# Patient Record
Sex: FEMALE | Race: WHITE | Employment: UNEMPLOYED | URBAN - METROPOLITAN AREA
[De-identification: names, ages, dates, MRNs, and addresses within clinical notes are randomized per-mention and may not be internally consistent; named-entity substitution may affect disease eponyms.]

---

## 2019-01-08 ENCOUNTER — HOSPITAL ENCOUNTER (OUTPATIENT)
Dept: MRI IMAGING | Age: 54
Discharge: HOME | End: 2019-01-10
Payer: COMMERCIAL

## 2019-01-08 ENCOUNTER — HOSPITAL ENCOUNTER (OUTPATIENT)
Dept: INTERVENTIONAL RADIOLOGY/VASCULAR | Age: 54
Discharge: HOME | End: 2019-01-08
Payer: COMMERCIAL

## 2019-01-08 DIAGNOSIS — G89.29 CHRONIC LEFT SHOULDER PAIN: ICD-10-CM

## 2019-01-08 DIAGNOSIS — M25.512 LEFT SHOULDER PAIN, UNSPECIFIED CHRONICITY: ICD-10-CM

## 2019-01-08 DIAGNOSIS — M25.512 CHRONIC LEFT SHOULDER PAIN: ICD-10-CM

## 2019-01-08 PROCEDURE — 77002 NEEDLE LOCALIZATION BY XRAY: CPT

## 2019-01-08 PROCEDURE — A9577 INJ MULTIHANCE: HCPCS | Performed by: RADIOLOGY

## 2019-01-08 PROCEDURE — 73222 MRI JOINT UPR EXTREM W/DYE: CPT

## 2019-01-08 PROCEDURE — 2500000003 HC RX 250 WO HCPCS: Performed by: RADIOLOGY

## 2019-01-08 PROCEDURE — 6360000004 HC RX CONTRAST MEDICATION: Performed by: RADIOLOGY

## 2019-01-08 PROCEDURE — 23350 INJECTION FOR SHOULDER X-RAY: CPT

## 2019-01-08 RX ORDER — LIDOCAINE HYDROCHLORIDE 10 MG/ML
10 INJECTION, SOLUTION INFILTRATION; PERINEURAL ONCE
Status: COMPLETED | OUTPATIENT
Start: 2019-01-08 | End: 2019-01-08

## 2019-01-08 RX ADMIN — GADOBENATE DIMEGLUMINE 1 ML: 529 INJECTION, SOLUTION INTRAVENOUS at 13:14

## 2019-01-08 RX ADMIN — IOPAMIDOL 3 ML: 408 INJECTION, SOLUTION INTRATHECAL at 13:14

## 2019-01-08 RX ADMIN — LIDOCAINE HYDROCHLORIDE 10 ML: 10 INJECTION, SOLUTION INFILTRATION; PERINEURAL at 13:13

## 2019-01-08 ASSESSMENT — PAIN SCALES - GENERAL: PAINLEVEL_OUTOF10: 10

## 2019-10-11 ENCOUNTER — TELEPHONE (OUTPATIENT)
Dept: FAMILY MEDICINE CLINIC | Age: 54
End: 2019-10-11

## 2019-10-16 ENCOUNTER — OFFICE VISIT (OUTPATIENT)
Dept: FAMILY MEDICINE CLINIC | Age: 54
End: 2019-10-16
Payer: MEDICAID

## 2019-10-16 ENCOUNTER — HOSPITAL ENCOUNTER (OUTPATIENT)
Age: 54
Discharge: HOME | End: 2019-10-18
Payer: MEDICAID

## 2019-10-16 VITALS
WEIGHT: 255 LBS | SYSTOLIC BLOOD PRESSURE: 136 MMHG | TEMPERATURE: 96.6 F | BODY MASS INDEX: 45.18 KG/M2 | DIASTOLIC BLOOD PRESSURE: 84 MMHG | OXYGEN SATURATION: 99 % | HEIGHT: 63 IN | HEART RATE: 88 BPM

## 2019-10-16 DIAGNOSIS — F32.A ANXIETY AND DEPRESSION: ICD-10-CM

## 2019-10-16 DIAGNOSIS — Z23 NEED FOR TDAP VACCINATION: ICD-10-CM

## 2019-10-16 DIAGNOSIS — E66.01 CLASS 3 SEVERE OBESITY DUE TO EXCESS CALORIES WITH BODY MASS INDEX (BMI) OF 45.0 TO 49.9 IN ADULT, UNSPECIFIED WHETHER SERIOUS COMORBIDITY PRESENT (HCC): ICD-10-CM

## 2019-10-16 DIAGNOSIS — Z12.31 ENCOUNTER FOR SCREENING MAMMOGRAM FOR BREAST CANCER: ICD-10-CM

## 2019-10-16 DIAGNOSIS — G89.29 CHRONIC RIGHT-SIDED LOW BACK PAIN WITH RIGHT-SIDED SCIATICA: ICD-10-CM

## 2019-10-16 DIAGNOSIS — M25.551 RIGHT HIP PAIN: ICD-10-CM

## 2019-10-16 DIAGNOSIS — Z76.89 ENCOUNTER TO ESTABLISH CARE: Primary | ICD-10-CM

## 2019-10-16 DIAGNOSIS — M54.41 CHRONIC RIGHT-SIDED LOW BACK PAIN WITH RIGHT-SIDED SCIATICA: ICD-10-CM

## 2019-10-16 DIAGNOSIS — G47.00 INSOMNIA, UNSPECIFIED TYPE: ICD-10-CM

## 2019-10-16 DIAGNOSIS — Z12.11 SCREEN FOR COLON CANCER: ICD-10-CM

## 2019-10-16 DIAGNOSIS — F41.9 ANXIETY AND DEPRESSION: ICD-10-CM

## 2019-10-16 PROBLEM — E66.813 CLASS 3 SEVERE OBESITY DUE TO EXCESS CALORIES WITH BODY MASS INDEX (BMI) OF 45.0 TO 49.9 IN ADULT: Status: ACTIVE | Noted: 2019-10-16

## 2019-10-16 LAB
ALBUMIN SERPL-MCNC: 4.7 G/DL (ref 3.5–5.2)
ALP BLD-CCNC: 80 U/L (ref 35–104)
ALT SERPL-CCNC: 20 U/L (ref 0–32)
ANION GAP SERPL CALCULATED.3IONS-SCNC: 17 MMOL/L (ref 7–16)
AST SERPL-CCNC: 22 U/L (ref 0–31)
BASOPHILS ABSOLUTE: 0.05 E9/L (ref 0–0.2)
BASOPHILS RELATIVE PERCENT: 0.9 % (ref 0–2)
BILIRUB SERPL-MCNC: 0.4 MG/DL (ref 0–1.2)
BUN BLDV-MCNC: 18 MG/DL (ref 6–20)
CALCIUM SERPL-MCNC: 10.2 MG/DL (ref 8.6–10.2)
CHLORIDE BLD-SCNC: 96 MMOL/L (ref 98–107)
CHOLESTEROL, TOTAL: 295 MG/DL (ref 0–199)
CO2: 29 MMOL/L (ref 22–29)
CREAT SERPL-MCNC: 1 MG/DL (ref 0.5–1)
EOSINOPHILS ABSOLUTE: 0.09 E9/L (ref 0.05–0.5)
EOSINOPHILS RELATIVE PERCENT: 1.6 % (ref 0–6)
GFR AFRICAN AMERICAN: >60
GFR NON-AFRICAN AMERICAN: 57 ML/MIN/1.73
GLUCOSE BLD-MCNC: 112 MG/DL (ref 74–99)
HBA1C MFR BLD: 5.7 % (ref 4–5.6)
HCT VFR BLD CALC: 48 % (ref 34–48)
HDLC SERPL-MCNC: 60 MG/DL
HEMOGLOBIN: 14.8 G/DL (ref 11.5–15.5)
IMMATURE GRANULOCYTES #: 0.01 E9/L
IMMATURE GRANULOCYTES %: 0.2 % (ref 0–5)
LDL CHOLESTEROL CALCULATED: 203 MG/DL (ref 0–99)
LYMPHOCYTES ABSOLUTE: 2.08 E9/L (ref 1.5–4)
LYMPHOCYTES RELATIVE PERCENT: 37.9 % (ref 20–42)
MCH RBC QN AUTO: 27.3 PG (ref 26–35)
MCHC RBC AUTO-ENTMCNC: 30.8 % (ref 32–34.5)
MCV RBC AUTO: 88.6 FL (ref 80–99.9)
MONOCYTES ABSOLUTE: 0.49 E9/L (ref 0.1–0.95)
MONOCYTES RELATIVE PERCENT: 8.9 % (ref 2–12)
NEUTROPHILS ABSOLUTE: 2.77 E9/L (ref 1.8–7.3)
NEUTROPHILS RELATIVE PERCENT: 50.5 % (ref 43–80)
PDW BLD-RTO: 13.9 FL (ref 11.5–15)
PLATELET # BLD: 302 E9/L (ref 130–450)
PMV BLD AUTO: 10.8 FL (ref 7–12)
POTASSIUM SERPL-SCNC: 4.2 MMOL/L (ref 3.5–5)
RBC # BLD: 5.42 E12/L (ref 3.5–5.5)
SODIUM BLD-SCNC: 142 MMOL/L (ref 132–146)
TOTAL PROTEIN: 8.1 G/DL (ref 6.4–8.3)
TRIGL SERPL-MCNC: 161 MG/DL (ref 0–149)
TSH SERPL DL<=0.05 MIU/L-ACNC: 4.09 UIU/ML (ref 0.27–4.2)
VLDLC SERPL CALC-MCNC: 32 MG/DL
WBC # BLD: 5.5 E9/L (ref 4.5–11.5)

## 2019-10-16 PROCEDURE — 80053 COMPREHEN METABOLIC PANEL: CPT

## 2019-10-16 PROCEDURE — 36415 COLL VENOUS BLD VENIPUNCTURE: CPT

## 2019-10-16 PROCEDURE — 90715 TDAP VACCINE 7 YRS/> IM: CPT | Performed by: FAMILY MEDICINE

## 2019-10-16 PROCEDURE — 90471 IMMUNIZATION ADMIN: CPT | Performed by: FAMILY MEDICINE

## 2019-10-16 PROCEDURE — G8484 FLU IMMUNIZE NO ADMIN: HCPCS | Performed by: FAMILY MEDICINE

## 2019-10-16 PROCEDURE — 80061 LIPID PANEL: CPT

## 2019-10-16 PROCEDURE — 1036F TOBACCO NON-USER: CPT | Performed by: FAMILY MEDICINE

## 2019-10-16 PROCEDURE — 99203 OFFICE O/P NEW LOW 30 MIN: CPT | Performed by: FAMILY MEDICINE

## 2019-10-16 PROCEDURE — 83036 HEMOGLOBIN GLYCOSYLATED A1C: CPT

## 2019-10-16 PROCEDURE — 86803 HEPATITIS C AB TEST: CPT

## 2019-10-16 PROCEDURE — G8427 DOCREV CUR MEDS BY ELIG CLIN: HCPCS | Performed by: FAMILY MEDICINE

## 2019-10-16 PROCEDURE — 86703 HIV-1/HIV-2 1 RESULT ANTBDY: CPT

## 2019-10-16 PROCEDURE — G8417 CALC BMI ABV UP PARAM F/U: HCPCS | Performed by: FAMILY MEDICINE

## 2019-10-16 PROCEDURE — 84443 ASSAY THYROID STIM HORMONE: CPT

## 2019-10-16 PROCEDURE — 3017F COLORECTAL CA SCREEN DOC REV: CPT | Performed by: FAMILY MEDICINE

## 2019-10-16 PROCEDURE — 85025 COMPLETE CBC W/AUTO DIFF WBC: CPT

## 2019-10-16 RX ORDER — CLONAZEPAM 1 MG/1
1 TABLET ORAL 2 TIMES DAILY PRN
Refills: 2 | COMMUNITY
Start: 2019-09-19

## 2019-10-16 RX ORDER — ZOLPIDEM TARTRATE 10 MG/1
TABLET ORAL
Refills: 2 | COMMUNITY
Start: 2019-09-28

## 2019-10-16 RX ORDER — BUPROPION HYDROCHLORIDE 300 MG/1
300 TABLET ORAL EVERY MORNING
Refills: 2 | COMMUNITY
Start: 2019-09-23 | End: 2021-01-13

## 2019-10-16 RX ORDER — DULOXETIN HYDROCHLORIDE 60 MG/1
60 CAPSULE, DELAYED RELEASE ORAL DAILY
Refills: 2 | COMMUNITY
Start: 2019-09-28

## 2019-10-16 RX ORDER — INDOMETHACIN 75 MG/1
CAPSULE, EXTENDED RELEASE ORAL
Refills: 3 | COMMUNITY
Start: 2019-09-30 | End: 2019-11-04

## 2019-10-16 SDOH — HEALTH STABILITY: MENTAL HEALTH: HOW MANY STANDARD DRINKS CONTAINING ALCOHOL DO YOU HAVE ON A TYPICAL DAY?: 1 OR 2

## 2019-10-16 SDOH — HEALTH STABILITY: MENTAL HEALTH: HOW OFTEN DO YOU HAVE A DRINK CONTAINING ALCOHOL?: MONTHLY OR LESS

## 2019-10-16 ASSESSMENT — ENCOUNTER SYMPTOMS
SORE THROAT: 0
ABDOMINAL PAIN: 0
NAUSEA: 0
RHINORRHEA: 0
VOMITING: 0
CONSTIPATION: 0
COUGH: 0
SINUS PAIN: 0
SHORTNESS OF BREATH: 0
DIARRHEA: 0
BACK PAIN: 1

## 2019-10-16 ASSESSMENT — PATIENT HEALTH QUESTIONNAIRE - PHQ9
SUM OF ALL RESPONSES TO PHQ QUESTIONS 1-9: 2
2. FEELING DOWN, DEPRESSED OR HOPELESS: 1
SUM OF ALL RESPONSES TO PHQ9 QUESTIONS 1 & 2: 2
SUM OF ALL RESPONSES TO PHQ QUESTIONS 1-9: 2
1. LITTLE INTEREST OR PLEASURE IN DOING THINGS: 1

## 2019-10-17 LAB
HEPATITIS C ANTIBODY INTERPRETATION: NORMAL
HIV-1 AND HIV-2 ANTIBODIES: NORMAL

## 2019-10-30 ENCOUNTER — OFFICE VISIT (OUTPATIENT)
Dept: FAMILY MEDICINE CLINIC | Age: 54
End: 2019-10-30
Payer: MEDICAID

## 2019-10-30 VITALS
HEART RATE: 76 BPM | OXYGEN SATURATION: 98 % | DIASTOLIC BLOOD PRESSURE: 84 MMHG | SYSTOLIC BLOOD PRESSURE: 136 MMHG | BODY MASS INDEX: 45.54 KG/M2 | HEIGHT: 63 IN | WEIGHT: 257 LBS | TEMPERATURE: 96.9 F

## 2019-10-30 DIAGNOSIS — M47.816 LUMBAR SPONDYLOSIS: ICD-10-CM

## 2019-10-30 DIAGNOSIS — R73.03 PREDIABETES: ICD-10-CM

## 2019-10-30 DIAGNOSIS — E66.01 CLASS 3 SEVERE OBESITY DUE TO EXCESS CALORIES WITH BODY MASS INDEX (BMI) OF 45.0 TO 49.9 IN ADULT, UNSPECIFIED WHETHER SERIOUS COMORBIDITY PRESENT (HCC): ICD-10-CM

## 2019-10-30 DIAGNOSIS — E78.2 MIXED HYPERLIPIDEMIA: Primary | ICD-10-CM

## 2019-10-30 DIAGNOSIS — M43.16 SPONDYLOLISTHESIS OF LUMBAR REGION: ICD-10-CM

## 2019-10-30 PROCEDURE — G8484 FLU IMMUNIZE NO ADMIN: HCPCS | Performed by: FAMILY MEDICINE

## 2019-10-30 PROCEDURE — 3017F COLORECTAL CA SCREEN DOC REV: CPT | Performed by: FAMILY MEDICINE

## 2019-10-30 PROCEDURE — 1036F TOBACCO NON-USER: CPT | Performed by: FAMILY MEDICINE

## 2019-10-30 PROCEDURE — G8427 DOCREV CUR MEDS BY ELIG CLIN: HCPCS | Performed by: FAMILY MEDICINE

## 2019-10-30 PROCEDURE — 99213 OFFICE O/P EST LOW 20 MIN: CPT | Performed by: FAMILY MEDICINE

## 2019-10-30 PROCEDURE — G8417 CALC BMI ABV UP PARAM F/U: HCPCS | Performed by: FAMILY MEDICINE

## 2019-10-30 RX ORDER — ATORVASTATIN CALCIUM 10 MG/1
10 TABLET, FILM COATED ORAL DAILY
Qty: 30 TABLET | Refills: 5 | Status: SHIPPED
Start: 2019-10-30 | End: 2020-04-29 | Stop reason: SDUPTHER

## 2019-11-01 PROBLEM — M47.816 LUMBAR SPONDYLOSIS: Status: ACTIVE | Noted: 2019-11-01

## 2019-11-01 PROBLEM — M43.16 SPONDYLOLISTHESIS OF LUMBAR REGION: Status: ACTIVE | Noted: 2019-11-01

## 2019-11-01 PROBLEM — R73.03 PREDIABETES: Status: ACTIVE | Noted: 2019-11-01

## 2019-11-01 ASSESSMENT — ENCOUNTER SYMPTOMS
SHORTNESS OF BREATH: 0
DIARRHEA: 0
NAUSEA: 0
CONSTIPATION: 0
BACK PAIN: 1
WHEEZING: 0
VOMITING: 0
COUGH: 0

## 2019-11-04 ENCOUNTER — OFFICE VISIT (OUTPATIENT)
Dept: PHYSICAL MEDICINE AND REHAB | Age: 54
End: 2019-11-04
Payer: MEDICAID

## 2019-11-04 VITALS
BODY MASS INDEX: 46.25 KG/M2 | SYSTOLIC BLOOD PRESSURE: 128 MMHG | WEIGHT: 261 LBS | DIASTOLIC BLOOD PRESSURE: 84 MMHG | HEART RATE: 82 BPM | HEIGHT: 63 IN

## 2019-11-04 DIAGNOSIS — M47.819 FACET ARTHROPATHY: ICD-10-CM

## 2019-11-04 DIAGNOSIS — M47.26 OTHER SPONDYLOSIS WITH RADICULOPATHY, LUMBAR REGION: ICD-10-CM

## 2019-11-04 DIAGNOSIS — M47.816 LUMBAR SPONDYLOSIS: Primary | ICD-10-CM

## 2019-11-04 DIAGNOSIS — M21.70 LEG LENGTH DISCREPANCY: ICD-10-CM

## 2019-11-04 PROCEDURE — G8427 DOCREV CUR MEDS BY ELIG CLIN: HCPCS | Performed by: PHYSICAL MEDICINE & REHABILITATION

## 2019-11-04 PROCEDURE — 3017F COLORECTAL CA SCREEN DOC REV: CPT | Performed by: PHYSICAL MEDICINE & REHABILITATION

## 2019-11-04 PROCEDURE — G8417 CALC BMI ABV UP PARAM F/U: HCPCS | Performed by: PHYSICAL MEDICINE & REHABILITATION

## 2019-11-04 PROCEDURE — G8484 FLU IMMUNIZE NO ADMIN: HCPCS | Performed by: PHYSICAL MEDICINE & REHABILITATION

## 2019-11-04 PROCEDURE — 99204 OFFICE O/P NEW MOD 45 MIN: CPT | Performed by: PHYSICAL MEDICINE & REHABILITATION

## 2019-11-04 PROCEDURE — 1036F TOBACCO NON-USER: CPT | Performed by: PHYSICAL MEDICINE & REHABILITATION

## 2019-11-11 ENCOUNTER — EVALUATION (OUTPATIENT)
Dept: PHYSICAL THERAPY | Age: 54
End: 2019-11-11
Payer: MEDICAID

## 2019-11-11 DIAGNOSIS — M47.819 FACET ARTHROPATHY: ICD-10-CM

## 2019-11-11 DIAGNOSIS — M47.26 OTHER SPONDYLOSIS WITH RADICULOPATHY, LUMBAR REGION: ICD-10-CM

## 2019-11-11 DIAGNOSIS — M47.816 LUMBAR SPONDYLOSIS: Primary | ICD-10-CM

## 2019-11-11 DIAGNOSIS — M21.70 LEG LENGTH DISCREPANCY: ICD-10-CM

## 2019-11-11 PROCEDURE — 97163 PT EVAL HIGH COMPLEX 45 MIN: CPT | Performed by: PHYSICAL THERAPIST

## 2019-11-13 ENCOUNTER — TREATMENT (OUTPATIENT)
Dept: PHYSICAL THERAPY | Age: 54
End: 2019-11-13
Payer: MEDICAID

## 2019-11-13 DIAGNOSIS — M47.816 LUMBAR SPONDYLOSIS: Primary | ICD-10-CM

## 2019-11-13 PROCEDURE — 97110 THERAPEUTIC EXERCISES: CPT | Performed by: PHYSICAL THERAPIST

## 2019-11-13 PROCEDURE — G0283 ELEC STIM OTHER THAN WOUND: HCPCS | Performed by: PHYSICAL THERAPIST

## 2019-11-18 ENCOUNTER — TREATMENT (OUTPATIENT)
Dept: PHYSICAL THERAPY | Age: 54
End: 2019-11-18
Payer: MEDICAID

## 2019-11-18 DIAGNOSIS — M47.816 LUMBAR SPONDYLOSIS: Primary | ICD-10-CM

## 2019-11-18 PROCEDURE — 97530 THERAPEUTIC ACTIVITIES: CPT | Performed by: PHYSICAL THERAPIST

## 2019-11-18 PROCEDURE — G0283 ELEC STIM OTHER THAN WOUND: HCPCS | Performed by: PHYSICAL THERAPIST

## 2019-11-18 PROCEDURE — 97110 THERAPEUTIC EXERCISES: CPT | Performed by: PHYSICAL THERAPIST

## 2019-11-22 ENCOUNTER — TREATMENT (OUTPATIENT)
Dept: PHYSICAL THERAPY | Age: 54
End: 2019-11-22
Payer: MEDICAID

## 2019-11-22 DIAGNOSIS — M47.816 LUMBAR SPONDYLOSIS: Primary | ICD-10-CM

## 2019-11-22 PROCEDURE — G0283 ELEC STIM OTHER THAN WOUND: HCPCS | Performed by: PHYSICAL THERAPIST

## 2019-11-25 ENCOUNTER — TELEPHONE (OUTPATIENT)
Dept: PHYSICAL MEDICINE AND REHAB | Age: 54
End: 2019-11-25

## 2019-11-27 ENCOUNTER — TREATMENT (OUTPATIENT)
Dept: PHYSICAL THERAPY | Age: 54
End: 2019-11-27
Payer: MEDICAID

## 2019-11-27 DIAGNOSIS — M47.816 LUMBAR SPONDYLOSIS: Primary | ICD-10-CM

## 2019-11-27 DIAGNOSIS — M47.26 OTHER SPONDYLOSIS WITH RADICULOPATHY, LUMBAR REGION: ICD-10-CM

## 2019-11-27 DIAGNOSIS — M47.819 FACET ARTHROPATHY: ICD-10-CM

## 2019-11-27 DIAGNOSIS — M21.70 LEG LENGTH DISCREPANCY: ICD-10-CM

## 2019-11-27 PROCEDURE — 97110 THERAPEUTIC EXERCISES: CPT

## 2019-11-27 PROCEDURE — G0283 ELEC STIM OTHER THAN WOUND: HCPCS

## 2019-11-27 PROCEDURE — 97164 PT RE-EVAL EST PLAN CARE: CPT | Performed by: PHYSICAL THERAPIST

## 2019-12-10 ENCOUNTER — OFFICE VISIT (OUTPATIENT)
Dept: PHYSICAL MEDICINE AND REHAB | Age: 54
End: 2019-12-10
Payer: MEDICAID

## 2019-12-10 VITALS
HEIGHT: 62 IN | SYSTOLIC BLOOD PRESSURE: 130 MMHG | HEART RATE: 86 BPM | WEIGHT: 257 LBS | DIASTOLIC BLOOD PRESSURE: 96 MMHG | BODY MASS INDEX: 47.29 KG/M2

## 2019-12-10 DIAGNOSIS — M21.70 LEG LENGTH DISCREPANCY: ICD-10-CM

## 2019-12-10 DIAGNOSIS — M54.17 RADICULOPATHY, LUMBOSACRAL REGION: Primary | ICD-10-CM

## 2019-12-10 DIAGNOSIS — M47.26 OTHER SPONDYLOSIS WITH RADICULOPATHY, LUMBAR REGION: ICD-10-CM

## 2019-12-10 PROCEDURE — G8427 DOCREV CUR MEDS BY ELIG CLIN: HCPCS | Performed by: PHYSICAL MEDICINE & REHABILITATION

## 2019-12-10 PROCEDURE — 1036F TOBACCO NON-USER: CPT | Performed by: PHYSICAL MEDICINE & REHABILITATION

## 2019-12-10 PROCEDURE — G8417 CALC BMI ABV UP PARAM F/U: HCPCS | Performed by: PHYSICAL MEDICINE & REHABILITATION

## 2019-12-10 PROCEDURE — G8484 FLU IMMUNIZE NO ADMIN: HCPCS | Performed by: PHYSICAL MEDICINE & REHABILITATION

## 2019-12-10 PROCEDURE — 99214 OFFICE O/P EST MOD 30 MIN: CPT | Performed by: PHYSICAL MEDICINE & REHABILITATION

## 2019-12-10 PROCEDURE — 3017F COLORECTAL CA SCREEN DOC REV: CPT | Performed by: PHYSICAL MEDICINE & REHABILITATION

## 2019-12-10 RX ORDER — GABAPENTIN 300 MG/1
CAPSULE ORAL
Qty: 90 CAPSULE | Refills: 2 | Status: SHIPPED
Start: 2019-12-10 | End: 2020-02-24

## 2019-12-10 RX ORDER — CYCLOBENZAPRINE HCL 5 MG
5 TABLET ORAL NIGHTLY PRN
Qty: 30 TABLET | Refills: 2 | Status: SHIPPED | OUTPATIENT
Start: 2019-12-10 | End: 2020-01-09

## 2019-12-18 ENCOUNTER — TREATMENT (OUTPATIENT)
Dept: PHYSICAL THERAPY | Age: 54
End: 2019-12-18
Payer: MEDICAID

## 2019-12-18 DIAGNOSIS — M47.816 LUMBAR SPONDYLOSIS: Primary | ICD-10-CM

## 2019-12-18 PROCEDURE — G0283 ELEC STIM OTHER THAN WOUND: HCPCS

## 2019-12-18 PROCEDURE — 97110 THERAPEUTIC EXERCISES: CPT

## 2019-12-19 ENCOUNTER — HOSPITAL ENCOUNTER (OUTPATIENT)
Age: 54
Setting detail: OUTPATIENT SURGERY
Discharge: HOME | End: 2019-12-19
Attending: PHYSICAL MEDICINE & REHABILITATION | Admitting: PHYSICAL MEDICINE & REHABILITATION
Payer: MEDICAID

## 2019-12-19 VITALS
DIASTOLIC BLOOD PRESSURE: 76 MMHG | HEART RATE: 70 BPM | RESPIRATION RATE: 16 BRPM | SYSTOLIC BLOOD PRESSURE: 144 MMHG | OXYGEN SATURATION: 95 %

## 2019-12-19 DIAGNOSIS — M54.50 LUMBAR BACK PAIN: ICD-10-CM

## 2019-12-19 PROBLEM — M54.16 LUMBAR RADICULITIS: Status: ACTIVE | Noted: 2019-12-19

## 2019-12-19 PROCEDURE — 64483 NJX AA&/STRD TFRM EPI L/S 1: CPT | Performed by: PHYSICAL MEDICINE & REHABILITATION

## 2019-12-19 PROCEDURE — 7100000011 HC PHASE II RECOVERY - ADDTL 15 MIN: Performed by: PHYSICAL MEDICINE & REHABILITATION

## 2019-12-19 PROCEDURE — 2580000003 HC RX 258: Performed by: PHYSICAL MEDICINE & REHABILITATION

## 2019-12-19 PROCEDURE — 6360000004 HC RX CONTRAST MEDICATION: Performed by: PHYSICAL MEDICINE & REHABILITATION

## 2019-12-19 PROCEDURE — 2500000003 HC RX 250 WO HCPCS: Performed by: PHYSICAL MEDICINE & REHABILITATION

## 2019-12-19 PROCEDURE — 3600000005 HC SURGERY LEVEL 5 BASE: Performed by: PHYSICAL MEDICINE & REHABILITATION

## 2019-12-19 PROCEDURE — 3600000015 HC SURGERY LEVEL 5 ADDTL 15MIN: Performed by: PHYSICAL MEDICINE & REHABILITATION

## 2019-12-19 PROCEDURE — 7100000010 HC PHASE II RECOVERY - FIRST 15 MIN: Performed by: PHYSICAL MEDICINE & REHABILITATION

## 2019-12-19 PROCEDURE — 2709999900 HC NON-CHARGEABLE SUPPLY: Performed by: PHYSICAL MEDICINE & REHABILITATION

## 2019-12-19 PROCEDURE — 6360000002 HC RX W HCPCS: Performed by: PHYSICAL MEDICINE & REHABILITATION

## 2019-12-19 RX ORDER — LIDOCAINE HYDROCHLORIDE 10 MG/ML
INJECTION, SOLUTION EPIDURAL; INFILTRATION; INTRACAUDAL; PERINEURAL PRN
Status: DISCONTINUED | OUTPATIENT
Start: 2019-12-19 | End: 2019-12-19 | Stop reason: ALTCHOICE

## 2019-12-19 ASSESSMENT — PAIN - FUNCTIONAL ASSESSMENT: PAIN_FUNCTIONAL_ASSESSMENT: 0-10

## 2019-12-19 ASSESSMENT — PAIN DESCRIPTION - DESCRIPTORS: DESCRIPTORS: ACHING

## 2019-12-19 ASSESSMENT — PAIN SCALES - GENERAL
PAINLEVEL_OUTOF10: 2
PAINLEVEL_OUTOF10: 4

## 2019-12-19 ASSESSMENT — PAIN DESCRIPTION - LOCATION: LOCATION: BACK

## 2019-12-19 ASSESSMENT — PAIN DESCRIPTION - PAIN TYPE: TYPE: CHRONIC PAIN

## 2019-12-23 ENCOUNTER — TREATMENT (OUTPATIENT)
Dept: PHYSICAL THERAPY | Age: 54
End: 2019-12-23
Payer: MEDICAID

## 2019-12-23 DIAGNOSIS — M47.816 LUMBAR SPONDYLOSIS: Primary | ICD-10-CM

## 2019-12-23 PROCEDURE — G0283 ELEC STIM OTHER THAN WOUND: HCPCS

## 2019-12-23 PROCEDURE — 97110 THERAPEUTIC EXERCISES: CPT

## 2019-12-27 ENCOUNTER — TREATMENT (OUTPATIENT)
Dept: PHYSICAL THERAPY | Age: 54
End: 2019-12-27
Payer: MEDICAID

## 2019-12-27 DIAGNOSIS — M47.816 LUMBAR SPONDYLOSIS: Primary | ICD-10-CM

## 2019-12-27 PROCEDURE — 97110 THERAPEUTIC EXERCISES: CPT | Performed by: PHYSICAL THERAPIST

## 2019-12-27 PROCEDURE — G0283 ELEC STIM OTHER THAN WOUND: HCPCS | Performed by: PHYSICAL THERAPIST

## 2020-01-07 ENCOUNTER — TREATMENT (OUTPATIENT)
Dept: PHYSICAL THERAPY | Age: 55
End: 2020-01-07
Payer: MEDICAID

## 2020-01-07 PROCEDURE — 97110 THERAPEUTIC EXERCISES: CPT

## 2020-01-07 PROCEDURE — G0283 ELEC STIM OTHER THAN WOUND: HCPCS

## 2020-01-13 ENCOUNTER — OFFICE VISIT (OUTPATIENT)
Dept: PHYSICAL MEDICINE AND REHAB | Age: 55
End: 2020-01-13
Payer: MEDICAID

## 2020-01-13 VITALS
HEART RATE: 95 BPM | SYSTOLIC BLOOD PRESSURE: 130 MMHG | WEIGHT: 269 LBS | HEIGHT: 63 IN | BODY MASS INDEX: 47.66 KG/M2 | DIASTOLIC BLOOD PRESSURE: 83 MMHG

## 2020-01-13 PROCEDURE — G8484 FLU IMMUNIZE NO ADMIN: HCPCS | Performed by: PHYSICAL MEDICINE & REHABILITATION

## 2020-01-13 PROCEDURE — 1036F TOBACCO NON-USER: CPT | Performed by: PHYSICAL MEDICINE & REHABILITATION

## 2020-01-13 PROCEDURE — 3017F COLORECTAL CA SCREEN DOC REV: CPT | Performed by: PHYSICAL MEDICINE & REHABILITATION

## 2020-01-13 PROCEDURE — G8427 DOCREV CUR MEDS BY ELIG CLIN: HCPCS | Performed by: PHYSICAL MEDICINE & REHABILITATION

## 2020-01-13 PROCEDURE — 99214 OFFICE O/P EST MOD 30 MIN: CPT | Performed by: PHYSICAL MEDICINE & REHABILITATION

## 2020-01-13 PROCEDURE — G8417 CALC BMI ABV UP PARAM F/U: HCPCS | Performed by: PHYSICAL MEDICINE & REHABILITATION

## 2020-01-13 NOTE — H&P
Eriberto Sanchez, [address] [facility] Physical Medicine and Rehabilitation  [address] [facility] [facility] [place]  Phone: [phone]  Fax: [phone]    PCP: Ben Goyal DO  Date of visit: 1/13/20    Chief Complaint   Patient presents with    Back Pain     2 week follow up after santosh     Interval:   Patient presents today for injection follow up. She underwent right L3-4 TFESI and reports 100% relief in her right hip and leg pain. Today she complains of right low back pain over the SI joint. The pain is rated Pain Score:   2, is described as sharp. The pain is better with sitting in recliner with heating pad. The pain is worse with staying in one position too long. There is associated tingling in right leg- improved. There is no weakness. There is no bowel/bladder changes. The prior workup has included: Xray, MRI L Spine     The prior treatment has included:  PT: currently   Chiropractic: none    Modalities: heat with relief   OTC Tylenol: yes with no relief   NSAIDS: ibuprofen, indocin with no relief, voltaren with relief  Opioids: none    Membrane stabilizers: neurontin 300mg TID with relief   Muscle relaxers: flexeril with relief   Previous injections: right L3-4 TFESI    Previous surgery at this site: none    No Known Allergies    Current Outpatient Medications   Medication Sig Dispense Refill    gabapentin (NEURONTIN) 300 MG capsule Take 1 capsule by mouth nightly for 3 days, THEN 1 capsule 2 times daily for 3 days, THEN 1 capsule 3 times daily for 30 days.  90 capsule 2    diclofenac (VOLTAREN) 50 MG EC tablet Take 1 tablet by mouth 2 times daily as needed for Pain 60 tablet 3    atorvastatin (LIPITOR) 10 MG tablet Take 1 tablet by mouth daily 30 tablet 5    buPROPion (WELLBUTRIN XL) 300 MG extended release tablet TAKE 1 TABLET BY MOUTH ONCE DAILY  2    clonazePAM (KLONOPIN) 1 MG tablet TAKE 1 TABLET BY MOUTH THREE TIMES DAILY AS NEEDED  2    DULoxetine (CYMBALTA) 60 MG disturbance, anxiety, depression    Hematologic/Lymphatic/Immunologic: Denies bruising       Physical Exam:   Blood pressure 130/83, pulse 95, height 5' 3\" (1.6 m), weight 269 lb (122 kg). General: well developed and well nourished in no acute distress. Body habitus is morbidly obese  HEENT: No rhinorrhea, sneezing, yawning, or lacrimation. No scleral icterus or conjunctival injection. Resp: symmetrical chest expansion, unlabored breathing, respirations unlabored. CV: Heart rate is regular. Peripheral pulses are palpable  Lymph: No visible regional lymphadenopathy. Skin: No rashes or ecchymosis. Normal turgor. Psych: Mood is calm. Affect is normal.   Ext: No edema noted     MSK:   Back/Hip Exam:   Inspection: Pelvis was asymmetric. Lumbar lordotic curvature was decreased. There was no scoliosis. No ecchymoses or erythema. Palpation: Palpatory exam revealed no tenderness along right lumbosacral paraspinals, no ttp midline spine  ttp right SI joint sulcus, no ttp greater trochanters and TFL on both side. There was paraspinal spasms. There were no trigger points. ROM: ROM decreased  Special/provocative testing:   Supine SLR negative   negative FABERS. Positive Gaenslen's   There was leg length discrepancy     Neurological Exam:  Strength:   R  L  Hip Flex  5  5  Knee Ext  5  5  Ankle dorsi  4+  5  EHL   5 5  Ankle Plantar  5  5    Sensory:  Intact for light touch in all lower extremity dermatomes. Reflexes:   R  L  Patellar  (0) (2+)  Ankle Jerk  (2+) (2+)    Gait is Antalgic. Right foot externally rotated      Imaging: (personally reviewed by me 01/13/20)  Lumbar x-ray     MRI L Spine      BONE MARROW SIGNAL: There is normal marrow signal.       T12-L1: Normal T12-L1.       L1-L2: Normal L1-L2.       L2-L3: Facet hypertrophy noted without canal or foraminal stenosis.       L3-L4: Grade 1 listhesis of L3 noted with a disc bulge/pseudobulge.    Mild canal stenosis noted with moderate left and severe

## 2020-01-13 NOTE — PROGRESS NOTES
Darryl Hull, [address] [facility] Physical Medicine and Rehabilitation  [address] [facility] [place]  Phone: [phone]  Fax: [phone]    PCP: Lev Olivia DO  Date of visit: 1/13/20    Chief Complaint   Patient presents with    Back Pain     2 week follow up after santosh     Interval:   Patient presents today for injection follow up. She underwent right L3-4 TFESI and reports 100% relief in her right hip and leg pain. Today she complains of right low back pain over the SI joint. The pain is rated Pain Score:   2, is described as sharp. The pain is better with sitting in recliner with heating pad. The pain is worse with staying in one position too long. There is associated tingling in right leg- improved. There is no weakness. There is no bowel/bladder changes. The prior workup has included: Xray, MRI L Spine     The prior treatment has included:  PT: currently   Chiropractic: none    Modalities: heat with relief   OTC Tylenol: yes with no relief   NSAIDS: ibuprofen, indocin with no relief, voltaren with relief  Opioids: none    Membrane stabilizers: neurontin 300mg TID with relief   Muscle relaxers: flexeril with relief   Previous injections: right L3-4 TFESI    Previous surgery at this site: none    No Known Allergies    Current Outpatient Medications   Medication Sig Dispense Refill    gabapentin (NEURONTIN) 300 MG capsule Take 1 capsule by mouth nightly for 3 days, THEN 1 capsule 2 times daily for 3 days, THEN 1 capsule 3 times daily for 30 days.  90 capsule 2    diclofenac (VOLTAREN) 50 MG EC tablet Take 1 tablet by mouth 2 times daily as needed for Pain 60 tablet 3    atorvastatin (LIPITOR) 10 MG tablet Take 1 tablet by mouth daily 30 tablet 5    buPROPion (WELLBUTRIN XL) 300 MG extended release tablet TAKE 1 TABLET BY MOUTH ONCE DAILY  2    clonazePAM (KLONOPIN) 1 MG tablet TAKE 1 TABLET BY MOUTH THREE TIMES DAILY AS NEEDED  2    DULoxetine (CYMBALTA) 60 MG extended release capsule TAKE 2 CAPSULES BY MOUTH ONCE DAILY AS DIRECTED  2    zolpidem (AMBIEN) 10 MG tablet TAKE 1 2 (ONE HALF) TO 1 WHOLE TABLET BY MOUTH AT BEDTIME AS DIRECETED ON EMPTY STOMACH  2    Probiotic Product (PROBIOTIC-10 PO) Take by mouth       No current facility-administered medications for this visit. Past Medical History:   Diagnosis Date    Anxiety     Depression     Obesity     Osteoarthritis        Past Surgical History:   Procedure Laterality Date    ANESTHESIA NERVE BLOCK Right 12/19/2019    RIGHT L3-4 TRANSFORAMINAL EPIDURAL STEROID INJECTION (CPT 60297) performed by Sharmaine Mayes DO at [address] [place] [address] Right 2015    NERVE BLOCK Right 12/19/2019    lumbar trasnforaminal    ROTATOR CUFF REPAIR Right 2000    SHOULDER SURGERY Left 2019       Family History   Problem Relation Age of Onset    Diabetes Mother     Atrial Fibrillation Mother     High Blood Pressure Mother     High Cholesterol Mother     Diabetes Father     Breast Cancer Maternal Grandmother        Social History     Tobacco Use    Smoking status: Never Smoker    Smokeless tobacco: Never Used   Substance Use Topics    Alcohol use: Yes     Frequency: Monthly or less     Drinks per session: 1 or 2     Binge frequency: Never    Drug use: Never        Functional Status: The patient is able to ambulate and perform activities of daily living with the use of an assistive device- cane. ROS:    Constitutional: Denies fevers, chills, night sweats, unintentional weight loss     Skin: Denies rash or skin changes     Eyes: Denies vision changes    Ears/Nose/Throat: Denies nasal congestion or sore throat     Respiratory: Denies SOB or cough     Cardiovascular: Denies CP, palpitations, edema      Gastrointestinal: Denies abdominal pain,  N/V, constipation, or diarrhea    Genitourinary: Denies urinary symptoms    Neurologic: See HPI.     MSK: See HPI.      Psychiatric: + sleep right   foraminal stenosis. Synovial facet joint effusions are seen   bilaterally.       L4-L5: Minimal retrolisthesis of L4 with facet hypertrophy. Severe   left and mild right foraminal stenosis without canal stenosis.       L5-S1: Facet hypertrophy without canal or foraminal stenosis.       SOFT TISSUES: The visualized paravertebral soft tissues are within   normal limits.           Impression   1. Grade 1 spondylolisthesis of L3 with severe facet synovitis. 2. Multilevel canal and foraminal stenosis most severe on the left   from L3-4 through L5-S1 as discussed. Impression:   Sukhdev Manuel is a 62 y.o. female     1. Sacroiliitis (HCC)    2. Radiculopathy, lumbosacral region    3. Other spondylosis with radiculopathy, lumbar region        Plan:   · Continue HEP   · Patient would benefit from right SI joint injection under x-ray guidance. Procedure risks, benefits and alternatives were discussed. Patient would like to proceed. · Continue neurontin 300mg TID  · Continue flexeril 5mg qhs PRN   · Continue voltaren.  The patient was educated about the diagnosis, prognosis, indications, risks and benefits of treatment. An opportunity to ask questions was given to the patient and questions were answered. The patient agreed to proceed with the recommended treatment as described above.       Follow up after injection       Janes Steen DO, [facility]   Board Certified Physical Medicine and Rehabilitation

## 2020-01-14 ENCOUNTER — TREATMENT (OUTPATIENT)
Dept: PHYSICAL THERAPY | Age: 55
End: 2020-01-14
Payer: MEDICAID

## 2020-01-14 PROCEDURE — G0283 ELEC STIM OTHER THAN WOUND: HCPCS | Performed by: PHYSICAL THERAPIST

## 2020-01-14 NOTE — PROGRESS NOTES
Physical Therapy Treatment Note    Date: 2020  Patient Name: Lizzy JOHNSON [facility])  : 1965   MRN: [id number]  DOInjury: 5 years ago   Referring Provider:  Sharmaine Mayes DO    Medical Diagnosis:   Lumbar spondylosis       2. Other spondylosis with radiculopathy, lumbar region      3. Leg length discrepancy      4. Facet arthropathy       Modified Oswestry 54% at mid point. S: Pt reports 5/10 pain today. States Dr aggravated LB yesterday during examination and today she is tight and really sore with movement   O:  Time 8414-9265     Visit 10/12 Repeat outcome measure at mid point and end. Pain 5/10     ROM      Modalities      MH + ES X 20 min prone  MO   Stretch      PPT   NR   SKTC Do not do due to sharp anterior R hip pain. TE   DKTC Do not do due to sharp anterior R hip pain. TE   Seated flexion 5 x 10 sec  TE      TE   Exercise      Nustep  or Do not do if is reproduces anterior or lateral hip pain  TE   Bike  or X 10 min TE        ROWS: H  TA   ROWS: M  TA   ROWS: L  TA   Obliques - high  TA   Obliques - low     Lawnmower Pulls  TA   Standing rectus pull down  TA   Squats  TA     TA   S-lying hip ABD isometrics  Pillow between knees    Clamshell - isometric      Bridging                     TA      TA               A:  Tolerated well. Felt some relief with HP and IFC. Discussed anatomy, physiology, body mechanics, principles of loading, and progressive loading/activity.   P: Continue with rehab plan  Madelaine Metzger PTA, ATC    Treatment Charges: Mins Units   Initial Evaluation     Re-Evaluation     Ther Exercise         TE     Manual Therapy     MT     Ther Activities        TA     Gait Training          GT     Neuro Re-education NR     Modalities 20 1   Non-Billable Service Time     Other     Total Time/Units 20 1

## 2020-01-16 ENCOUNTER — HOSPITAL ENCOUNTER (OUTPATIENT)
Dept: OPERATING ROOM | Age: 55
Setting detail: OUTPATIENT SURGERY
Discharge: HOME | End: 2020-01-16
Attending: PHYSICAL MEDICINE & REHABILITATION
Payer: MEDICAID

## 2020-01-16 ENCOUNTER — HOSPITAL ENCOUNTER (OUTPATIENT)
Age: 55
Setting detail: OUTPATIENT SURGERY
Discharge: HOME | End: 2020-01-16
Attending: PHYSICAL MEDICINE & REHABILITATION | Admitting: PHYSICAL MEDICINE & REHABILITATION
Payer: MEDICAID

## 2020-01-16 VITALS
RESPIRATION RATE: 16 BRPM | HEART RATE: 79 BPM | OXYGEN SATURATION: 97 % | SYSTOLIC BLOOD PRESSURE: 141 MMHG | DIASTOLIC BLOOD PRESSURE: 94 MMHG

## 2020-01-16 PROBLEM — M46.1 SACROILIITIS (HCC): Status: ACTIVE | Noted: 2020-01-16

## 2020-01-16 PROCEDURE — 6360000002 HC RX W HCPCS: Performed by: PHYSICAL MEDICINE & REHABILITATION

## 2020-01-16 PROCEDURE — 7100000011 HC PHASE II RECOVERY - ADDTL 15 MIN: Performed by: PHYSICAL MEDICINE & REHABILITATION

## 2020-01-16 PROCEDURE — 2709999900 HC NON-CHARGEABLE SUPPLY: Performed by: PHYSICAL MEDICINE & REHABILITATION

## 2020-01-16 PROCEDURE — 3600000005 HC SURGERY LEVEL 5 BASE: Performed by: PHYSICAL MEDICINE & REHABILITATION

## 2020-01-16 PROCEDURE — 7100000010 HC PHASE II RECOVERY - FIRST 15 MIN: Performed by: PHYSICAL MEDICINE & REHABILITATION

## 2020-01-16 PROCEDURE — 2500000003 HC RX 250 WO HCPCS: Performed by: PHYSICAL MEDICINE & REHABILITATION

## 2020-01-16 PROCEDURE — 6360000004 HC RX CONTRAST MEDICATION: Performed by: PHYSICAL MEDICINE & REHABILITATION

## 2020-01-16 PROCEDURE — 3209999900 FLUORO FOR SURGICAL PROCEDURES

## 2020-01-16 PROCEDURE — 27096 INJECT SACROILIAC JOINT: CPT | Performed by: PHYSICAL MEDICINE & REHABILITATION

## 2020-01-16 RX ORDER — LIDOCAINE HYDROCHLORIDE 10 MG/ML
INJECTION, SOLUTION EPIDURAL; INFILTRATION; INTRACAUDAL; PERINEURAL PRN
Status: DISCONTINUED | OUTPATIENT
Start: 2020-01-16 | End: 2020-01-16 | Stop reason: ALTCHOICE

## 2020-01-16 ASSESSMENT — PAIN DESCRIPTION - DESCRIPTORS: DESCRIPTORS: ACHING

## 2020-01-16 ASSESSMENT — PAIN SCALES - GENERAL: PAINLEVEL_OUTOF10: 0

## 2020-01-16 ASSESSMENT — PAIN - FUNCTIONAL ASSESSMENT: PAIN_FUNCTIONAL_ASSESSMENT: 0-10

## 2020-01-16 NOTE — OP NOTE
SACROILIAC JOINT ARTHROGRAM AND STEROID INJECTION     WITH FLUOROSCOPIC GUIDANCE    Patient: Yari Stallworth                                                    MRN: [id number]  : 1965                                             Date of procedure: 2020    Physician Performing Procedure: Ольга Pedraza DO    Clinical Scenario: As per our office notes. Diagnosis: sacroiliitis     Injectate: A total of 2mL, consisting of 1mL of Kenalog (40mg/mL), the remainder comprised of 2% Lidocaine. Levels Treated: right Sacroiliac Joint    Approach:  Posterior     Improvement after today's procedure: As per nursing record. Comments: none    Pre-procedural evaluation: The patient was examined today just prior to performing the procedure listed above. The patient's heart rate was normal and rhythm was regular, lungs were clear to auscultation bilaterally, peripheral pulses were intact in the lower limbs, strength and sensation were preserved in the lower limbs, and reflexes were symmetric in the lower limbs. Procedure: The patient was prepped and draped in a sterile fashion in the prone position after informed consent was signed and all patient questions were answered including the risks, benefits, alternative treatment options, and prognosis. The risks include but are not limited to infection, allergic reaction, nerve damage, paralysis, epidural hematoma, syncope, headache, respiratory or cardiac arrest, and scar formation. The fluoroscopic C-arm was positioned for optimal visualization of the sacroiliac (SI) joint. The inferior portion of the above SI joint was localized under fluoroscopic visualization and 3cc of 1% Lidocaine without Epinephrine was utilized for soft tissue local anesthesia. A 22 gauge spinal needle was inserted into the fluoroscopically hyperlucent region within the SI joint.   A 0.5 cc. volume of Isovue was injected into the joint and a partial arthrogram flow pattern was obtained. Spot films were obtained. The steroid/anesthetic solution noted above was then injected into the SI joint. The patient tolerated the procedure well and was discharged after an appropriate period of observation. If there are any complications, the patient was instructed to call us. The patient is to follow-up with the requesting physician after the injection, preferably within one month.

## 2020-01-16 NOTE — H&P
Yan Guthrie, [address] [facility] Physical Medicine and Rehabilitation  [address] [facility] [place]  Phone: [phone]  Fax: [phone]     PCP: Ata Quinones DO  Date of visit: 1/13/20          Chief Complaint   Patient presents with    Back Pain       2 week follow up after santosh      Interval:   Patient presents today for injection follow up. She underwent right L3-4 TFESI and reports 100% relief in her right hip and leg pain. Today she complains of right low back pain over the SI joint. The pain is rated Pain Score:   2, is described as sharp. The pain is better with sitting in recliner with heating pad. The pain is worse with staying in one position too long. There is associated tingling in right leg- improved. There is no weakness. There is no bowel/bladder changes.      The prior workup has included: Xray, MRI L Spine      The prior treatment has included:  PT: currently   Chiropractic: none    Modalities: heat with relief   OTC Tylenol: yes with no relief   NSAIDS: ibuprofen, indocin with no relief, voltaren with relief  Opioids: none    Membrane stabilizers: neurontin 300mg TID with relief   Muscle relaxers: flexeril with relief   Previous injections: right L3-4 TFESI    Previous surgery at this site: none     No Known Allergies            Current Outpatient Medications   Medication Sig Dispense Refill    gabapentin (NEURONTIN) 300 MG capsule Take 1 capsule by mouth nightly for 3 days, THEN 1 capsule 2 times daily for 3 days, THEN 1 capsule 3 times daily for 30 days.  90 capsule 2    diclofenac (VOLTAREN) 50 MG EC tablet Take 1 tablet by mouth 2 times daily as needed for Pain 60 tablet 3    atorvastatin (LIPITOR) 10 MG tablet Take 1 tablet by mouth daily 30 tablet 5    buPROPion (WELLBUTRIN XL) 300 MG extended release tablet TAKE 1 TABLET BY MOUTH ONCE DAILY   2    clonazePAM (KLONOPIN) 1 MG tablet TAKE 1 TABLET BY MOUTH THREE TIMES DAILY AS NEEDED   2    DULoxetine (CYMBALTA) 60 MG extended release capsule TAKE 2 CAPSULES BY MOUTH ONCE DAILY AS DIRECTED   2    zolpidem (AMBIEN) 10 MG tablet TAKE 1 2 (ONE HALF) TO 1 WHOLE TABLET BY MOUTH AT BEDTIME AS DIRECETED ON EMPTY STOMACH   2    Probiotic Product (PROBIOTIC-10 PO) Take by mouth          No current facility-administered medications for this visit.               Past Medical History:   Diagnosis Date    Anxiety      Depression      Obesity      Osteoarthritis                 Past Surgical History:   Procedure Laterality Date    ANESTHESIA NERVE BLOCK Right 12/19/2019     RIGHT L3-4 TRANSFORAMINAL EPIDURAL STEROID INJECTION (CPT 91787) performed by Jose Calle DO at [address] [place] [address] Right 2015    NERVE BLOCK Right 12/19/2019     lumbar trasnforaminal    ROTATOR CUFF REPAIR Right 2000    SHOULDER SURGERY Left 2019               Family History   Problem Relation Age of Onset    Diabetes Mother      Atrial Fibrillation Mother      High Blood Pressure Mother      High Cholesterol Mother      Diabetes Father      Breast Cancer Maternal Grandmother           Social History            Tobacco Use    Smoking status: Never Smoker    Smokeless tobacco: Never Used   Substance Use Topics    Alcohol use: Yes       Frequency: Monthly or less       Drinks per session: 1 or 2       Binge frequency: Never    Drug use: Never         Functional Status: The patient is able to ambulate and perform activities of daily living with the use of an assistive device- cane.          ROS:    Constitutional: Denies fevers, chills, night sweats, unintentional weight loss     Skin: Denies rash or skin changes     Eyes: Denies vision changes    Ears/Nose/Throat: Denies nasal congestion or sore throat     Respiratory: Denies SOB or cough     Cardiovascular: Denies CP, palpitations, edema      Gastrointestinal: Denies abdominal pain,  N/V, constipation, or diarrhea    Genitourinary: Denies urinary symptoms    Neurologic: See HPI.     MSK: See HPI. Psychiatric: + sleep disturbance, anxiety, depression    Hematologic/Lymphatic/Immunologic: Denies bruising         Physical Exam:   Blood pressure 130/83, pulse 95, height 5' 3\" (1.6 m), weight 269 lb (122 kg). General: well developed and well nourished in no acute distress. Body habitus is morbidly obese  HEENT: No rhinorrhea, sneezing, yawning, or lacrimation. No scleral icterus or conjunctival injection. Resp: symmetrical chest expansion, unlabored breathing, respirations unlabored. CV: Heart rate is regular. Peripheral pulses are palpable  Lymph: No visible regional lymphadenopathy. Skin: No rashes or ecchymosis. Normal turgor. Psych: Mood is calm. Affect is normal.   Ext: No edema noted      MSK:   Back/Hip Exam:   Inspection: Pelvis was asymmetric. Lumbar lordotic curvature was decreased. There was no scoliosis. No ecchymoses or erythema. Palpation: Palpatory exam revealed no tenderness along right lumbosacral paraspinals, no ttp midline spine  ttp right SI joint sulcus, no ttp greater trochanters and TFL on both side. There was paraspinal spasms. There were no trigger points. ROM: ROM decreased  Special/provocative testing:   Supine SLR negative   negative FABERS. Positive Gaenslen's   There was leg length discrepancy      Neurological Exam:  Strength:                     R          L  Hip Flex                       5          5  Knee Ext                     5          5  Ankle dorsi                  4+        5  EHL                             5          5  Ankle Plantar               5          5     Sensory:  Intact for light touch in all lower extremity dermatomes.      Reflexes:                     R          L  Patellar                        (0)        (2+)  Ankle Jerk                   (2+)      (2+)     Gait is Antalgic.  Right foot externally rotated        Imaging: (personally reviewed by me 01/13/20)  Lumbar x-ray      MRI L

## 2020-01-20 ENCOUNTER — TREATMENT (OUTPATIENT)
Dept: PHYSICAL THERAPY | Age: 55
End: 2020-01-20
Payer: MEDICAID

## 2020-01-20 PROCEDURE — 97110 THERAPEUTIC EXERCISES: CPT

## 2020-01-20 PROCEDURE — G0283 ELEC STIM OTHER THAN WOUND: HCPCS

## 2020-01-20 NOTE — PROGRESS NOTES
Physical Therapy Treatment Note    Date: 2020  Patient Name: Hola JOHNSON [facility])  : 1965   MRN: [id number]  DOInjury: 5 years ago   Referring Provider:  Malinda Ro DO    Medical Diagnosis:   Lumbar spondylosis       2. Other spondylosis with radiculopathy, lumbar region      3. Leg length discrepancy      4. Facet arthropathy       Modified Oswestry 54% at mid point. S: Pt reports /10 pain today. PT states she had an epidural Thursday that went fine. However, she fell down the stairs Saturday and she is very sore. O:  Time 5080-1884     Visit  Repeat outcome measure at mid point and end. Pain 5/10     ROM      Modalities      MH + ES X 20 min prone  MO   Stretch      PPT   NR   SKTC Do not do due to sharp anterior R hip pain. TE   DKTC Do not do due to sharp anterior R hip pain. TE   Seated flexion 5 x 10 sec  TE      TE   Exercise      Nustep  or Do not do if is reproduces anterior or lateral hip pain  TE   Bike  or X 15 min TE        ROWS: H Blue  x 20  TA   ROWS: M Blue  x 20  TA   ROWS: L Blue  x 20  TA   Obliques - high  TA   Obliques - low     Lawnmower Pulls  TA   Standing rectus pull down  TA   Squats  TA     TA   S-lying hip ABD isometrics  Pillow between knees    Clamshell - isometric      Bridging                     TA      TA               A:  Tolerated well. Felt some relief with HP and IFC. Discussed anatomy, physiology, body mechanics, principles of loading, and progressive loading/activity.   P: Continue with rehab plan  [place] Primer, PTA, ATC    Treatment Charges: Mins Units   Initial Evaluation     Re-Evaluation     Ther Exercise         TE 35 2   Manual Therapy     MT     Ther Activities        TA     Gait Training          GT     Neuro Re-education NR     Modalities 20 1   Non-Billable Service Time     Other     Total Time/Units 55 3

## 2020-01-30 ENCOUNTER — TREATMENT (OUTPATIENT)
Dept: PHYSICAL THERAPY | Age: 55
End: 2020-01-30
Payer: MEDICAID

## 2020-01-30 PROCEDURE — G0283 ELEC STIM OTHER THAN WOUND: HCPCS | Performed by: PHYSICAL THERAPIST

## 2020-01-30 PROCEDURE — 97110 THERAPEUTIC EXERCISES: CPT | Performed by: PHYSICAL THERAPIST

## 2020-02-04 ENCOUNTER — TREATMENT (OUTPATIENT)
Dept: PHYSICAL THERAPY | Age: 55
End: 2020-02-04
Payer: MEDICAID

## 2020-02-04 PROCEDURE — 97110 THERAPEUTIC EXERCISES: CPT | Performed by: PHYSICAL THERAPIST

## 2020-02-04 PROCEDURE — G0283 ELEC STIM OTHER THAN WOUND: HCPCS | Performed by: PHYSICAL THERAPIST

## 2020-02-24 ENCOUNTER — OFFICE VISIT (OUTPATIENT)
Dept: ORTHOPEDIC SURGERY | Age: 55
End: 2020-02-24
Payer: MEDICAID

## 2020-02-24 VITALS — BODY MASS INDEX: 46.42 KG/M2 | HEIGHT: 63 IN | WEIGHT: 262 LBS

## 2020-02-24 PROCEDURE — 3017F COLORECTAL CA SCREEN DOC REV: CPT | Performed by: ORTHOPAEDIC SURGERY

## 2020-02-24 PROCEDURE — 1036F TOBACCO NON-USER: CPT | Performed by: ORTHOPAEDIC SURGERY

## 2020-02-24 PROCEDURE — G8484 FLU IMMUNIZE NO ADMIN: HCPCS | Performed by: ORTHOPAEDIC SURGERY

## 2020-02-24 PROCEDURE — 99203 OFFICE O/P NEW LOW 30 MIN: CPT | Performed by: ORTHOPAEDIC SURGERY

## 2020-02-24 PROCEDURE — G8427 DOCREV CUR MEDS BY ELIG CLIN: HCPCS | Performed by: ORTHOPAEDIC SURGERY

## 2020-02-24 PROCEDURE — G8417 CALC BMI ABV UP PARAM F/U: HCPCS | Performed by: ORTHOPAEDIC SURGERY

## 2020-02-24 RX ORDER — CYCLOBENZAPRINE HCL 5 MG
TABLET ORAL
COMMUNITY
Start: 2020-01-13 | End: 2020-03-11

## 2020-02-24 RX ORDER — GABAPENTIN 300 MG/1
CAPSULE ORAL
Qty: 90 CAPSULE | Refills: 3 | Status: SHIPPED
Start: 2020-02-24 | End: 2020-06-16 | Stop reason: SDUPTHER

## 2020-02-24 NOTE — TELEPHONE ENCOUNTER
Patient last visit 1- no return visit scheduled. Pharmacy requesting refill on Gabapentin 300 mg 1 tab tid sent 12-10-19 #90 2 refills, and Diclofenac 50 mg 1 tab bid sent 11-4-19 #60 3 refills. Please advise.

## 2020-02-24 NOTE — TELEPHONE ENCOUNTER
Called and left message on patient voicemail that 2 scripts were sent to her pharmacy for Gabapentin and diclofenac.

## 2020-03-03 ENCOUNTER — OFFICE VISIT (OUTPATIENT)
Dept: PHYSICAL MEDICINE AND REHAB | Age: 55
End: 2020-03-03
Payer: MEDICAID

## 2020-03-03 VITALS
HEIGHT: 63 IN | SYSTOLIC BLOOD PRESSURE: 130 MMHG | BODY MASS INDEX: 46.95 KG/M2 | WEIGHT: 265 LBS | HEART RATE: 94 BPM | DIASTOLIC BLOOD PRESSURE: 84 MMHG

## 2020-03-03 PROCEDURE — 3017F COLORECTAL CA SCREEN DOC REV: CPT | Performed by: PHYSICAL MEDICINE & REHABILITATION

## 2020-03-03 PROCEDURE — 99214 OFFICE O/P EST MOD 30 MIN: CPT | Performed by: PHYSICAL MEDICINE & REHABILITATION

## 2020-03-03 PROCEDURE — G8417 CALC BMI ABV UP PARAM F/U: HCPCS | Performed by: PHYSICAL MEDICINE & REHABILITATION

## 2020-03-03 PROCEDURE — 1036F TOBACCO NON-USER: CPT | Performed by: PHYSICAL MEDICINE & REHABILITATION

## 2020-03-03 PROCEDURE — G8427 DOCREV CUR MEDS BY ELIG CLIN: HCPCS | Performed by: PHYSICAL MEDICINE & REHABILITATION

## 2020-03-03 PROCEDURE — G8484 FLU IMMUNIZE NO ADMIN: HCPCS | Performed by: PHYSICAL MEDICINE & REHABILITATION
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
## 2020-03-11 ENCOUNTER — OFFICE VISIT (OUTPATIENT)
Dept: ORTHOPEDIC SURGERY | Age: 55
End: 2020-03-11
Payer: MEDICAID

## 2020-03-11 VITALS — WEIGHT: 264.99 LBS | BODY MASS INDEX: 46.95 KG/M2 | HEIGHT: 63 IN

## 2020-03-11 PROCEDURE — G8417 CALC BMI ABV UP PARAM F/U: HCPCS | Performed by: ORTHOPAEDIC SURGERY

## 2020-03-11 PROCEDURE — 3017F COLORECTAL CA SCREEN DOC REV: CPT | Performed by: ORTHOPAEDIC SURGERY

## 2020-03-11 PROCEDURE — 99213 OFFICE O/P EST LOW 20 MIN: CPT | Performed by: ORTHOPAEDIC SURGERY

## 2020-03-11 PROCEDURE — G8427 DOCREV CUR MEDS BY ELIG CLIN: HCPCS | Performed by: ORTHOPAEDIC SURGERY

## 2020-03-11 PROCEDURE — G8484 FLU IMMUNIZE NO ADMIN: HCPCS | Performed by: ORTHOPAEDIC SURGERY

## 2020-03-11 PROCEDURE — 1036F TOBACCO NON-USER: CPT | Performed by: ORTHOPAEDIC SURGERY

## 2020-03-11 RX ORDER — CYCLOBENZAPRINE HCL 5 MG
TABLET ORAL
Qty: 30 TABLET | Refills: 0 | Status: SHIPPED
Start: 2020-03-11 | End: 2020-04-13

## 2020-03-11 NOTE — TELEPHONE ENCOUNTER
Patient last visit 3-3-2020 next visit 4-. Pharmacy requesting refill on Flexeril 5 mg 1 tab qhs prn sent 12-10-19 #30 2 refills. Please advise.

## 2020-03-19 ENCOUNTER — EVALUATION (OUTPATIENT)
Dept: PHYSICAL THERAPY | Age: 55
End: 2020-03-19
Payer: MEDICAID

## 2020-03-19 PROBLEM — S83.92XA SPRAIN OF LEFT KNEE: Status: ACTIVE | Noted: 2020-03-19

## 2020-03-19 PROCEDURE — 97161 PT EVAL LOW COMPLEX 20 MIN: CPT | Performed by: PHYSICAL THERAPIST

## 2020-03-19 PROCEDURE — 97110 THERAPEUTIC EXERCISES: CPT | Performed by: PHYSICAL THERAPIST

## 2020-03-19 PROCEDURE — 97530 THERAPEUTIC ACTIVITIES: CPT | Performed by: PHYSICAL THERAPIST

## 2020-03-19 NOTE — PROGRESS NOTES
Physical Therapy Daily Treatment Note    Date: 3/19/2020  Patient Name: Marianne Arias  : 1965   MRN: [id number]  [facility] [place]: 2020  DOSx: None  Referring Provider: Sherice Guallpa DO  [address]     Medical Diagnosis:      Diagnosis Orders   1. Sprain of left knee, unspecified ligament, initial encounter         Outcome Measure:  Lower Extremity Functional Scale (LEFS) 72.5% impairment    S: See eval  O: Mod edema  Time  0823-900       Visit  1 Repeat outcome measure at mid point and end. Pain    8/10 worst pain     ROM  96 active flex, -3 active ext     Modalities       Ice, compression, elevation  mmHg x 15 min           Stretching       Patella mobs      Prone hangs      Heel props      Knee flex stretch-seated      Prone self flexion stretch            Exercise       Nustep  NEXT     Quad sets      SLR 10 reps x 1 set     Heel slides 12 reps x 1 set      HS stretch 5 reps x 1 set with 10 second holds     SAQ 10 reps x 1 set with 3 second hold     Standing HS curls 15 reps x 1 set     Calf Stretch long sitting 5 reps x 1 set with 10 second hold     Toe Raises 15 reps x 1 set     LAQ  15 reps x 1 set     HS in sitting with pillow case      Marching       Sidekicks      Squat       Step-ups - FWD       Step-ups - LAT      Step-ups - BWD       Step up and over reciprocally       TG squats      TG Calf raises       NMR For safe ambulation in community and home    Marching gait      Side stepping      Retrowalk      Heel to toe      A: Tolerated well. Above added to written HEP along with instructions for ice and elevation.   P: Continue with rehab plan  Gavin Monroy PT    Treatment Charges: Mins Units   Initial Evaluation 30 1   Re-Evaluation     Ther Exercise         TE 25 2   Manual Therapy     MT     Ther Activities        TA     Gait Training          GT     Neuro Re-education NR     Modalities     Non-Billable Service Time     Other     Total Time/Units 55
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
## 2020-03-23 NOTE — TELEPHONE ENCOUNTER
Pharmacy is requesting refills for Diclofenac 50mg 1 BID prn #60. Last filled on 2/24/20. Patient was last seen on 3/3/2020, pending follow up on 4/14/2020. Please advise.

## 2020-03-26 ENCOUNTER — TELEPHONE (OUTPATIENT)
Dept: PHYSICAL MEDICINE AND REHAB | Age: 55
End: 2020-03-26

## 2020-03-26 ENCOUNTER — TELEPHONE (OUTPATIENT)
Dept: PHYSICAL THERAPY | Age: 55
End: 2020-03-26

## 2020-03-26 NOTE — TELEPHONE ENCOUNTER
Date: 3/26/2020       Patient Name: Will Bolton  : 1965      MRN: [id number]    Left message informing patient we are scheduling follow up appts for the end of May.      Anson Zhao PTA

## 2020-03-27 NOTE — TELEPHONE ENCOUNTER
2nd attempt to contact the patient.   Called and left message on the patient voicemail that we either need to rescheduled her appointment for after 5- or schedule an evist.

## 2020-04-03 ENCOUNTER — TELEPHONE (OUTPATIENT)
Dept: ORTHOPEDIC SURGERY | Age: 55
End: 2020-04-03

## 2020-04-03 ENCOUNTER — TELEPHONE (OUTPATIENT)
Dept: PHYSICAL THERAPY | Age: 55
End: 2020-04-03

## 2020-04-03 NOTE — TELEPHONE ENCOUNTER
Called and left message for patient to call back and r/s her appointment on 4/8/2020 with Dr Elvin Rodriguez. We are canceling her about due to Covid-19. Electronically signed by Roxanna Vaughan MA on 4/3/20 at 9:11 AM EDT

## 2020-04-08 ENCOUNTER — TELEPHONE (OUTPATIENT)
Dept: PHYSICAL THERAPY | Age: 55
End: 2020-04-08

## 2020-04-13 RX ORDER — CYCLOBENZAPRINE HCL 5 MG
TABLET ORAL
Qty: 30 TABLET | Refills: 2 | Status: SHIPPED
Start: 2020-04-13 | End: 2020-06-16 | Stop reason: SDUPTHER

## 2020-04-22 ENCOUNTER — HOSPITAL ENCOUNTER (OUTPATIENT)
Age: 55
Discharge: HOME | End: 2020-04-24
Payer: MEDICAID

## 2020-04-22 LAB
ANION GAP SERPL CALCULATED.3IONS-SCNC: 15 MMOL/L (ref 7–16)
BUN BLDV-MCNC: 8 MG/DL (ref 6–20)
CALCIUM SERPL-MCNC: 9.9 MG/DL (ref 8.6–10.2)
CHLORIDE BLD-SCNC: 107 MMOL/L (ref 98–107)
CHOLESTEROL, TOTAL: 183 MG/DL (ref 0–199)
CO2: 27 MMOL/L (ref 22–29)
CREAT SERPL-MCNC: 0.9 MG/DL (ref 0.5–1)
GFR AFRICAN AMERICAN: >60
GFR NON-AFRICAN AMERICAN: >60 ML/MIN/1.73
GLUCOSE BLD-MCNC: 122 MG/DL (ref 74–99)
HBA1C MFR BLD: 5.7 % (ref 4–5.6)
HDLC SERPL-MCNC: 59 MG/DL
LDL CHOLESTEROL CALCULATED: 99 MG/DL (ref 0–99)
POTASSIUM SERPL-SCNC: 5.8 MMOL/L (ref 3.5–5)
SODIUM BLD-SCNC: 149 MMOL/L (ref 132–146)
TRIGL SERPL-MCNC: 125 MG/DL (ref 0–149)
VLDLC SERPL CALC-MCNC: 25 MG/DL

## 2020-04-22 PROCEDURE — 36415 COLL VENOUS BLD VENIPUNCTURE: CPT

## 2020-04-22 PROCEDURE — 83036 HEMOGLOBIN GLYCOSYLATED A1C: CPT

## 2020-04-22 PROCEDURE — 80061 LIPID PANEL: CPT

## 2020-04-22 PROCEDURE — 80048 BASIC METABOLIC PNL TOTAL CA: CPT

## 2020-04-27 RX ORDER — ATORVASTATIN CALCIUM 10 MG/1
TABLET, FILM COATED ORAL
Qty: 30 TABLET | Refills: 0 | OUTPATIENT
Start: 2020-04-27

## 2020-04-29 ENCOUNTER — TELEMEDICINE (OUTPATIENT)
Dept: FAMILY MEDICINE CLINIC | Age: 55
End: 2020-04-29
Payer: MEDICAID

## 2020-04-29 VITALS — WEIGHT: 265 LBS | BODY MASS INDEX: 46.95 KG/M2 | HEIGHT: 63 IN

## 2020-04-29 PROCEDURE — 3017F COLORECTAL CA SCREEN DOC REV: CPT | Performed by: FAMILY MEDICINE

## 2020-04-29 PROCEDURE — 99213 OFFICE O/P EST LOW 20 MIN: CPT | Performed by: FAMILY MEDICINE

## 2020-04-29 PROCEDURE — G8427 DOCREV CUR MEDS BY ELIG CLIN: HCPCS | Performed by: FAMILY MEDICINE

## 2020-04-29 RX ORDER — ATORVASTATIN CALCIUM 10 MG/1
10 TABLET, FILM COATED ORAL DAILY
Qty: 30 TABLET | Refills: 5 | Status: SHIPPED
Start: 2020-04-29 | End: 2020-10-07 | Stop reason: SDUPTHER

## 2020-04-29 ASSESSMENT — ENCOUNTER SYMPTOMS
WHEEZING: 0
COUGH: 0
CONSTIPATION: 0
DIARRHEA: 0
NAUSEA: 0
VOMITING: 0
SHORTNESS OF BREATH: 0

## 2020-04-29 NOTE — PROGRESS NOTES
TeleMedicine Patient Consent    This visit was performed as a virtual video visit using a synchronous, two-way, audio-video telehealth technology platform. Patient identification was verified at the start of the visit, including the patient's telephone number and physical location. I discussed with the patient the nature of our telehealth visits, that:     1. Due to the nature of an audio- video modality, the only components of a physical exam that could be done are the elements supported by direct observation. 2. I would evaluate the patient and recommend diagnostics and treatments based on my assessment. 3. If it was felt that the patient should be evaluated in clinic or an emergency room setting, then they would be directed there. 4. Our sessions are not being recorded and that personal health information is protected. 5. Our team would provide follow up care in person if/when the patient needs it. Patient does agree to proceed with telemedicine consultation. Patient's location: home address in [facility]  Physician  location other address in [facility]) other people involved in call: none        2020     Shane Mazariegos (:  1965) is a 60 y.o. female, with a:  Past Medical History:   Diagnosis Date    Anxiety     Depression     Obesity     Osteoarthritis        Here for evaluation of the following medical concerns:  Chief Complaint   Patient presents with    Follow-up    Medication Refill     Verified and pended    Discuss Labs     Completed on 2020     Hyperlipidemia:  No new myalgias or GI upset on atorvastatin (Lipitor). Medication compliance: compliant all of the time. Patient is  following a low fat, low cholesterol diet. She is not exercising regularly.      Lab Results   Component Value Date    CHOL 183 2020    TRIG 125 2020    HDL 59 2020    LDLCALC 99 2020     Lab Results   Component Value Date    ALT 20 10/16/2019    AST 22 (ONE HALF) TO 1 WHOLE TABLET BY MOUTH AT BEDTIME AS DIRECETED ON EMPTY STOMACH Yes Historical Provider, MD   Probiotic Product (PROBIOTIC-10 PO) Take by mouth Yes Historical Provider, MD        Social History     Tobacco Use    Smoking status: Never Smoker    Smokeless tobacco: Never Used   Substance Use Topics    Alcohol use: Yes     Frequency: Monthly or less     Drinks per session: 1 or 2     Binge frequency: Never        Past Surgical History:   Procedure Laterality Date    ANESTHESIA NERVE BLOCK Right 12/19/2019    RIGHT L3-4 TRANSFORAMINAL EPIDURAL STEROID INJECTION (CPT 75237) performed by Hunter Pineda DO at [address] [facility] Right 1/16/2020    RIGHT SACROILIAC JOINT STEROID INJECTION UNDER X-RAY GUIDANCE performed by Hunter Pineda DO at [address] Right 2015    NERVE BLOCK Right 12/19/2019    lumbar trasnforaminal    NERVE BLOCK Right 01/16/2020    right sacroiliac joint steroid injection     ROTATOR CUFF REPAIR Right 2000    SHOULDER SURGERY Left 2019       Vitals:    04/29/20 1439   Weight: 265 lb (120.2 kg)   Height: 5' 2.99\" (1.6 m)     Estimated body mass index is 46.96 kg/m² as calculated from the following:    Height as of this encounter: 5' 2.99\" (1.6 m). Weight as of this encounter: 265 lb (120.2 kg). Physical Exam  Constitutional:       General: She is not in acute distress. Appearance: Normal appearance. She is not ill-appearing. HENT:      Head: Normocephalic and atraumatic. Eyes:      Extraocular Movements: Extraocular movements intact. Pupils: Pupils are equal, round, and reactive to light. Pulmonary:      Effort: Pulmonary effort is normal. No respiratory distress. Neurological:      Mental Status: She is alert. Mental status is at baseline.    Psychiatric:         Mood and Affect: Mood normal.         Behavior: Behavior normal.         ASSESSMENT/PLAN:  Eloina Chan was seen

## 2020-05-12 ENCOUNTER — HOSPITAL ENCOUNTER (OUTPATIENT)
Age: 55
Discharge: HOME | End: 2020-05-14
Payer: MEDICAID

## 2020-05-12 LAB
ANION GAP SERPL CALCULATED.3IONS-SCNC: 13 MMOL/L (ref 7–16)
BUN BLDV-MCNC: 9 MG/DL (ref 6–20)
CALCIUM SERPL-MCNC: 9.9 MG/DL (ref 8.6–10.2)
CHLORIDE BLD-SCNC: 102 MMOL/L (ref 98–107)
CO2: 29 MMOL/L (ref 22–29)
CREAT SERPL-MCNC: 0.9 MG/DL (ref 0.5–1)
GFR AFRICAN AMERICAN: >60
GFR NON-AFRICAN AMERICAN: >60 ML/MIN/1.73
GLUCOSE BLD-MCNC: 132 MG/DL (ref 74–99)
POTASSIUM SERPL-SCNC: 5.1 MMOL/L (ref 3.5–5)
SODIUM BLD-SCNC: 144 MMOL/L (ref 132–146)

## 2020-05-12 PROCEDURE — 36415 COLL VENOUS BLD VENIPUNCTURE: CPT

## 2020-05-12 PROCEDURE — 80048 BASIC METABOLIC PNL TOTAL CA: CPT

## 2020-05-15 ENCOUNTER — EVALUATION (OUTPATIENT)
Dept: PHYSICAL THERAPY | Age: 55
End: 2020-05-15
Payer: MEDICAID

## 2020-05-15 PROBLEM — S80.02XA CONTUSION OF LEFT KNEE: Status: ACTIVE | Noted: 2020-05-15

## 2020-05-15 PROBLEM — M25.562 LEFT KNEE PAIN: Status: ACTIVE | Noted: 2020-05-15

## 2020-05-15 PROCEDURE — 97110 THERAPEUTIC EXERCISES: CPT | Performed by: PHYSICAL THERAPIST

## 2020-05-15 NOTE — PROGRESS NOTES
Physical Therapy Daily Treatment Note    Date: 5/15/2020  Patient Name: Samantha Mratinez  : 1965   MRN: [id number]  [place]: 2020   DOSx: --  Referring Provider: Sherin Price DO  [address]     Medical Diagnosis:      Diagnosis Orders   1. Left knee pain, unspecified chronicity     2. Contusion of left knee, initial encounter     3. Sprain of left knee, unspecified ligament, initial encounter         Outcome Measure:   Lower Extremity Functional Scale (LEFS) 72.5% impairment    S: Patient returns due to hiatus caused by COVID-19. She reports she has not improved since injuring her knee 3 months ago. She reports pain at rest 2/10. Pain is made worse with stairs, bending knee, knee compression. She reports her knee has given out 5 times with falls since 20. She denies locking. She also reports feeling swollen and says knee pops a lot. Patient has made marks on her knee with an ink pen to note areas where she hurts the most which are medial joint line and patella. O: Patient entered clinic with limping gait and knee brace in place. No edema noted. Ballotable patella is present. Patella crepitus noted with AROM. ROM: 0 ext, 90 flex. Strength 3/5. TTP medial joint line and entire patella rim. Lateral joint line is non-tender. Time 3865-9804       Visit  1 time every 7-10 days until ready for Heavy, Slow, Resistance then 2-3 days per week. Pain 2/10  Repeat outcome measure at mid point and end. ROM 0 ext, 90 flex (vs 120 on R)       Modalities         Ice     MO   Exercise         Phase 1        QS 5 sec hold 3 x 10   TE   SLR    TE   Bridging 2-leg 3 x 10   TE   S-lying hip ABD 3 x 10   TE   Clamshells  3 x 10     LAQ to 45 deg  3 x 10       Phase 2         Bridging 1-leg      TE   Clamshell      TE   S-lying hip ABD      TE   SLR      TE   Hip Hiking      NR   Reach and Touch      NR   Airplane ?     NR             Phase 3 Knee Extension Machine 90 flex to 45 flex   TE   Hamstring Curl Machine     TE   Leg Press 2-leg To 45 deg flex   TE   Calf Raises     TE   Hip Hiking      Reach and Touch               Leg Press 1-leg ? TE                               A:  Tolerated well. Above added to written HEP. Discussed anatomy, physiology, body mechanics, principles of loading, and progressive loading/activity at length.   P: Continue with rehab plan  Darlin Martinez PT    Treatment Charges: Mins Units   Initial Evaluation     Re-Evaluation     Ther Exercise         TE 60 4   Manual Therapy     MT     Ther Activities        TA     Gait Training          GT     Neuro Re-education NR     Modalities     Non-Billable Service Time     Other     Total Time/Units 60 4

## 2020-05-26 ENCOUNTER — TREATMENT (OUTPATIENT)
Dept: PHYSICAL THERAPY | Age: 55
End: 2020-05-26
Payer: MEDICAID

## 2020-05-26 PROCEDURE — 97110 THERAPEUTIC EXERCISES: CPT | Performed by: PHYSICAL THERAPIST

## 2020-05-26 NOTE — PROGRESS NOTES
Physical Therapy Daily Treatment Note    Date: 2020  Patient Name: Fab Coleman  : 1965   MRN: [id number]  [place]: 2020   DOSx: --  Referring Provider: Claude Bright, DO  [address], [facility]     Medical Diagnosis:      Diagnosis Orders   1. Left knee pain, unspecified chronicity     2. Contusion of left knee, initial encounter     3. Sprain of left knee, unspecified ligament, initial encounter         Outcome Measure:   Lower Extremity Functional Scale (LEFS) 72.5% impairment    S: Performing HEP. Most exercises are comfortable; knee extensions are not. O:    Time 7078-1811       Visit 3/12 1 time every 7-14 days until ready for Heavy, Slow, Resistance then 2 days per week. Pain 2/10  Repeat outcome measure at mid point and end. ROM 0 ext, 90 flex (vs 120 on R)       Modalities         Ice     MO   Exercise         Phase 1        QS    TE   SLR    TE   Bridging 2-leg   TE   S-lying hip ABD   TE   Clamshells      LAQ to 45 deg        Phase 2         LAQ to 45 deg  3 x 10 (PT's foot blocking motion for proper limit)     QS 5 sec hold 3 x 10     Bridging 1-leg Unable. Opted for 2 leg below     TE   Bridging 2-leg 20 lbs on abdomen 3 x 10     Clamshell Blue 3 x 10     TE   S-lying hip ABD 3 lbs 3 x 10   Cues for hip placement   TE   SLR      TE   Hip Hiking      NR             Phase 3         Knee Extension Machine 90 flex to 45 flex  Next    TE   Hamstring Curl Machine Next? TE   Leg Press 2-leg To 45 deg flex  Next    TE   Calf Raises     TE   Hip Hiking      Reach and Touch               Leg Press 1-leg ? TE                               A:  Tolerated well. Above added to written HEP. Discussed anatomy, physiology, body mechanics, principles of loading, and progressive loading/activity at length.   P: Continue with rehab plan  Jethro Damon PT    Treatment Charges: Mins Units   Initial Evaluation     Re-Evaluation     Ther Exercise         TE

## 2020-05-27 ENCOUNTER — OFFICE VISIT (OUTPATIENT)
Dept: ORTHOPEDIC SURGERY | Age: 55
End: 2020-05-27
Payer: MEDICAID

## 2020-05-27 VITALS — BODY MASS INDEX: 46.78 KG/M2 | WEIGHT: 264 LBS | HEIGHT: 63 IN

## 2020-05-27 PROCEDURE — 20610 DRAIN/INJ JOINT/BURSA W/O US: CPT | Performed by: ORTHOPAEDIC SURGERY

## 2020-05-27 PROCEDURE — 1036F TOBACCO NON-USER: CPT | Performed by: ORTHOPAEDIC SURGERY

## 2020-05-27 PROCEDURE — G8427 DOCREV CUR MEDS BY ELIG CLIN: HCPCS | Performed by: ORTHOPAEDIC SURGERY

## 2020-05-27 PROCEDURE — 99213 OFFICE O/P EST LOW 20 MIN: CPT | Performed by: ORTHOPAEDIC SURGERY

## 2020-05-27 PROCEDURE — 3017F COLORECTAL CA SCREEN DOC REV: CPT | Performed by: ORTHOPAEDIC SURGERY

## 2020-05-27 PROCEDURE — G8417 CALC BMI ABV UP PARAM F/U: HCPCS | Performed by: ORTHOPAEDIC SURGERY

## 2020-05-27 RX ORDER — TRIAMCINOLONE ACETONIDE 40 MG/ML
40 INJECTION, SUSPENSION INTRA-ARTICULAR; INTRAMUSCULAR ONCE
Status: COMPLETED | OUTPATIENT
Start: 2020-05-27 | End: 2020-05-27

## 2020-05-27 RX ADMIN — TRIAMCINOLONE ACETONIDE 40 MG: 40 INJECTION, SUSPENSION INTRA-ARTICULAR; INTRAMUSCULAR at 16:16
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
## 2020-06-05 ENCOUNTER — OFFICE VISIT (OUTPATIENT)
Dept: ORTHOPEDIC SURGERY | Age: 55
End: 2020-06-05
Payer: MEDICAID

## 2020-06-05 VITALS — TEMPERATURE: 98 F | BODY MASS INDEX: 47.84 KG/M2 | HEIGHT: 63 IN | WEIGHT: 270 LBS

## 2020-06-05 PROCEDURE — 3017F COLORECTAL CA SCREEN DOC REV: CPT | Performed by: ORTHOPAEDIC SURGERY

## 2020-06-05 PROCEDURE — G8427 DOCREV CUR MEDS BY ELIG CLIN: HCPCS | Performed by: ORTHOPAEDIC SURGERY

## 2020-06-05 PROCEDURE — G8417 CALC BMI ABV UP PARAM F/U: HCPCS | Performed by: ORTHOPAEDIC SURGERY

## 2020-06-05 PROCEDURE — 1036F TOBACCO NON-USER: CPT | Performed by: ORTHOPAEDIC SURGERY

## 2020-06-05 PROCEDURE — 20610 DRAIN/INJ JOINT/BURSA W/O US: CPT | Performed by: ORTHOPAEDIC SURGERY

## 2020-06-05 PROCEDURE — 99213 OFFICE O/P EST LOW 20 MIN: CPT | Performed by: ORTHOPAEDIC SURGERY

## 2020-06-05 NOTE — PROGRESS NOTES
Chief Complaint:   Chief Complaint   Patient presents with    Knee Pain     Left Knee, x 2 days, states of swelling and inability to bear any weight. States of pain all over the knee       Lolis Young complains of a great deal of left knee pain over the last 2 days. There is no history of injury or incident to cause this. She has had feeling of swelling in the knee and has difficulty weightbearing or moving it. She is walking on crutches with great difficulty. Allergies; medications; past medical, surgical, family, and social history; and problem list have been reviewed today and updated as indicated in this encounter seen below. Exam: The patient is struggling with crutches to walk but she is able to get around. She is making a lot of sounds indicating her distress. Color and temperature of the left knee are grossly normal.  She resists motion of the knee. There is crepitus with range of motion. There is the impression that there is effusion or hemarthrosis. Her calf is supple with no tenderness. Radiographs: None    ASSESSMENT:    Lori Garcia was seen today for knee pain. Diagnoses and all orders for this visit:    Primary osteoarthritis of left knee    Sprain of left knee, unspecified ligament, initial encounter    Contusion of left knee, initial encounter    Hemarthrosis, left knee  -     [place] ARTHROCENTESIS ASPIR&/INJ MAJOR JT/BURSA W/O US        PLAN: With suggestion of hemarthrosis arthrocentesis was recommended. After discussing risks and potential benefits the skin was prepped in an aseptic fashion. The knee was punctured with 8 21-gauge needle. Approximately 5 cc of dark blood was aspirated from the knee before the needle tip occluded. There is no suggestion of purulence in this at all. She will ice and elevate and limit activities to allow the knee to settle down.   We will follow-up in couple weeks and see how she is doing with possibility of resuming physical therapy. Return in about 3 weeks (around 6/26/2020). Current Outpatient Medications   Medication Sig Dispense Refill    atorvastatin (LIPITOR) 10 MG tablet Take 1 tablet by mouth daily 30 tablet 5    diclofenac (VOLTAREN) 50 MG EC tablet Take 1 tablet by mouth twice daily as needed for pain 60 tablet 2    cyclobenzaprine (FLEXERIL) 5 MG tablet TAKE 1 TABLET BY MOUTH NIGHTLY AS NEEDED FOR MUSCLE SPASM 30 tablet 2    buPROPion (WELLBUTRIN XL) 300 MG extended release tablet TAKE 1 TABLET BY MOUTH ONCE DAILY  2    clonazePAM (KLONOPIN) 1 MG tablet TAKE 1 TABLET BY MOUTH THREE TIMES DAILY AS NEEDED  2    DULoxetine (CYMBALTA) 60 MG extended release capsule TAKE 2 CAPSULES BY MOUTH ONCE DAILY AS DIRECTED  2    zolpidem (AMBIEN) 10 MG tablet TAKE 1 2 (ONE HALF) TO 1 WHOLE TABLET BY MOUTH AT BEDTIME AS DIRECETED ON EMPTY STOMACH  2    Probiotic Product (PROBIOTIC-10 PO) Take by mouth      gabapentin (NEURONTIN) 300 MG capsule TAKE 1 CAPSULE BY MOUTH NIGHTLY FOR 3 DAYS,THEN 1CAPSULE TWICE A DAY FOR 3 DAYS,THEN 1 CAPSULE 3 TIMES A DAY 90 capsule 3     No current facility-administered medications for this visit.         Patient Active Problem List   Diagnosis    Class 3 severe obesity due to excess calories with body mass index (BMI) of 45.0 to 49.9 in adult [facility])    Chronic right-sided low back pain with right-sided sciatica    Right hip pain    Anxiety and depression    Insomnia    Prediabetes    Spondylolisthesis of lumbar region    Lumbar spondylosis    Other spondylosis with radiculopathy, lumbar region    Leg length discrepancy    Facet arthropathy    Lumbar radiculitis    Sacroiliitis ([facility] [address]    Sprain of left knee    Left knee pain    Contusion of left knee       Past Medical History:   Diagnosis Date    Anxiety     Depression     Obesity     Osteoarthritis        Past Surgical History:   Procedure Laterality Date    ANESTHESIA NERVE BLOCK Right 12/19/2019    RIGHT L3-4 TRANSFORAMINAL EPIDURAL STEROID INJECTION (CPT 27743) performed by Lennice Castleman, DO at [address] [facility] Right 1/16/2020    RIGHT SACROILIAC JOINT STEROID INJECTION UNDER X-RAY GUIDANCE performed by Lennice Castleman, DO at [address] [facility] Right 2015    NERVE BLOCK Right 12/19/2019    lumbar trasnforaminal    NERVE BLOCK Right 01/16/2020    right sacroiliac joint steroid injection     ROTATOR CUFF REPAIR Right 2000    SHOULDER SURGERY Left 2019       No Known Allergies    Social History     Socioeconomic History    Marital status:      Spouse name: None    Number of children: None    Years of education: None    Highest education level: None   Occupational History    None   Social Needs    Financial resource strain: None    Food insecurity     Worry: None     Inability: None    Transportation needs     Medical: None     Non-medical: None   Tobacco Use    Smoking status: Never Smoker    Smokeless tobacco: Never Used   Substance and Sexual Activity    Alcohol use: Yes     Frequency: Monthly or less     Drinks per session: 1 or 2     Binge frequency: Never    Drug use: Never    Sexual activity: Yes     Partners: Female   Lifestyle    Physical activity     Days per week: None     Minutes per session: None    Stress: None   Relationships    Social connections     Talks on phone: None     Gets together: None     Attends Ca[employer] service: None     Active member of club or organization: None     Attends meetings of clubs or organizations: None     Relationship status: None    Intimate partner violence     Fear of current or ex partner: None     Emotionally abused: None     Physically abused: None     Forced sexual activity: None   Other Topics Concern    None   Social History Narrative    None       Review of Systems  As follows except as previously noted in HPI:  Constitutional: Negative for chills, diaphoresis,

## 2020-06-16 ENCOUNTER — OFFICE VISIT (OUTPATIENT)
Dept: PHYSICAL MEDICINE AND REHAB | Age: 55
End: 2020-06-16
Payer: MEDICAID

## 2020-06-16 VITALS
SYSTOLIC BLOOD PRESSURE: 132 MMHG | HEART RATE: 98 BPM | DIASTOLIC BLOOD PRESSURE: 80 MMHG | BODY MASS INDEX: 49.61 KG/M2 | HEIGHT: 63 IN | WEIGHT: 280 LBS

## 2020-06-16 PROCEDURE — 3017F COLORECTAL CA SCREEN DOC REV: CPT | Performed by: PHYSICAL MEDICINE & REHABILITATION

## 2020-06-16 PROCEDURE — 99213 OFFICE O/P EST LOW 20 MIN: CPT | Performed by: PHYSICAL MEDICINE & REHABILITATION

## 2020-06-16 PROCEDURE — G8417 CALC BMI ABV UP PARAM F/U: HCPCS | Performed by: PHYSICAL MEDICINE & REHABILITATION

## 2020-06-16 PROCEDURE — G8427 DOCREV CUR MEDS BY ELIG CLIN: HCPCS | Performed by: PHYSICAL MEDICINE & REHABILITATION

## 2020-06-16 PROCEDURE — 1036F TOBACCO NON-USER: CPT | Performed by: PHYSICAL MEDICINE & REHABILITATION

## 2020-06-16 RX ORDER — GABAPENTIN 300 MG/1
300 CAPSULE ORAL 3 TIMES DAILY
Qty: 90 CAPSULE | Refills: 3 | Status: SHIPPED
Start: 2020-06-16 | End: 2020-08-18 | Stop reason: SDUPTHER

## 2020-06-16 RX ORDER — CYCLOBENZAPRINE HCL 5 MG
10 TABLET ORAL NIGHTLY PRN
Qty: 30 TABLET | Refills: 2 | Status: SHIPPED
Start: 2020-06-16 | End: 2020-08-18 | Stop reason: SDUPTHER

## 2020-06-17 ENCOUNTER — OFFICE VISIT (OUTPATIENT)
Dept: ORTHOPEDIC SURGERY | Age: 55
End: 2020-06-17
Payer: MEDICAID

## 2020-06-17 VITALS — TEMPERATURE: 98 F | BODY MASS INDEX: 49.08 KG/M2 | WEIGHT: 277 LBS | HEIGHT: 63 IN

## 2020-06-17 PROCEDURE — G8417 CALC BMI ABV UP PARAM F/U: HCPCS | Performed by: ORTHOPAEDIC SURGERY

## 2020-06-17 PROCEDURE — 1036F TOBACCO NON-USER: CPT | Performed by: ORTHOPAEDIC SURGERY

## 2020-06-17 PROCEDURE — 99213 OFFICE O/P EST LOW 20 MIN: CPT | Performed by: ORTHOPAEDIC SURGERY

## 2020-06-17 PROCEDURE — 3017F COLORECTAL CA SCREEN DOC REV: CPT | Performed by: ORTHOPAEDIC SURGERY

## 2020-06-17 PROCEDURE — G8427 DOCREV CUR MEDS BY ELIG CLIN: HCPCS | Performed by: ORTHOPAEDIC SURGERY

## 2020-06-17 NOTE — PROGRESS NOTES
the knee is doing and if she can resume her physical therapy will refresher prescription if necessary to follow-up. Return in about 4 weeks (around 7/15/2020). Current Outpatient Medications   Medication Sig Dispense Refill    gabapentin (NEURONTIN) 300 MG capsule Take 1 capsule by mouth 3 times daily for 30 days. 90 capsule 3    cyclobenzaprine (FLEXERIL) 5 MG tablet Take 2 tablets by mouth nightly as needed for Muscle spasms 30 tablet 2    diclofenac (VOLTAREN) 50 MG EC tablet Take 1 tablet by mouth 2 times daily 60 tablet 2    atorvastatin (LIPITOR) 10 MG tablet Take 1 tablet by mouth daily 30 tablet 5    buPROPion (WELLBUTRIN XL) 300 MG extended release tablet TAKE 1 TABLET BY MOUTH ONCE DAILY  2    clonazePAM (KLONOPIN) 1 MG tablet TAKE 1 TABLET BY MOUTH THREE TIMES DAILY AS NEEDED  2    DULoxetine (CYMBALTA) 60 MG extended release capsule TAKE 2 CAPSULES BY MOUTH ONCE DAILY AS DIRECTED  2    zolpidem (AMBIEN) 10 MG tablet TAKE 1 2 (ONE HALF) TO 1 WHOLE TABLET BY MOUTH AT BEDTIME AS DIRECETED ON EMPTY STOMACH  2    Probiotic Product (PROBIOTIC-10 PO) Take by mouth       No current facility-administered medications for this visit.         Patient Active Problem List   Diagnosis    Class 3 severe obesity due to excess calories with body mass index (BMI) of 45.0 to 49.9 in adult [facility])    Chronic right-sided low back pain with right-sided sciatica    Right hip pain    Anxiety and depression    Insomnia    Prediabetes    Spondylolisthesis of lumbar region    Lumbar spondylosis    Other spondylosis with radiculopathy, lumbar region    Leg length discrepancy    Facet arthropathy    Lumbar radiculitis    [facility]roiliitis ([facility] [address]    Sprain of left knee    Left knee pain    Contusion of left knee       Past Medical History:   Diagnosis Date    Anxiety     Depression     Obesity     Osteoarthritis        Past Surgical History:   Procedure Laterality Date    ANESTHESIA NERVE BLOCK Right for chills, diaphoresis, fatigue, fever and unexpected weight change. Respiratory: Negative for cough, shortness of breath and wheezing. Cardiovascular: Negative for chest pain and palpitations. Neurological: Negative for dizziness, syncope, cephalgia. GI / : negative  Musculoskeletal: see HPI       Objective:   Physical Exam   Constitutional: Oriented to person, place, and time. and appears well-developed and well-nourished. :   Head: Normocephalic and atraumatic. Eyes: EOM are normal.   Neck: Neck supple. Cardiovascular: Normal rate and regular rhythm. Pulmonary/Chest: Effort normal. No stridor. No respiratory distress, no wheezes. Abdominal:  No abnormal distension. Neurological: Alert and oriented to person, place, and time. Skin: Skin is warm and dry. Psychiatric: Normal mood and affect.  Behavior is normal. Thought content normal.    ROHAN Guzmán DO    6/17/20  10:56 AM

## 2020-07-08 ENCOUNTER — TREATMENT (OUTPATIENT)
Dept: PHYSICAL THERAPY | Age: 55
End: 2020-07-08
Payer: MEDICAID

## 2020-07-08 PROCEDURE — 97110 THERAPEUTIC EXERCISES: CPT | Performed by: PHYSICAL THERAPIST

## 2020-07-08 NOTE — PROGRESS NOTES
Physical Therapy Daily Treatment Note    Date: 2020  Patient Name: Andrew Forman  : 1965   MRN: [id number]  [facility] [place]: 2020   DOSx: --  Referring Provider: Bg Wilder DO  1934 [address], [facility]     Medical Diagnosis:      Diagnosis Orders   1. Left knee pain, unspecified chronicity         Outcome Measure:   Lower Extremity Functional Scale (LEFS) 72.5% impairment    S: Patient returns after hiatus. She was last in PT 20. Patient very frustrated with L knee dysfunction and resurgence of old LB and R leg pain problem. She reports limited ability to tolerate axial loading. Notices she has trouble shopping at [employer] (with cane and cart). She has trouble lifting R leg into car when she is done. Overall, she reports her L knee has improved slightly since having her knee injected 20 and blood aspirated off of it 20. She states she has to wear a brace to help manage her symptoms. She is able to tolerate light activity with brace. Increasing activity like running errands in the community makes things worse for her including her LBP which affects her ability to raise the right leg into the car. She reports she is performing her HEP, but that bridges aggravate her R LBP. O:    Time 8403-3819       Visit  1 time every 7-14 days until ready for Heavy, Slow, Resistance then 2 days per week. Pain 2/10  Repeat outcome measure at mid point and end.      ROM 0 ext, 110 flex (vs 120 on R)       Modalities         Ice     MO   Exercise         Phase 1        QS L & R 5 sec hold 3 x 10   TE   SLR L 3 x 15   TE   Bridging 2-leg   TE   S-lying hip ABD L & R 3 x 10   TE   Clamshells L & R 3 x 10     LAQ to 45 deg        Phase 2         LAQ to 45 deg      QS     Bridging 1-leg    TE   Bridging 2-leg     Clamshell    TE   S-lying hip ABD  Cues for hip placement   TE   SLR      TE   Hip Hiking      NR             Phase 3         Knee Extension Machine   TE   Hamstring Curl Machine   TE   Leg Press 2-leg   TE   Calf Raises     TE   Hip Hiking      Reach and Touch               Leg Press 1-leg ? TE            ROWS H - M - L Next                  A:  Tolerated well. Motion improved. No edema noted L knee. Above added to written HEP. Discussed anatomy, physiology, body mechanics, principles of loading, and progressive loading/activity at length. Temporarily discontinued LAQ, resisted clamshells, and bridging. Added unresisted clamshell and hip ABD to R LE. Patient to do HEP 1-2 times a day. P: Continue with rehab plan. Will add back exercise into the mix.    Alvarez Reinoso, PT    Treatment Charges: Mins Units   Initial Evaluation     Re-Evaluation     Ther Exercise         TE 45 3   Manual Therapy     MT     Ther Activities        TA     Gait Training          GT     Neuro Re-education NR     Modalities     Non-Billable Service Time     Other     Total Time/Units 45 3

## 2020-07-10 ENCOUNTER — TREATMENT (OUTPATIENT)
Dept: PHYSICAL THERAPY | Age: 55
End: 2020-07-10
Payer: MEDICAID

## 2020-07-10 PROCEDURE — 97110 THERAPEUTIC EXERCISES: CPT | Performed by: PHYSICAL THERAPIST

## 2020-07-10 NOTE — PROGRESS NOTES
Physical Therapy Daily Treatment Note    Date: 7/10/2020  Patient Name: Vani Jackson  : 1965   MRN: [id number]  [place]: 2020   DOSx: --  Referring Provider: Vivian Velazquez DO  1934 [address], [facility]     Medical Diagnosis:      Diagnosis Orders   1. Left knee pain, unspecified chronicity     2. Contusion of left knee, initial encounter     3. Sprain of left knee, unspecified ligament, initial encounter         Outcome Measure:   Lower Extremity Functional Scale (LEFS) 72.5% impairment    S: No new report      O:    Time 2875-5572       Visit  1 time every 7-14 days until ready for Heavy, Slow, Resistance then 2 days per week. Pain 2/10  Repeat outcome measure at mid point and end. ROM 0 ext, 110 flex (vs 120 on R)       Modalities         Ice     MO   Exercise         Phase 1        QS L & R 5 sec hold 3 x 10   TE   SLR L & R 3 x 15   TE   Bridging 2-leg   TE   S-lying hip ABD L & R 3 x 10   TE   Clamshells L & R 3 x 10     LAQ to 30 deg  3 x 10       Phase 2         LAQ to 45 deg      QS     Bridging 1-leg    TE   Bridging 2-leg     Clamshell    TE   S-lying hip ABD  Cues for hip placement   TE   SLR      TE   Hip Hiking      NR             Phase 3         Knee Extension Machine   TE   Hamstring Curl Machine   TE   Leg Press 2-leg   TE   Calf Raises     TE   Hip Hiking      Reach and Touch               Leg Press 1-leg ? TE            ROWS H - M - L Green 3 x 10 ea            Green tubing  Given 7/10/20               A:  Tolerated well. Patient adding daily tubing exercise. P: Continue with rehab plan.     Luther Islas, PT    Treatment Charges: Mins Units   Initial Evaluation     Re-Evaluation     Ther Exercise         TE 45 3   Manual Therapy     MT     Ther Activities        TA     Gait Training          GT     Neuro Re-education NR     Modalities     Non-Billable Service Time     Other     Total Time/Units 45 3
yes

## 2020-07-15 ENCOUNTER — TREATMENT (OUTPATIENT)
Dept: PHYSICAL THERAPY | Age: 55
End: 2020-07-15
Payer: MEDICAID

## 2020-07-15 PROCEDURE — 97110 THERAPEUTIC EXERCISES: CPT | Performed by: PHYSICAL THERAPIST

## 2020-07-15 NOTE — PROGRESS NOTES
Physical Therapy Daily Treatment Note    Date: 7/15/2020  Patient Name: Kim Boyce  : 1965   MRN: [id number]  [place]: 2020   DOSx: --  Referring Provider: Marty De Luna DO  [address], [facility] [place]     Medical Diagnosis:      Diagnosis Orders   1. Left knee pain, unspecified chronicity     2. Contusion of left knee, initial encounter         Outcome Measure:   Lower Extremity Functional Scale (LEFS) 72.5% impairment    S: No new report      O:    Time 0448-1530       Visit  1 time every 7-14 days until ready for Heavy, Slow, Resistance then 2 days per week. Pain 2/10  Repeat outcome measure at mid point and end. ROM 0 ext, 110 flex (vs 120 on R)       Modalities         Ice     MO   Exercise         Phase 1        QS L & R 5 sec hold 3 x 10   TE   SLR L & R 3 x 15   TE   Bridging 2-leg   TE   S-lying hip ABD L & R 3 x 10   TE   Clamshells L & R 3 x 10     LAQ to 30 deg  3 x 10       Phase 2         LAQ to 45 deg      QS     Bridging 1-leg    TE   Bridging 2-leg     Clamshell    TE   S-lying hip ABD  Cues for hip placement   TE   SLR      TE   Hip Hiking      NR             Phase 3         Knee Extension Machine   TE   Hamstring Curl Machine   TE   Leg Press 2-leg   TE   Calf Raises     TE   Hip Hiking      Reach and Touch               Leg Press 1-leg ? TE            ROWS H - M - L Green 3 x 10 ea            Green tubing  Given 7/10/20               A:  Tolerated well. P: Continue with rehab plan.     Erna Door, PT    Treatment Charges: Mins Units   Initial Evaluation     Re-Evaluation     Ther Exercise         TE 45 3   Manual Therapy     MT     Ther Activities        TA     Gait Training          GT     Neuro Re-education NR     Modalities     Non-Billable Service Time     Other     Total Time/Units 45 3

## 2020-07-17 ENCOUNTER — TREATMENT (OUTPATIENT)
Dept: PHYSICAL THERAPY | Age: 55
End: 2020-07-17
Payer: MEDICAID

## 2020-07-17 PROCEDURE — G0283 ELEC STIM OTHER THAN WOUND: HCPCS | Performed by: PHYSICAL THERAPIST

## 2020-07-17 PROCEDURE — 97110 THERAPEUTIC EXERCISES: CPT | Performed by: PHYSICAL THERAPIST

## 2020-07-17 NOTE — PROGRESS NOTES
Physical Therapy Daily Treatment Note    Date: 2020  Patient Name: Andrea Marie  : 1965   MRN: [id number]  [place]: 2020   DOSx: --  Referring Provider: Bonnie Baker DO  [address], [facility]     Medical Diagnosis:      Diagnosis Orders   1. Left knee pain, unspecified chronicity     2. Contusion of left knee, initial encounter     3. Sprain of left knee, unspecified ligament, initial encounter         Outcome Measure:   Lower Extremity Functional Scale (LEFS) 72.5% impairment    S: Reports L knee is feeling better. She states her back and R leg pain persists. She says she is trying to be better about pacing, however her trips to [employer] remain difficult due to distance and duration. O:    Time 5409-2502       Visit    1 time every 7-14 days until ready for Heavy, Slow, Resistance then 2 days per week. Pain 2/10  Repeat outcome measure at mid point and end. ROM 0 ext, 110 flex (vs 120 on R)       Modalities         Ice + IFC R lateral hip L  S-lying; Ice & ES applied to R hip x 15 min   MO   Exercise         Phase 1        QS L & R 5 sec hold 3 x 10   TE   SLR L & R 3 x 15   TE   Bridging 2-leg   TE   S-lying hip ABD L & R 3 x 10   TE   Clamshells L & R 3 x 10     LAQ to 30 deg  3 x 10       Phase 2         LAQ to 45 deg      QS     Bridging 1-leg    TE   Bridging 2-leg     Clamshell    TE   S-lying hip ABD  Cues for hip placement   TE   SLR      TE   Hip Hiking      NR             Phase 3         Knee Extension Machine   TE   Hamstring Curl Machine   TE   Leg Press 2-leg   TE   Calf Raises     TE   Hip Hiking      Reach and Touch               Leg Press 1-leg ? TE            [address] tubing  Given 7/10/20               A:  Tolerated well. P: Continue with rehab plan.     Katy Beal PT    Treatment Charges: Mins Units   Initial Evaluation     Re-Evaluation     Ther Exercise         TE 30 2   Manual Therapy MT     Ther Activities        TA     Gait Training          GT     Neuro Re-education NR     Modalities 15 1   Non-Billable Service Time     Other     Total Time/Units 45 3

## 2020-07-20 ENCOUNTER — TREATMENT (OUTPATIENT)
Dept: PHYSICAL THERAPY | Age: 55
End: 2020-07-20
Payer: MEDICAID

## 2020-07-20 PROCEDURE — 97014 ELECTRIC STIMULATION THERAPY: CPT | Performed by: PHYSICAL THERAPIST

## 2020-07-20 PROCEDURE — 97110 THERAPEUTIC EXERCISES: CPT | Performed by: PHYSICAL THERAPIST

## 2020-07-20 NOTE — PROGRESS NOTES
Physical Therapy Daily Treatment Note    Date: 2020  Patient Name: Suhas Fu  : 1965   MRN: [id number]  [facility] [place]: 2020   DOSx: --  Referring Provider: Roosevelt Cui DO   [address], [facility]     Medical Diagnosis:      Diagnosis Orders   1. Left knee pain, unspecified chronicity     2. Contusion of left knee, initial encounter     3. Sprain of left knee, unspecified ligament, initial encounter         Outcome Measure:   Lower Extremity Functional Scale (LEFS) 72.5% impairment    S: \"Everything hurts. \"  Does not want ice and ES today; open to heat and ES. Reports R lateral hip pain has been worse. She reports it limits her ability to walk and is painful to lift it to enter car. O: TTP R greater trochanter and R glute med bulk. Trunk flexion causes same pain; trunk extension does not. Hip motions cause familiar pain. Time 7769-8832       Visit    1 time every 7-14 days until ready for Heavy, Slow, Resistance then 2 days per week. Pain Pre Ex: 4/10  Post Ex: 7/10  Post modalities: 7/10  Repeat outcome measure at mid point and end. ROM 0 ext, 110 flex (vs 120 on R)       Modalities         Heat + IFC R lateral hip   (L  S-lying) x 15 min   MO   Exercise         Phase 1        QS L & R 5 sec hold 3 x 10   TE   SLR L & R 3 x 10   TE   Bridging 2-leg   TE   S-lying hip ABD L & R 3 x 10   TE   Clamshells L & R 3 x 10     LAQ to 30 deg  3 x 10       Phase 2         LAQ to 45 deg      QS     Bridging 1-leg    TE   Bridging 2-leg     Clamshell    TE   S-lying hip ABD  Cues for hip placement   TE   SLR      TE   Hip Hiking      NR             Phase 3         Knee Extension Machine   TE   Hamstring Curl Machine   TE   Leg Press 2-leg   TE   Calf Raises     TE   Hip Hiking      Reach and Touch               Leg Press 1-leg ? TE            [address] tubing  Given 7/10/20               A:  Tolerated well.   Impression: R gluteal

## 2020-07-22 ENCOUNTER — TREATMENT (OUTPATIENT)
Dept: PHYSICAL THERAPY | Age: 55
End: 2020-07-22

## 2020-07-22 ENCOUNTER — TELEPHONE (OUTPATIENT)
Dept: PHYSICAL THERAPY | Age: 55
End: 2020-07-22

## 2020-07-22 PROCEDURE — 99999 PR OFFICE/OUTPT VISIT,PROCEDURE ONLY: CPT | Performed by: PHYSICAL THERAPIST

## 2020-07-22 NOTE — TELEPHONE ENCOUNTER
Patient arrived for her regularly scheduled appointment stating she could not participate in therapy. She said her back is getting worse and would like to put PT on hold until she can see Dr. Ag Gomez for her back (she is attending PT for her L knee via Dr. Karo Savage). No treatment provided today.

## 2020-07-22 NOTE — PROGRESS NOTES
Patient arrived for her regularly scheduled appointment stating she could not participate in therapy. She said her back is getting worse and would like to put PT on hold until she can see Dr. Althea Choudhury for her back (she is attending PT for her L knee via Dr. Sukh Palacios). No treatment provided today.

## 2020-08-18 ENCOUNTER — OFFICE VISIT (OUTPATIENT)
Dept: PHYSICAL MEDICINE AND REHAB | Age: 55
End: 2020-08-18
Payer: MEDICAID

## 2020-08-18 VITALS
WEIGHT: 275 LBS | SYSTOLIC BLOOD PRESSURE: 161 MMHG | HEIGHT: 63 IN | BODY MASS INDEX: 48.73 KG/M2 | DIASTOLIC BLOOD PRESSURE: 99 MMHG | HEART RATE: 99 BPM

## 2020-08-18 PROCEDURE — G8417 CALC BMI ABV UP PARAM F/U: HCPCS | Performed by: PHYSICAL MEDICINE & REHABILITATION

## 2020-08-18 PROCEDURE — 3017F COLORECTAL CA SCREEN DOC REV: CPT | Performed by: PHYSICAL MEDICINE & REHABILITATION

## 2020-08-18 PROCEDURE — 1036F TOBACCO NON-USER: CPT | Performed by: PHYSICAL MEDICINE & REHABILITATION

## 2020-08-18 PROCEDURE — G8427 DOCREV CUR MEDS BY ELIG CLIN: HCPCS | Performed by: PHYSICAL MEDICINE & REHABILITATION

## 2020-08-18 PROCEDURE — 99214 OFFICE O/P EST MOD 30 MIN: CPT | Performed by: PHYSICAL MEDICINE & REHABILITATION

## 2020-08-18 RX ORDER — GABAPENTIN 300 MG/1
300 CAPSULE ORAL 3 TIMES DAILY
Qty: 90 CAPSULE | Refills: 3 | Status: SHIPPED
Start: 2020-08-18 | End: 2020-10-06 | Stop reason: SDUPTHER

## 2020-08-18 RX ORDER — CYCLOBENZAPRINE HCL 5 MG
10 TABLET ORAL NIGHTLY PRN
Qty: 30 TABLET | Refills: 2 | Status: SHIPPED
Start: 2020-08-18 | End: 2020-10-06 | Stop reason: SDUPTHER
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
## 2020-08-28 ENCOUNTER — HOSPITAL ENCOUNTER (OUTPATIENT)
Age: 55
Discharge: HOME | End: 2020-08-30
Payer: MEDICAID

## 2020-08-28 PROCEDURE — U0003 INFECTIOUS AGENT DETECTION BY NUCLEIC ACID (DNA OR RNA); SEVERE ACUTE RESPIRATORY SYNDROME CORONAVIRUS 2 (SARS-COV-2) (CORONAVIRUS DISEASE [COVID-19]), AMPLIFIED PROBE TECHNIQUE, MAKING USE OF HIGH THROUGHPUT TECHNOLOGIES AS DESCRIBED BY CMS-2020-01-R: HCPCS

## 2020-08-29 LAB
SARS-COV-2: NOT DETECTED
SOURCE: NORMAL

## 2020-09-03 ENCOUNTER — HOSPITAL ENCOUNTER (OUTPATIENT)
Dept: OPERATING ROOM | Age: 55
Setting detail: OUTPATIENT SURGERY
Discharge: HOME | End: 2020-09-03
Attending: PHYSICAL MEDICINE & REHABILITATION
Payer: MEDICAID

## 2020-09-03 ENCOUNTER — HOSPITAL ENCOUNTER (OUTPATIENT)
Age: 55
Setting detail: OUTPATIENT SURGERY
Discharge: HOME | End: 2020-09-03
Attending: PHYSICAL MEDICINE & REHABILITATION | Admitting: PHYSICAL MEDICINE & REHABILITATION
Payer: MEDICAID

## 2020-09-03 VITALS
OXYGEN SATURATION: 9 % | BODY MASS INDEX: 47.84 KG/M2 | RESPIRATION RATE: 14 BRPM | DIASTOLIC BLOOD PRESSURE: 79 MMHG | SYSTOLIC BLOOD PRESSURE: 130 MMHG | WEIGHT: 270 LBS | TEMPERATURE: 97.9 F | HEART RATE: 96 BPM | HEIGHT: 63 IN

## 2020-09-03 PROCEDURE — 2709999900 HC NON-CHARGEABLE SUPPLY: Performed by: PHYSICAL MEDICINE & REHABILITATION

## 2020-09-03 PROCEDURE — 6360000004 HC RX CONTRAST MEDICATION: Performed by: PHYSICAL MEDICINE & REHABILITATION

## 2020-09-03 PROCEDURE — 6360000002 HC RX W HCPCS: Performed by: PHYSICAL MEDICINE & REHABILITATION

## 2020-09-03 PROCEDURE — 3209999900 FLUORO FOR SURGICAL PROCEDURES

## 2020-09-03 PROCEDURE — 2500000003 HC RX 250 WO HCPCS: Performed by: PHYSICAL MEDICINE & REHABILITATION

## 2020-09-03 PROCEDURE — 7100000010 HC PHASE II RECOVERY - FIRST 15 MIN: Performed by: PHYSICAL MEDICINE & REHABILITATION

## 2020-09-03 PROCEDURE — 64483 NJX AA&/STRD TFRM EPI L/S 1: CPT | Performed by: PHYSICAL MEDICINE & REHABILITATION

## 2020-09-03 PROCEDURE — 7100000011 HC PHASE II RECOVERY - ADDTL 15 MIN: Performed by: PHYSICAL MEDICINE & REHABILITATION

## 2020-09-03 PROCEDURE — 2580000003 HC RX 258: Performed by: PHYSICAL MEDICINE & REHABILITATION

## 2020-09-03 PROCEDURE — 3600000005 HC SURGERY LEVEL 5 BASE: Performed by: PHYSICAL MEDICINE & REHABILITATION

## 2020-09-03 RX ORDER — LIDOCAINE HYDROCHLORIDE 10 MG/ML
INJECTION, SOLUTION EPIDURAL; INFILTRATION; INTRACAUDAL; PERINEURAL PRN
Status: DISCONTINUED | OUTPATIENT
Start: 2020-09-03 | End: 2020-09-03 | Stop reason: ALTCHOICE

## 2020-09-03 ASSESSMENT — PAIN SCALES - GENERAL
PAINLEVEL_OUTOF10: 0
PAINLEVEL_OUTOF10: 0

## 2020-09-03 ASSESSMENT — PAIN - FUNCTIONAL ASSESSMENT: PAIN_FUNCTIONAL_ASSESSMENT: 0-10

## 2020-09-03 NOTE — OP NOTE
LUMBOSACRAL EPIDURAL STEROID INJECTION, TRANSFORAMINAL APPROACH       WITH FLUOROSCOPIC GUIDANCE    Patient: Judith Pena                         MRN#: [id number]  : 1965   Date of procedure: 9/3/2020      Physician Performing Procedure:  Kallie Sanchez DO    Clinical Scenario: As per electronic documentation. Diagnosis: lumbar radiculitis    Injectate: A total of 3cc, consisting of 1 cc of Dexamethasone 10mg/ml,  with the remainder of normal saline    Levels Treated:  Right L3-4 neural foramen    Approach:   Transforaminal    Improvement after today's procedure: As per nursing record. Comments:  None     Pre-procedural evaluation: The patient was examined today just prior to performing the procedure listed above. The patient's heart rate was normal, lungs were clear to auscultation bilaterally. Procedure: The patient was prepped and draped in a sterile fashion in the prone position after informed consent was signed and all the patient's questions were answered including the risks, benefits, alternative treatment options, and prognosis. The risks include - but are not limited to - infection, allergic reaction, increased pain, lack of therapeutic benefit, steroid reaction, nerve damage, paralysis, stroke, epidural hematoma, syncope, headache, respiratory or cardiac arrest, and scar formation. The C-arm was positioned so that an oblique view of the neural foramen as noted above was visualized. The soft tissues overlying this structure were infiltrated with 2-3 cc. of 1% Lidocaine without Epinephrine. A 22 gauge 5 inch spinal needle was inserted toward the target using a trajectory view along the fluoroscope beam.  Under AP and lateral visualization, the needle was advanced so it did not puncture dura. Biplanar projections were used to confirm position. Aspiration was confirmed to be negative for CSF and/or blood.   A 1-2 cc. volume of Isovue contrast was injected at this level.  The contrast was observed to flow under the pedicle. Radiographs were obtained for documentation purposes. After attaining flow of contrast documented above, the above injectate was administered into the transforaminal epidural space. The patient tolerated the procedure well and was discharged after an appropriate period of observation. If there are any complications, the patient was instructed to call us. The patient is to follow-up with the requesting physician after the injection, preferably within three weeks.
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
## 2020-10-06 ENCOUNTER — TELEPHONE (OUTPATIENT)
Dept: FAMILY MEDICINE CLINIC | Age: 55
End: 2020-10-06

## 2020-10-06 ENCOUNTER — OFFICE VISIT (OUTPATIENT)
Dept: PHYSICAL MEDICINE AND REHAB | Age: 55
End: 2020-10-06
Payer: MEDICAID

## 2020-10-06 VITALS
HEIGHT: 63 IN | HEART RATE: 82 BPM | WEIGHT: 276.8 LBS | BODY MASS INDEX: 49.04 KG/M2 | DIASTOLIC BLOOD PRESSURE: 85 MMHG | SYSTOLIC BLOOD PRESSURE: 141 MMHG

## 2020-10-06 PROCEDURE — G8484 FLU IMMUNIZE NO ADMIN: HCPCS | Performed by: PHYSICAL MEDICINE & REHABILITATION

## 2020-10-06 PROCEDURE — 99214 OFFICE O/P EST MOD 30 MIN: CPT | Performed by: PHYSICAL MEDICINE & REHABILITATION

## 2020-10-06 PROCEDURE — 1036F TOBACCO NON-USER: CPT | Performed by: PHYSICAL MEDICINE & REHABILITATION

## 2020-10-06 PROCEDURE — 3017F COLORECTAL CA SCREEN DOC REV: CPT | Performed by: PHYSICAL MEDICINE & REHABILITATION

## 2020-10-06 PROCEDURE — G8417 CALC BMI ABV UP PARAM F/U: HCPCS | Performed by: PHYSICAL MEDICINE & REHABILITATION

## 2020-10-06 PROCEDURE — G8427 DOCREV CUR MEDS BY ELIG CLIN: HCPCS | Performed by: PHYSICAL MEDICINE & REHABILITATION

## 2020-10-06 RX ORDER — CYCLOBENZAPRINE HCL 5 MG
10 TABLET ORAL NIGHTLY PRN
Qty: 30 TABLET | Refills: 2 | Status: SHIPPED
Start: 2020-10-06 | End: 2020-12-15

## 2020-10-06 RX ORDER — GABAPENTIN 300 MG/1
300 CAPSULE ORAL 3 TIMES DAILY
Qty: 90 CAPSULE | Refills: 3 | Status: SHIPPED
Start: 2020-10-06 | End: 2021-07-26

## 2020-10-06 NOTE — H&P
Lori Rater, [address] [facility]  [address] [facility] [place]  Phone: [phone]  Fax: [phone]    PCP: Remedios Singer DO  Date of visit: 10/6/20    Chief Complaint   Patient presents with    Back Pain     post epidural 9/3/20    Tingling     right leg    Medication Refill     Interval:   Patient presents today for follow up. She underwent right L3-4 TFESI and reports 100% relief in her radicular pain. Now she complains of low back pain. She is getting left knee scope on Monday. The pain is rated Pain Score:   5. Described as achy. Located in low back without radiation. The pain is better with sitting in recliner with heating pad. The pain is worse with staying in one position too long. There is no weakness. There is no bowel/bladder changes. The prior workup has included: Xray, MRI L Spine     The prior treatment has included:  PT: completed for lumbar 2/4/2020, yes   Chiropractic: none    Modalities: heat with relief   OTC Tylenol: yes with no relief   NSAIDS: ibuprofen, indocin with no relief, voltaren with relief  Opioids: none    Membrane stabilizers: neurontin 300mg TID with relief   Muscle relaxers: flexeril with relief   Previous injections: right L3-4 TFESI x 2, right SI joint    Previous surgery at this site: none    No Known Allergies    Current Outpatient Medications   Medication Sig Dispense Refill    cyclobenzaprine (FLEXERIL) 5 MG tablet Take 2 tablets by mouth nightly as needed for Muscle spasms 30 tablet 2    gabapentin (NEURONTIN) 300 MG capsule Take 1 capsule by mouth 3 times daily for 30 days.  90 capsule 3    diclofenac (VOLTAREN) 50 MG EC tablet Take 1 tablet by mouth 2 times daily 60 tablet 2    atorvastatin (LIPITOR) 10 MG tablet Take 1 tablet by mouth daily 30 tablet 5    buPROPion (WELLBUTRIN XL) 300 MG extended release tablet TAKE 1 TABLET BY MOUTH ONCE DAILY  2    clonazePAM (KLONOPIN) 1 MG tablet TAKE 1 TABLET BY MOUTH THREE TIMES DAILY AS NEEDED  2    DULoxetine (CYMBALTA) 60 MG extended release capsule TAKE 2 CAPSULES BY MOUTH ONCE DAILY AS DIRECTED  2    zolpidem (AMBIEN) 10 MG tablet TAKE 1 2 (ONE HALF) TO 1 WHOLE TABLET BY MOUTH AT BEDTIME AS DIRECETED ON EMPTY STOMACH  2     No current facility-administered medications for this visit. Past Medical History:   Diagnosis Date    Anxiety     Depression     Obesity     Osteoarthritis        Past Surgical History:   Procedure Laterality Date    ANESTHESIA NERVE BLOCK Right 12/19/2019    RIGHT L3-4 TRANSFORAMINAL EPIDURAL STEROID INJECTION (CPT 57400) performed by Tiffany Bennett DO at [address] [facility] [address] JOINT Right 1/16/2020    RIGHT SACROILIAC JOINT STEROID INJECTION UNDER X-RAY GUIDANCE performed by Tiffany Bennett DO at [address] [facility] Right 2015    NERVE BLOCK Right 12/19/2019    lumbar trasnforaminal    NERVE BLOCK Right 01/16/2020    right sacroiliac joint steroid injection     NERVE BLOCK Right 09/03/2020    right l3-4transforaminal epidural steroid injection    NERVE BLOCK Right 9/3/2020    RIGHT L3-4 TRANSFORAMINAL EPIDURAL STEROID INJECTION performed by Tiffany Bennett DO at [address] [facility] Right 2000    SHOULDER SURGERY Left 2019       Family History   Problem Relation Age of Onset    Diabetes Mother     Atrial Fibrillation Mother     High Blood Pressure Mother     High Cholesterol Mother     Diabetes Father     Breast Cancer Maternal Grandmother        Social History     Tobacco Use    Smoking status: Never Smoker    Smokeless tobacco: Never Used   Substance Use Topics    Alcohol use: Yes     Frequency: Monthly or less     Drinks per session: 1 or 2     Binge frequency: Never    Drug use: Never        Functional Status:    The patient is able to ambulate and perform activities of daily living with the use of an 5     Sensory:  Intact for light touch in all upper and lower extremity dermatomes.      Reflexes:                     R          L  Patellar                        (1+)       (2+)  Ankle Jerk                   (2+)      (2+)     Gait is Antalgic. Right foot externally rotated      Imaging: (personally reviewed by me 10/06/20)  Lumbar x-ray     MRI L Spine      BONE MARROW SIGNAL: There is normal marrow signal.       T12-L1: Normal T12-L1.       L1-L2: Normal L1-L2.       L2-L3: Facet hypertrophy noted without canal or foraminal stenosis.       L3-L4: Grade 1 listhesis of L3 noted with a disc bulge/pseudobulge. Mild canal stenosis noted with moderate left and severe right   foraminal stenosis. Synovial facet joint effusions are seen   bilaterally.       L4-L5: Minimal retrolisthesis of L4 with facet hypertrophy. Severe   left and mild right foraminal stenosis without canal stenosis.       L5-S1: Facet hypertrophy without canal or foraminal stenosis.       SOFT TISSUES: The visualized paravertebral soft tissues are within   normal limits.           Impression   1. Grade 1 spondylolisthesis of L3 with severe facet synovitis. 2. Multilevel canal and foraminal stenosis most severe on the left   from L3-4 through L5-S1 as discussed. CT C spine       Impression:   Carlos Fournier is a 60 y.o. female     1. Lumbar facet arthropathy    2. Radiculopathy, lumbosacral region        Plan:   · Epidural helped with radicular pain. Now complaining of axial low back pain. · She would benefit from medial branch block bilateral L4-5 and L5-S1 x 2 with intent for RFA. Procedure risks, benefits and alternatives were discussed. Patient would like to proceed. · Refilled neurontin, flexeril and voltaren.  The patient was educated about the diagnosis, prognosis, indications, risks and benefits of treatment. An opportunity to ask questions was given to the patient and questions were answered.   The patient agreed to proceed with the recommended treatment as described above.  Follow up after injection       Tiara Flores DO, FAAPMR   Board Certified Physical Medicine and Rehabilitation    The patient was counseled at length about the risks of mariposa Covid-19 during their perioperative period and any recovery window from their procedure. The patient was made aware that mariposa Covid-19  may worsen their prognosis for recovering from their procedure  and lend to a higher morbidity and/or mortality risk. All material risks, benefits, and reasonable alternatives including postponing the procedure were discussed. The patient does wish to proceed with the procedure at this time.

## 2020-10-06 NOTE — TELEPHONE ENCOUNTER
Called patient and scheduled her for surgical clearance 10/7/2020 @ 1200.  Patient is having left knee torn meniscus scope repair with [facility] on 10/12/2020

## 2020-10-06 NOTE — PROGRESS NOTES
assistive device- cane. ROS:    Constitutional: Denies fevers, chills, night sweats, unintentional weight loss     Skin: Denies rash or skin changes     Eyes: Denies vision changes    Ears/Nose/Throat: Denies nasal congestion or sore throat     Respiratory: Denies SOB or cough     Cardiovascular: Denies CP, palpitations, edema      Gastrointestinal: Denies abdominal pain,  N/V, constipation, or diarrhea    Genitourinary: Denies urinary symptoms    Neurologic: See HPI.     MSK: See HPI. Psychiatric: + sleep disturbance, anxiety, depression    Hematologic/Lymphatic/Immunologic: Denies bruising       Physical Exam:   Blood pressure (!) 141/85, pulse 82, height 5' 3\" (1.6 m), weight 276 lb 12.8 oz (125.6 kg). General: well developed and well nourished in no acute distress. Body habitus is morbidly obese  HEENT: No rhinorrhea, sneezing, yawning, or lacrimation. No scleral icterus or conjunctival injection. Resp: symmetrical chest expansion, unlabored breathing, respirations unlabored. CV: Heart rate is regular. Peripheral pulses are palpable  Lymph: No visible regional lymphadenopathy. Skin: No rashes or ecchymosis. Normal turgor. Psych: Mood is calm. Affect is normal.   Ext: No edema noted     MSK:   Back/Hip Exam:   Inspection: Pelvis was asymmetric. Lumbar lordotic curvature was decreased. There was no scoliosis. No ecchymoses or erythema. Palpation: Palpatory exam revealed tenderness along bilateral lumbosacral paraspinals, no ttp midline spine  ttp right SI joint sulcus, no ttp greater trochanters and TFL on both side. There was paraspinal spasms. There were no trigger points. ROM decreased. +facet loading.        Neurological Exam:  Strength:                     R          L  Hip Flex                       5         5    Knee Ext                     5          5  Ankle dorsi                  5          5  EHL                             5          5  Ankle Plantar               5 5     Sensory:  Intact for light touch in all upper and lower extremity dermatomes.      Reflexes:                     R          L  Patellar                        (1+)       (2+)  Ankle Jerk                   (2+)      (2+)     Gait is Antalgic. Right foot externally rotated      Imaging: (personally reviewed by me 10/06/20)  Lumbar x-ray     MRI L Spine      BONE MARROW SIGNAL: There is normal marrow signal.       T12-L1: Normal T12-L1.       L1-L2: Normal L1-L2.       L2-L3: Facet hypertrophy noted without canal or foraminal stenosis.       L3-L4: Grade 1 listhesis of L3 noted with a disc bulge/pseudobulge. Mild canal stenosis noted with moderate left and severe right   foraminal stenosis. Synovial facet joint effusions are seen   bilaterally.       L4-L5: Minimal retrolisthesis of L4 with facet hypertrophy. Severe   left and mild right foraminal stenosis without canal stenosis.       L5-S1: Facet hypertrophy without canal or foraminal stenosis.       SOFT TISSUES: The visualized paravertebral soft tissues are within   normal limits.           Impression   1. Grade 1 spondylolisthesis of L3 with severe facet synovitis. 2. Multilevel canal and foraminal stenosis most severe on the left   from L3-4 through L5-S1 as discussed. CT C spine       Impression:   Salinas Hooker is a 60 y.o. female     1. Lumbar facet arthropathy    2. Radiculopathy, lumbosacral region        Plan:   · Epidural helped with radicular pain. Now complaining of axial low back pain. · She would benefit from medial branch block bilateral L4-5 and L5-S1 x 2 with intent for RFA. Procedure risks, benefits and alternatives were discussed. Patient would like to proceed. · Refilled neurontin, flexeril and voltaren.  The patient was educated about the diagnosis, prognosis, indications, risks and benefits of treatment. An opportunity to ask questions was given to the patient and questions were answered.   The patient agreed to proceed with the recommended treatment as described above.  Follow up after injection       Tiara Flores DO, FAAPMR   Board Certified Physical Medicine and Rehabilitation    The patient was counseled at length about the risks of mariposa Covid-19 during their perioperative period and any recovery window from their procedure. The patient was made aware that mariposa Covid-19  may worsen their prognosis for recovering from their procedure  and lend to a higher morbidity and/or mortality risk. All material risks, benefits, and reasonable alternatives including postponing the procedure were discussed. The patient does wish to proceed with the procedure at this time.

## 2020-10-07 ENCOUNTER — HOSPITAL ENCOUNTER (OUTPATIENT)
Age: 55
Discharge: HOME | End: 2020-10-09
Payer: MEDICAID

## 2020-10-07 ENCOUNTER — OFFICE VISIT (OUTPATIENT)
Dept: FAMILY MEDICINE CLINIC | Age: 55
End: 2020-10-07
Payer: MEDICAID

## 2020-10-07 VITALS
BODY MASS INDEX: 48.73 KG/M2 | HEIGHT: 63 IN | HEART RATE: 83 BPM | OXYGEN SATURATION: 96 % | DIASTOLIC BLOOD PRESSURE: 88 MMHG | TEMPERATURE: 97.3 F | SYSTOLIC BLOOD PRESSURE: 138 MMHG | WEIGHT: 275 LBS

## 2020-10-07 LAB
ALBUMIN SERPL-MCNC: 4.2 G/DL (ref 3.5–5.2)
ALP BLD-CCNC: 69 U/L (ref 35–104)
ALT SERPL-CCNC: 53 U/L (ref 0–32)
ANION GAP SERPL CALCULATED.3IONS-SCNC: 12 MMOL/L (ref 7–16)
AST SERPL-CCNC: 40 U/L (ref 0–31)
BASOPHILS ABSOLUTE: 0.06 E9/L (ref 0–0.2)
BASOPHILS RELATIVE PERCENT: 1.3 % (ref 0–2)
BILIRUB SERPL-MCNC: 0.3 MG/DL (ref 0–1.2)
BUN BLDV-MCNC: 9 MG/DL (ref 6–20)
CALCIUM SERPL-MCNC: 10.1 MG/DL (ref 8.6–10.2)
CHLORIDE BLD-SCNC: 101 MMOL/L (ref 98–107)
CO2: 27 MMOL/L (ref 22–29)
CREAT SERPL-MCNC: 0.9 MG/DL (ref 0.5–1)
EOSINOPHILS ABSOLUTE: 0.13 E9/L (ref 0.05–0.5)
EOSINOPHILS RELATIVE PERCENT: 2.8 % (ref 0–6)
GFR AFRICAN AMERICAN: >60
GFR NON-AFRICAN AMERICAN: >60 ML/MIN/1.73
GLUCOSE BLD-MCNC: 105 MG/DL (ref 74–99)
HBA1C MFR BLD: 6.3 % (ref 4–5.6)
HCT VFR BLD CALC: 44.5 % (ref 34–48)
HEMOGLOBIN: 13.7 G/DL (ref 11.5–15.5)
IMMATURE GRANULOCYTES #: 0.01 E9/L
IMMATURE GRANULOCYTES %: 0.2 % (ref 0–5)
LYMPHOCYTES ABSOLUTE: 2.08 E9/L (ref 1.5–4)
LYMPHOCYTES RELATIVE PERCENT: 44.8 % (ref 20–42)
MCH RBC QN AUTO: 27.5 PG (ref 26–35)
MCHC RBC AUTO-ENTMCNC: 30.8 % (ref 32–34.5)
MCV RBC AUTO: 89.2 FL (ref 80–99.9)
MONOCYTES ABSOLUTE: 0.35 E9/L (ref 0.1–0.95)
MONOCYTES RELATIVE PERCENT: 7.5 % (ref 2–12)
NEUTROPHILS ABSOLUTE: 2.01 E9/L (ref 1.8–7.3)
NEUTROPHILS RELATIVE PERCENT: 43.4 % (ref 43–80)
PDW BLD-RTO: 14.9 FL (ref 11.5–15)
PLATELET # BLD: 254 E9/L (ref 130–450)
PMV BLD AUTO: 10.6 FL (ref 7–12)
POTASSIUM SERPL-SCNC: 5.7 MMOL/L (ref 3.5–5)
RBC # BLD: 4.99 E12/L (ref 3.5–5.5)
SODIUM BLD-SCNC: 140 MMOL/L (ref 132–146)
TOTAL PROTEIN: 7.2 G/DL (ref 6.4–8.3)
WBC # BLD: 4.6 E9/L (ref 4.5–11.5)

## 2020-10-07 PROCEDURE — 3017F COLORECTAL CA SCREEN DOC REV: CPT | Performed by: FAMILY MEDICINE

## 2020-10-07 PROCEDURE — 1036F TOBACCO NON-USER: CPT | Performed by: FAMILY MEDICINE

## 2020-10-07 PROCEDURE — G8417 CALC BMI ABV UP PARAM F/U: HCPCS | Performed by: FAMILY MEDICINE

## 2020-10-07 PROCEDURE — 99213 OFFICE O/P EST LOW 20 MIN: CPT | Performed by: FAMILY MEDICINE

## 2020-10-07 PROCEDURE — 80053 COMPREHEN METABOLIC PANEL: CPT

## 2020-10-07 PROCEDURE — G8484 FLU IMMUNIZE NO ADMIN: HCPCS | Performed by: FAMILY MEDICINE

## 2020-10-07 PROCEDURE — 36415 COLL VENOUS BLD VENIPUNCTURE: CPT

## 2020-10-07 PROCEDURE — G8427 DOCREV CUR MEDS BY ELIG CLIN: HCPCS | Performed by: FAMILY MEDICINE

## 2020-10-07 PROCEDURE — 93000 ELECTROCARDIOGRAM COMPLETE: CPT | Performed by: FAMILY MEDICINE

## 2020-10-07 PROCEDURE — 85025 COMPLETE CBC W/AUTO DIFF WBC: CPT

## 2020-10-07 PROCEDURE — 83036 HEMOGLOBIN GLYCOSYLATED A1C: CPT

## 2020-10-07 RX ORDER — ATORVASTATIN CALCIUM 10 MG/1
10 TABLET, FILM COATED ORAL DAILY
Qty: 30 TABLET | Refills: 5 | Status: SHIPPED
Start: 2020-10-07 | End: 2021-04-15

## 2020-10-07 ASSESSMENT — ENCOUNTER SYMPTOMS
DIARRHEA: 0
CONSTIPATION: 0
VOMITING: 0
NAUSEA: 0
SHORTNESS OF BREATH: 0
WHEEZING: 0
COUGH: 0

## 2020-10-12 ENCOUNTER — TELEPHONE (OUTPATIENT)
Dept: PHYSICAL MEDICINE AND REHAB | Age: 55
End: 2020-10-12

## 2020-10-12 NOTE — TELEPHONE ENCOUNTER
Called patient to schedule mbb with dr. Scooby Leblanc. Left message with callback number and instructed patient to call the office to schedule.

## 2020-10-26 ENCOUNTER — TELEPHONE (OUTPATIENT)
Dept: PHYSICAL MEDICINE AND REHAB | Age: 55
End: 2020-10-26

## 2020-10-29 ENCOUNTER — HOSPITAL ENCOUNTER (OUTPATIENT)
Age: 55
Discharge: HOME | End: 2020-10-31
Payer: MEDICAID

## 2020-10-29 PROCEDURE — U0003 INFECTIOUS AGENT DETECTION BY NUCLEIC ACID (DNA OR RNA); SEVERE ACUTE RESPIRATORY SYNDROME CORONAVIRUS 2 (SARS-COV-2) (CORONAVIRUS DISEASE [COVID-19]), AMPLIFIED PROBE TECHNIQUE, MAKING USE OF HIGH THROUGHPUT TECHNOLOGIES AS DESCRIBED BY CMS-2020-01-R: HCPCS

## 2020-10-31 LAB
SARS-COV-2: NOT DETECTED
SOURCE: NORMAL

## 2020-11-05 ENCOUNTER — HOSPITAL ENCOUNTER (OUTPATIENT)
Age: 55
Setting detail: OUTPATIENT SURGERY
Discharge: HOME | End: 2020-11-05
Attending: PHYSICAL MEDICINE & REHABILITATION | Admitting: PHYSICAL MEDICINE & REHABILITATION
Payer: MEDICAID

## 2020-11-05 ENCOUNTER — HOSPITAL ENCOUNTER (OUTPATIENT)
Dept: OPERATING ROOM | Age: 55
Setting detail: OUTPATIENT SURGERY
Discharge: HOME | End: 2020-11-05
Attending: PHYSICAL MEDICINE & REHABILITATION
Payer: MEDICAID

## 2020-11-05 VITALS
RESPIRATION RATE: 16 BRPM | SYSTOLIC BLOOD PRESSURE: 104 MMHG | DIASTOLIC BLOOD PRESSURE: 62 MMHG | TEMPERATURE: 96.3 F | HEIGHT: 63 IN | WEIGHT: 265 LBS | BODY MASS INDEX: 46.95 KG/M2 | HEART RATE: 90 BPM | OXYGEN SATURATION: 97 %

## 2020-11-05 PROCEDURE — 2709999900 HC NON-CHARGEABLE SUPPLY: Performed by: PHYSICAL MEDICINE & REHABILITATION

## 2020-11-05 PROCEDURE — 2500000003 HC RX 250 WO HCPCS: Performed by: PHYSICAL MEDICINE & REHABILITATION

## 2020-11-05 PROCEDURE — 3600000005 HC SURGERY LEVEL 5 BASE: Performed by: PHYSICAL MEDICINE & REHABILITATION

## 2020-11-05 PROCEDURE — 3600000015 HC SURGERY LEVEL 5 ADDTL 15MIN: Performed by: PHYSICAL MEDICINE & REHABILITATION

## 2020-11-05 PROCEDURE — 7100000010 HC PHASE II RECOVERY - FIRST 15 MIN: Performed by: PHYSICAL MEDICINE & REHABILITATION

## 2020-11-05 PROCEDURE — 3209999900 FLUORO FOR SURGICAL PROCEDURES

## 2020-11-05 PROCEDURE — 64493 INJ PARAVERT F JNT L/S 1 LEV: CPT | Performed by: PHYSICAL MEDICINE & REHABILITATION

## 2020-11-05 PROCEDURE — 64494 INJ PARAVERT F JNT L/S 2 LEV: CPT | Performed by: PHYSICAL MEDICINE & REHABILITATION

## 2020-11-05 RX ORDER — LIDOCAINE HYDROCHLORIDE 10 MG/ML
INJECTION, SOLUTION EPIDURAL; INFILTRATION; INTRACAUDAL; PERINEURAL PRN
Status: DISCONTINUED | OUTPATIENT
Start: 2020-11-05 | End: 2020-11-05 | Stop reason: ALTCHOICE

## 2020-11-05 ASSESSMENT — PAIN - FUNCTIONAL ASSESSMENT: PAIN_FUNCTIONAL_ASSESSMENT: 0-10

## 2020-11-05 ASSESSMENT — PAIN DESCRIPTION - LOCATION
LOCATION: BACK
LOCATION: BACK

## 2020-11-05 ASSESSMENT — PAIN DESCRIPTION - PAIN TYPE
TYPE: CHRONIC PAIN
TYPE: SURGICAL PAIN

## 2020-11-05 ASSESSMENT — PAIN SCALES - GENERAL
PAINLEVEL_OUTOF10: 3
PAINLEVEL_OUTOF10: 6

## 2020-11-05 ASSESSMENT — PAIN DESCRIPTION - DESCRIPTORS
DESCRIPTORS: SORE
DESCRIPTORS: ACHING

## 2020-11-05 NOTE — OP NOTE
Cristal Carrera    11/5/2020     CHIEF COMPLAINT:  Low back pain. PRE-OPERATIVE DIAGNOSIS:  Lumbar spondylosis, lumbar facet syndrome, lumbosacral osteoarthritis     POST-OPERATIVE DIAGNOSIS:  Lumbar spondylosis, lumbar facet syndrome, lumbosacral osteoarthritis     PROCEDURE:  # 1 Fluoroscopic guided lumbar medial branch blocks at  levels: medial branch level: L3, L4, L5 Joint: Bilateral L4-5, L5-S1. SURGEON:    Nakita Aparicio DO    ANESTHESIA:   Local.    ESTIMATED BLOOD LOSS:  None.  ______________________________________________________________________  HISTORY & PHYSICAL: See separate H&P. PROCEDURE:  After confirming written and informed consent, Cristal Carrera was brought to the procedure room and placed in the prone position. Blood pressure, heart rate, O2 saturation, and visual and verbal monitoring were established. A time-out was performed and Heenas name, date of birth, the procedure to be performed, as well as the plan for the location of the needle insertion were confirmed. Fluoroscopy was utilized to delineate the anatomy of the lumbosacral spine. Surface landmarks were identified as well. After prep and drape, an oblique fluoroscopic view was created to optimize visualization of the junction of the transverse process and the pedicle. After infiltration of local, a #22-gauge, 3.5-inch regional block needle was passed to position the tip at the junction of the superior articular process and the transverse process, along the course of the medial branch. Satisfactory needle placement was confirmed by AP and oblique projections. At the sacral alar level, the sacral alar region was visualized and the needle tip was positioned on the sacral alar at the base of the superior articulating process where the medial branch traverses. At each level the patient received 0.75 cc of 0.25% Marcaine.     The above procedure was performed at each of the following levels: Bilateral L4-5, L5-S1. Negative aspiration was noted prior to each injection. The needles were removed intact and Band-Aids were applied. Whit Cooper was transferred to the recovery area. She was monitored, reassessed and eventually discharged after an appropriate observatory period. No complications were noted. Following the procedure Whit Cooper noted improvement of previous pain symptoms. Plan: Whit Cooper will return to the office as scheduled. She was encouraged to call with questions, concerns or if worsening of symptoms occurs.       Nakita Aparicio DO, [facility]   Board Certified Physical Medicine and Rehabilitation

## 2020-11-05 NOTE — H&P
Joe Cassidy, [id number] [facility] Physical Medicine [facility] Rehabilitation  [address] [facility] [place]  Phone: [phone]  Fax: [phone]     PCP: Claudine Toure DO  Date of visit: 10/6/20          Chief Complaint   Patient presents with    Back Pain       post epidural 9/3/20    Tingling       right leg    Medication Refill      Interval:   Patient presents today for follow up. She underwent right L3-4 TFESI and reports 100% relief in her radicular pain. Now she complains of low back pain. She is getting left knee scope on Monday. The pain is rated Pain Score:   5. Described as achy. Located in low back without radiation. The pain is better with sitting in recliner with heating pad. The pain is worse with staying in one position too long. There is no weakness. There is no bowel/bladder changes.      The prior workup has included: Xray, MRI L Spine      The prior treatment has included:  PT: completed for lumbar 2/4/2020, yes   Chiropractic: none    Modalities: heat with relief   OTC Tylenol: yes with no relief   NSAIDS: ibuprofen, indocin with no relief, voltaren with relief  Opioids: none    Membrane stabilizers: neurontin 300mg TID with relief   Muscle relaxers: flexeril with relief   Previous injections: right L3-4 TFESI x 2, right SI joint    Previous surgery at this site: none     No Known Allergies            Current Outpatient Medications   Medication Sig Dispense Refill    cyclobenzaprine (FLEXERIL) 5 MG tablet Take 2 tablets by mouth nightly as needed for Muscle spasms 30 tablet 2    gabapentin (NEURONTIN) 300 MG capsule Take 1 capsule by mouth 3 times daily for 30 days.  90 capsule 3    diclofenac (VOLTAREN) 50 MG EC tablet Take 1 tablet by mouth 2 times daily 60 tablet 2    atorvastatin (LIPITOR) 10 MG tablet Take 1 tablet by mouth daily 30 tablet 5    buPROPion (WELLBUTRIN XL) 300 MG extended release tablet TAKE 1 TABLET BY MOUTH ONCE DAILY   2    clonazePAM (KLONOPIN) 1 MG tablet TAKE 1 TABLET BY MOUTH THREE TIMES DAILY AS NEEDED   2    DULoxetine (CYMBALTA) 60 MG extended release capsule TAKE 2 CAPSULES BY MOUTH ONCE DAILY AS DIRECTED   2    zolpidem (AMBIEN) 10 MG tablet TAKE 1 2 (ONE HALF) TO 1 WHOLE TABLET BY MOUTH AT BEDTIME AS DIRECETED ON EMPTY STOMACH   2      No current facility-administered medications for this visit.               Past Medical History:   Diagnosis Date    Anxiety      Depression      Obesity      Osteoarthritis                 Past Surgical History:   Procedure Laterality Date    ANESTHESIA NERVE BLOCK Right 12/19/2019     RIGHT L3-4 TRANSFORAMINAL EPIDURAL STEROID INJECTION (CPT 61479) performed by Volodymyr Craven DO at [facility] Right 1/16/2020     RIGHT SACROILIAC JOINT STEROID INJECTION UNDER X-RAY GUIDANCE performed by Volodymyr Craven DO at [address] [place] [address] Right 2015    NERVE BLOCK Right 12/19/2019     lumbar trasnforaminal    NERVE BLOCK Right 01/16/2020     right sacroiliac joint steroid injection     NERVE BLOCK Right 09/03/2020     right l3-4transforaminal epidural steroid injection    NERVE BLOCK Right 9/3/2020     RIGHT L3-4 TRANSFORAMINAL EPIDURAL STEROID INJECTION performed by Volodymyr Craven DO at [address] [facility] Right 2000    SHOULDER SURGERY Left 2019               Family History   Problem Relation Age of Onset    Diabetes Mother      Atrial Fibrillation Mother      High Blood Pressure Mother      High Cholesterol Mother      Diabetes Father      Breast Cancer Maternal Grandmother           Social History            Tobacco Use    Smoking status: Never Smoker    Smokeless tobacco: Never Used   Substance Use Topics    Alcohol use: Yes       Frequency: Monthly or less       Drinks per session: 1 or 2       Binge frequency: Never    Drug use: Never         Functional Status:    The patient is able to ambulate and perform activities of daily living with the use of an assistive device- cane. ROS:    Constitutional: Denies fevers, chills, night sweats, unintentional weight loss     Skin: Denies rash or skin changes     Eyes: Denies vision changes    Ears/Nose/Throat: Denies nasal congestion or sore throat     Respiratory: Denies SOB or cough     Cardiovascular: Denies CP, palpitations, edema      Gastrointestinal: Denies abdominal pain,  N/V, constipation, or diarrhea    Genitourinary: Denies urinary symptoms    Neurologic: See HPI.     MSK: See HPI. Psychiatric: + sleep disturbance, anxiety, depression    Hematologic/Lymphatic/Immunologic: Denies bruising         Physical Exam:   Blood pressure (!) 141/85, pulse 82, height 5' 3\" (1.6 m), weight 276 lb 12.8 oz (125.6 kg). General: well developed and well nourished in no acute distress. Body habitus is morbidly obese  HEENT: No rhinorrhea, sneezing, yawning, or lacrimation. No scleral icterus or conjunctival injection. Resp: symmetrical chest expansion, unlabored breathing, respirations unlabored. CV: Heart rate is regular. Peripheral pulses are palpable  Lymph: No visible regional lymphadenopathy. Skin: No rashes or ecchymosis. Normal turgor. Psych: Mood is calm. Affect is normal.   Ext: No edema noted      MSK:   Back/Hip Exam:   Inspection: Pelvis was asymmetric. Lumbar lordotic curvature was decreased. There was no scoliosis. No ecchymoses or erythema. Palpation: Palpatory exam revealed tenderness along bilateral lumbosacral paraspinals, no ttp midline spine  ttp right SI joint sulcus, no ttp greater trochanters and TFL on both side. There was paraspinal spasms. There were no trigger points.     ROM decreased.         Neurological Exam:  Strength:                     R          L  Hip Flex                       5         5    Knee Ext                     5          6  Ankle dorsi                  5          5  EHL                             5          5  Ankle Plantar               5          4     Sensory:  Intact for light touch in all upper and lower extremity dermatomes.      Reflexes:                     R          L  Patellar                        (1+)       (2+)  Ankle Jerk                   (2+)      (2+)     Gait is Antalgic. Right foot externally rotated        Imaging: (personally reviewed by me 10/06/20)  Lumbar x-ray      MRI L Spine       BONE MARROW SIGNAL: There is normal marrow signal.       T12-L1: Normal T12-L1.       L1-L2: Normal L1-L2.       L2-L3: Facet hypertrophy noted without canal or foraminal stenosis.       L3-L4: Grade 1 listhesis of L3 noted with a disc bulge/pseudobulge. Mild canal stenosis noted with moderate left and severe right   foraminal stenosis. Synovial facet joint effusions are seen   bilaterally.       L4-L5: Minimal retrolisthesis of L4 with facet hypertrophy. Severe   left and mild right foraminal stenosis without canal stenosis.       L5-S1: Facet hypertrophy without canal or foraminal stenosis.       SOFT TISSUES: The visualized paravertebral soft tissues are within   normal limits.           Impression   1. Grade 1 spondylolisthesis of L3 with severe facet synovitis. 2. Multilevel canal and foraminal stenosis most severe on the left   from L3-4 through L5-S1 as discussed.      CT C spine         Impression:   Aishwarya Bull is a 60 y.o. female      1. Lumbar facet arthropathy    2. Radiculopathy, lumbosacral region          Plan:   · Epidural helped with radicular pain. Now complaining of axial low back pain. · She would benefit from medial branch block bilateral L4-5 and L5-S1 x 2 with intent for RFA. Procedure risks, benefits and alternatives were discussed. Patient would like to proceed. · Refilled neurontin, flexeril and voltaren.         · The patient was educated about the diagnosis, prognosis, indications, risks and benefits of treatment.  An opportunity to ask questions was given to the patient and questions were answered. The patient agreed to proceed with the recommended treatment as described above.      · Follow up after injection         Geatano Soares DO, FAAPMR   Board Certified Physical Medicine and Rehabilitation     The patient was counseled at length about the risks of mariposa Covid-19 during their perioperative period and any recovery window from their procedure.  The patient was made aware that mraiposa Covid-19  may worsen their prognosis for recovering from their procedure  and lend to a higher morbidity and/or mortality risk.  All material risks, benefits, and reasonable alternatives including postponing the procedure were discussed.  The patient does wish to proceed with the procedure at this time.

## 2020-11-10 DIAGNOSIS — E87.5 SERUM POTASSIUM ELEVATED: ICD-10-CM

## 2020-11-10 DIAGNOSIS — R79.89 ELEVATED LFTS: ICD-10-CM

## 2020-11-10 LAB
ALBUMIN SERPL-MCNC: 4.7 G/DL (ref 3.5–5.2)
ALP BLD-CCNC: 69 U/L (ref 35–104)
ALT SERPL-CCNC: 40 U/L (ref 0–32)
ANION GAP SERPL CALCULATED.3IONS-SCNC: 16 MMOL/L (ref 7–16)
AST SERPL-CCNC: 29 U/L (ref 0–31)
BILIRUB SERPL-MCNC: 0.4 MG/DL (ref 0–1.2)
BUN BLDV-MCNC: 12 MG/DL (ref 6–20)
CALCIUM SERPL-MCNC: 10.5 MG/DL (ref 8.6–10.2)
CHLORIDE BLD-SCNC: 101 MMOL/L (ref 98–107)
CO2: 24 MMOL/L (ref 22–29)
CREAT SERPL-MCNC: 1.1 MG/DL (ref 0.5–1)
GFR AFRICAN AMERICAN: >60
GFR NON-AFRICAN AMERICAN: 51 ML/MIN/1.73
GLUCOSE BLD-MCNC: 153 MG/DL (ref 74–99)
POTASSIUM SERPL-SCNC: 5.8 MMOL/L (ref 3.5–5)
PRO-BNP: <5 PG/ML (ref 0–125)
SODIUM BLD-SCNC: 141 MMOL/L (ref 132–146)
TOTAL PROTEIN: 7.9 G/DL (ref 6.4–8.3)

## 2020-11-11 LAB
HAV IGM SER IA-ACNC: NORMAL
HEPATITIS B CORE IGM ANTIBODY: NORMAL
HEPATITIS B SURFACE ANTIGEN INTERPRETATION: NORMAL
HEPATITIS C ANTIBODY INTERPRETATION: NORMAL

## 2020-11-13 LAB
ALDOSTERONE: 36.9 NG/DL
RENIN ACTIVITY: 4.1 NG/ML/HR

## 2020-11-16 ENCOUNTER — OFFICE VISIT (OUTPATIENT)
Dept: FAMILY MEDICINE CLINIC | Age: 55
End: 2020-11-16
Payer: MEDICAID

## 2020-11-16 VITALS
OXYGEN SATURATION: 95 % | HEART RATE: 95 BPM | BODY MASS INDEX: 47.84 KG/M2 | TEMPERATURE: 97 F | RESPIRATION RATE: 18 BRPM | DIASTOLIC BLOOD PRESSURE: 82 MMHG | SYSTOLIC BLOOD PRESSURE: 120 MMHG | WEIGHT: 270 LBS | HEIGHT: 63 IN

## 2020-11-16 PROCEDURE — 90686 IIV4 VACC NO PRSV 0.5 ML IM: CPT | Performed by: FAMILY MEDICINE

## 2020-11-16 PROCEDURE — G8417 CALC BMI ABV UP PARAM F/U: HCPCS | Performed by: FAMILY MEDICINE

## 2020-11-16 PROCEDURE — 3017F COLORECTAL CA SCREEN DOC REV: CPT | Performed by: FAMILY MEDICINE

## 2020-11-16 PROCEDURE — 99214 OFFICE O/P EST MOD 30 MIN: CPT | Performed by: FAMILY MEDICINE

## 2020-11-16 PROCEDURE — 90471 IMMUNIZATION ADMIN: CPT | Performed by: FAMILY MEDICINE

## 2020-11-16 PROCEDURE — G8482 FLU IMMUNIZE ORDER/ADMIN: HCPCS | Performed by: FAMILY MEDICINE

## 2020-11-16 PROCEDURE — G8427 DOCREV CUR MEDS BY ELIG CLIN: HCPCS | Performed by: FAMILY MEDICINE

## 2020-11-16 PROCEDURE — 1036F TOBACCO NON-USER: CPT | Performed by: FAMILY MEDICINE

## 2020-11-16 ASSESSMENT — ENCOUNTER SYMPTOMS
VOMITING: 0
WHEEZING: 0
SHORTNESS OF BREATH: 0
NAUSEA: 0
COUGH: 0
DIARRHEA: 0
CONSTIPATION: 0

## 2020-11-16 NOTE — PROGRESS NOTES
2020     Socorro Alexander (:  1965) is a 60 y.o. female, with a:  Past Medical History:   Diagnosis Date    Anxiety     Depression     Dyslipidemia     Obesity     Osteoarthritis        Here for evaluation of the following medical concerns:  Chief Complaint   Patient presents with    6 Month Follow-Up    Hyperlipidemia     F/u atorvastatin     Discuss Labs     completed 11/10/2020    Results     chest xray results concerned about enlarged heart and what to do about it     Health Maintenance     wants mammogram on [place] Every /unsure about the flu vaccine     F/U of chronic problem(s) and new or recent complaint of lab review and abnormal CXR   Chronic problems reviewed today include:  Hyperlipidemia and prediabetes  Current status of this/these condition(s):  stable  Tolerating meds: Yes    Hyperlipidemia  Current treatment: Atorvastatin 10 mg daily  Recent medication changes: None  No new myalgias or GI upset    Lab Results   Component Value Date    CHOL 183 2020    TRIG 125 2020    HDL 59 2020    LDLCALC 99 2020     Lab Results   Component Value Date    ALT 40 (H) 11/10/2020    AST 29 11/10/2020        The 10-year ASCVD risk score (Cornel Cervantes et al., 2013) is: 1.9%    Values used to calculate the score:      Age: 60 years      Sex: Female      Is Non- : No      Diabetic: No      Tobacco smoker: No      Systolic Blood Pressure: 539 mmHg      Is BP treated: No      HDL Cholesterol: 59 mg/dL      Total Cholesterol: 183 mg/dL    Prediabetes  Recent hemoglobin A1c 6.3    Lab Results   Component Value Date    LABA1C 6.3 (H) 10/07/2020    LABA1C 5.7 (H) 2020    LABA1C 5.7 (H) 10/16/2019     Lab Results   Component Value Date    CREATININE 1.1 (H) 11/10/2020     Lab Results   Component Value Date    ALT 40 (H) 11/10/2020    AST 29 11/10/2020     Lab Results   Component Value Date    CHOL 183 2020    TRIG 125 2020    HDL 59 Never        Past Surgical History:   Procedure Laterality Date    ANESTHESIA NERVE BLOCK Right 12/19/2019    RIGHT L3-4 TRANSFORAMINAL EPIDURAL STEROID INJECTION (CPT 40458) performed by Joanie Yung DO at [facility] Right 1/16/2020    RIGHT SACROILIAC JOINT STEROID INJECTION UNDER X-RAY GUIDANCE performed by Joanie Yung DO at [facility] Right 2015    NERVE BLOCK Right 12/19/2019    lumbar trasnforaminal    NERVE BLOCK Right 01/16/2020    right sacroiliac joint steroid injection     NERVE BLOCK Right 09/03/2020    right l3-4transforaminal epidural steroid injection    NERVE BLOCK Right 9/3/2020    RIGHT L3-4 TRANSFORAMINAL EPIDURAL STEROID INJECTION performed by Joanie Yung DO at [facility] Bilateral 11/05/2020    medial branch block    NERVE BLOCK Bilateral 11/5/2020    MEDIAL BRANCH BLOCK BILATERAL L4-5 AND L5-S1 (CPT 95615) performed by Joanie Yung DO at [facility] Right 2000    SHOULDER SURGERY Left 2019       Vitals:    11/16/20 1304   BP: 120/82   Pulse: 95   Resp: 18   Temp: 97 °F (36.1 °C)   TempSrc: Temporal   SpO2: 95%   Weight: 270 lb (122.5 kg)   Height: 5' 3\" (1.6 m)     Estimated body mass index is 47.83 kg/m² as calculated from the following:    Height as of this encounter: 5' 3\" (1.6 m). Weight as of this encounter: 270 lb (122.5 kg). Physical Exam  Constitutional:       General: She is not in acute distress. Appearance: She is well-developed. She is obese. HENT:      Head: Normocephalic and atraumatic. Right Ear: External ear normal.      Left Ear: External ear normal.   Eyes:      Extraocular Movements: Extraocular movements intact. Pupils: Pupils are equal, round, and reactive to light. Neck:      Thyroid: No thyromegaly. Cardiovascular:      Rate and Rhythm: Normal rate and regular rhythm. --Barrett Cordon, DO on 11/16/2020 at 1:15 PM

## 2020-11-16 NOTE — PATIENT INSTRUCTIONS
Patient Education        Potassium-Restricted Diet: Care Instructions  Your Care Instructions     Potassium is a mineral. It helps keep the right mix of fluids in your body. It also helps your nerves and muscles work as they should. Most people get the potassium they need from the foods they eat. But if you have certain health problems, such as kidney disease, you may need to be careful about how much potassium you get. This is because too much potassium can be harmful. You can control how much potassium you get. You can do this if you eat foods that don't have much of it and you don't eat foods that have lots of it. Follow-up care is a key part of your treatment and safety. Be sure to make and go to all appointments, and call your doctor if you are having problems. It's also a good idea to know your test results and keep a list of the medicines you take. How can you care for yourself at home? · Limit foods that are high in potassium. Potassium is in many foods, such as vegetables, fruits, and milk products. High-potassium foods include:  ? Fruits such as bananas, oranges, and cantaloupe. ? Tomatoes. ? Broccoli. ? Milk. ? Spinach. ? Potatoes and sweet potatoes. · Eat foods that don't have as much potassium. These low-potassium foods include:  ? Fruits such as applesauce, pineapple, grapes, blueberries, and raspberries. ? Cucumbers. ? Hummus. ? White or brown rice. ? Spaghetti. ? Tortillas. ? Macaroni. · Do not use a salt substitute or \"lite\" salt unless you talk to your doctor first. These often are very high in potassium. · Be sure to tell your doctor about any prescription or over-the-counter medicines you are taking. Some medicines can increase the potassium in your body. Where can you learn more? Go to [email or web address]://[email or web address].[email or web address]. org and sign in to your vidCoin account. Enter M262 in the formerly [facility] box to learn more about \"Potassium-Restricted Diet: Care Instructions. \" If you do not have an account, please click on the \"Sign Up Now\" link. Current as of: August 22, 2019               Content Version: 12.6  © 1552-0637 [employer]. Care instructions adapted under license by [facility] ([facility]). If you have questions about a medical condition or this instruction, always ask your healthcare professional. [address] any warranty or liability for your use of this information.

## 2020-11-16 NOTE — PROGRESS NOTES
Vaccine Information Sheet, \"Influenza - Inactivated\"  given to Yesi Rushing, or parent/legal guardian of  Yesi Rushing and verbalized understanding. Patient responses:    Have you ever had a reaction to a flu vaccine? No  Do you have any current illness? No  Have you ever had Guillian Woodrow Syndrome? No  Do you have a serious allergy to any of the follow: Neomycin, Polymyxin, Thimerosal, eggs or egg products? No    Flu vaccine given per order. Please see immunization tab. Risks and benefits explained. Current VIS given.       Immunizations Administered     Name Date Dose Route    Influenza, Quadv, IM, PF (6 mo and older Fluzone, Flulaval, Fluarix, and 3 yrs and older Afluria) 11/16/2020 0.5 mL Intramuscular    Site: Deltoid- Left    Lot: [id number]    NDC: 09978-526-28

## 2020-11-23 ENCOUNTER — OFFICE VISIT (OUTPATIENT)
Dept: PHYSICAL MEDICINE AND REHAB | Age: 55
End: 2020-11-23
Payer: MEDICAID

## 2020-11-23 VITALS
DIASTOLIC BLOOD PRESSURE: 93 MMHG | HEIGHT: 63 IN | SYSTOLIC BLOOD PRESSURE: 132 MMHG | HEART RATE: 93 BPM | WEIGHT: 272 LBS | BODY MASS INDEX: 48.2 KG/M2

## 2020-11-23 PROCEDURE — 1036F TOBACCO NON-USER: CPT | Performed by: PHYSICAL MEDICINE & REHABILITATION

## 2020-11-23 PROCEDURE — G8427 DOCREV CUR MEDS BY ELIG CLIN: HCPCS | Performed by: PHYSICAL MEDICINE & REHABILITATION

## 2020-11-23 PROCEDURE — G8417 CALC BMI ABV UP PARAM F/U: HCPCS | Performed by: PHYSICAL MEDICINE & REHABILITATION

## 2020-11-23 PROCEDURE — 99213 OFFICE O/P EST LOW 20 MIN: CPT | Performed by: PHYSICAL MEDICINE & REHABILITATION

## 2020-11-23 PROCEDURE — 3017F COLORECTAL CA SCREEN DOC REV: CPT | Performed by: PHYSICAL MEDICINE & REHABILITATION

## 2020-11-23 PROCEDURE — G8482 FLU IMMUNIZE ORDER/ADMIN: HCPCS | Performed by: PHYSICAL MEDICINE & REHABILITATION

## 2020-11-23 NOTE — H&P
Matthew Tashiisaac, [id number] [facility] Physical Medicine and Rehabilitation  [address] [facility] [place]  Phone: [phone]  Fax: [phone]    PCP: Felix Gibbs DO  Date of visit: 11/23/20    Chief Complaint   Patient presents with    Back Pain     low back pain     Interval:   Patient presents today for follow up after MBB. She underwent bilateral L4-5 and L5-S1 MBB #1 and reports 50% relief. She was able to mow her mother's lawn and mulch the leaves without issues. She complains of continued low back pain. The pain is rated Pain Score:   4. Described as achy. Located in low back without radiation. The pain is better with sitting in recliner with heating pad. The pain is worse with staying in one position too long. There is no weakness. There is no bowel/bladder changes.      The prior workup has included: Xray, MRI L Spine     The prior treatment has included:  PT: completed for lumbar 2/4/2020, yes   Chiropractic: none    Modalities: heat with relief   OTC Tylenol: yes with no relief   NSAIDS: ibuprofen, indocin with no relief, voltaren with relief  Opioids: none    Membrane stabilizers: neurontin 300mg TID with relief   Muscle relaxers: flexeril with relief   Previous injections: right L3-4 TFESI x 2, right SI joint    Bilateral L4-5 and L5-S1 MBB   Previous surgery at this site: none    No Known Allergies    Current Outpatient Medications   Medication Sig Dispense Refill    atorvastatin (LIPITOR) 10 MG tablet Take 1 tablet by mouth daily 30 tablet 5    cyclobenzaprine (FLEXERIL) 5 MG tablet Take 2 tablets by mouth nightly as needed for Muscle spasms 30 tablet 2    buPROPion (WELLBUTRIN XL) 300 MG extended release tablet TAKE 1 TABLET BY MOUTH ONCE DAILY  2    clonazePAM (KLONOPIN) 1 MG tablet TAKE 1 TABLET BY MOUTH THREE TIMES DAILY AS NEEDED  2    DULoxetine (CYMBALTA) 60 MG extended release capsule TAKE 2 CAPSULES BY MOUTH ONCE DAILY AS DIRECTED  2    zolpidem (AMBIEN) 10 MG tablet TAKE 1 2 (ONE HALF) TO 1 WHOLE TABLET BY MOUTH AT BEDTIME AS DIRECETED ON EMPTY STOMACH  2    gabapentin (NEURONTIN) 300 MG capsule Take 1 capsule by mouth 3 times daily for 30 days. 90 capsule 3    diclofenac (VOLTAREN) 50 MG EC tablet Take 1 tablet by mouth 2 times daily 60 tablet 2     No current facility-administered medications for this visit.         Past Medical History:   Diagnosis Date    Anxiety     Depression     Dyslipidemia     Obesity     Osteoarthritis        Past Surgical History:   Procedure Laterality Date    ANESTHESIA NERVE BLOCK Right 12/19/2019    RIGHT L3-4 TRANSFORAMINAL EPIDURAL STEROID INJECTION (CPT 96400) performed by Yovanny De Luna DO at [address] [facility] [address] [place] [address] Right 1/16/2020    RIGHT SACROILIAC JOINT STEROID INJECTION UNDER X-RAY GUIDANCE performed by Yovanny De Luna DO at [address] [place] [address] Right 2015    NERVE BLOCK Right 12/19/2019    lumbar trasnforaminal    NERVE BLOCK Right 01/16/2020    right sacroiliac joint steroid injection     NERVE BLOCK Right 09/03/2020    right l3-4transforaminal epidural steroid injection    NERVE BLOCK Right 9/3/2020    RIGHT L3-4 TRANSFORAMINAL EPIDURAL STEROID INJECTION performed by Yovanny De Luna DO at [facility] Bilateral 11/05/2020    medial branch block    NERVE BLOCK Bilateral 11/5/2020    MEDIAL BRANCH BLOCK BILATERAL L4-5 AND L5-S1 (CPT 35557) performed by Yovanny De Luna DO at [address] [facility] Right 2000    SHOULDER SURGERY Left 2019       Family History   Problem Relation Age of Onset    Diabetes Mother     Atrial Fibrillation Mother     High Blood Pressure Mother     High Cholesterol Mother     Diabetes Father     Breast Cancer Maternal Grandmother        Social History     Tobacco Use    Smoking status: Never Smoker    Smokeless tobacco: Never Used   Substance Use Topics    Alcohol use: Yes     Frequency: Monthly or less     Drinks per session: 1 or 2     Binge frequency: Never    Drug use: Never        Functional Status: The patient is able to ambulate and perform activities of daily living with the use of an assistive device- cane. ROS:    Constitutional: Denies fevers, chills, night sweats, unintentional weight loss     Skin: Denies rash or skin changes     Eyes: Denies vision changes    Ears/Nose/Throat: Denies nasal congestion or sore throat     Respiratory: Denies SOB or cough     Cardiovascular: Denies CP, palpitations, edema      Gastrointestinal: Denies abdominal pain,  N/V, constipation, or diarrhea    Genitourinary: Denies urinary symptoms    Neurologic: See HPI.     MSK: See HPI. Psychiatric: + sleep disturbance, anxiety, depression    Hematologic/Lymphatic/Immunologic: Denies bruising       Physical Exam:   Blood pressure (!) 132/93, pulse 93, height 5' 3\" (1.6 m), weight 272 lb (123.4 kg). General: well developed and well nourished in no acute distress. Body habitus is morbidly obese  HEENT: No rhinorrhea, sneezing, yawning, or lacrimation. No scleral icterus or conjunctival injection. Resp: symmetrical chest expansion, unlabored breathing, respirations unlabored. CV: Heart rate is regular. Peripheral pulses are palpable  Lymph: No visible regional lymphadenopathy. Skin: No rashes or ecchymosis. Normal turgor. Psych: Mood is calm. Affect is normal.   Ext: No edema noted     MSK:   Back/Hip Exam:   Inspection: Pelvis was asymmetric. Lumbar lordotic curvature was decreased. There was no scoliosis. No ecchymoses or erythema. Palpation: Palpatory exam revealed tenderness along bilateral lumbosacral paraspinals, no ttp midline spine  ttp right SI joint sulcus, no ttp greater trochanters and TFL on both side. There was paraspinal spasms. There were no trigger points. ROM decreased. +facet loading.        Neurological Exam:  Strength: R          L  Hip Flex                       5         5    Knee Ext                     5          5  Ankle dorsi                  5          5  EHL                             5          5  Ankle Plantar               5          5     Sensory:  Intact for light touch in all upper and lower extremity dermatomes.      Reflexes:                     R          L  Patellar                        (1+)       (2+)  Ankle Jerk                   (2+)      (2+)     Gait is Antalgic. Right foot externally rotated      Imaging: (personally reviewed by me 11/23/20)  Lumbar x-ray     MRI L Spine      BONE MARROW SIGNAL: There is normal marrow signal.       T12-L1: Normal T12-L1.       L1-L2: Normal L1-L2.       L2-L3: Facet hypertrophy noted without canal or foraminal stenosis.       L3-L4: Grade 1 listhesis of L3 noted with a disc bulge/pseudobulge. Mild canal stenosis noted with moderate left and severe right   foraminal stenosis. Synovial facet joint effusions are seen   bilaterally.       L4-L5: Minimal retrolisthesis of L4 with facet hypertrophy. Severe   left and mild right foraminal stenosis without canal stenosis.       L5-S1: Facet hypertrophy without canal or foraminal stenosis.       SOFT TISSUES: The visualized paravertebral soft tissues are within   normal limits.           Impression   1. Grade 1 spondylolisthesis of L3 with severe facet synovitis. 2. Multilevel canal and foraminal stenosis most severe on the left   from L3-4 through L5-S1 as discussed. CT C spine       Impression:   Pita Best is a 60 y.o. female     1. Lumbar facet arthropathy        Plan:    · She would benefit from medial branch block bilateral L4-5 and L5-S1 #2 with intent for RFA. Procedure risks, benefits and alternatives were discussed. Patient would like to proceed.  The patient was educated about the diagnosis, prognosis, indications, risks and benefits of treatment.  An opportunity to ask questions was given to the patient and questions were answered. The patient agreed to proceed with the recommended treatment as described above.  Follow up after injection       Volodymyr Craven DO, FAAPMR   Board Certified Physical Medicine and Rehabilitation    The patient was counseled at length about the risks of mariposa Covid-19 during their perioperative period and any recovery window from their procedure. The patient was made aware that mariposa Covid-19  may worsen their prognosis for recovering from their procedure  and lend to a higher morbidity and/or mortality risk. All material risks, benefits, and reasonable alternatives including postponing the procedure were discussed. The patient does wish to proceed with the procedure at this time.

## 2020-11-23 NOTE — PROGRESS NOTES
Michelle Anne, [address] [facility] Physical Medicine and Rehabilitation  [address] [facility] [place]  Phone: [phone]  Fax: [phone]    PCP: Derek Fenton DO  Date of visit: 11/23/20    Chief Complaint   Patient presents with    Back Pain     low back pain     Interval:   Patient presents today for follow up after MBB. She underwent bilateral L4-5 and L5-S1 MBB #1 and reports 50% relief. She was able to mow her mother's lawn and mulch the leaves without issues. She complains of continued low back pain. The pain is rated Pain Score:   4. Described as achy. Located in low back without radiation. The pain is better with sitting in recliner with heating pad. The pain is worse with staying in one position too long. There is no weakness. There is no bowel/bladder changes.      The prior workup has included: Xray, MRI L Spine     The prior treatment has included:  PT: completed for lumbar 2/4/2020, yes   Chiropractic: none    Modalities: heat with relief   OTC Tylenol: yes with no relief   NSAIDS: ibuprofen, indocin with no relief, voltaren with relief  Opioids: none    Membrane stabilizers: neurontin 300mg TID with relief   Muscle relaxers: flexeril with relief   Previous injections: right L3-4 TFESI x 2, right SI joint    Bilateral L4-5 and L5-S1 MBB   Previous surgery at this site: none    No Known Allergies    Current Outpatient Medications   Medication Sig Dispense Refill    atorvastatin (LIPITOR) 10 MG tablet Take 1 tablet by mouth daily 30 tablet 5    cyclobenzaprine (FLEXERIL) 5 MG tablet Take 2 tablets by mouth nightly as needed for Muscle spasms 30 tablet 2    buPROPion (WELLBUTRIN XL) 300 MG extended release tablet TAKE 1 TABLET BY MOUTH ONCE DAILY  2    clonazePAM (KLONOPIN) 1 MG tablet TAKE 1 TABLET BY MOUTH THREE TIMES DAILY AS NEEDED  2    DULoxetine (CYMBALTA) 60 MG extended release capsule TAKE 2 CAPSULES BY MOUTH ONCE DAILY AS DIRECTED  2    zolpidem (AMBIEN) 10 MG tablet TAKE 1 2 (ONE HALF) TO 1 WHOLE TABLET BY MOUTH AT BEDTIME AS DIRECETED ON EMPTY STOMACH  2    gabapentin (NEURONTIN) 300 MG capsule Take 1 capsule by mouth 3 times daily for 30 days. 90 capsule 3    diclofenac (VOLTAREN) 50 MG EC tablet Take 1 tablet by mouth 2 times daily 60 tablet 2     No current facility-administered medications for this visit.         Past Medical History:   Diagnosis Date    Anxiety     Depression     Dyslipidemia     Obesity     Osteoarthritis        Past Surgical History:   Procedure Laterality Date    ANESTHESIA NERVE BLOCK Right 12/19/2019    RIGHT L3-4 TRANSFORAMINAL EPIDURAL STEROID INJECTION (CPT 17304) performed by Ariel Kaiser DO at [address] [facility] [address] [place] [address] Right 1/16/2020    RIGHT SACROILIAC JOINT STEROID INJECTION UNDER X-RAY GUIDANCE performed by Ariel Kaiser DO at [address] [place] [address] Right 2015    NERVE BLOCK Right 12/19/2019    lumbar trasnforaminal    NERVE BLOCK Right 01/16/2020    right sacroiliac joint steroid injection     NERVE BLOCK Right 09/03/2020    right l3-4transforaminal epidural steroid injection    NERVE BLOCK Right 9/3/2020    RIGHT L3-4 TRANSFORAMINAL EPIDURAL STEROID INJECTION performed by Ariel Kaiser DO at [facility] Bilateral 11/05/2020    medial branch block    NERVE BLOCK Bilateral 11/5/2020    MEDIAL BRANCH BLOCK BILATERAL L4-5 AND L5-S1 (CPT 09254) performed by Ariel Kaiser DO at [address] [facility] Right 2000    SHOULDER SURGERY Left 2019       Family History   Problem Relation Age of Onset    Diabetes Mother     Atrial Fibrillation Mother     High Blood Pressure Mother     High Cholesterol Mother     Diabetes Father     Breast Cancer Maternal Grandmother        Social History     Tobacco Use    Smoking status: Never Smoker    Smokeless tobacco: Never Used   Substance Use Topics    Alcohol use: Yes     Frequency: Monthly or less     Drinks per session: 1 or 2     Binge frequency: Never    Drug use: Never        Functional Status: The patient is able to ambulate and perform activities of daily living with the use of an assistive device- cane. ROS:    Constitutional: Denies fevers, chills, night sweats, unintentional weight loss     Skin: Denies rash or skin changes     Eyes: Denies vision changes    Ears/Nose/Throat: Denies nasal congestion or sore throat     Respiratory: Denies SOB or cough     Cardiovascular: Denies CP, palpitations, edema      Gastrointestinal: Denies abdominal pain,  N/V, constipation, or diarrhea    Genitourinary: Denies urinary symptoms    Neurologic: See HPI.     MSK: See HPI. Psychiatric: + sleep disturbance, anxiety, depression    Hematologic/Lymphatic/Immunologic: Denies bruising       Physical Exam:   Blood pressure (!) 132/93, pulse 93, height 5' 3\" (1.6 m), weight 272 lb (123.4 kg). General: well developed and well nourished in no acute distress. Body habitus is morbidly obese  HEENT: No rhinorrhea, sneezing, yawning, or lacrimation. No scleral icterus or conjunctival injection. Resp: symmetrical chest expansion, unlabored breathing, respirations unlabored. CV: Heart rate is regular. Peripheral pulses are palpable  Lymph: No visible regional lymphadenopathy. Skin: No rashes or ecchymosis. Normal turgor. Psych: Mood is calm. Affect is normal.   Ext: No edema noted     MSK:   Back/Hip Exam:   Inspection: Pelvis was asymmetric. Lumbar lordotic curvature was decreased. There was no scoliosis. No ecchymoses or erythema. Palpation: Palpatory exam revealed tenderness along bilateral lumbosacral paraspinals, no ttp midline spine  ttp right SI joint sulcus, no ttp greater trochanters and TFL on both side. There was paraspinal spasms. There were no trigger points. ROM decreased. +facet loading.        Neurological Exam:  Strength: R          L  Hip Flex                       5         5    Knee Ext                     5          5  Ankle dorsi                  5          5  EHL                             5          5  Ankle Plantar               5          5     Sensory:  Intact for light touch in all upper and lower extremity dermatomes.      Reflexes:                     R          L  Patellar                        (1+)       (2+)  Ankle Jerk                   (2+)      (2+)     Gait is Antalgic. Right foot externally rotated      Imaging: (personally reviewed by me 11/23/20)  Lumbar x-ray     MRI L Spine      BONE MARROW SIGNAL: There is normal marrow signal.       T12-L1: Normal T12-L1.       L1-L2: Normal L1-L2.       L2-L3: Facet hypertrophy noted without canal or foraminal stenosis.       L3-L4: Grade 1 listhesis of L3 noted with a disc bulge/pseudobulge. Mild canal stenosis noted with moderate left and severe right   foraminal stenosis. Synovial facet joint effusions are seen   bilaterally.       L4-L5: Minimal retrolisthesis of L4 with facet hypertrophy. Severe   left and mild right foraminal stenosis without canal stenosis.       L5-S1: Facet hypertrophy without canal or foraminal stenosis.       SOFT TISSUES: The visualized paravertebral soft tissues are within   normal limits.           Impression   1. Grade 1 spondylolisthesis of L3 with severe facet synovitis. 2. Multilevel canal and foraminal stenosis most severe on the left   from L3-4 through L5-S1 as discussed. CT C spine       Impression:   Katy Rowan is a 60 y.o. female     1. Lumbar facet arthropathy        Plan:    · She would benefit from medial branch block bilateral L4-5 and L5-S1 #2 with intent for RFA. Procedure risks, benefits and alternatives were discussed. Patient would like to proceed.  The patient was educated about the diagnosis, prognosis, indications, risks and benefits of treatment.  An opportunity to ask questions was given to the patient and questions were answered. The patient agreed to proceed with the recommended treatment as described above.  Follow up after injection       Reynold Benitez DO, FAAPMR   Board Certified Physical Medicine and Rehabilitation    The patient was counseled at length about the risks of mariposa Covid-19 during their perioperative period and any recovery window from their procedure. The patient was made aware that mariposa Covid-19  may worsen their prognosis for recovering from their procedure  and lend to a higher morbidity and/or mortality risk. All material risks, benefits, and reasonable alternatives including postponing the procedure were discussed. The patient does wish to proceed with the procedure at this time.

## 2020-11-30 ENCOUNTER — TELEPHONE (OUTPATIENT)
Dept: PHYSICAL MEDICINE AND REHAB | Age: 55
End: 2020-11-30

## 2020-11-30 NOTE — TELEPHONE ENCOUNTER
Got the authorization for MONE from [facility]. Called patient to schedule. Left message with callback number and instructed patient to call the office to be put on the schedule.

## 2020-12-04 ENCOUNTER — HOSPITAL ENCOUNTER (OUTPATIENT)
Age: 55
Discharge: HOME | End: 2020-12-06
Payer: MEDICAID

## 2020-12-04 PROCEDURE — U0003 INFECTIOUS AGENT DETECTION BY NUCLEIC ACID (DNA OR RNA); SEVERE ACUTE RESPIRATORY SYNDROME CORONAVIRUS 2 (SARS-COV-2) (CORONAVIRUS DISEASE [COVID-19]), AMPLIFIED PROBE TECHNIQUE, MAKING USE OF HIGH THROUGHPUT TECHNOLOGIES AS DESCRIBED BY CMS-2020-01-R: HCPCS

## 2020-12-05 LAB
SARS-COV-2: NOT DETECTED
SOURCE: NORMAL

## 2020-12-09 ENCOUNTER — HOSPITAL ENCOUNTER (OUTPATIENT)
Dept: NON INVASIVE DIAGNOSTICS | Age: 55
Discharge: HOME | End: 2020-12-09
Payer: MEDICAID

## 2020-12-09 LAB
LV EF: 63 %
LVEF MODALITY: NORMAL

## 2020-12-09 PROCEDURE — 93306 TTE W/DOPPLER COMPLETE: CPT

## 2020-12-10 ENCOUNTER — HOSPITAL ENCOUNTER (OUTPATIENT)
Dept: OPERATING ROOM | Age: 55
Setting detail: OUTPATIENT SURGERY
Discharge: HOME | End: 2020-12-10
Attending: PHYSICAL MEDICINE & REHABILITATION
Payer: MEDICAID

## 2020-12-10 ENCOUNTER — HOSPITAL ENCOUNTER (OUTPATIENT)
Age: 55
Setting detail: OUTPATIENT SURGERY
Discharge: HOME | End: 2020-12-10
Attending: PHYSICAL MEDICINE & REHABILITATION | Admitting: PHYSICAL MEDICINE & REHABILITATION
Payer: MEDICAID

## 2020-12-10 VITALS
HEIGHT: 63 IN | SYSTOLIC BLOOD PRESSURE: 134 MMHG | WEIGHT: 270 LBS | DIASTOLIC BLOOD PRESSURE: 73 MMHG | OXYGEN SATURATION: 98 % | BODY MASS INDEX: 47.84 KG/M2 | RESPIRATION RATE: 16 BRPM | TEMPERATURE: 97.6 F | HEART RATE: 82 BPM

## 2020-12-10 PROCEDURE — 3600000005 HC SURGERY LEVEL 5 BASE: Performed by: PHYSICAL MEDICINE & REHABILITATION

## 2020-12-10 PROCEDURE — 64494 INJ PARAVERT F JNT L/S 2 LEV: CPT | Performed by: PHYSICAL MEDICINE & REHABILITATION

## 2020-12-10 PROCEDURE — 3209999900 FLUORO FOR SURGICAL PROCEDURES

## 2020-12-10 PROCEDURE — 3600000015 HC SURGERY LEVEL 5 ADDTL 15MIN: Performed by: PHYSICAL MEDICINE & REHABILITATION

## 2020-12-10 PROCEDURE — 2709999900 HC NON-CHARGEABLE SUPPLY: Performed by: PHYSICAL MEDICINE & REHABILITATION

## 2020-12-10 PROCEDURE — 7100000011 HC PHASE II RECOVERY - ADDTL 15 MIN: Performed by: PHYSICAL MEDICINE & REHABILITATION

## 2020-12-10 PROCEDURE — 2500000003 HC RX 250 WO HCPCS: Performed by: PHYSICAL MEDICINE & REHABILITATION

## 2020-12-10 PROCEDURE — 7100000010 HC PHASE II RECOVERY - FIRST 15 MIN: Performed by: PHYSICAL MEDICINE & REHABILITATION

## 2020-12-10 PROCEDURE — 64493 INJ PARAVERT F JNT L/S 1 LEV: CPT | Performed by: PHYSICAL MEDICINE & REHABILITATION

## 2020-12-10 RX ORDER — BUPIVACAINE HYDROCHLORIDE 2.5 MG/ML
INJECTION, SOLUTION EPIDURAL; INFILTRATION; INTRACAUDAL PRN
Status: DISCONTINUED | OUTPATIENT
Start: 2020-12-10 | End: 2020-12-10 | Stop reason: ALTCHOICE

## 2020-12-10 RX ORDER — LIDOCAINE HYDROCHLORIDE 10 MG/ML
INJECTION, SOLUTION EPIDURAL; INFILTRATION; INTRACAUDAL; PERINEURAL PRN
Status: DISCONTINUED | OUTPATIENT
Start: 2020-12-10 | End: 2020-12-10 | Stop reason: ALTCHOICE

## 2020-12-10 ASSESSMENT — PAIN SCALES - GENERAL
PAINLEVEL_OUTOF10: 0
PAINLEVEL_OUTOF10: 8

## 2020-12-10 ASSESSMENT — PAIN DESCRIPTION - ORIENTATION: ORIENTATION: RIGHT

## 2020-12-10 ASSESSMENT — PAIN DESCRIPTION - PAIN TYPE: TYPE: SURGICAL PAIN

## 2020-12-10 ASSESSMENT — PAIN DESCRIPTION - LOCATION: LOCATION: BACK

## 2020-12-10 ASSESSMENT — PAIN DESCRIPTION - DESCRIPTORS
DESCRIPTORS: ACHING
DESCRIPTORS: ACHING

## 2020-12-10 ASSESSMENT — PAIN - FUNCTIONAL ASSESSMENT: PAIN_FUNCTIONAL_ASSESSMENT: 0-10

## 2020-12-10 NOTE — H&P
Meliza Avendano, [id number] [facility] Physical Medicine [facility] Rehabilitation  [address] [facility] [address]  Phone: [phone]  Fax: [phone]     PCP: Davey Forte DO  Date of visit: 11/23/20          Chief Complaint   Patient presents with    Back Pain       low back pain      Interval:   Patient presents today for follow up after MBB. She underwent bilateral L4-5 and L5-S1 MBB #1 and reports 50% relief. She was able to mow her mother's lawn and mulch the leaves without issues. She complains of continued low back pain. The pain is rated Pain Score:   4. Described as achy. Located in low back without radiation. The pain is better with sitting in recliner with heating pad. The pain is worse with staying in one position too long. There is no weakness.  There is no bowel/bladder changes.      The prior workup has included: Xray, MRI L Spine      The prior treatment has included:  PT: completed for lumbar 2/4/2020, yes   Chiropractic: none    Modalities: heat with relief   OTC Tylenol: yes with no relief   NSAIDS: ibuprofen, indocin with no relief, voltaren with relief  Opioids: none    Membrane stabilizers: neurontin 300mg TID with relief   Muscle relaxers: flexeril with relief   Previous injections: right L3-4 TFESI x 2, right SI joint    Bilateral L4-5 and L5-S1 MBB   Previous surgery at this site: none     No Known Allergies            Current Outpatient Medications   Medication Sig Dispense Refill    atorvastatin (LIPITOR) 10 MG tablet Take 1 tablet by mouth daily 30 tablet 5    cyclobenzaprine (FLEXERIL) 5 MG tablet Take 2 tablets by mouth nightly as needed for Muscle spasms 30 tablet 2    buPROPion (WELLBUTRIN XL) 300 MG extended release tablet TAKE 1 TABLET BY MOUTH ONCE DAILY   2    clonazePAM (KLONOPIN) 1 MG tablet TAKE 1 TABLET BY MOUTH THREE TIMES DAILY AS NEEDED   2    DULoxetine (CYMBALTA) 60 MG extended release capsule TAKE 2 CAPSULES BY MOUTH ONCE DAILY AS DIRECTED   2    zolpidem (AMBIEN) 10 MG tablet TAKE 1 2 (ONE HALF) TO 1 WHOLE TABLET BY MOUTH AT BEDTIME AS DIRECETED ON EMPTY STOMACH   2    gabapentin (NEURONTIN) 300 MG capsule Take 1 capsule by mouth 3 times daily for 30 days.  90 capsule 3    diclofenac (VOLTAREN) 50 MG EC tablet Take 1 tablet by mouth 2 times daily 60 tablet 2      No current facility-administered medications for this visit.               Past Medical History:   Diagnosis Date    Anxiety      Depression      Dyslipidemia      Obesity      Osteoarthritis                 Past Surgical History:   Procedure Laterality Date    ANESTHESIA NERVE BLOCK Right 12/19/2019     RIGHT L3-4 TRANSFORAMINAL EPIDURAL STEROID INJECTION (CPT 38467) performed by Volodymyr Craven DO at [address] Right 1/16/2020     RIGHT SACROILIAC JOINT STEROID INJECTION UNDER X-RAY GUIDANCE performed by Volodymyr Craven DO at [address] [place] [address] Right 2015    NERVE BLOCK Right 12/19/2019     lumbar trasnforaminal    NERVE BLOCK Right 01/16/2020     right sacroiliac joint steroid injection     NERVE BLOCK Right 09/03/2020     right l3-4transforaminal epidural steroid injection    NERVE BLOCK Right 9/3/2020     RIGHT L3-4 TRANSFORAMINAL EPIDURAL STEROID INJECTION performed by Volodymyr Craven DO at [facility] Bilateral 11/05/2020     medial branch block    NERVE BLOCK Bilateral 11/5/2020     MEDIAL BRANCH BLOCK BILATERAL L4-5 AND L5-S1 (CPT 66391) performed by Volodymyr Craven DO at [address] [facility] Right 2000    SHOULDER SURGERY Left 2019               Family History   Problem Relation Age of Onset    Diabetes Mother      Atrial Fibrillation Mother      High Blood Pressure Mother      High Cholesterol Mother      Diabetes Father      Breast Cancer Maternal Grandmother           Social History            Tobacco Use    Smoking status: Never Smoker    Smokeless tobacco: Never Used   Substance Use Topics    Alcohol use: Yes       Frequency: Monthly or less       Drinks per session: 1 or 2       Binge frequency: Never    Drug use: Never         Functional Status: The patient is able to ambulate and perform activities of daily living with the use of an assistive device- cane. ROS:    Constitutional: Denies fevers, chills, night sweats, unintentional weight loss     Skin: Denies rash or skin changes     Eyes: Denies vision changes    Ears/Nose/Throat: Denies nasal congestion or sore throat     Respiratory: Denies SOB or cough     Cardiovascular: Denies CP, palpitations, edema      Gastrointestinal: Denies abdominal pain,  N/V, constipation, or diarrhea    Genitourinary: Denies urinary symptoms    Neurologic: See HPI.     MSK: See HPI. Psychiatric: + sleep disturbance, anxiety, depression    Hematologic/Lymphatic/Immunologic: Denies bruising         Physical Exam:   Blood pressure (!) 132/93, pulse 93, height 5' 3\" (1.6 m), weight 272 lb (123.4 kg). General: well developed and well nourished in no acute distress. Body habitus is morbidly obese  HEENT: No rhinorrhea, sneezing, yawning, or lacrimation. No scleral icterus or conjunctival injection. Resp: symmetrical chest expansion, unlabored breathing, respirations unlabored. CV: Heart rate is regular. Peripheral pulses are palpable  Lymph: No visible regional lymphadenopathy. Skin: No rashes or ecchymosis. Normal turgor. Psych: Mood is calm. Affect is normal.   Ext: No edema noted      MSK:   Back/Hip Exam:   Inspection: Pelvis was asymmetric. Lumbar lordotic curvature was decreased. There was no scoliosis. No ecchymoses or erythema. Palpation: Palpatory exam revealed tenderness along bilateral lumbosacral paraspinals, no ttp midline spine  ttp right SI joint sulcus, no ttp greater trochanters and TFL on both side. There was paraspinal spasms.  There were no trigger points. ROM decreased. +facet loading.         Neurological Exam:  Strength:                     R          L  Hip Flex                       5         5    Knee Ext                     5          6  Ankle dorsi                  5          5  EHL                             5          5  Ankle Plantar               5          5     Sensory:  Intact for light touch in all upper and lower extremity dermatomes.      Reflexes:                     R          L  Patellar                        (1+)       (2+)  Ankle Jerk                   (2+)      (2+)     Gait is Antalgic. Right foot externally rotated        Imaging: (personally reviewed by me 11/23/20)  Lumbar x-ray      MRI L Spine       BONE MARROW SIGNAL: There is normal marrow signal.       T12-L1: Normal T12-L1.       L1-L2: Normal L1-L2.       L2-L3: Facet hypertrophy noted without canal or foraminal stenosis.       L3-L4: Grade 1 listhesis of L3 noted with a disc bulge/pseudobulge. Mild canal stenosis noted with moderate left and severe right   foraminal stenosis. Synovial facet joint effusions are seen   bilaterally.       L4-L5: Minimal retrolisthesis of L4 with facet hypertrophy. Severe   left and mild right foraminal stenosis without canal stenosis.       L5-S1: Facet hypertrophy without canal or foraminal stenosis.       SOFT TISSUES: The visualized paravertebral soft tissues are within   normal limits.           Impression   1. Grade 1 spondylolisthesis of L3 with severe facet synovitis. 2. Multilevel canal and foraminal stenosis most severe on the left   from L3-4 through L5-S1 as discussed.      CT C spine         Impression:   Mabel Lay is a 60 y.o. female      1. Lumbar facet arthropathy          Plan:    · She would benefit from medial branch block bilateral L4-5 and L5-S1 #2 with intent for RFA. Procedure risks, benefits and alternatives were discussed.  Patient would like to proceed.        · The patient was educated about the diagnosis, prognosis, indications, risks and benefits of treatment. An opportunity to ask questions was given to the patient and questions were answered. The patient agreed to proceed with the recommended treatment as described above.      · Follow up after injection         Myra Boo DO, FAAPMR   Board Certified Physical Medicine and Rehabilitation     The patient was counseled at length about the risks of mariposa Covid-19 during their perioperative period and any recovery window from their procedure.  The patient was made aware that mariposa Covid-19  may worsen their prognosis for recovering from their procedure  and lend to a higher morbidity and/or mortality risk.  All material risks, benefits, and reasonable alternatives including postponing the procedure were discussed. The patient does wish to proceed with the procedure at this time.

## 2020-12-10 NOTE — OP NOTE
Vivi Eaton    12/10/2020     CHIEF COMPLAINT:  Low back pain. PRE-OPERATIVE DIAGNOSIS:  Lumbar spondylosis, lumbar facet syndrome, lumbosacral osteoarthritis     POST-OPERATIVE DIAGNOSIS:  Lumbar spondylosis, lumbar facet syndrome, lumbosacral osteoarthritis     PROCEDURE:  # 2 Fluoroscopic guided lumbar medial branch blocks at  levels: medial branch level: L3, L4, L5 Joint: Bilateral L4-5, L5-S1. SURGEON:    Yovanny De Luna DO    ANESTHESIA:   Local.    ESTIMATED BLOOD LOSS:  None.  ______________________________________________________________________  HISTORY & PHYSICAL: See separate H&P. PROCEDURE:  After confirming written and informed consent, Vivi Eaton was brought to the procedure room and placed in the prone position. Blood pressure, heart rate, O2 saturation, and visual and verbal monitoring were established. A time-out was performed and Heenas name, date of birth, the procedure to be performed, as well as the plan for the location of the needle insertion were confirmed. Fluoroscopy was utilized to delineate the anatomy of the lumbosacral spine. Surface landmarks were identified as well. After prep and drape, an oblique fluoroscopic view was created to optimize visualization of the junction of the transverse process and the pedicle. After infiltration of local, a #22-gauge, 3.5-inch regional block needle was passed to position the tip at the junction of the superior articular process and the transverse process, along the course of the medial branch. Satisfactory needle placement was confirmed by AP and oblique projections. At the sacral alar level, the sacral alar region was visualized and the needle tip was positioned on the sacral alar at the base of the superior articulating process where the medial branch traverses. At each level the patient received 0.75 cc of 0.25% Marcaine.     The above procedure was performed at each of the following levels: Bilateral L4-5, L5-S1. Negative aspiration was noted prior to each injection. The needles were removed intact and Band-Aids were applied. Jossy Sarmiento was transferred to the recovery area. She was monitored, reassessed and eventually discharged after an appropriate observatory period. No complications were noted. Following the procedure Jossy Sarmiento noted improvement of previous pain symptoms. Plan: Jossy Sarmiento will return to the office as scheduled. She was encouraged to call with questions, concerns or if worsening of symptoms occurs.       Reynold Benitez DO, [facility]   Board Certified Physical Medicine and Rehabilitation

## 2020-12-15 RX ORDER — CYCLOBENZAPRINE HCL 5 MG
TABLET ORAL
Qty: 30 TABLET | Refills: 0 | Status: SHIPPED
Start: 2020-12-15 | End: 2021-01-05 | Stop reason: SDUPTHER

## 2020-12-15 NOTE — TELEPHONE ENCOUNTER
Patient last visit 11-23-20 next visit 1-5-2021. Pharmacy requesting refill on Flexeril 5 mg 1 tab qhs prn  Sent 10-6-20 #30 2 refills. Please advise.

## 2020-12-18 ENCOUNTER — OFFICE VISIT (OUTPATIENT)
Dept: CARDIOLOGY CLINIC | Age: 55
End: 2020-12-18
Payer: MEDICAID

## 2020-12-18 VITALS
OXYGEN SATURATION: 98 % | BODY MASS INDEX: 47.84 KG/M2 | SYSTOLIC BLOOD PRESSURE: 130 MMHG | WEIGHT: 270 LBS | HEART RATE: 79 BPM | DIASTOLIC BLOOD PRESSURE: 78 MMHG | HEIGHT: 63 IN | RESPIRATION RATE: 16 BRPM

## 2020-12-18 PROCEDURE — 93000 ELECTROCARDIOGRAM COMPLETE: CPT | Performed by: INTERNAL MEDICINE

## 2020-12-18 PROCEDURE — 1036F TOBACCO NON-USER: CPT | Performed by: INTERNAL MEDICINE

## 2020-12-18 PROCEDURE — G8417 CALC BMI ABV UP PARAM F/U: HCPCS | Performed by: INTERNAL MEDICINE

## 2020-12-18 PROCEDURE — G8482 FLU IMMUNIZE ORDER/ADMIN: HCPCS | Performed by: INTERNAL MEDICINE

## 2020-12-18 PROCEDURE — 3017F COLORECTAL CA SCREEN DOC REV: CPT | Performed by: INTERNAL MEDICINE

## 2020-12-18 PROCEDURE — G8427 DOCREV CUR MEDS BY ELIG CLIN: HCPCS | Performed by: INTERNAL MEDICINE

## 2020-12-18 PROCEDURE — 99204 OFFICE O/P NEW MOD 45 MIN: CPT | Performed by: INTERNAL MEDICINE

## 2020-12-18 RX ORDER — BUPROPION HYDROCHLORIDE 150 MG/1
450 TABLET ORAL EVERY MORNING
COMMUNITY
Start: 2020-12-07

## 2020-12-18 NOTE — PROGRESS NOTES
Chief Complaint   Patient presents with    Abnormal Test Results       Patient Active Problem List    Diagnosis Date Noted    Left knee pain 05/15/2020    Contusion of left knee 05/15/2020    Sprain of left knee 03/19/2020    Sacroiliitis ([facility] [address] 01/16/2020    Lumbar radiculitis 12/19/2019    Other spondylosis with radiculopathy, lumbar region 11/11/2019    Leg length discrepancy 11/11/2019    Facet arthropathy 11/11/2019    Prediabetes 11/01/2019    Spondylolisthesis of lumbar region 11/01/2019    Lumbar spondylosis 11/01/2019    Class 3 severe obesity due to excess calories with body mass index (BMI) of 45.0 to 49.9 in adult [facility]) 10/16/2019    Chronic right-sided low back pain with right-sided sciatica 10/16/2019    Right hip pain 10/16/2019    Anxiety and depression 10/16/2019    Insomnia 10/16/2019       Current Outpatient Medications   Medication Sig Dispense Refill    buPROPion (WELLBUTRIN XL) 150 MG extended release tablet TAKE 1 TABLET BY MOUTH ONCE DAILY ALONG WITH 300MG DOSE TO EQUAL 450MG      cyclobenzaprine (FLEXERIL) 5 MG tablet TAKE 2 TABLETS BY MOUTH NIGHTLY AS NEEDED FOR MUSCLE SPASM 30 tablet 0    atorvastatin (LIPITOR) 10 MG tablet Take 1 tablet by mouth daily 30 tablet 5    gabapentin (NEURONTIN) 300 MG capsule Take 1 capsule by mouth 3 times daily for 30 days. 90 capsule 3    diclofenac (VOLTAREN) 50 MG EC tablet Take 1 tablet by mouth 2 times daily 60 tablet 2    buPROPion (WELLBUTRIN XL) 300 MG extended release tablet 300 mg every morning   2    clonazePAM (KLONOPIN) 1 MG tablet TAKE 1 TABLET BY MOUTH THREE TIMES DAILY AS NEEDED  2    DULoxetine (CYMBALTA) 60 MG extended release capsule TAKE 2 CAPSULES BY MOUTH ONCE DAILY AS DIRECTED  2    zolpidem (AMBIEN) 10 MG tablet TAKE 1 2 (ONE HALF) TO 1 WHOLE TABLET BY MOUTH AT BEDTIME AS DIRECETED ON EMPTY STOMACH  2     No current facility-administered medications for this visit.          No Known Allergies    Vitals: 12/18/20 1015   BP: 130/78   Site: Right Upper Arm   Position: Sitting   Cuff Size: Large Adult   Pulse: 79   Resp: 16   SpO2: 98%   Weight: 270 lb (122.5 kg)   Height: 5' 3\" (1.6 m)       Social History     Socioeconomic History    Marital status:      Spouse name: Not on file    Number of children: Not on file    Years of education: Not on file    Highest education level: Not on file   Occupational History    Not on file   Social Needs    Financial resource strain: Not on file    Food insecurity     Worry: Not on file     Inability: Not on file    Transportation needs     Medical: Not on file     Non-medical: Not on file   Tobacco Use    Smoking status: Never Smoker    Smokeless tobacco: Never Used   Substance and Sexual Activity    Alcohol use: Yes     Frequency: Monthly or less     Drinks per session: 1 or 2     Binge frequency: Never     Comment: 2 cups a day     Drug use: Never    Sexual activity: Yes     Partners: Female   Lifestyle    Physical activity     Days per week: Not on file     Minutes per session: Not on file    Stress: Not on file   Relationships    Social connections     Talks on phone: Not on file     Gets together: Not on file     Attends Yarsanism service: Not on file     Active member of club or organization: Not on file     Attends meetings of clubs or organizations: Not on file     Relationship status: Not on file    Intimate partner violence     Fear of current or ex partner: Not on file     Emotionally abused: Not on file     Physically abused: Not on file     Forced sexual activity: Not on file   Other Topics Concern    Not on file   Social History Narrative    Not on file       Family History   Problem Relation Age of Onset    Diabetes Mother     Atrial Fibrillation Mother     High Blood Pressure Mother     High Cholesterol Mother     Diabetes Father     Breast Cancer Maternal Grandmother SUBJECTIVE: Yesi Rushing presents to the office today for consult - dr. Blanca New - for cardiac evaluation. She complains of no cardiac symptoms  and denies   chest pain, chest pressure/discomfort, claudication, dyspnea, exertional chest pressure/discomfort, fatigue, irregular heart beat, near-syncope, orthopnea, palpitations, paroxysmal nocturnal dyspnea, syncope and tachypnea. Had a cxr prior to orthopedic surgery - claimed cardiomegaly - they went along with surgery with no issues. No conventional risk factors for CAD. 10 year AHA CV risk profile 1.9%  Had an echo 12/9 read by dr. Zia Henley ( I reviewed images and agree): NORMAL LV size and systolic function, mild LVH and mild AI        Review of Systems:   Heart: as above   Lungs: as above   Eyes: denies changes in vision or discharge. Ears: denies changes in hearing or pain. Nose: denies epistaxis or masses   Throat: denies sore throat or trouble swallowing. Neuro: denies numbness, tingling, tremors. Skin: denies rashes or itching. : denies hematuria, dysuria   GI: denies vomiting, diarrhea   Psych: denies mood changed, anxiety, depression. all others negative. Physical Exam   /78 (Site: Right Upper Arm, Position: Sitting, Cuff Size: Large Adult)   Pulse 79   Resp 16   Ht 5' 3\" (1.6 m)   Wt 270 lb (122.5 kg)   SpO2 98%   BMI 47.83 kg/m²   Constitutional: Oriented to person, place, and time. Obese  No distress. Head: Normocephalic and atraumatic. Eyes: EOM are normal. Pupils are equal, round, and reactive to light. Neck: Normal range of motion. Neck supple. No hepatojugular reflux and no JVD present. Carotid bruit is not present. No tracheal deviation present. No thyromegaly present. Cardiovascular: Normal rate, regular rhythm, normal heart sounds and intact distal pulses. Exam reveals no gallop and no friction rub. No murmur heard. Pulmonary/Chest: Effort normal and breath sounds normal. No respiratory distress. No wheezes. No rales. No tenderness. Abdominal: Soft. Bowel sounds are normal. No distension and no mass. No tenderness. No rebound and no guarding. Musculoskeletal: Normal range of motion. No edema and no tenderness. Neurological: Alert and oriented to person, place, and time. Skin: Skin is warm and dry. No rash noted. Not diaphoretic. No erythema. Psychiatric: Normal mood and affect. Behavior is normal.     EKG:  normal EKG, normal sinus rhythm.     ASSESSMENT AND PLAN:  Patient Active Problem List   Diagnosis    Class 3 severe obesity due to excess calories with body mass index (BMI) of 45.0 to 49.9 in adult [facility])    Chronic right-sided low back pain with right-sided sciatica    Right hip pain    Anxiety and depression    Insomnia    Prediabetes    Spondylolisthesis of lumbar region    Lumbar spondylosis    Other spondylosis with radiculopathy, lumbar region    Leg length discrepancy    Facet arthropathy    Lumbar radiculitis    Sacroiliitis ([address]    Sprain of left knee    Left knee pain    Contusion of left knee     No cardiac symptoms  False positive claim by cxr of cardiomegaly - echo refutes this claim  Normal ekg and cardiac exam  Discussed weight loss for long term health  OV vimal Do M.D  [facility]

## 2021-01-05 ENCOUNTER — OFFICE VISIT (OUTPATIENT)
Dept: PHYSICAL MEDICINE AND REHAB | Age: 56
End: 2021-01-05
Payer: MEDICAID

## 2021-01-05 VITALS
WEIGHT: 273 LBS | HEIGHT: 63 IN | BODY MASS INDEX: 48.37 KG/M2 | DIASTOLIC BLOOD PRESSURE: 96 MMHG | SYSTOLIC BLOOD PRESSURE: 164 MMHG | HEART RATE: 93 BPM

## 2021-01-05 DIAGNOSIS — M47.816 LUMBAR FACET ARTHROPATHY: Primary | ICD-10-CM

## 2021-01-05 PROCEDURE — G8482 FLU IMMUNIZE ORDER/ADMIN: HCPCS | Performed by: PHYSICAL MEDICINE & REHABILITATION

## 2021-01-05 PROCEDURE — 1036F TOBACCO NON-USER: CPT | Performed by: PHYSICAL MEDICINE & REHABILITATION

## 2021-01-05 PROCEDURE — G8427 DOCREV CUR MEDS BY ELIG CLIN: HCPCS | Performed by: PHYSICAL MEDICINE & REHABILITATION

## 2021-01-05 PROCEDURE — 99213 OFFICE O/P EST LOW 20 MIN: CPT | Performed by: PHYSICAL MEDICINE & REHABILITATION

## 2021-01-05 PROCEDURE — G8417 CALC BMI ABV UP PARAM F/U: HCPCS | Performed by: PHYSICAL MEDICINE & REHABILITATION

## 2021-01-05 PROCEDURE — 3017F COLORECTAL CA SCREEN DOC REV: CPT | Performed by: PHYSICAL MEDICINE & REHABILITATION

## 2021-01-05 RX ORDER — CYCLOBENZAPRINE HCL 5 MG
TABLET ORAL
Qty: 30 TABLET | Refills: 2 | Status: SHIPPED
Start: 2021-01-05 | End: 2021-03-24

## 2021-01-05 NOTE — PROGRESS NOTES
William Beal, [address] [facility]  [address] [facility] [place]  Phone: [phone]  Fax: [phone]    PCP: Brittni Allen DO  Date of visit: 1/5/21    Chief Complaint   Patient presents with    Back Pain     follow up after medical branch blocks     Interval:   Patient presents today for follow up after MBB. She underwent bilateral L4-5 and L5-S1 MBB #1 and reports 70% relief. She complains of continued low back pain. The pain is rated Pain Score:   3. Described as achy. Located in low back without radiation. The pain is better with sitting in recliner with heating pad. The pain is worse with staying in one position too long. There is no weakness. There is no bowel/bladder changes.      The prior workup has included: Xray, MRI L Spine     The prior treatment has included:  PT: completed for lumbar 2/4/2020, yes   Chiropractic: none    Modalities: heat with relief   OTC Tylenol: yes with no relief   NSAIDS: ibuprofen, indocin with no relief, voltaren with relief  Opioids: none    Membrane stabilizers: neurontin 300mg TID with relief   Muscle relaxers: flexeril with relief   Previous injections: right L3-4 TFESI x 2, right SI joint    Bilateral L4-5 and L5-S1 MBB x2  Previous surgery at this site: none    No Known Allergies    Current Outpatient Medications   Medication Sig Dispense Refill    cyclobenzaprine (FLEXERIL) 5 MG tablet TAKE 2 TABLETS BY MOUTH NIGHTLY AS NEEDED FOR MUSCLE SPASM 30 tablet 2    buPROPion (WELLBUTRIN XL) 150 MG extended release tablet Take 400 mg by mouth daily       atorvastatin (LIPITOR) 10 MG tablet Take 1 tablet by mouth daily 30 tablet 5    buPROPion (WELLBUTRIN XL) 300 MG extended release tablet 300 mg every morning   2    clonazePAM (KLONOPIN) 1 MG tablet TAKE 1 TABLET BY MOUTH THREE TIMES DAILY AS NEEDED  2    DULoxetine (CYMBALTA) 60 MG extended release capsule TAKE 2 CAPSULES BY MOUTH ONCE DAILY AS DIRECTED  2    zolpidem (AMBIEN) 10 MG tablet TAKE 1 2 (ONE HALF) TO 1 WHOLE TABLET BY MOUTH AT BEDTIME AS DIRECETED ON EMPTY STOMACH  2    gabapentin (NEURONTIN) 300 MG capsule Take 1 capsule by mouth 3 times daily for 30 days. 90 capsule 3    diclofenac (VOLTAREN) 50 MG EC tablet Take 1 tablet by mouth 2 times daily 60 tablet 2     No current facility-administered medications for this visit.         Past Medical History:   Diagnosis Date    Anxiety     Depression     Dyslipidemia     Hyperlipidemia     Obesity     Osteoarthritis        Past Surgical History:   Procedure Laterality Date    ANESTHESIA NERVE BLOCK Right 12/19/2019    RIGHT L3-4 TRANSFORAMINAL EPIDURAL STEROID INJECTION (CPT 12099) performed by Savannah Rubio DO at [address] [facility] [address] [place] [address] Right 1/16/2020    RIGHT SACROILIAC JOINT STEROID INJECTION UNDER X-RAY GUIDANCE performed by Savannah Rubio DO at [address] [facility] Right 2015    NERVE BLOCK Right 12/19/2019    lumbar trasnforaminal    NERVE BLOCK Right 01/16/2020    right sacroiliac joint steroid injection     NERVE BLOCK Right 09/03/2020    right l3-4transforaminal epidural steroid injection    NERVE BLOCK Right 9/3/2020    RIGHT L3-4 TRANSFORAMINAL EPIDURAL STEROID INJECTION performed by Savannah Rubio DO at [facility] Bilateral 11/05/2020    medial branch block    NERVE BLOCK Bilateral 11/5/2020    MEDIAL BRANCH BLOCK BILATERAL L4-5 AND L5-S1 (CPT 31353) performed by Savannah Rubio DO at [facility] Bilateral 12/10/2020    medial branch block    NERVE BLOCK Bilateral 12/10/2020    BILATERAL MEDIAL BRANCH BLOCK L4-5 AND L5-S1 (CPT 99465) performed by Savannah Rubio DO at [address] [facility] Right 2000    SHOULDER SURGERY Left 2019       Family History   Problem Relation Age of Onset    Diabetes Mother     Atrial revealed tenderness along bilateral lumbosacral paraspinals, no ttp midline spine  ttp right SI joint sulcus, no ttp greater trochanters and TFL on both side. There was paraspinal spasms. There were no trigger points. ROM decreased. +facet loading. Neurological Exam:  Strength:                     R          L  Hip Flex                       5         5    Knee Ext                     5          5  Ankle dorsi                  5          5  EHL                             5          5  Ankle Plantar               5          5     Sensory:  Intact for light touch in all upper and lower extremity dermatomes.      Reflexes:                     R          L  Patellar                        (1+)       (2+)  Ankle Jerk                   (2+)      (2+)     Gait is Antalgic. Right foot externally rotated      Imaging: (personally reviewed by me 01/05/21)  Lumbar x-ray     MRI L Spine      BONE MARROW SIGNAL: There is normal marrow signal.       T12-L1: Normal T12-L1.       L1-L2: Normal L1-L2.       L2-L3: Facet hypertrophy noted without canal or foraminal stenosis.       L3-L4: Grade 1 listhesis of L3 noted with a disc bulge/pseudobulge. Mild canal stenosis noted with moderate left and severe right   foraminal stenosis. Synovial facet joint effusions are seen   bilaterally.       L4-L5: Minimal retrolisthesis of L4 with facet hypertrophy. Severe   left and mild right foraminal stenosis without canal stenosis.       L5-S1: Facet hypertrophy without canal or foraminal stenosis.       SOFT TISSUES: The visualized paravertebral soft tissues are within   normal limits.           Impression   1. Grade 1 spondylolisthesis of L3 with severe facet synovitis. 2. Multilevel canal and foraminal stenosis most severe on the left   from L3-4 through L5-S1 as discussed. CT C spine       Impression:   Shannan Harrington is a 60 y.o. female     1. Lumbar facet arthropathy        Plan:    · Positive MBB.  Will proceed with Fluoroscopic-guided Right lumbar medial branch neurolysis (RFA) at levels L4-5, L5-S1  · Refill flexeril        The patient was educated about the diagnosis, prognosis, indications, risks and benefits of treatment. An opportunity to ask questions was given to the patient and questions were answered. The patient agreed to proceed with the recommended treatment as described above.  Follow up after RFA      Tish Goldstein, , [address] [place] [address]   Board Certified Physical Medicine and Rehabilitation    The patient was counseled at length about the risks of mariposa Covid-19 during their perioperative period and any recovery window from their procedure. The patient was made aware that mariposa Covid-19  may worsen their prognosis for recovering from their procedure  and lend to a higher morbidity and/or mortality risk. All material risks, benefits, and reasonable alternatives including postponing the procedure were discussed. The patient does wish to proceed with the procedure at this time.

## 2021-01-05 NOTE — H&P
 DULoxetine (CYMBALTA) 60 MG extended release capsule TAKE 2 CAPSULES BY MOUTH ONCE DAILY AS DIRECTED  2    zolpidem (AMBIEN) 10 MG tablet TAKE 1 2 (ONE HALF) TO 1 WHOLE TABLET BY MOUTH AT BEDTIME AS DIRECETED ON EMPTY STOMACH  2    gabapentin (NEURONTIN) 300 MG capsule Take 1 capsule by mouth 3 times daily for 30 days. 90 capsule 3    diclofenac (VOLTAREN) 50 MG EC tablet Take 1 tablet by mouth 2 times daily 60 tablet 2     No current facility-administered medications for this visit.         Past Medical History:   Diagnosis Date    Anxiety     Depression     Dyslipidemia     Hyperlipidemia     Obesity     Osteoarthritis        Past Surgical History:   Procedure Laterality Date    ANESTHESIA NERVE BLOCK Right 12/19/2019    RIGHT L3-4 TRANSFORAMINAL EPIDURAL STEROID INJECTION (CPT 37392) performed by Holly Dhillon DO at [address] Right 1/16/2020    RIGHT SACROILIAC JOINT STEROID INJECTION UNDER X-RAY GUIDANCE [facility]    NERVE BLOCK Right 12/19/2019    lumbar trasnforaminal    NERVE BLOCK Right 01/16/2020    right sacroiliac joint steroid injection     NERVE BLOCK Right 09/03/2020    right l3-4transforaminal epidural steroid injection    NERVE BLOCK Right 9/3/2020    RIGHT L3-4 TRANSFORAMINAL EPIDURAL STEROID INJECTION performed by Holly Dhillon DO at [facility] Bilateral 11/05/2020    medial branch block    NERVE BLOCK Bilateral 11/5/2020    MEDIAL BRANCH BLOCK BILATERAL L4-5 AND L5-S1 (CPT 92865) performed by Holly Dhillon DO at [facility] Bilateral 12/10/2020    medial branch block    NERVE BLOCK Bilateral 12/10/2020    BILATERAL MEDIAL BRANCH BLOCK L4-5 AND L5-S1 (CPT 32169) performed by Holly Dhillon DO at [address] Right [address]  SHOULDER SURGERY Left 2019       Family History   Problem Relation Age of Onset    Diabetes Mother     Atrial Fibrillation Mother     High Blood Pressure Mother     High Cholesterol Mother     Diabetes Father     Breast Cancer Maternal Grandmother        Social History     Tobacco Use    Smoking status: Never Smoker    Smokeless tobacco: Never Used   Substance Use Topics    Alcohol use: Yes     Frequency: Monthly or less     Drinks per session: 1 or 2     Binge frequency: Never     Comment: 2 cups a day     Drug use: Never        Functional Status: The patient is able to ambulate and perform activities of daily living with the use of an assistive device- cane. ROS:    Constitutional: Denies fevers, chills, night sweats, unintentional weight loss     Skin: Denies rash or skin changes     Eyes: Denies vision changes    Ears/Nose/Throat: Denies nasal congestion or sore throat     Respiratory: Denies SOB or cough     Cardiovascular: Denies CP, palpitations, edema      Gastrointestinal: Denies abdominal pain,  N/V, constipation, or diarrhea    Genitourinary: Denies urinary symptoms    Neurologic: See HPI.     MSK: See HPI. Psychiatric: + sleep disturbance, anxiety, depression    Hematologic/Lymphatic/Immunologic: Denies bruising       Physical Exam:   Blood pressure (!) 164/96, pulse 93, height 5' 3\" (1.6 m), weight 273 lb (123.8 kg). General: well developed and well nourished in no acute distress. Body habitus is morbidly obese  HEENT: No rhinorrhea, sneezing, yawning, or lacrimation. No scleral icterus or conjunctival injection. Resp: symmetrical chest expansion, unlabored breathing, respirations unlabored. CV: Heart rate is regular. Peripheral pulses are palpable  Lymph: No visible regional lymphadenopathy. Skin: No rashes or ecchymosis. Normal turgor. Psych: Mood is calm.  Affect is normal.   Ext: No edema noted     MSK:   Back/Hip Exam: Inspection: Pelvis was asymmetric. Lumbar lordotic curvature was decreased. There was no scoliosis. No ecchymoses or erythema. Palpation: Palpatory exam revealed tenderness along bilateral lumbosacral paraspinals, no ttp midline spine  ttp right SI joint sulcus, no ttp greater trochanters and TFL on both side. There was paraspinal spasms. There were no trigger points. ROM decreased. +facet loading. Neurological Exam:  Strength:                     R          L  Hip Flex                       5         5    Knee Ext                     5          5  Ankle dorsi                  5          5  EHL                             5          5  Ankle Plantar               5          5     Sensory:  Intact for light touch in all upper and lower extremity dermatomes.      Reflexes:                     R          L  Patellar                        (1+)       (2+)  Ankle Jerk                   (2+)      (2+)     Gait is Antalgic. Right foot externally rotated      Imaging: (personally reviewed by me 01/05/21)  Lumbar x-ray     MRI L Spine      BONE MARROW SIGNAL: There is normal marrow signal.       T12-L1: Normal T12-L1.       L1-L2: Normal L1-L2.       L2-L3: Facet hypertrophy noted without canal or foraminal stenosis.       L3-L4: Grade 1 listhesis of L3 noted with a disc bulge/pseudobulge. Mild canal stenosis noted with moderate left and severe right   foraminal stenosis. Synovial facet joint effusions are seen   bilaterally.       L4-L5: Minimal retrolisthesis of L4 with facet hypertrophy. Severe   left and mild right foraminal stenosis without canal stenosis.       L5-S1: Facet hypertrophy without canal or foraminal stenosis.       SOFT TISSUES: The visualized paravertebral soft tissues are within   normal limits.           Impression   1. Grade 1 spondylolisthesis of L3 with severe facet synovitis.    2. Multilevel canal and foraminal stenosis most severe on the left from L3-4 through L5-S1 as discussed. CT C spine       Impression:   Gayle Ochoa is a 60 y.o. female     1. Lumbar facet arthropathy        Plan:    · Positive MBB. Will proceed with Fluoroscopic-guided Right lumbar medial branch neurolysis (RFA) at levels L4-5, L5-S1  · Refill flexeril       ? The patient was educated about the diagnosis, prognosis, indications, risks and benefits of treatment. An opportunity to ask questions was given to the patient and questions were answered. The patient agreed to proceed with the recommended treatment as described above. ? Follow up after RFA      William Beal DO, [address] [place] [address]   Board Certified Physical Medicine and Rehabilitation    The patient was counseled at length about the risks of mariposa Covid-19 during their perioperative period and any recovery window from their procedure. The patient was made aware that mariposa Covid-19  may worsen their prognosis for recovering from their procedure  and lend to a higher morbidity and/or mortality risk. All material risks, benefits, and reasonable alternatives including postponing the procedure were discussed. The patient does wish to proceed with the procedure at this time.

## 2021-01-08 ENCOUNTER — TELEPHONE (OUTPATIENT)
Dept: PHYSICAL MEDICINE AND REHAB | Age: 56
End: 2021-01-08

## 2021-01-08 NOTE — TELEPHONE ENCOUNTER
Called patient to schedule RFA. Left message with callback number and instructed patient to call the office to be put on the schedule. Will wait for patient to call the office.

## 2021-01-14 ENCOUNTER — HOSPITAL ENCOUNTER (OUTPATIENT)
Age: 56
Discharge: HOME | End: 2021-01-16
Payer: MEDICAID

## 2021-01-14 DIAGNOSIS — M47.816 LUMBAR FACET ARTHROPATHY: ICD-10-CM

## 2021-01-14 PROCEDURE — U0003 INFECTIOUS AGENT DETECTION BY NUCLEIC ACID (DNA OR RNA); SEVERE ACUTE RESPIRATORY SYNDROME CORONAVIRUS 2 (SARS-COV-2) (CORONAVIRUS DISEASE [COVID-19]), AMPLIFIED PROBE TECHNIQUE, MAKING USE OF HIGH THROUGHPUT TECHNOLOGIES AS DESCRIBED BY CMS-2020-01-R: HCPCS

## 2021-01-15 LAB
SARS-COV-2: NOT DETECTED
SOURCE: NORMAL

## 2021-01-21 ENCOUNTER — HOSPITAL ENCOUNTER (OUTPATIENT)
Age: 56
Setting detail: OUTPATIENT SURGERY
Discharge: HOME | End: 2021-01-21
Attending: PHYSICAL MEDICINE & REHABILITATION | Admitting: PHYSICAL MEDICINE & REHABILITATION
Payer: MEDICAID

## 2021-01-21 ENCOUNTER — HOSPITAL ENCOUNTER (OUTPATIENT)
Dept: OPERATING ROOM | Age: 56
Setting detail: OUTPATIENT SURGERY
Discharge: HOME | End: 2021-01-21
Attending: PHYSICAL MEDICINE & REHABILITATION
Payer: MEDICAID

## 2021-01-21 VITALS
HEART RATE: 84 BPM | HEIGHT: 63 IN | SYSTOLIC BLOOD PRESSURE: 158 MMHG | TEMPERATURE: 95.6 F | OXYGEN SATURATION: 98 % | DIASTOLIC BLOOD PRESSURE: 85 MMHG | WEIGHT: 255 LBS | BODY MASS INDEX: 45.18 KG/M2 | RESPIRATION RATE: 20 BRPM

## 2021-01-21 DIAGNOSIS — M47.816 LUMBAR FACET ARTHROPATHY: Primary | ICD-10-CM

## 2021-01-21 DIAGNOSIS — M47.896 OTHER OSTEOARTHRITIS OF SPINE, LUMBAR REGION: ICD-10-CM

## 2021-01-21 PROCEDURE — 3600000005 HC SURGERY LEVEL 5 BASE: Performed by: PHYSICAL MEDICINE & REHABILITATION

## 2021-01-21 PROCEDURE — 64635 DESTROY LUMB/SAC FACET JNT: CPT | Performed by: PHYSICAL MEDICINE & REHABILITATION

## 2021-01-21 PROCEDURE — 3209999900 FLUORO FOR SURGICAL PROCEDURES

## 2021-01-21 PROCEDURE — 7100000010 HC PHASE II RECOVERY - FIRST 15 MIN: Performed by: PHYSICAL MEDICINE & REHABILITATION

## 2021-01-21 PROCEDURE — 2709999900 HC NON-CHARGEABLE SUPPLY: Performed by: PHYSICAL MEDICINE & REHABILITATION

## 2021-01-21 PROCEDURE — 64636 DESTROY L/S FACET JNT ADDL: CPT | Performed by: PHYSICAL MEDICINE & REHABILITATION

## 2021-01-21 PROCEDURE — 3600000015 HC SURGERY LEVEL 5 ADDTL 15MIN: Performed by: PHYSICAL MEDICINE & REHABILITATION

## 2021-01-21 PROCEDURE — 2500000003 HC RX 250 WO HCPCS: Performed by: PHYSICAL MEDICINE & REHABILITATION

## 2021-01-21 PROCEDURE — 7100000011 HC PHASE II RECOVERY - ADDTL 15 MIN: Performed by: PHYSICAL MEDICINE & REHABILITATION

## 2021-01-21 PROCEDURE — C1713 ANCHOR/SCREW BN/BN,TIS/BN: HCPCS | Performed by: PHYSICAL MEDICINE & REHABILITATION

## 2021-01-21 RX ORDER — LIDOCAINE HYDROCHLORIDE 10 MG/ML
INJECTION, SOLUTION EPIDURAL; INFILTRATION; INTRACAUDAL; PERINEURAL PRN
Status: DISCONTINUED | OUTPATIENT
Start: 2021-01-21 | End: 2021-01-21 | Stop reason: ALTCHOICE

## 2021-01-21 RX ORDER — LIDOCAINE HYDROCHLORIDE 20 MG/ML
INJECTION, SOLUTION EPIDURAL; INFILTRATION; INTRACAUDAL; PERINEURAL PRN
Status: DISCONTINUED | OUTPATIENT
Start: 2021-01-21 | End: 2021-01-21 | Stop reason: ALTCHOICE

## 2021-01-21 ASSESSMENT — PAIN DESCRIPTION - PAIN TYPE
TYPE: CHRONIC PAIN
TYPE: CHRONIC PAIN

## 2021-01-21 ASSESSMENT — PAIN DESCRIPTION - ONSET
ONSET: ON-GOING
ONSET: ON-GOING

## 2021-01-21 ASSESSMENT — PAIN SCALES - GENERAL
PAINLEVEL_OUTOF10: 2
PAINLEVEL_OUTOF10: 2

## 2021-01-21 ASSESSMENT — PAIN - FUNCTIONAL ASSESSMENT: PAIN_FUNCTIONAL_ASSESSMENT: 0-10

## 2021-01-21 ASSESSMENT — PAIN DESCRIPTION - DESCRIPTORS: DESCRIPTORS: SORE

## 2021-01-21 ASSESSMENT — PAIN DESCRIPTION - FREQUENCY: FREQUENCY: CONTINUOUS

## 2021-01-21 NOTE — H&P
Shalonda Pacheco, [address] [facility]  [address] [facility] [place]  Phone: [phone]  Fax: [phone]     PCP: Sandrine Naik DO  Date of visit: 1/5/21          Chief Complaint   Patient presents with    Back Pain       follow up after medical branch blocks      Interval:   Patient presents today for follow up after MBB. She underwent bilateral L4-5 and L5-S1 MBB #1 and reports 70% relief. She complains of continued low back pain. The pain is rated Pain Score:   3. Described as achy. Located in low back without radiation. The pain is better with sitting in recliner with heating pad. The pain is worse with staying in one position too long. There is no weakness.  There is no bowel/bladder changes.      The prior workup has included: Xray, MRI L Spine      The prior treatment has included:  PT: completed for lumbar 2/4/2020, yes   Chiropractic: none    Modalities: heat with relief   OTC Tylenol: yes with no relief   NSAIDS: ibuprofen, indocin with no relief, voltaren with relief  Opioids: none    Membrane stabilizers: neurontin 300mg TID with relief   Muscle relaxers: flexeril with relief   Previous injections: right L3-4 TFESI x 2, right SI joint    Bilateral L4-5 and L5-S1 MBB x2  Previous surgery at this site: none     No Known Allergies            Current Outpatient Medications   Medication Sig Dispense Refill    cyclobenzaprine (FLEXERIL) 5 MG tablet TAKE 2 TABLETS BY MOUTH NIGHTLY AS NEEDED FOR MUSCLE SPASM 30 tablet 2    buPROPion (WELLBUTRIN XL) 150 MG extended release tablet Take 400 mg by mouth daily         atorvastatin (LIPITOR) 10 MG tablet Take 1 tablet by mouth daily 30 tablet 5    buPROPion (WELLBUTRIN XL) 300 MG extended release tablet 300 mg every morning    2    clonazePAM (KLONOPIN) 1 MG tablet TAKE 1 TABLET BY MOUTH THREE TIMES DAILY AS NEEDED   2 MSK:   Back/Hip Exam:   Inspection: Pelvis was asymmetric. Lumbar lordotic curvature was decreased. There was no scoliosis. No ecchymoses or erythema. Palpation: Palpatory exam revealed tenderness along bilateral lumbosacral paraspinals, no ttp midline spine  ttp right SI joint sulcus, no ttp greater trochanters and TFL on both side. There was paraspinal spasms. There were no trigger points. ROM decreased. +facet loading.         Neurological Exam:  Strength:                     R          L  Hip Flex                       5         5    Knee Ext                     5          7  Ankle dorsi                  5          5  EHL                             5          5  Ankle Plantar               5          5     Sensory:  Intact for light touch in all upper and lower extremity dermatomes.      Reflexes:                     R          L  Patellar                        (1+)       (2+)  Ankle Jerk                   (2+)      (2+)     Gait is Antalgic. Right foot externally rotated        Imaging: (personally reviewed by me 01/05/21)  Lumbar x-ray      MRI L Spine       BONE MARROW SIGNAL: There is normal marrow signal.       T12-L1: Normal T12-L1.       L1-L2: Normal L1-L2.       L2-L3: Facet hypertrophy noted without canal or foraminal stenosis.       L3-L4: Grade 1 listhesis of L3 noted with a disc bulge/pseudobulge. Mild canal stenosis noted with moderate left and severe right   foraminal stenosis. Synovial facet joint effusions are seen   bilaterally.       L4-L5: Minimal retrolisthesis of L4 with facet hypertrophy. Severe   left and mild right foraminal stenosis without canal stenosis.       L5-S1: Facet hypertrophy without canal or foraminal stenosis.       SOFT TISSUES: The visualized paravertebral soft tissues are within   normal limits.           Impression   1. Grade 1 spondylolisthesis of L3 with severe facet synovitis.    2. Multilevel canal and foraminal stenosis most severe on the left

## 2021-01-21 NOTE — OP NOTE
Nathan     1/21/2021      PRE-OPERATIVE DIAGNOSIS:  Lumbar spondylosis,  lumbar facet arthropathy, lumbosacral OA. POST-OPERATIVE DIAGNOSIS:  Lumbar spondylosis, lumbar facet arthropathy, lumbosacral OA. PROCEDURE:  Fluoroscopic-guided Right  lumbar medial branch neurolysis at  levels L4-5, L5-S1    SURGEON:    Kayla Mederos DO    ANESTHESIA:   Local    ESTIMATED BLOOD LOSS:  None.  ______________________________________________________________________    HISTORY & PHYSICAL:   See separate H&P    PROCEDURE:  After confirming written and informed consent, Moses Richter was brought to the procedure room and placed in the prone position. Blood pressure, heart rate, O2 saturation, and visual and verbal monitoring were established. A time-out was performed and her name and date of birth, the procedure to be performed as well as the plan for the location of the needle insertion were confirmed. Fluoroscopy was utilized to delineate the anatomy of the lumbar spine. Surface landmarks were identified as well. After prep and drape, an oblique fluoroscopic view was created to optimize visualization of the junction of the transverse process and the pedicle. After infiltration of local, # 20-gauge 100-mm 10- mm active tip radiofrequency ablation needle was passed to position the tip at the junction of the superior articular process and the transverse process, along the course of the medial branch. Satisfactory needle placement was confirmed by AP, lateral and oblique projections. At the sacral ala level, the sacral alar region was visualized and the needle tip was positioned on the sacral ala at the base of the superior articulating process where the medial branch traverses. Satisfactory needle placement was confirmed by AP, lateral and oblique projections. Sensory and motor testing was then performed. She  then received 0.75 cc of 1% PF xylocaine at each level. Radiofrequency thermal ablation was then performed at 80 degree Celsius for 90 seconds. Ruth Jarrell was comfortable throughout the ablation. No complications were noted. In summary, medial branch neurolysis were performed at the following levels; right L4-5, L5-S1. Negative aspiration was noted prior to each injection. The needle was removed intact and dressings were applied. Ruth Jarrell was transferred to the recovery area. She was monitored, reassessed and discharged after an appropriate observatory period. No complications were noted. Ruth Jarrell will follow up in the office as scheduled. She was encouraged to call with questions, concerns or if worsening of symptoms occurs.       Kelly Rodriguez DO, [facility]   Board Certified Physical Medicine and Rehabilitation

## 2021-01-26 ENCOUNTER — HOSPITAL ENCOUNTER (OUTPATIENT)
Dept: MAMMOGRAPHY | Age: 56
Discharge: HOME | End: 2021-01-28
Payer: MEDICAID

## 2021-01-26 DIAGNOSIS — Z12.31 ENCOUNTER FOR SCREENING MAMMOGRAM FOR MALIGNANT NEOPLASM OF BREAST: ICD-10-CM

## 2021-01-26 PROCEDURE — 77063 BREAST TOMOSYNTHESIS BI: CPT

## 2021-02-16 ENCOUNTER — TELEMEDICINE (OUTPATIENT)
Dept: PHYSICAL MEDICINE AND REHAB | Age: 56
End: 2021-02-16

## 2021-02-16 ENCOUNTER — TELEPHONE (OUTPATIENT)
Dept: PHYSICAL MEDICINE AND REHAB | Age: 56
End: 2021-02-16

## 2021-02-16 DIAGNOSIS — M47.816 LUMBAR FACET ARTHROPATHY: Primary | ICD-10-CM

## 2021-02-16 RX ORDER — BUPROPION HYDROCHLORIDE 300 MG/1
TABLET ORAL
COMMUNITY
Start: 2021-02-12 | End: 2021-02-25

## 2021-02-16 NOTE — TELEPHONE ENCOUNTER
Patient called back and she stated that she had no side effects for injection and she would like to do the other side. Please advise.

## 2021-02-16 NOTE — PROGRESS NOTES
Multiple attempts were made to do a video visit with patient but unsuccessful due to technological issues with her phone. She has no complications after right RFA. She would like to proceed with left.

## 2021-02-26 ENCOUNTER — HOSPITAL ENCOUNTER (OUTPATIENT)
Age: 56
Discharge: HOME | End: 2021-02-28
Payer: MEDICAID

## 2021-02-26 DIAGNOSIS — M47.819 FACET ARTHROPATHY: ICD-10-CM

## 2021-02-26 PROCEDURE — U0003 INFECTIOUS AGENT DETECTION BY NUCLEIC ACID (DNA OR RNA); SEVERE ACUTE RESPIRATORY SYNDROME CORONAVIRUS 2 (SARS-COV-2) (CORONAVIRUS DISEASE [COVID-19]), AMPLIFIED PROBE TECHNIQUE, MAKING USE OF HIGH THROUGHPUT TECHNOLOGIES AS DESCRIBED BY CMS-2020-01-R: HCPCS

## 2021-02-27 LAB
SARS-COV-2: NOT DETECTED
SOURCE: NORMAL

## 2021-03-02 ENCOUNTER — PREP FOR PROCEDURE (OUTPATIENT)
Dept: PHYSICAL MEDICINE AND REHAB | Age: 56
End: 2021-03-02

## 2021-03-04 ENCOUNTER — HOSPITAL ENCOUNTER (OUTPATIENT)
Dept: OPERATING ROOM | Age: 56
Setting detail: OUTPATIENT SURGERY
Discharge: HOME | End: 2021-03-04
Attending: PHYSICAL MEDICINE & REHABILITATION
Payer: MEDICAID

## 2021-03-04 ENCOUNTER — HOSPITAL ENCOUNTER (OUTPATIENT)
Age: 56
Setting detail: OUTPATIENT SURGERY
Discharge: HOME | End: 2021-03-04
Attending: PHYSICAL MEDICINE & REHABILITATION | Admitting: PHYSICAL MEDICINE & REHABILITATION
Payer: MEDICAID

## 2021-03-04 VITALS
HEIGHT: 63 IN | BODY MASS INDEX: 46.78 KG/M2 | HEART RATE: 71 BPM | OXYGEN SATURATION: 96 % | RESPIRATION RATE: 16 BRPM | DIASTOLIC BLOOD PRESSURE: 79 MMHG | TEMPERATURE: 96.3 F | WEIGHT: 264 LBS | SYSTOLIC BLOOD PRESSURE: 140 MMHG

## 2021-03-04 DIAGNOSIS — M47.896 OTHER OSTEOARTHRITIS OF SPINE, LUMBAR REGION: ICD-10-CM

## 2021-03-04 DIAGNOSIS — M47.819 FACET ARTHROPATHY: ICD-10-CM

## 2021-03-04 DIAGNOSIS — M47.816 LUMBAR SPONDYLOSIS: Primary | ICD-10-CM

## 2021-03-04 PROCEDURE — 64635 DESTROY LUMB/SAC FACET JNT: CPT | Performed by: PHYSICAL MEDICINE & REHABILITATION

## 2021-03-04 PROCEDURE — 3600000005 HC SURGERY LEVEL 5 BASE: Performed by: PHYSICAL MEDICINE & REHABILITATION

## 2021-03-04 PROCEDURE — 7100000011 HC PHASE II RECOVERY - ADDTL 15 MIN: Performed by: PHYSICAL MEDICINE & REHABILITATION

## 2021-03-04 PROCEDURE — 7100000010 HC PHASE II RECOVERY - FIRST 15 MIN: Performed by: PHYSICAL MEDICINE & REHABILITATION

## 2021-03-04 PROCEDURE — 2709999900 HC NON-CHARGEABLE SUPPLY: Performed by: PHYSICAL MEDICINE & REHABILITATION

## 2021-03-04 PROCEDURE — 2500000003 HC RX 250 WO HCPCS: Performed by: PHYSICAL MEDICINE & REHABILITATION

## 2021-03-04 PROCEDURE — 3600000015 HC SURGERY LEVEL 5 ADDTL 15MIN: Performed by: PHYSICAL MEDICINE & REHABILITATION

## 2021-03-04 PROCEDURE — 3209999900 FLUORO FOR SURGICAL PROCEDURES

## 2021-03-04 PROCEDURE — 64636 DESTROY L/S FACET JNT ADDL: CPT | Performed by: PHYSICAL MEDICINE & REHABILITATION

## 2021-03-04 PROCEDURE — C1713 ANCHOR/SCREW BN/BN,TIS/BN: HCPCS | Performed by: PHYSICAL MEDICINE & REHABILITATION

## 2021-03-04 RX ORDER — LIDOCAINE HYDROCHLORIDE 20 MG/ML
INJECTION, SOLUTION EPIDURAL; INFILTRATION; INTRACAUDAL; PERINEURAL PRN
Status: DISCONTINUED | OUTPATIENT
Start: 2021-03-04 | End: 2021-03-04 | Stop reason: ALTCHOICE

## 2021-03-04 RX ORDER — LIDOCAINE HYDROCHLORIDE 10 MG/ML
INJECTION, SOLUTION EPIDURAL; INFILTRATION; INTRACAUDAL; PERINEURAL PRN
Status: DISCONTINUED | OUTPATIENT
Start: 2021-03-04 | End: 2021-03-04 | Stop reason: ALTCHOICE

## 2021-03-04 ASSESSMENT — PAIN DESCRIPTION - DESCRIPTORS: DESCRIPTORS: ACHING

## 2021-03-04 ASSESSMENT — PAIN - FUNCTIONAL ASSESSMENT: PAIN_FUNCTIONAL_ASSESSMENT: 0-10

## 2021-03-04 NOTE — OP NOTE
[facility]    3/4/2021      PRE-OPERATIVE DIAGNOSIS:  Lumbar spondylosis,  lumbar facet arthropathy, lumbosacral OA. POST-OPERATIVE DIAGNOSIS:  Lumbar spondylosis, lumbar facet arthropathy, lumbosacral OA. PROCEDURE:  Fluoroscopic-guided Left  lumbar medial branch neurolysis at  levels L4-5, L5-S1    SURGEON:    Kayla Mederos DO    ANESTHESIA:   Local    ESTIMATED BLOOD LOSS:  None.  ______________________________________________________________________    HISTORY & PHYSICAL:   See separate H&P    PROCEDURE:  After confirming written and informed consent, Sonja Lock was brought to the procedure room and placed in the prone position. Blood pressure, heart rate, O2 saturation, and visual and verbal monitoring were established. A time-out was performed and her name and date of birth, the procedure to be performed as well as the plan for the location of the needle insertion were confirmed. Fluoroscopy was utilized to delineate the anatomy of the lumbar spine. Surface landmarks were identified as well. After prep and drape, an oblique fluoroscopic view was created to optimize visualization of the junction of the transverse process and the pedicle. After infiltration of local, # 20-gauge 100-mm 10- mm active tip radiofrequency ablation needle was passed to position the tip at the junction of the superior articular process and the transverse process, along the course of the medial branch. Satisfactory needle placement was confirmed by AP, lateral and oblique projections. At the sacral ala level, the sacral alar region was visualized and the needle tip was positioned on the sacral ala at the base of the superior articulating process where the medial branch traverses. Satisfactory needle placement was confirmed by AP, lateral and oblique projections. Sensory and motor testing was then performed. She  then received 0.75 cc of 1% PF xylocaine at each level. Radiofrequency thermal ablation was then performed at 80 degree Celsius for 90 seconds. Altagracia Quiroz was comfortable throughout the ablation. No complications were noted. In summary, medial branch neurolysis were performed at the following levels; left L4-5, L5-S1. Negative aspiration was noted prior to each injection. The needle was removed intact and dressings were applied. Altagracia Quiroz was transferred to the recovery area. She was monitored, reassessed and discharged after an appropriate observatory period. No complications were noted. Altagracia Quiroz will follow up in the office as scheduled. She was encouraged to call with questions, concerns or if worsening of symptoms occurs.       Quintin Simental DO, [facility]   Board Certified Physical Medicine and Rehabilitation

## 2021-03-04 NOTE — H&P
Charlene Sethi, [address] [facility] Physical Medicine and Rehabilitation  [address] [facility] [place]  Phone: [phone]  Fax: [phone]    PCP: Yumiko Kumar DO  Date of visit: 3/4/2021    CC: low back pain       Severiano Pete is a 60 y.o. female who presents today for radiofrequency ablation procedure. Patient complains of low back pain. No red flag symptoms. Consents to proceed with procedure. No Known Allergies    No current facility-administered medications for this encounter.         Past Medical History:   Diagnosis Date    Anxiety     Depression     Dyslipidemia     Hyperlipidemia     Obesity     Osteoarthritis        Past Surgical History:   Procedure Laterality Date    ANESTHESIA NERVE BLOCK Right 12/19/2019    RIGHT L3-4 TRANSFORAMINAL EPIDURAL STEROID INJECTION (CPT 66783) performed by Charlene Sethi DO at [address] Right 1/16/2020    RIGHT SACROILIAC JOINT STEROID INJECTION UNDER X-RAY GUIDANCE performed by Charlene Sethi DO at [address] [place] [address] Right 2015    NERVE BLOCK Right 12/19/2019    lumbar trasnforaminal    NERVE BLOCK Right 01/16/2020    right sacroiliac joint steroid injection     NERVE BLOCK Right 09/03/2020    right l3-4transforaminal epidural steroid injection    NERVE BLOCK Right 9/3/2020    RIGHT L3-4 TRANSFORAMINAL EPIDURAL STEROID INJECTION [facility]ilateral 11/05/2020    medial branch block    NERVE BLOCK Bilateral 11/5/2020    MEDIAL BRANCH BLOCK BILATERAL L4-5 AND L5-S1 (CPT 87849) performed [facility] Bilateral 12/10/2020    medial branch block    NERVE BLOCK Bilateral 12/10/2020    BILATERAL MEDIAL BRANCH BLOCK L4-5 AND L5-S1 (CPT H2478554) performed by Charlene Sethi DO at [address] [place] [address]  NERVE BLOCK Right 01/21/2021    RADIOFREQUENCY RIGHT L4-5 AND L5-S1    PAIN MANAGEMENT PROCEDURE Right 1/21/2021    RADIOFREQUENCY RIGHT L4-5 AND L5-S1 (CPT 48504) performed by uLpe Cantu DO at [address] Right [address]    SHOULDER SURGERY Left 2019       Family History   Problem Relation Age of Onset    Diabetes Mother     Atrial Fibrillation Mother     High Blood Pressure Mother     High Cholesterol Mother     Diabetes Father     Breast Cancer Maternal Grandmother             ROS:    Constitutional: Denies fevers, chills, night sweats, unintentional weight loss     Skin: Denies rash or skin changes     Respiratory: Denies SOB or cough     Cardiovascular: Denies CP, palpitations, edema      Neurologic: See HPI.     MSK: See HPI. Hematologic/Lymphatic/Immunologic: Denies bruising       Physical Exam:   Blood pressure 138/75, pulse 76, temperature 96.3 °F (35.7 °C), temperature source Temporal, resp. rate 20, height 5' 3\" (1.6 m), weight 264 lb (119.7 kg), SpO2 98 %. General: well developed and well nourished in no acute distress  Resp: symmetrical chest expansion, unlabored breathing, respirations unlabored. CV: Heart rate is regular. Peripheral pulses are palpable  Skin: No rashes or ecchymosis. Normal turgor. MSK: ttp lumbar psps      Neurological Exam:  No focal sensorimotor deficits.        Impression:     Lumbar facet arthropathy      Plan:   · Injection as planned today           Lupe Cantu DO, [address] [place] [address]   Board Certified Physical Medicine and Rehabilitation

## 2021-03-09 NOTE — TELEPHONE ENCOUNTER
Last labwork she had revealed chronic kidney disease. She should not be taking NSAIDs like voltaren chronically with poor kidney function. Tylenol and muscle relaxer are all that should be taken.

## 2021-03-09 NOTE — TELEPHONE ENCOUNTER
Called and left message on patient for patient to call office. Will wait for return call from patient.

## 2021-03-10 ENCOUNTER — TELEPHONE (OUTPATIENT)
Dept: PHYSICAL MEDICINE AND REHAB | Age: 56
End: 2021-03-10

## 2021-03-10 NOTE — TELEPHONE ENCOUNTER
2nd call to patient. Called and left message on patient voicemail that we did not refill her voltaren tabs and do to her latest lab work and she should only be taking Tylenol and muscle relaxer.

## 2021-03-16 ENCOUNTER — OFFICE VISIT (OUTPATIENT)
Dept: PHYSICAL MEDICINE AND REHAB | Age: 56
End: 2021-03-16
Payer: MEDICAID

## 2021-03-16 VITALS
SYSTOLIC BLOOD PRESSURE: 138 MMHG | BODY MASS INDEX: 48.9 KG/M2 | DIASTOLIC BLOOD PRESSURE: 88 MMHG | WEIGHT: 276 LBS | HEART RATE: 92 BPM | HEIGHT: 63 IN

## 2021-03-16 DIAGNOSIS — S33.5XXA LUMBAR SPRAIN, INITIAL ENCOUNTER: Primary | ICD-10-CM

## 2021-03-16 DIAGNOSIS — M47.816 LUMBAR FACET ARTHROPATHY: ICD-10-CM

## 2021-03-16 DIAGNOSIS — M47.26 OTHER SPONDYLOSIS WITH RADICULOPATHY, LUMBAR REGION: ICD-10-CM

## 2021-03-16 PROCEDURE — G8482 FLU IMMUNIZE ORDER/ADMIN: HCPCS | Performed by: PHYSICAL MEDICINE & REHABILITATION

## 2021-03-16 PROCEDURE — 1036F TOBACCO NON-USER: CPT | Performed by: PHYSICAL MEDICINE & REHABILITATION

## 2021-03-16 PROCEDURE — 99213 OFFICE O/P EST LOW 20 MIN: CPT | Performed by: PHYSICAL MEDICINE & REHABILITATION

## 2021-03-16 PROCEDURE — 3017F COLORECTAL CA SCREEN DOC REV: CPT | Performed by: PHYSICAL MEDICINE & REHABILITATION

## 2021-03-16 PROCEDURE — G8417 CALC BMI ABV UP PARAM F/U: HCPCS | Performed by: PHYSICAL MEDICINE & REHABILITATION

## 2021-03-16 PROCEDURE — G8427 DOCREV CUR MEDS BY ELIG CLIN: HCPCS | Performed by: PHYSICAL MEDICINE & REHABILITATION

## 2021-03-16 NOTE — PROGRESS NOTES
Kelly Rodriguez, [address] [facility] Physical Medicine and Rehabilitation  [address] [facility] [place]  Phone: [phone]  Fax: [phone]    PCP: Priscilla Waller DO  Date of visit: 3/16/21    Chief Complaint   Patient presents with    Back Pain     follow up     Interval:   Patient presents today for follow up after RFA. She reports good relief so far. No side effects. Unfortunately a few days after the injection, she fell down the steps and landing on her hands and knees and hurt her back. She has been using ice and heat. Stopped taking voltaren due to recent labwork. She is using flexeril. Pain has gotten better. The pain is rated Pain Score:   2. Described as achy. The pain is better with sitting in recliner with heating pad. The pain is worse with staying in one position too long. There is no weakness. There is no bowel/bladder changes.      The prior workup has included: Xray, MRI L Spine     The prior treatment has included:  PT: completed for lumbar 2/4/2020, yes   Chiropractic: none    Modalities: heat with relief   OTC Tylenol: yes with no relief   NSAIDS: ibuprofen, indocin with no relief, voltaren with relief-- contraindicated   Opioids: none    Membrane stabilizers: neurontin 300mg TID with relief, cymbalta    Muscle relaxers: flexeril with relief   Previous injections: right L3-4 TFESI x 2, right SI joint    Bilateral L4-5 and L5-S1 MBB x2  Bilateral L4-5 and L5-S1 RFA  Previous surgery at this site: none    No Known Allergies    Current Outpatient Medications   Medication Sig Dispense Refill    cyclobenzaprine (FLEXERIL) 5 MG tablet TAKE 2 TABLETS BY MOUTH NIGHTLY AS NEEDED FOR MUSCLE SPASM 30 tablet 2    buPROPion (WELLBUTRIN XL) 150 MG extended release tablet Take 450 mg by mouth every morning       atorvastatin (LIPITOR) 10 MG tablet Take 1 tablet by mouth daily 30 tablet 5    clonazePAM (KLONOPIN) 1 MG tablet TAKE 1 TABLET BY MOUTH THREE TIMES DAILY AS NEEDED  2    DULoxetine (CYMBALTA) 60 MG extended release capsule Take 60 mg by mouth daily 2 capsules  2    zolpidem (AMBIEN) 10 MG tablet TAKE 1 2 (ONE HALF) TO 1 WHOLE TABLET BY MOUTH AT BEDTIME AS DIRECETED ON EMPTY STOMACH  2    gabapentin (NEURONTIN) 300 MG capsule Take 1 capsule by mouth 3 times daily for 30 days. 90 capsule 3    diclofenac (VOLTAREN) 50 MG EC tablet Take 1 tablet by mouth 2 times daily 60 tablet 2     No current facility-administered medications for this visit.         Past Medical History:   Diagnosis Date    Anxiety     Depression     Dyslipidemia     Hyperlipidemia     Obesity     Osteoarthritis        Past Surgical History:   Procedure Laterality Date    ANESTHESIA NERVE BLOCK Right 12/19/2019    RIGHT L3-4 TRANSFORAMINAL EPIDURAL STEROID INJECTION (CPT 55050) performed by Marisela Forman DO at [address] [facility] [address] JOINT Right 1/16/2020    RIGHT SACROILIAC JOINT STEROID INJECTION UNDER X-RAY GUIDANCE performed by Marisela Forman DO at [address] [place] [address] Right 2015    NERVE BLOCK Right 12/19/2019    lumbar trasnforaminal    NERVE BLOCK Right 01/16/2020    right sacroiliac joint steroid injection     NERVE BLOCK Right 09/03/2020    right l3-4transforaminal epidural steroid injection    NERVE BLOCK Right 9/3/2020    RIGHT L3-4 TRANSFORAMINAL EPIDURAL STEROID INJECTION performed by Marisela Forman DO at [facility] Bilateral 11/05/2020    medial branch block    NERVE BLOCK Bilateral 11/5/2020    MEDIAL BRANCH BLOCK BILATERAL L4-5 AND L5-S1 (CPT 76210) performed by Marisela Forman DO at [facility] Bilateral 12/10/2020    medial branch block    NERVE BLOCK Bilateral 12/10/2020    BILATERAL MEDIAL BRANCH BLOCK L4-5 AND L5-S1 (CPT [id number]) performed by Marisela Forman DO at [facility] Right 01/21/2021    RADIOFREQUENCY RIGHT L4-5 AND L5-S1    NERVE BLOCK Left 03/04/2021    fluoroscopic guided left lumbar medial branch  neurolysis (RFA)) at levels L4-5, L5-S1    PAIN MANAGEMENT PROCEDURE Right 1/21/2021    RADIOFREQUENCY RIGHT L4-5 AND L5-S1 (CPT 54022) performed by Christina Swain DO at [address] Left 3/4/2021    FLUOROSCOPIC-GUIDED LEFT LUMBAR MEDIAL BRANCH NUEROLYSIS (RADIOFREQUENCY ABLATION) AT LEVELS L4-5, L5-S1 (CPT 16400) performed by Christina Swain DO at [address]    SHOULDER SURGERY Left 2019       Family History   Problem Relation Age of Onset    Diabetes Mother     Atrial Fibrillation Mother     High Blood Pressure Mother     High Cholesterol Mother     Diabetes Father     Breast Cancer Maternal Grandmother        Social History     Tobacco Use    Smoking status: Never Smoker    Smokeless tobacco: Never Used   Substance Use Topics    Alcohol use: Yes     Frequency: Monthly or less     Drinks per session: 1 or 2     Binge frequency: Never     Comment: 2 cups a day     Drug use: Never        Functional Status: The patient is able to ambulate and perform activities of daily living with the use of an assistive device- cane. ROS:    Constitutional: Denies fevers, chills, night sweats, unintentional weight loss     Skin: Denies rash or skin changes     Eyes: Denies vision changes    Ears/Nose/Throat: Denies nasal congestion or sore throat     Respiratory: Denies SOB or cough     Cardiovascular: Denies CP, palpitations, edema      Gastrointestinal: Denies abdominal pain,  N/V, constipation, or diarrhea    Genitourinary: Denies urinary symptoms    Neurologic: See HPI.     MSK: See HPI. Psychiatric: + sleep disturbance, anxiety, depression    Hematologic/Lymphatic/Immunologic: Denies bruising       Physical Exam:   Blood pressure 138/88, pulse 92, height 5' 3\" (1.6 m), weight 276 lb (125.2 kg).    General: well hypertrophy. Severe   left and mild right foraminal stenosis without canal stenosis.       L5-S1: Facet hypertrophy without canal or foraminal stenosis.       SOFT TISSUES: The visualized paravertebral soft tissues are within   normal limits.           Impression   1. Grade 1 spondylolisthesis of L3 with severe facet synovitis. 2. Multilevel canal and foraminal stenosis most severe on the left   from L3-4 through L5-S1 as discussed. CT C spine       Impression:   Severiano Pete is a 60 y.o. female     1. Lumbar sprain, initial encounter    2. Lumbar facet arthropathy    3. Other spondylosis with radiculopathy, lumbar region        Plan:    · Did well with RFA in terms of arthritis pain. Unfortunately she recently fell spraining her back. Recommend continued flexeril, topical bengay or salonpas and provided stretches to do.  The patient was educated about the diagnosis, prognosis, indications, risks and benefits of treatment. An opportunity to ask questions was given to the patient and questions were answered. The patient agreed to proceed with the recommended treatment as described above.  Follow up in 6 weeks.        Charlene Sethi DO, [facility]   Board Certified Physical Medicine and Rehabilitation

## 2021-03-16 NOTE — PATIENT INSTRUCTIONS
Patient Education        Low Back Pain: Exercises  Introduction  Here are some examples of exercises for you to try. The exercises may be suggested for a condition or for rehabilitation. Start each exercise slowly. Ease off the exercises if you start to have pain. You will be told when to start these exercises and which ones will work best for you. How to do the exercises  Press-up   1. Lie on your stomach, supporting your body with your forearms. 2. Press your elbows down into the floor to raise your upper back. As you do this, relax your stomach muscles and allow your back to arch without using your back muscles. As your press up, do not let your hips or pelvis come off the floor. 3. Hold for 15 to 30 seconds, then relax. 4. Repeat 2 to 4 times. Alternate arm and leg (bird dog) exercise   Do this exercise slowly. Try to keep your body straight at all times, and do not let one hip drop lower than the other. 1. Start on the floor, on your hands and knees. 2. Tighten your belly muscles. 3. Raise one leg off the floor, and hold it straight out behind you. Be careful not to let your hip drop down, because that will twist your trunk. 4. Hold for about 6 seconds, then lower your leg and switch to the other leg. 5. Repeat 8 to 12 times on each leg. 6. Over time, work up to holding for 10 to 30 seconds each time. 7. If you feel stable and secure with your leg raised, try raising the opposite arm straight out in front of you at the same time. Knee-to-chest exercise   1. Lie on your back with your knees bent and your feet flat on the floor. 2. Bring one knee to your chest, keeping the other foot flat on the floor (or keeping the other leg straight, whichever feels better on your lower back). 3. Keep your lower back pressed to the floor. Hold for at least 15 to 30 seconds. 4. Relax, and lower the knee to the starting position. 5. Repeat with the other leg. Repeat 2 to 4 times with each leg.   6. To get more stretch, put your other leg flat on the floor while pulling your knee to your chest.    Curl-ups   1. Lie on the floor on your back with your knees bent at a 90-degree angle. Your feet should be flat on the floor, about 12 inches from your buttocks. 2. Cross your arms over your chest. If this bothers your neck, try putting your hands behind your neck (not your head), with your elbows spread apart. 3. Slowly tighten your belly muscles and raise your shoulder blades off the floor. 4. Keep your head in line with your body, and do not press your chin to your chest.  5. Hold this position for 1 or 2 seconds, then slowly lower yourself back down to the floor. 6. Repeat 8 to 12 times. Pelvic tilt exercise   1. Lie on your back with your knees bent. 2. \"Brace\" your stomach. This means to tighten your muscles by pulling in and imagining your belly button moving toward your spine. You should feel like your back is pressing to the floor and your hips and pelvis are rocking back. 3. Hold for about 6 seconds while you breathe smoothly. 4. Repeat 8 to 12 times. Heel dig bridging   1. Lie on your back with both knees bent and your ankles bent so that only your heels are digging into the floor. Your knees should be bent about 90 degrees. 2. Then push your heels into the floor, squeeze your buttocks, and lift your hips off the floor until your shoulders, hips, and knees are all in a straight line. 3. Hold for about 6 seconds as you continue to breathe normally, and then slowly lower your hips back down to the floor and rest for up to 10 seconds. 4. Do 8 to 12 repetitions. Hamstring stretch in doorway   1. Lie on your back in a doorway, with one leg through the open door. 2. Slide your leg up the wall to straighten your knee. You should feel a gentle stretch down the back of your leg. 3. Hold the stretch for at least 15 to 30 seconds. Do not arch your back, point your toes, or bend either knee.  Keep one heel touching the floor and the other heel touching the wall. 4. Repeat with your other leg. 5. Do 2 to 4 times for each leg. Hip flexor stretch   1. Kneel on the floor with one knee bent and one leg behind you. Place your forward knee over your foot. Keep your other knee touching the floor. 2. Slowly push your hips forward until you feel a stretch in the upper thigh of your rear leg. 3. Hold the stretch for at least 15 to 30 seconds. Repeat with your other leg. 4. Do 2 to 4 times on each side. Wall sit   1. Stand with your back 10 to 12 inches away from a wall. 2. Lean into the wall until your back is flat against it. 3. Slowly slide down until your knees are slightly bent, pressing your lower back into the wall. 4. Hold for about 6 seconds, then slide back up the wall. 5. Repeat 8 to 12 times. Follow-up care is a key part of your treatment and safety. Be sure to make and go to all appointments, and call your doctor if you are having problems. It's also a good idea to know your test results and keep a list of the medicines you take. Where can you learn more? Go to https://Sprig ToyspeFididel.Moodyo. org and sign in to your [employer] account. Enter W075 in the [employer] box to learn more about \"Low Back Pain: Exercises. \"     If you do not have an account, please click on the \"Sign Up Now\" link. Current as of: November 16, 2020               Content Version: 12.8  © 2006-2021 Healthwise, Incorporated. Care instructions adapted under license by [facility] ([facility]). If you have questions about a medical condition or this instruction, always ask your healthcare professional. [place] [address] any warranty or liability for your use of this information. Patient Education        Acute Low Back Pain: Exercises  Introduction  Here are some examples of typical rehabilitation exercises for your condition. Start each exercise slowly.  Ease off the exercise if you start to have a medical condition or this instruction, always ask your healthcare professional. [place] [address] any warranty or liability for your use of this information.

## 2021-03-24 RX ORDER — CYCLOBENZAPRINE HCL 5 MG
TABLET ORAL
Qty: 30 TABLET | Refills: 0 | Status: SHIPPED
Start: 2021-03-24 | End: 2021-05-03

## 2021-03-24 NOTE — TELEPHONE ENCOUNTER
Patient last visit 3-16-21 next visit 4-27-21. Pharmacy requesting a refill on Flexeril 5 mg 2 tab qhs prn sent 1-5-21 #30 2 refills. Please advise.

## 2021-03-26 ENCOUNTER — IMMUNIZATION (OUTPATIENT)
Dept: PRIMARY CARE CLINIC | Age: 56
End: 2021-03-26
Payer: MEDICAID

## 2021-03-26 PROCEDURE — 91301 COVID-19, MODERNA VACCINE 100MCG/0.5ML DOSE: CPT | Performed by: NURSE PRACTITIONER

## 2021-03-26 PROCEDURE — 0011A COVID-19, MODERNA VACCINE 100MCG/0.5ML DOSE: CPT | Performed by: NURSE PRACTITIONER

## 2021-04-14 DIAGNOSIS — E78.2 MIXED HYPERLIPIDEMIA: ICD-10-CM

## 2021-04-14 RX ORDER — CYCLOBENZAPRINE HCL 5 MG
TABLET ORAL
Qty: 30 TABLET | Refills: 0 | OUTPATIENT
Start: 2021-04-14

## 2021-04-19 RX ORDER — ATORVASTATIN CALCIUM 10 MG/1
TABLET, FILM COATED ORAL
Qty: 30 TABLET | Refills: 1 | Status: SHIPPED
Start: 2021-04-19 | End: 2021-06-24 | Stop reason: SDUPTHER

## 2021-04-26 ENCOUNTER — IMMUNIZATION (OUTPATIENT)
Dept: PRIMARY CARE CLINIC | Age: 56
End: 2021-04-26
Payer: MEDICAID

## 2021-04-26 PROCEDURE — 0012A COVID-19, MODERNA VACCINE 100MCG/0.5ML DOSE: CPT | Performed by: NURSE PRACTITIONER

## 2021-04-26 PROCEDURE — 91301 COVID-19, MODERNA VACCINE 100MCG/0.5ML DOSE: CPT | Performed by: NURSE PRACTITIONER

## 2021-04-27 ENCOUNTER — OFFICE VISIT (OUTPATIENT)
Dept: PHYSICAL MEDICINE AND REHAB | Age: 56
End: 2021-04-27
Payer: MEDICAID

## 2021-04-27 VITALS
SYSTOLIC BLOOD PRESSURE: 128 MMHG | BODY MASS INDEX: 49.08 KG/M2 | HEIGHT: 63 IN | HEART RATE: 98 BPM | WEIGHT: 277 LBS | DIASTOLIC BLOOD PRESSURE: 88 MMHG

## 2021-04-27 DIAGNOSIS — M47.819 FACET ARTHROPATHY: ICD-10-CM

## 2021-04-27 DIAGNOSIS — M46.1 SACROILIITIS (HCC): Primary | ICD-10-CM

## 2021-04-27 DIAGNOSIS — M54.17 RADICULOPATHY, LUMBOSACRAL REGION: ICD-10-CM

## 2021-04-27 DIAGNOSIS — M47.816 LUMBAR FACET ARTHROPATHY: ICD-10-CM

## 2021-04-27 PROCEDURE — G8427 DOCREV CUR MEDS BY ELIG CLIN: HCPCS | Performed by: PHYSICAL MEDICINE & REHABILITATION

## 2021-04-27 PROCEDURE — 3017F COLORECTAL CA SCREEN DOC REV: CPT | Performed by: PHYSICAL MEDICINE & REHABILITATION

## 2021-04-27 PROCEDURE — 99214 OFFICE O/P EST MOD 30 MIN: CPT | Performed by: PHYSICAL MEDICINE & REHABILITATION

## 2021-04-27 PROCEDURE — G8417 CALC BMI ABV UP PARAM F/U: HCPCS | Performed by: PHYSICAL MEDICINE & REHABILITATION

## 2021-04-27 PROCEDURE — 1036F TOBACCO NON-USER: CPT | Performed by: PHYSICAL MEDICINE & REHABILITATION

## 2021-04-27 RX ORDER — BUPROPION HYDROCHLORIDE 300 MG/1
TABLET ORAL
COMMUNITY
Start: 2021-04-14 | End: 2021-05-05

## 2021-04-27 RX ORDER — LOTEPREDNOL ETABONATE 5 MG/ML
SUSPENSION/ DROPS OPHTHALMIC
Status: ON HOLD | COMMUNITY
Start: 2021-04-15 | End: 2021-05-06 | Stop reason: ALTCHOICE
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
## 2021-04-29 ENCOUNTER — CLINICAL DOCUMENTATION (OUTPATIENT)
Dept: SPIRITUAL SERVICES | Age: 56
End: 2021-04-29

## 2021-04-29 NOTE — ACP (ADVANCE CARE PLANNING)
Advance Care Planning    Ambulatory ACP Specialist Patient Outreach    Date:  4/29/2021  ACP Specialist:  Barak Barron    Outreach call to patient in follow-up to ACP Specialist referral from:  [] PCP  [] Provider   [] Ambulatory Care Management [] Other for Reason:  Patient called me for ACP as I have been in contact with her wife for ACP. I am mailing documents to her today and will follow up. [x] Continued Conversation for ACP decision making / Goals of Care  [] Code Status Discussion  [] Completion of Adv Directive  [] Completion of Portable DNR order  [] Other (Specify)    Date Referral Received: 4/29/21    Today's Outreach:  [x] First   [] Second  [] Third                               Third outreach made by []  phone  [] email []   Verifico     Intervention:  [x] Spoke with Patient  [] Left VM requesting return call      Outcome: Mailing documents and let her know available to complete with her and her wife. Next Step:   [] ACP scheduled conversation  [] Outreach again in one week               [x] Email / Mail ACP Info Sheets  [] Email / Mail Advance Directive            [] Close Referral. Routing closure to referring provider/staff and to ACP Specialist .      Thank you for this referral.

## 2021-05-03 RX ORDER — CYCLOBENZAPRINE HCL 5 MG
TABLET ORAL
Qty: 30 TABLET | Refills: 2 | Status: SHIPPED
Start: 2021-05-03 | End: 2021-06-09 | Stop reason: SDUPTHER

## 2021-05-06 ENCOUNTER — HOSPITAL ENCOUNTER (OUTPATIENT)
Dept: OPERATING ROOM | Age: 56
Setting detail: OUTPATIENT SURGERY
Discharge: HOME | End: 2021-05-06
Attending: PHYSICAL MEDICINE & REHABILITATION
Payer: MEDICAID

## 2021-05-06 ENCOUNTER — HOSPITAL ENCOUNTER (OUTPATIENT)
Age: 56
Setting detail: OUTPATIENT SURGERY
Discharge: HOME | End: 2021-05-06
Attending: PHYSICAL MEDICINE & REHABILITATION | Admitting: PHYSICAL MEDICINE & REHABILITATION
Payer: MEDICAID

## 2021-05-06 VITALS
HEIGHT: 63 IN | HEART RATE: 91 BPM | RESPIRATION RATE: 18 BRPM | OXYGEN SATURATION: 97 % | WEIGHT: 270 LBS | BODY MASS INDEX: 47.84 KG/M2 | SYSTOLIC BLOOD PRESSURE: 148 MMHG | DIASTOLIC BLOOD PRESSURE: 98 MMHG

## 2021-05-06 DIAGNOSIS — M53.3 SACROILIAC JOINT PAIN: ICD-10-CM

## 2021-05-06 PROCEDURE — 3209999900 FLUORO FOR SURGICAL PROCEDURES

## 2021-05-06 PROCEDURE — 7100000011 HC PHASE II RECOVERY - ADDTL 15 MIN: Performed by: PHYSICAL MEDICINE & REHABILITATION

## 2021-05-06 PROCEDURE — 6360000002 HC RX W HCPCS: Performed by: PHYSICAL MEDICINE & REHABILITATION

## 2021-05-06 PROCEDURE — 7100000010 HC PHASE II RECOVERY - FIRST 15 MIN: Performed by: PHYSICAL MEDICINE & REHABILITATION

## 2021-05-06 PROCEDURE — 2709999900 HC NON-CHARGEABLE SUPPLY: Performed by: PHYSICAL MEDICINE & REHABILITATION

## 2021-05-06 PROCEDURE — 6360000004 HC RX CONTRAST MEDICATION: Performed by: PHYSICAL MEDICINE & REHABILITATION

## 2021-05-06 PROCEDURE — 3600000005 HC SURGERY LEVEL 5 BASE: Performed by: PHYSICAL MEDICINE & REHABILITATION

## 2021-05-06 PROCEDURE — 27096 INJECT SACROILIAC JOINT: CPT | Performed by: PHYSICAL MEDICINE & REHABILITATION

## 2021-05-06 PROCEDURE — 2500000003 HC RX 250 WO HCPCS: Performed by: PHYSICAL MEDICINE & REHABILITATION

## 2021-05-06 RX ORDER — PREDNISONE 5 MG/ML
20 SOLUTION ORAL DAILY
COMMUNITY
End: 2021-06-24

## 2021-05-06 RX ORDER — LIDOCAINE HYDROCHLORIDE 10 MG/ML
INJECTION, SOLUTION EPIDURAL; INFILTRATION; INTRACAUDAL; PERINEURAL PRN
Status: DISCONTINUED | OUTPATIENT
Start: 2021-05-06 | End: 2021-05-06 | Stop reason: ALTCHOICE

## 2021-05-06 ASSESSMENT — PAIN - FUNCTIONAL ASSESSMENT: PAIN_FUNCTIONAL_ASSESSMENT: 0-10

## 2021-05-06 ASSESSMENT — PAIN SCALES - GENERAL
PAINLEVEL_OUTOF10: 0
PAINLEVEL_OUTOF10: 0
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
## 2021-05-06 NOTE — OP NOTE
SACROILIAC JOINT ARTHROGRAM AND STEROID INJECTION     WITH FLUOROSCOPIC GUIDANCE    Patient: Pepe Yadav                                                    MRN: [id number]  : 1965                                             Date of procedure: 2021    Physician Performing Procedure: Fitz Mackey DO    Clinical Scenario: As per our office notes. Diagnosis: sacroiliitis     Injectate: A total of 2mL, consisting of 1mL of Kenalog (40mg/mL), the remainder comprised of 2% Lidocaine. Levels Treated: right Sacroiliac Joint    Approach:  Posterior     Improvement after today's procedure: As per nursing record. Comments: none    Pre-procedural evaluation: The patient was examined today just prior to performing the procedure listed above. The patient's heart rate was normal and rhythm was regular, lungs were clear to auscultation bilaterally, peripheral pulses were intact in the lower limbs, strength and sensation were preserved in the lower limbs, and reflexes were symmetric in the lower limbs. Procedure: The patient was prepped and draped in a sterile fashion in the prone position after informed consent was signed and all patient questions were answered including the risks, benefits, alternative treatment options, and prognosis. The risks include but are not limited to infection, allergic reaction, nerve damage, paralysis, epidural hematoma, syncope, headache, respiratory or cardiac arrest, and scar formation. The fluoroscopic C-arm was positioned for optimal visualization of the sacroiliac (SI) joint. The inferior portion of the above SI joint was localized under fluoroscopic visualization and 3cc of 1% Lidocaine without Epinephrine was utilized for soft tissue local anesthesia. A 22 gauge spinal needle was inserted into the fluoroscopically hyperlucent region within the SI joint.   A 0.5 cc. volume of Isovue was injected into the joint and a partial arthrogram flow

## 2021-05-17 NOTE — ACP (ADVANCE CARE PLANNING)
Advance Care Planning     Advance Care Planning Clinical Specialist  Conversation Note      Date of ACP Conversation: 4/29/2021    Conversation Conducted with: Patient with Decision Making Capacity    ACP Clinical Specialist: [address] Decision Maker:     Current Designated Healthcare Decision Maker:     Primary Decision Maker: Sidney Low - Spouse - [phone]    Secondary Decision Maker: Archana Lua - Parent - [phone]  Click here to complete Healthcare Decision Makers including section of the Healthcare Decision Maker Relationship (ie \"Primary\")    Care Preferences    Hospitalization: \"If your health worsens and it becomes clear that your chance of recovery is unlikely, what would your preference be regarding hospitalization? \"    Choice:  [x] The patient wants hospitalization. [] The patient prefers comfort-focused treatment without hospitalization. Ventilation: \"If you were in your present state of health and suddenly became very ill and were unable to breathe on your own, what would your preference be about the use of a ventilator (breathing machine) if it were available to you? \"      If the patient would desire the use of ventilator (breathing machine), answer \"yes\". If not, \"no\": yes    \"If your health worsens and it becomes clear that your chance of recovery is unlikely, what would your preference be about the use of a ventilator (breathing machine) if it were available to you? \"     Would the patient desire the use of ventilator (breathing machine)?: No      Resuscitation  \"CPR works best to restart the heart when there is a sudden event, like a heart attack, in someone who is otherwise healthy. Unfortunately, CPR does not typically restart the heart for people who have serious health conditions or who are very sick. \"    \"In the event your heart stopped as a result of an underlying serious health condition, would you want attempts to be made to restart your heart (answer \"yes\" for attempt to resuscitate) or would you prefer a natural death (answer \"no\" for do not attempt to resuscitate)? \" yes       [x] Yes   [] No   Educated Patient / Arnie Bowman regarding differences between Advance Directives and portable DNR orders. Length of ACP Conversation in minutes:  36    Cain Bacon is going to fill out documents that were mailed and take to get notarized then have copy for medical record.     Conversation Outcomes:  [x] ACP discussion completed  [] Existing advance directive reviewed with patient; no changes to patient's previously recorded wishes  [] New Advance Directive completed  [] Portable Do Not Rescitate prepared for Provider review and signature  [] POLST/POST/MOLST/MOST prepared for Provider review and signature      Follow-up plan:    [] Schedule follow-up conversation to continue planning  [] Referred individual to Provider for additional questions/concerns   [] Advised patient/agent/surrogate to review completed ACP document and update if needed with changes in condition, patient preferences or care setting    [x] This note routed to one or more involved healthcare providers

## 2021-06-09 ENCOUNTER — OFFICE VISIT (OUTPATIENT)
Dept: PHYSICAL MEDICINE AND REHAB | Age: 56
End: 2021-06-09
Payer: MEDICAID

## 2021-06-09 VITALS
BODY MASS INDEX: 49.43 KG/M2 | HEART RATE: 96 BPM | SYSTOLIC BLOOD PRESSURE: 143 MMHG | WEIGHT: 279 LBS | DIASTOLIC BLOOD PRESSURE: 87 MMHG | HEIGHT: 63 IN

## 2021-06-09 DIAGNOSIS — M47.816 LUMBAR SPONDYLOSIS: ICD-10-CM

## 2021-06-09 DIAGNOSIS — M53.3 SACROILIAC JOINT DYSFUNCTION: Primary | ICD-10-CM

## 2021-06-09 PROCEDURE — 3017F COLORECTAL CA SCREEN DOC REV: CPT | Performed by: PHYSICAL MEDICINE & REHABILITATION

## 2021-06-09 PROCEDURE — G8417 CALC BMI ABV UP PARAM F/U: HCPCS | Performed by: PHYSICAL MEDICINE & REHABILITATION

## 2021-06-09 PROCEDURE — 99213 OFFICE O/P EST LOW 20 MIN: CPT | Performed by: PHYSICAL MEDICINE & REHABILITATION

## 2021-06-09 PROCEDURE — 1036F TOBACCO NON-USER: CPT | Performed by: PHYSICAL MEDICINE & REHABILITATION

## 2021-06-09 PROCEDURE — G8428 CUR MEDS NOT DOCUMENT: HCPCS | Performed by: PHYSICAL MEDICINE & REHABILITATION

## 2021-06-09 RX ORDER — CYCLOBENZAPRINE HCL 5 MG
5 TABLET ORAL 3 TIMES DAILY PRN
Qty: 90 TABLET | Refills: 2 | Status: SHIPPED
Start: 2021-06-09 | End: 2021-11-16

## 2021-06-09 NOTE — PROGRESS NOTES
Maybell Severe, [address] [facility] Physical Medicine and Rehabilitation  [address] [facility] [address]. Santino Cordero  Phone: [phone]  Fax: [phone]    PCP: Herminia Dumont DO  Date of visit: 6/9/21    Chief Complaint   Patient presents with    Back Pain     follow up     Interval:   Patient presents today for follow up regarding low back pain. She underwent right SI joint injection and reports 40% relief. She still points directly to the SI joint on the right as the source of her pain. Worse with standing. The pain is rated Pain Score:   4. Described as achy. There is no weakness. There is no bowel/bladder changes. Has been taking flexeril BID instead of just at night which seems to be helping somewhat.      The prior workup has included: Xray, MRI L Spine     The prior treatment has included:  PT: completed for lumbar 2/4/2020, yes   Chiropractic: none    Modalities: heat with relief   OTC Tylenol: yes with no relief   NSAIDS: ibuprofen, indocin with no relief, voltaren with relief-- contraindicated   Opioids: none    Membrane stabilizers: neurontin 300mg TID with relief, cymbalta    Muscle relaxers: flexeril with relief   Previous injections: right L3-4 TFESI x 2, right SI joint    Bilateral L4-5 and L5-S1 MBB x2  Bilateral L4-5 and L5-S1 RFA  Right SI joint  Previous surgery at this site: none    No Known Allergies    Current Outpatient Medications   Medication Sig Dispense Refill    cyclobenzaprine (FLEXERIL) 5 MG tablet Take 1 tablet by mouth 3 times daily as needed for Muscle spasms 90 tablet 2    predniSONE 5 MG/5ML solution Take 20 mg by mouth daily      atorvastatin (LIPITOR) 10 MG tablet Take 1 tablet by mouth once daily 30 tablet 1    buPROPion (WELLBUTRIN XL) 150 MG extended release tablet Take 450 mg by mouth every morning       clonazePAM (KLONOPIN) 1 MG tablet TAKE 1 TABLET BY MOUTH THREE TIMES DAILY AS NEEDED  2    DULoxetine (CYMBALTA) 60 MG extended release capsule Take 60 mg by mouth daily 2 capsules  2    zolpidem (AMBIEN) 10 MG tablet TAKE 1 2 (ONE HALF) TO 1 WHOLE TABLET BY MOUTH AT BEDTIME AS DIRECETED ON EMPTY STOMACH  2    gabapentin (NEURONTIN) 300 MG capsule Take 1 capsule by mouth 3 times daily for 30 days. 90 capsule 3     No current facility-administered medications for this visit.        Past Medical History:   Diagnosis Date    Anxiety     Depression     Dyslipidemia     Hyperlipidemia     Obesity     Osteoarthritis        Past Surgical History:   Procedure Laterality Date    ANESTHESIA NERVE BLOCK Right 12/19/2019    RIGHT L3-4 TRANSFORAMINAL EPIDURAL STEROID INJECTION (CPT 31158) performed by Jazmin Parisi DO at [address] Right 5/6/2021    RIGHT SACROILIAC JOINT INJECTION UNDER X-RAY GUIDANCE performed by Jazmin Parisi DO at [address] [facility] [address] JOINT Right 1/16/2020    RIGHT SACROILIAC JOINT STEROID INJECTION UNDER X-RAY GUIDANCE performed by Jazmin Parisi DO at [address] [facility] Right 2015    NERVE BLOCK Right 12/19/2019    lumbar trasnforaminal    NERVE BLOCK Right 01/16/2020    right sacroiliac joint steroid injection     NERVE BLOCK Right 09/03/2020    right l3-4transforaminal epidural steroid injection    NERVE BLOCK Right 9/3/2020    RIGHT L3-4 TRANSFORAMINAL EPIDURAL STEROID INJECTION performed by Jazmin Parisi DO at [facility] Bilateral 11/05/2020    medial branch block    NERVE BLOCK Bilateral 11/5/2020    MEDIAL BRANCH BLOCK BILATERAL L4-5 AND L5-S1 (CPT 49213) performed by Jazmin Parisi DO at [facility] Bilateral 12/10/2020    medial branch block    NERVE BLOCK Bilateral 12/10/2020    BILATERAL MEDIAL BRANCH BLOCK L4-5 AND L5-S1 (CPT Z9563050) performed by Jazmin Parisi DO at [facility] Right 01/21/2021    RADIOFREQUENCY RIGHT L4-5 AND L5-S1    NERVE BLOCK Left 03/04/2021    fluoroscopic guided left lumbar medial branch  neurolysis (RFA)) at levels L4-5, L5-S1    NERVE BLOCK Right 05/06/2021    si    PAIN MANAGEMENT PROCEDURE Right 1/21/2021    RADIOFREQUENCY RIGHT L4-5 AND L5-S1 (CPT 24633) performed by Fitz Mackey DO at [address] Left 3/4/2021    FLUOROSCOPIC-GUIDED LEFT LUMBAR MEDIAL BRANCH NUEROLYSIS (RADIOFREQUENCY ABLATION) AT LEVELS L4-5, L5-S1 (CPT 28840) performed by Fitz Mackey DO at [address] [facility] Right 2000    SHOULDER SURGERY Left 2019       Family History   Problem Relation Age of Onset    Diabetes Mother     Atrial Fibrillation Mother     High Blood Pressure Mother     High Cholesterol Mother     Diabetes Father     Breast Cancer Maternal Grandmother        Social History     Tobacco Use    Smoking status: Never Smoker    Smokeless tobacco: Never Used   Vaping Use    Vaping Use: Never used   Substance Use Topics    Alcohol use: Yes     Comment: 2 cups a day     Drug use: Never        Functional Status: The patient is able to ambulate and perform activities of daily living without the use of an assistive device. ROS:    Constitutional: Denies fevers, chills, night sweats, unintentional weight loss     Skin: Denies rash or skin changes     Eyes: Denies vision changes    Ears/Nose/Throat: Denies nasal congestion or sore throat     Respiratory: Denies SOB or cough     Cardiovascular: Denies CP, palpitations, edema      Gastrointestinal: Denies abdominal pain,  N/V, constipation, or diarrhea    Genitourinary: Denies urinary symptoms    Neurologic: See HPI.     MSK: See HPI. Psychiatric: + sleep disturbance, anxiety, depression    Hematologic/Lymphatic/Immunologic: Denies bruising       Physical Exam:   Blood pressure (!) 143/87, pulse 96, height 5' 3\" (1.6 m), weight 279 lb (126.6 kg).    General: well developed and well nourished in no acute distress. Body habitus is morbidly obese  HEENT: No rhinorrhea, sneezing, yawning, or lacrimation. No scleral icterus or conjunctival injection. Resp: symmetrical chest expansion, unlabored breathing, respirations unlabored. CV: Heart rate is regular. Peripheral pulses are palpable  Lymph: No visible regional lymphadenopathy. Skin: No rashes or ecchymosis. Normal turgor. Psych: Mood is calm. Affect is normal.   Ext: No edema noted     MSK:   Back/Hip Exam:   Inspection: Pelvis was symmetric. Lumbar lordotic curvature was decreased. There was no scoliosis. No ecchymoses or erythema. Palpation: Palpatory exam revealed no tenderness along lumbosacral paraspinals, midline spine, ttp right SI joint sulcus, no ttp greater trochanters and TFL on both side. There was no paraspinal spasms. There were no trigger points. ROM: ROM decreased. Special/provocative testing:   Supine SLR negative   Positive Gaenslen's   Positive SI compression test   positive FABERS. Neurological Exam:     Gait is Antalgic. Right foot externally rotated      Imaging: (personally reviewed by me 06/09/21)  Lumbar x-ray     MRI L Spine      BONE MARROW SIGNAL: There is normal marrow signal.       T12-L1: Normal T12-L1.       L1-L2: Normal L1-L2.       L2-L3: Facet hypertrophy noted without canal or foraminal stenosis.       L3-L4: Grade 1 listhesis of L3 noted with a disc bulge/pseudobulge. Mild canal stenosis noted with moderate left and severe right   foraminal stenosis. Synovial facet joint effusions are seen   bilaterally.       L4-L5: Minimal retrolisthesis of L4 with facet hypertrophy. Severe   left and mild right foraminal stenosis without canal stenosis.       L5-S1: Facet hypertrophy without canal or foraminal stenosis.       SOFT TISSUES: The visualized paravertebral soft tissues are within   normal limits.           Impression   1. Grade 1 spondylolisthesis of L3 with severe facet synovitis.    2. Multilevel canal and foraminal stenosis most severe on the left   from L3-4 through L5-S1 as discussed. CT C spine       Impression:   Jerad Rosa is a 60 y.o. female     1. Sacroiliac joint dysfunction    2. Lumbar spondylosis        Plan:    · Minimal relief with SI joint injection. Discussed other conservative options. She is considering acupuncture. I will refer to chiropractor for possible SI/pelvis manipulation.  The patient was educated about the diagnosis, prognosis, indications, risks and benefits of treatment. An opportunity to ask questions was given to the patient and questions were answered. The patient agreed to proceed with the recommended treatment as described above.      Follow up RUTH Santoro DO, [facility]   Board Certified Physical Medicine and Rehabilitation

## 2021-06-22 DIAGNOSIS — R73.03 PREDIABETES: ICD-10-CM

## 2021-06-22 DIAGNOSIS — E78.2 MIXED HYPERLIPIDEMIA: ICD-10-CM

## 2021-06-22 LAB
ALBUMIN SERPL-MCNC: 4 G/DL (ref 3.5–5.2)
ALP BLD-CCNC: 72 U/L (ref 35–104)
ALT SERPL-CCNC: 34 U/L (ref 0–32)
ANION GAP SERPL CALCULATED.3IONS-SCNC: 6 MMOL/L (ref 7–16)
AST SERPL-CCNC: 27 U/L (ref 0–31)
BILIRUB SERPL-MCNC: 0.4 MG/DL (ref 0–1.2)
BUN BLDV-MCNC: 8 MG/DL (ref 6–20)
CALCIUM SERPL-MCNC: 9.6 MG/DL (ref 8.6–10.2)
CHLORIDE BLD-SCNC: 102 MMOL/L (ref 98–107)
CHOLESTEROL, TOTAL: 180 MG/DL (ref 0–199)
CO2: 32 MMOL/L (ref 22–29)
CREAT SERPL-MCNC: 1 MG/DL (ref 0.5–1)
GFR AFRICAN AMERICAN: >60
GFR NON-AFRICAN AMERICAN: 57 ML/MIN/1.73
GLUCOSE BLD-MCNC: 119 MG/DL (ref 74–99)
HBA1C MFR BLD: 5.8 % (ref 4–5.6)
HDLC SERPL-MCNC: 62 MG/DL
LDL CHOLESTEROL CALCULATED: 97 MG/DL (ref 0–99)
PARATHYROID HORMONE INTACT: 64 PG/ML (ref 15–65)
POTASSIUM SERPL-SCNC: 4.7 MMOL/L (ref 3.5–5)
SODIUM BLD-SCNC: 140 MMOL/L (ref 132–146)
TOTAL PROTEIN: 7.3 G/DL (ref 6.4–8.3)
TRIGL SERPL-MCNC: 107 MG/DL (ref 0–149)
VITAMIN D 25-HYDROXY: 22 NG/ML (ref 30–100)
VLDLC SERPL CALC-MCNC: 21 MG/DL

## 2021-06-24 ENCOUNTER — OFFICE VISIT (OUTPATIENT)
Dept: FAMILY MEDICINE CLINIC | Age: 56
End: 2021-06-24
Payer: MEDICAID

## 2021-06-24 VITALS
HEART RATE: 86 BPM | TEMPERATURE: 97.2 F | OXYGEN SATURATION: 97 % | BODY MASS INDEX: 48.8 KG/M2 | SYSTOLIC BLOOD PRESSURE: 150 MMHG | DIASTOLIC BLOOD PRESSURE: 96 MMHG | HEIGHT: 63 IN | WEIGHT: 275.4 LBS | RESPIRATION RATE: 16 BRPM

## 2021-06-24 DIAGNOSIS — E78.2 MIXED HYPERLIPIDEMIA: ICD-10-CM

## 2021-06-24 DIAGNOSIS — E87.5 HYPERKALEMIA: ICD-10-CM

## 2021-06-24 DIAGNOSIS — E55.9 VITAMIN D DEFICIENCY: ICD-10-CM

## 2021-06-24 DIAGNOSIS — I10 ESSENTIAL HYPERTENSION: ICD-10-CM

## 2021-06-24 DIAGNOSIS — N95.0 POST-MENOPAUSAL BLEEDING: ICD-10-CM

## 2021-06-24 DIAGNOSIS — R73.03 PREDIABETES: ICD-10-CM

## 2021-06-24 DIAGNOSIS — Z00.00 ANNUAL PHYSICAL EXAM: Primary | ICD-10-CM

## 2021-06-24 PROCEDURE — 99396 PREV VISIT EST AGE 40-64: CPT | Performed by: FAMILY MEDICINE

## 2021-06-24 RX ORDER — ATORVASTATIN CALCIUM 10 MG/1
10 TABLET, FILM COATED ORAL DAILY
Qty: 90 TABLET | Refills: 1 | Status: SHIPPED
Start: 2021-06-24 | End: 2021-12-22

## 2021-06-24 RX ORDER — ERGOCALCIFEROL 1.25 MG/1
50000 CAPSULE ORAL WEEKLY
Qty: 12 CAPSULE | Refills: 1 | Status: SHIPPED
Start: 2021-06-24 | End: 2021-12-22

## 2021-06-24 RX ORDER — AMLODIPINE BESYLATE 5 MG/1
5 TABLET ORAL DAILY
Qty: 30 TABLET | Refills: 0 | Status: SHIPPED
Start: 2021-06-24 | End: 2021-07-08 | Stop reason: DRUGHIGH

## 2021-06-24 SDOH — ECONOMIC STABILITY: FOOD INSECURITY: WITHIN THE PAST 12 MONTHS, THE FOOD YOU BOUGHT JUST DIDN'T LAST AND YOU DIDN'T HAVE MONEY TO GET MORE.: NEVER TRUE

## 2021-06-24 SDOH — ECONOMIC STABILITY: HOUSING INSECURITY
IN THE LAST 12 MONTHS, WAS THERE A TIME WHEN YOU DID NOT HAVE A STEADY PLACE TO SLEEP OR SLEPT IN A SHELTER (INCLUDING NOW)?: NO

## 2021-06-24 SDOH — ECONOMIC STABILITY: TRANSPORTATION INSECURITY
IN THE PAST 12 MONTHS, HAS THE LACK OF TRANSPORTATION KEPT YOU FROM MEDICAL APPOINTMENTS OR FROM GETTING MEDICATIONS?: NO

## 2021-06-24 SDOH — ECONOMIC STABILITY: TRANSPORTATION INSECURITY
IN THE PAST 12 MONTHS, HAS LACK OF TRANSPORTATION KEPT YOU FROM MEETINGS, WORK, OR FROM GETTING THINGS NEEDED FOR DAILY LIVING?: NO

## 2021-06-24 SDOH — ECONOMIC STABILITY: INCOME INSECURITY: IN THE LAST 12 MONTHS, WAS THERE A TIME WHEN YOU WERE NOT ABLE TO PAY THE MORTGAGE OR RENT ON TIME?: NO

## 2021-06-24 SDOH — ECONOMIC STABILITY: FOOD INSECURITY: WITHIN THE PAST 12 MONTHS, YOU WORRIED THAT YOUR FOOD WOULD RUN OUT BEFORE YOU GOT MONEY TO BUY MORE.: NEVER TRUE

## 2021-06-24 ASSESSMENT — SOCIAL DETERMINANTS OF HEALTH (SDOH): HOW HARD IS IT FOR YOU TO PAY FOR THE VERY BASICS LIKE FOOD, HOUSING, MEDICAL CARE, AND HEATING?: NOT HARD AT ALL

## 2021-06-24 NOTE — PROGRESS NOTES
2021     Pepe Yadav (:  1965) is a 60 y.o. female, with a:  Past Medical History:   Diagnosis Date    Anxiety     Depression     Dyslipidemia     Hyperlipidemia     Obesity     Osteoarthritis        Here for evaluation of the following medical concerns:  Chief Complaint   Patient presents with    Hyperlipidemia     review labs     Annual Exam    Health Maintenance     shingles vacc(no) Mammo screen ( last month )     She also follows with PMR, Nephrology, Psych    Interval Hx  - evaluated by Cardiology     Annual exam:  Patient is here for routine yearly physical/preventative visit. Patient has concerns today for some menstrual bleeding following COVID vaccination as well as elevated BP. Patient is generally healthy. Chronic medical problems include HLD, prediabetes, obesity. These are generally controlled. /96 today. Most recent labs reviewed with patient and  are remarkable. Health maintenance reviewed with patient and is not up to date. Patient does not smoke. Patient does drink alcohol. Patient  does not use drugs. Overall doing well. Patient's pastmedical, surgical, social and/or family history reviewed, updated in chart, and are non-contributory (unless otherwise stated). Medications and allergies also reviewed and updated in chart.       Hyperlipidemia  Current treatment: Atorvastatin 10 mg daily  Recent medication changes: None    Lab Results   Component Value Date    CHOL 180 2021    TRIG 107 2021    HDL 62 2021    LDLCALC 97 2021     Lab Results   Component Value Date    ALT 34 (H) 2021    AST 27 2021        Prediabetes  Lab Results   Component Value Date    LABA1C 5.8 (H) 2021    LABA1C 6.3 (H) 10/07/2020    LABA1C 5.7 (H) 2020     Lab Results   Component Value Date    LABMICR <12.0 2021    CREATININE 1.0 2021     Lab Results   Component Value Date    ALT 34 (H) 2021    AST 27 06/22/2021     Lab Results   Component Value Date    CHOL 180 06/22/2021    TRIG 107 06/22/2021    HDL 62 06/22/2021    LDLCALC 97 06/22/2021        The 10-year ASCVD risk score (Kelly Villalobos, et al., 2013) is: 3%    Values used to calculate the score:      Age: 60 years      Sex: Female      Is Non- : No      Diabetic: No      Tobacco smoker: No      Systolic Blood Pressure: 241 mmHg      Is BP treated: No      HDL Cholesterol: 62 mg/dL      Total Cholesterol: 180 mg/dL    Up 5 lbs    Review of Systems    Prior to Visit Medications    Medication Sig Taking? Authorizing Provider   atorvastatin (LIPITOR) 10 MG tablet Take 1 tablet by mouth daily Yes Pavel Naqvi,    vitamin D (ERGOCALCIFEROL) 1.25 MG (56553 UT) CAPS capsule Take 1 capsule by mouth once a week Yes Pavel Naqvi DO   amLODIPine (NORVASC) 5 MG tablet Take 1 tablet by mouth daily Yes Pavel Naqvi DO   cyclobenzaprine (FLEXERIL) 5 MG tablet Take 1 tablet by mouth 3 times daily as needed for Muscle spasms Yes Kayla Mederos, DO   buPROPion (WELLBUTRIN XL) 150 MG extended release tablet Take 450 mg by mouth every morning  Yes Historical Provider, MD   clonazePAM (KLONOPIN) 1 MG tablet TAKE 1 TABLET BY MOUTH THREE TIMES DAILY AS NEEDED Yes Historical Provider, MD   DULoxetine (CYMBALTA) 60 MG extended release capsule Take 60 mg by mouth daily 2 capsules Yes Historical Provider, MD   zolpidem (AMBIEN) 10 MG tablet TAKE 1 2 (ONE HALF) TO 1 WHOLE TABLET BY MOUTH AT BEDTIME AS DIRECETED ON EMPTY STOMACH Yes Historical Provider, MD   gabapentin (NEURONTIN) 300 MG capsule Take 1 capsule by mouth 3 times daily for 30 days.   Bernadette De La Rosa DO        Social History     Tobacco Use    Smoking status: Never Smoker    Smokeless tobacco: Never Used   Substance Use Topics    Alcohol use: Yes     Comment: 2 cups a day         Past Surgical History:   Procedure Laterality Date    ANESTHESIA NERVE BLOCK Right 12/19/2019 RIGHT L3-4 TRANSFORAMINAL EPIDURAL STEROID INJECTION (CPT 64915) performed by Bernadette De La Rosa DO at [address] Right 5/6/2021    RIGHT SACROILIAC JOINT INJECTION UNDER X-RAY GUIDANCE performed by Bernadette De La Rosa DO at [address] [address] Right 1/16/2020    RIGHT SACROILIAC JOINT STEROID INJECTION UNDER X-RAY GUIDANCE performed by Bernadette De La Rosa DO at [address] [place] [address] Right 2015    NERVE BLOCK Right 12/19/2019    lumbar trasnforaminal    NERVE BLOCK Right 01/16/2020    right sacroiliac joint steroid injection     NERVE BLOCK Right 09/03/2020    right l3-4transforaminal epidural steroid injection    NERVE BLOCK Right 9/3/2020    RIGHT L3-4 TRANSFORAMINAL EPIDURAL STEROID INJECTION performed by Bernadette De La Rosa DO at [facility] Bilateral 11/05/2020    medial branch block    NERVE BLOCK Bilateral 11/5/2020    MEDIAL BRANCH BLOCK BILATERAL L4-5 AND L5-S1 (CPT 15301) performed by Bernadette De La Rosa DO at [facility] Bilateral 12/10/2020    medial branch block    NERVE BLOCK Bilateral 12/10/2020    BILATERAL MEDIAL BRANCH BLOCK L4-5 AND L5-S1 (CPT 30619) performed by Bernadette De La Rosa DO at [facility] Right 01/21/2021    RADIOFREQUENCY RIGHT L4-5 AND L5-S1    NERVE BLOCK Left 03/04/2021    fluoroscopic guided left lumbar medial branch  neurolysis (RFA)) at levels L4-5, L5-S1    NERVE BLOCK Right 05/06/2021    si    PAIN MANAGEMENT PROCEDURE Right 1/21/2021    RADIOFREQUENCY RIGHT L4-5 AND L5-S1 (CPT 96227) performed by Bernadette De La Rosa DO at [address] [place] [address] Left 3/4/2021    FLUOROSCOPIC-GUIDED LEFT LUMBAR MEDIAL BRANCH NUEROLYSIS (RADIOFREQUENCY ABLATION) AT LEVELS L4-5, L5-S1 (CPT 70119) performed by Bernadette De La Rosa DO at [address] [facility] Right 2000

## 2021-06-24 NOTE — PATIENT INSTRUCTIONS
Patient Education        amlodipine  Pronunciation:  jae ingram  Brand:  Mark Tenorio  What is the most important information I should know about amlodipine? Follow all directions on your medicine label and package. Tell each of your healthcare providers about all your medical conditions, allergies, and all medicines you use. What is amlodipine? Amlodipine is a calcium channel blocker that dilates (widens) blood vessels and improves blood flow. Amlodipine is used to treat chest pain (angina) and other conditions caused by coronary artery disease. Amlodipine is also used to treat high blood pressure (hypertension) in adults and children at least 10years old. Lowering blood pressure may lower your risk of a stroke or heart attack. Amlodipine may also be used for purposes not listed in this medication guide. What should I discuss with my healthcare provider before taking amlodipine? You should not take amlodipine if you are allergic to it. Tell your doctor if you have ever had:  · liver disease; or  · a heart valve problem called aortic stenosis. Tell your doctor if you are pregnant or plan to become pregnant. It is not known whether amlodipine will harm an unborn baby. However, having high blood pressure during pregnancy may cause complications such as diabetes or eclampsia (dangerously high blood pressure that can lead to medical problems in both mother and baby). The benefit of treating hypertension may outweigh any risks to the baby. Tell your doctor if you are breastfeeding. How should I take amlodipine? Follow all directions on your prescription label and read all medication guides or instruction sheets. Your doctor may occasionally change your dose. Use the medicine exactly as directed. Take the medicine at the same time each day, with or without food. Shake the oral suspension (liquid) before you measure a dose.  Use the dosing syringe provided, or use a medicine dose-measuring device symptoms such as: chest pain or pressure, pain spreading to your jaw or shoulder, nausea, sweating. Call your doctor at once if you have:  · pounding heartbeats or fluttering in your chest;  · worsening chest pain;  · swelling in your feet or ankles;  · severe drowsiness; or  · a light-headed feeling, like you might pass out. Common side effects may include:  · dizziness, drowsiness;  · feeling tired;  · stomach pain, nausea; or  · flushing (warmth, redness, or tingly feeling). This is not a complete list of side effects and others may occur. Call your doctor for medical advice about side effects. You may report side effects to FDA at 3-307-DGP-9346. What other drugs will affect amlodipine? Tell your doctor about all your other medicines, especially:  · nitroglycerin;  · simvastatin (Zocor, Simcor, Vytorin); or  · any other heart or blood pressure medications. This list is not complete. Other drugs may affect amlodipine, including prescription and over-the-counter medicines, vitamins, and herbal products. Not all possible drug interactions are listed here. Where can I get more information? Your pharmacist can provide more information about amlodipine. Remember, keep this and all other medicines out of the reach of children, never share your medicines with others, and use this medication only for the indication prescribed. Every effort has been made to ensure that the information provided by [facility] is accurate, up-to-date, and complete, but no guarantee is made to that effect. Drug information contained herein may be time sensitive. [facility] information has been compiled for use by healthcare practitioners and consumers in the [place] and therefore [facility]" does not warrant that uses outside of the [place] are appropriate, unless specifically indicated otherwise. [facility] information does not endorse drugs, diagnose patients or recommend therapy.  [facility] drug information is an informational resource designed to assist licensed healthcare practitioners in caring for their patients and/or to serve consumers viewing this service as a supplement to, and not a substitute for, the expertise, skill, knowledge and judgment of healthcare practitioners. The absence of a warning for a given drug or drug combination in no way should be construed to indicate that the drug or drug combination is safe, effective or appropriate for any given patient. [facility] does not assume any responsibility for any aspect of healthcare administered with the aid of information [facility] provides. The information contained herein is not intended to cover all possible uses, directions, precautions, warnings, drug interactions, allergic reactions, or adverse effects. If you have questions about the drugs you are taking, check with your doctor, nurse or pharmacist.  Copyright 6027-9939 [address]: 15.01. Revision date: 10/28/2019. Care instructions adapted under license by [facility] ([facility]). If you have questions about a medical condition or this instruction, always ask your healthcare professional. [place] [address] any warranty or liability for your use of this information.

## 2021-07-07 ENCOUNTER — NURSE ONLY (OUTPATIENT)
Dept: FAMILY MEDICINE CLINIC | Age: 56
End: 2021-07-07

## 2021-07-07 VITALS — SYSTOLIC BLOOD PRESSURE: 140 MMHG | DIASTOLIC BLOOD PRESSURE: 80 MMHG | HEART RATE: 83 BPM | OXYGEN SATURATION: 96 %

## 2021-07-07 DIAGNOSIS — F32.A ANXIETY AND DEPRESSION: ICD-10-CM

## 2021-07-07 DIAGNOSIS — F41.9 ANXIETY AND DEPRESSION: ICD-10-CM

## 2021-07-07 PROCEDURE — 99999 PR OFFICE/OUTPT VISIT,PROCEDURE ONLY: CPT | Performed by: FAMILY MEDICINE

## 2021-07-08 RX ORDER — AMLODIPINE BESYLATE 10 MG/1
10 TABLET ORAL DAILY
Qty: 30 TABLET | Refills: 0 | Status: SHIPPED
Start: 2021-07-08 | End: 2021-08-02 | Stop reason: SDUPTHER

## 2021-07-08 NOTE — PROGRESS NOTES
Recommend increase to 10 mg daily (updated script sent to pharmacy) and follow up in office with me in 2-4 weeks.  Thanks
bp check new start amlodipine 5mg
SIRS (systemic inflammatory response syndrome)

## 2021-07-14 DIAGNOSIS — I10 ESSENTIAL HYPERTENSION: ICD-10-CM

## 2021-07-14 RX ORDER — AMLODIPINE BESYLATE 5 MG/1
TABLET ORAL
Qty: 30 TABLET | Refills: 0 | OUTPATIENT
Start: 2021-07-14

## 2021-07-26 RX ORDER — GABAPENTIN 300 MG/1
CAPSULE ORAL
Qty: 90 CAPSULE | Refills: 3 | Status: SHIPPED
Start: 2021-07-26 | End: 2022-06-07 | Stop reason: SINTOL

## 2021-08-02 ENCOUNTER — OFFICE VISIT (OUTPATIENT)
Dept: FAMILY MEDICINE CLINIC | Age: 56
End: 2021-08-02
Payer: MEDICAID

## 2021-08-02 VITALS
RESPIRATION RATE: 16 BRPM | SYSTOLIC BLOOD PRESSURE: 134 MMHG | DIASTOLIC BLOOD PRESSURE: 82 MMHG | OXYGEN SATURATION: 98 % | HEIGHT: 63 IN | BODY MASS INDEX: 49.08 KG/M2 | HEART RATE: 88 BPM | WEIGHT: 277 LBS | TEMPERATURE: 97.6 F

## 2021-08-02 DIAGNOSIS — I10 ESSENTIAL HYPERTENSION: Primary | ICD-10-CM

## 2021-08-02 PROCEDURE — 99213 OFFICE O/P EST LOW 20 MIN: CPT | Performed by: FAMILY MEDICINE

## 2021-08-02 PROCEDURE — 3017F COLORECTAL CA SCREEN DOC REV: CPT | Performed by: FAMILY MEDICINE

## 2021-08-02 PROCEDURE — G8427 DOCREV CUR MEDS BY ELIG CLIN: HCPCS | Performed by: FAMILY MEDICINE

## 2021-08-02 PROCEDURE — 1036F TOBACCO NON-USER: CPT | Performed by: FAMILY MEDICINE

## 2021-08-02 PROCEDURE — G8417 CALC BMI ABV UP PARAM F/U: HCPCS | Performed by: FAMILY MEDICINE

## 2021-08-02 RX ORDER — BUSPIRONE HYDROCHLORIDE 10 MG/1
10 TABLET ORAL 2 TIMES DAILY
COMMUNITY
Start: 2021-06-28

## 2021-08-02 RX ORDER — AMLODIPINE BESYLATE 10 MG/1
10 TABLET ORAL DAILY
Qty: 90 TABLET | Refills: 1 | Status: SHIPPED
Start: 2021-08-02 | End: 2022-02-02 | Stop reason: SDUPTHER

## 2021-08-02 ASSESSMENT — ENCOUNTER SYMPTOMS
DIARRHEA: 0
CONSTIPATION: 0
SHORTNESS OF BREATH: 0
VOMITING: 0
WHEEZING: 0
COUGH: 0
NAUSEA: 0

## 2021-08-02 NOTE — PROGRESS NOTES
2021     Salinas Hooker (:  1965) is a 60 y.o. female, with a:  Past Medical History:   Diagnosis Date    Anxiety     Depression     Dyslipidemia     Hyperlipidemia     Obesity     Osteoarthritis        Here for evaluation of the following medical concerns:  Chief Complaint   Patient presents with    Hypertension     She also follows with PMR, Nephrology, Psych    Hypertension  Current treatment: Amlodipine 10 mg daily  Recent medication changes: Amlodipine increased  Home blood pressure monitoring: No  Patient denies chest pain, shortness of breath and headache. Antihypertensive medication side effects: swelling in ankles. BP Readings from Last 3 Encounters:   21 134/82   21 (!) 140/80   21 (!) 150/96                                          Sodium (mmol/L)   Date Value   2021 140    BUN (mg/dL)   Date Value   2021 8    Glucose (mg/dL)   Date Value   2021 119 (H)      Potassium (mmol/L)   Date Value   2021 4.7    CREATININE (mg/dL)   Date Value   2021 1.0           Review of Systems   Constitutional: Negative for chills, fatigue and fever. Respiratory: Negative for cough, shortness of breath and wheezing. Cardiovascular: Positive for leg swelling (intermittent). Negative for chest pain. Gastrointestinal: Negative for constipation, diarrhea, nausea and vomiting. Genitourinary: Negative for difficulty urinating and dysuria. Neurological: Negative for headaches. Prior to Visit Medications    Medication Sig Taking?  Authorizing Provider   busPIRone (BUSPAR) 10 MG tablet TAKE 1 2 (ONE HALF) TABLET BY MOUTH TWICE DAILY FOR 7 DAYS THEN 1 TABLET TWICE DAILY THEREAFTER Yes Historical Provider, MD   gabapentin (NEURONTIN) 300 MG capsule TAKE 1 CAPSULE BY MOUTH THREE TIMES DAILY Yes Gaetano Soares DO   amLODIPine (NORVASC) 10 MG tablet Take 1 tablet by mouth daily Yes Pavel Naqvi DO   atorvastatin (LIPITOR) 10 MG tablet Take 1 tablet by mouth daily Yes Pavel Naqvi DO   vitamin D (ERGOCALCIFEROL) 1.25 MG (10044 UT) CAPS capsule Take 1 capsule by mouth once a week Yes Pavel Naqvi DO   cyclobenzaprine (FLEXERIL) 5 MG tablet Take 1 tablet by mouth 3 times daily as needed for Muscle spasms Yes Venkat Rothman DO   buPROPion (WELLBUTRIN XL) 150 MG extended release tablet Take 450 mg by mouth every morning  Yes Historical Provider, MD   clonazePAM (KLONOPIN) 1 MG tablet TAKE 1 TABLET BY MOUTH THREE TIMES DAILY AS NEEDED Yes Historical Provider, MD   DULoxetine (CYMBALTA) 60 MG extended release capsule Take 60 mg by mouth daily 2 capsules Yes Historical Provider, MD   zolpidem (AMBIEN) 10 MG tablet TAKE 1 2 (ONE HALF) TO 1 WHOLE TABLET BY MOUTH AT BEDTIME AS DIRECETED ON EMPTY STOMACH Yes Historical Provider, MD        Social History     Tobacco Use    Smoking status: Never Smoker    Smokeless tobacco: Never Used   Substance Use Topics    Alcohol use: Yes     Comment: 2 cups a day         Past Surgical History:   Procedure Laterality Date    ANESTHESIA NERVE BLOCK Right 12/19/2019    RIGHT L3-4 TRANSFORAMINAL EPIDURAL STEROID INJECTION (CPT 35347) performed by Venkat Rothman DO at [address] Right 5/6/2021    RIGHT SACROILIAC JOINT INJECTION UNDER X-RAY GUIDANCE performed by Venkat Rothman DO at [address] Right 1/16/2020    RIGHT SACROILIAC JOINT STEROID INJECTION UNDER X-RAY GUIDANCE performed by Venkat Rothman DO at [address] [facility] Right 2015    NERVE BLOCK Right 12/19/2019    lumbar trasnforaminal    NERVE BLOCK Right 01/16/2020    right sacroiliac joint steroid injection     NERVE BLOCK Right 09/03/2020    right l3-4transforaminal epidural steroid injection    NERVE BLOCK Right 9/3/2020    RIGHT L3-4 TRANSFORAMINAL EPIDURAL STEROID INJECTION performed by Venkat Rothman DO at [facility] Bilateral 11/05/2020    medial branch block    NERVE BLOCK Bilateral 11/5/2020    MEDIAL BRANCH BLOCK BILATERAL L4-5 AND L5-S1 (CPT 75830) performed by DO Ambika at [facility] Bilateral 12/10/2020    medial branch block    NERVE BLOCK Bilateral 12/10/2020    BILATERAL MEDIAL BRANCH BLOCK L4-5 AND L5-S1 (CPT 20951) performed by DO Ambika at [facility] Right 01/21/2021    RADIOFREQUENCY RIGHT L4-5 AND L5-S1    NERVE BLOCK Left 03/04/2021    fluoroscopic guided left lumbar medial branch  neurolysis (RFA)) at levels L4-5, L5-S1    NERVE BLOCK Right 05/06/2021    si    PAIN MANAGEMENT PROCEDURE Right 1/21/2021    RADIOFREQUENCY RIGHT L4-5 AND L5-S1 (CPT 97542) performed by DO Ambika at [address] [place] [address] [address] Left 3/4/2021    FLUOROSCOPIC-GUIDED LEFT LUMBAR MEDIAL BRANCH NUEROLYSIS (RADIOFREQUENCY ABLATION) AT LEVELS L4-5, L5-S1 (CPT 33052) performed by DO Ambika at [address] [facility] Right 2000    SHOULDER SURGERY Left 2019       Vitals:    08/02/21 1518   BP: 134/82   Pulse: 88   Resp: 16   Temp: 97.6 °F (36.4 °C)   TempSrc: Temporal   SpO2: 98%   Weight: 277 lb (125.6 kg)   Height: 5' 3\" (1.6 m)     Estimated body mass index is 49.07 kg/m² as calculated from the following:    Height as of this encounter: 5' 3\" (1.6 m). Weight as of this encounter: 277 lb (125.6 kg). Physical Exam  Constitutional:       General: She is not in acute distress. Appearance: She is well-developed. HENT:      Head: Normocephalic and atraumatic. Eyes:      Extraocular Movements: Extraocular movements intact. Conjunctiva/sclera: Conjunctivae normal.   Cardiovascular:      Rate and Rhythm: Normal rate and regular rhythm. Pulmonary:      Effort: Pulmonary effort is normal. No respiratory distress.       Breath sounds: Normal breath sounds. No wheezing, rhonchi or rales. Abdominal:      General: There is no distension. Musculoskeletal:      Right lower leg: No edema. Left lower leg: No edema. Neurological:      General: No focal deficit present. Mental Status: She is alert. Mental status is at baseline. ASSESSMENT/PLAN:  Rochelle Foster was seen today for hypertension. Diagnoses and all orders for this visit:    Essential hypertension  -     amLODIPine (NORVASC) 10 MG tablet; Take 1 tablet by mouth daily    Additional plan and future considerations:   As above. BP improved. Continue amlodipine.   RTO in 6 months for HTN follow up or sooner if needed    Future Appointments   Date Time Provider hospitals   10/5/2021  1:00 PM Siddharth Matos  [facility]   2/2/2022  1:00 PM Ailyn Blanca DO [facility]         --Ailyn Blanca DO on 8/2/2021 at 3:25 PM

## 2021-10-05 ENCOUNTER — OFFICE VISIT (OUTPATIENT)
Dept: OBGYN | Age: 56
End: 2021-10-05
Payer: MEDICAID

## 2021-10-05 VITALS
DIASTOLIC BLOOD PRESSURE: 72 MMHG | WEIGHT: 277 LBS | SYSTOLIC BLOOD PRESSURE: 130 MMHG | HEART RATE: 84 BPM | HEIGHT: 63 IN | BODY MASS INDEX: 49.08 KG/M2

## 2021-10-05 DIAGNOSIS — Z01.419 WOMEN'S ANNUAL ROUTINE GYNECOLOGICAL EXAMINATION: Primary | ICD-10-CM

## 2021-10-05 DIAGNOSIS — Z12.4 SCREENING FOR CERVICAL CANCER: ICD-10-CM

## 2021-10-05 DIAGNOSIS — N95.0 POSTMENOPAUSAL BLEEDING: ICD-10-CM

## 2021-10-05 PROCEDURE — 99203 OFFICE O/P NEW LOW 30 MIN: CPT | Performed by: OBSTETRICS & GYNECOLOGY

## 2021-10-05 PROCEDURE — G8484 FLU IMMUNIZE NO ADMIN: HCPCS | Performed by: OBSTETRICS & GYNECOLOGY

## 2021-10-05 PROCEDURE — G8427 DOCREV CUR MEDS BY ELIG CLIN: HCPCS | Performed by: OBSTETRICS & GYNECOLOGY

## 2021-10-05 PROCEDURE — G8417 CALC BMI ABV UP PARAM F/U: HCPCS | Performed by: OBSTETRICS & GYNECOLOGY

## 2021-10-05 PROCEDURE — 3017F COLORECTAL CA SCREEN DOC REV: CPT | Performed by: OBSTETRICS & GYNECOLOGY

## 2021-10-05 PROCEDURE — 1036F TOBACCO NON-USER: CPT | Performed by: OBSTETRICS & GYNECOLOGY

## 2021-10-05 PROCEDURE — 99386 PREV VISIT NEW AGE 40-64: CPT | Performed by: OBSTETRICS & GYNECOLOGY

## 2021-10-05 NOTE — PROGRESS NOTES
Lester Morocho     Patient presents for annual exam. Patient has had postmenopausal bleeding. She had one episode last year where she spotted for a couple of days. This happened again at the beginning of the year. She bled heavily in June/ July. States it lasted for 10 days with passage of clots. Patient states she gets recurrent rashes in her groin, states it is yeast.     Patient had a mammogram in January 2021.      Past Medical History:   Diagnosis Date    Anxiety     Depression     Dyslipidemia     Hyperlipidemia     Obesity     Osteoarthritis         Past Surgical History:   Procedure Laterality Date    ANESTHESIA NERVE BLOCK Right 12/19/2019    RIGHT L3-4 TRANSFORAMINAL EPIDURAL STEROID INJECTION (CPT 05122) performed by Fe Mendes DO at [address] Right 5/6/2021    RIGHT SACROILIAC JOINT INJECTION UNDER X-RAY GUIDANCE performed by Fe Mendes DO at [address] [facility] [address] JOINT Right 1/16/2020    RIGHT SACROILIAC JOINT STEROID INJECTION UNDER X-RAY GUIDANCE performed by Fe Mendes DO at [facility] [address] Right 2015    NERVE BLOCK Right 12/19/2019    lumbar trasnforaminal    NERVE BLOCK Right 01/16/2020    right sacroiliac joint steroid injection     NERVE BLOCK Right 09/03/2020    right l3-4transforaminal epidural steroid injection    NERVE BLOCK Right 9/3/2020    RIGHT L3-4 TRANSFORAMINAL EPIDURAL STEROID INJECTION performed by Fe Mendes DO at [facility] Bilateral 11/05/2020    medial branch block    NERVE BLOCK Bilateral 11/5/2020    MEDIAL BRANCH BLOCK BILATERAL L4-5 AND L5-S1 (CPT 63950) performed by Fe Mendes DO at [facility] Bilateral 12/10/2020    medial branch block    NERVE BLOCK Bilateral 12/10/2020    BILATERAL MEDIAL BRANCH BLOCK L4-5 AND L5-S1 (CPT C3437032) performed by Avelina Colvin DO [place] at [facility] Right 01/21/2021    RADIOFREQUENCY RIGHT L4-5 AND L5-S1    NERVE BLOCK Left 03/04/2021    fluoroscopic guided left lumbar medial branch  neurolysis (RFA)) at levels L4-5, L5-S1    NERVE BLOCK Right 05/06/2021    si    PAIN MANAGEMENT PROCEDURE Right 1/21/2021    RADIOFREQUENCY RIGHT L4-5 AND L5-S1 (CPT 15089) performed by Alvin Garcia DO at [address] Left 3/4/2021    FLUOROSCOPIC-GUIDED LEFT LUMBAR MEDIAL BRANCH NUEROLYSIS (RADIOFREQUENCY ABLATION) AT LEVELS L4-5, L5-S1 (CPT 32315) performed by Alvin Garcia DO at [address]    SHOULDER SURGERY Left 2019        Family History   Problem Relation Age of Onset    Diabetes Mother     Atrial Fibrillation Mother     High Blood Pressure Mother     High Cholesterol Mother     Diabetes Father     Breast Cancer Maternal Grandmother           Current Outpatient Medications:     nystatin-triamcinolone (MYCOLOG II) 511604-0.4 UNIT/GM-% cream, Apply topically 2 times daily. , Disp: 30 g, Rfl: 1    busPIRone (BUSPAR) 10 MG tablet, TAKE 1 2 (ONE HALF) TABLET BY MOUTH TWICE DAILY FOR 7 DAYS THEN 1 TABLET TWICE DAILY THEREAFTER, Disp: , Rfl:     amLODIPine (NORVASC) 10 MG tablet, Take 1 tablet by mouth daily, Disp: 90 tablet, Rfl: 1    gabapentin (NEURONTIN) 300 MG capsule, TAKE 1 CAPSULE BY MOUTH THREE TIMES DAILY, Disp: 90 capsule, Rfl: 3    atorvastatin (LIPITOR) 10 MG tablet, Take 1 tablet by mouth daily, Disp: 90 tablet, Rfl: 1    vitamin D (ERGOCALCIFEROL) 1.25 MG (89988 UT) CAPS capsule, Take 1 capsule by mouth once a week, Disp: 12 capsule, Rfl: 1    cyclobenzaprine (FLEXERIL) 5 MG tablet, Take 1 tablet by mouth 3 times daily as needed for Muscle spasms, Disp: 90 tablet, Rfl: 2    buPROPion (WELLBUTRIN XL) 150 MG extended release tablet, Take 450 mg by mouth every morning , Disp: , Rfl:     clonazePAM (KLONOPIN) 1 MG tablet, TAKE 1 TABLET BY MOUTH THREE TIMES DAILY AS NEEDED, Disp: , Rfl: 2    DULoxetine (CYMBALTA) 60 MG extended release capsule, Take 60 mg by mouth daily 2 capsules, Disp: , Rfl: 2    zolpidem (AMBIEN) 10 MG tablet, TAKE 1 2 (ONE HALF) TO 1 WHOLE TABLET BY MOUTH AT BEDTIME AS DIRECETED ON EMPTY STOMACH, Disp: , Rfl: 2     No Known Allergies     Social History     Tobacco History     Smoking Status  Never Smoker    Smokeless Tobacco Use  Never Used          Alcohol History     Alcohol Use Status  Yes Comment  2 cups a day           Drug Use     Drug Use Status  Never          Sexual Activity     Sexually Active  Yes Partners  Female                 Vitals:    10/05/21 1032   BP: 130/72   Pulse: 84        Physical Exam:  General: pleasant, alert     Breasts: breasts appear normal, no suspicious masses, no skin or nipple changes or axillary nodes. Pelvic exam: normal external genitalia, vulva, vagina, cervix, uterus and adnexa. No uterine or adnexal masses or tenderness. Mary Lou Nguyễn was seen today for gynecologic exam, other and other. Diagnoses and all orders for this visit:    Women's annual routine gynecological examination    Screening for cervical cancer  -     PAP SMEAR    Postmenopausal bleeding  -     US PELVIS COMPLETE; Future    Other orders  -     nystatin-triamcinolone (MYCOLOG II) 527977-3.1 UNIT/GM-% cream; Apply topically 2 times daily. Reviewed possible causes of PMB, including hyperplasia/ cancer. Return for Endometrial biopsy.      Deya Dillard MD

## 2021-10-05 NOTE — PROGRESS NOTES
New Patient alert and pleasant with concerns about PMB  Here today for annual GYN exam.  Pelvic exam, pap smear obtained, labeled and hand delivered to lab. Discharge instructions have been discussed with the patient. Patient advised to call our office with any questions or concerns. Voiced understanding.

## 2021-10-08 ENCOUNTER — HOSPITAL ENCOUNTER (OUTPATIENT)
Dept: ULTRASOUND IMAGING | Age: 56
Discharge: HOME | End: 2021-10-08
Payer: MEDICAID

## 2021-10-08 DIAGNOSIS — N95.0 POSTMENOPAUSAL BLEEDING: ICD-10-CM

## 2021-10-08 PROCEDURE — 76830 TRANSVAGINAL US NON-OB: CPT

## 2021-10-08 PROCEDURE — 76856 US EXAM PELVIC COMPLETE: CPT

## 2021-10-18 ENCOUNTER — OFFICE VISIT (OUTPATIENT)
Dept: PHYSICAL MEDICINE AND REHAB | Age: 56
End: 2021-10-18
Payer: MEDICAID

## 2021-10-18 VITALS
SYSTOLIC BLOOD PRESSURE: 166 MMHG | BODY MASS INDEX: 49.89 KG/M2 | HEIGHT: 63 IN | WEIGHT: 281.6 LBS | HEART RATE: 100 BPM | DIASTOLIC BLOOD PRESSURE: 93 MMHG

## 2021-10-18 DIAGNOSIS — E66.01 MORBID OBESITY WITH BMI OF 45.0-49.9, ADULT (HCC): ICD-10-CM

## 2021-10-18 DIAGNOSIS — G89.29 CHRONIC RIGHT-SIDED LOW BACK PAIN WITHOUT SCIATICA: ICD-10-CM

## 2021-10-18 DIAGNOSIS — M54.50 CHRONIC RIGHT-SIDED LOW BACK PAIN WITHOUT SCIATICA: ICD-10-CM

## 2021-10-18 DIAGNOSIS — M47.816 LUMBAR SPONDYLOSIS: ICD-10-CM

## 2021-10-18 DIAGNOSIS — M53.3 SACROILIAC JOINT DYSFUNCTION: Primary | ICD-10-CM

## 2021-10-18 PROCEDURE — 3017F COLORECTAL CA SCREEN DOC REV: CPT | Performed by: PHYSICAL MEDICINE & REHABILITATION

## 2021-10-18 PROCEDURE — 1036F TOBACCO NON-USER: CPT | Performed by: PHYSICAL MEDICINE & REHABILITATION

## 2021-10-18 PROCEDURE — G8484 FLU IMMUNIZE NO ADMIN: HCPCS | Performed by: PHYSICAL MEDICINE & REHABILITATION

## 2021-10-18 PROCEDURE — 99214 OFFICE O/P EST MOD 30 MIN: CPT | Performed by: PHYSICAL MEDICINE & REHABILITATION

## 2021-10-18 PROCEDURE — G8417 CALC BMI ABV UP PARAM F/U: HCPCS | Performed by: PHYSICAL MEDICINE & REHABILITATION

## 2021-10-18 PROCEDURE — G8427 DOCREV CUR MEDS BY ELIG CLIN: HCPCS | Performed by: PHYSICAL MEDICINE & REHABILITATION

## 2021-10-18 RX ORDER — BUPROPION HYDROCHLORIDE 300 MG/1
TABLET ORAL
COMMUNITY
Start: 2021-09-07

## 2021-10-18 RX ORDER — PREDNISOLONE ACETATE 10 MG/ML
SUSPENSION/ DROPS OPHTHALMIC PRN
COMMUNITY
Start: 2021-08-07

## 2021-10-18 NOTE — PROGRESS NOTES
tablet by mouth daily 90 tablet 1    atorvastatin (LIPITOR) 10 MG tablet Take 1 tablet by mouth daily 90 tablet 1    vitamin D (ERGOCALCIFEROL) 1.25 MG (56792 UT) CAPS capsule Take 1 capsule by mouth once a week 12 capsule 1    cyclobenzaprine (FLEXERIL) 5 MG tablet Take 1 tablet by mouth 3 times daily as needed for Muscle spasms 90 tablet 2    buPROPion (WELLBUTRIN XL) 150 MG extended release tablet Take 450 mg by mouth every morning       clonazePAM (KLONOPIN) 1 MG tablet TAKE 1 TABLET BY MOUTH THREE TIMES DAILY AS NEEDED  2    DULoxetine (CYMBALTA) 60 MG extended release capsule Take 60 mg by mouth daily 2 capsules  2    zolpidem (AMBIEN) 10 MG tablet TAKE 1 2 (ONE HALF) TO 1 WHOLE TABLET BY MOUTH AT BEDTIME AS DIRECETED ON EMPTY STOMACH  2    gabapentin (NEURONTIN) 300 MG capsule TAKE 1 CAPSULE BY MOUTH THREE TIMES DAILY 90 capsule 3     No current facility-administered medications for this visit.        Past Medical History:   Diagnosis Date    Anxiety     Depression     Dyslipidemia     Hyperlipidemia     Obesity     Osteoarthritis        Past Surgical History:   Procedure Laterality Date    ANESTHESIA NERVE BLOCK Right 12/19/2019    RIGHT L3-4 TRANSFORAMINAL EPIDURAL STEROID INJECTION (CPT 52163) performed by Cecilia Velez DO at [address] Right 5/6/2021    RIGHT SACROILIAC JOINT INJECTION UNDER X-RAY GUIDANCE performed by Cecilia Velez DO at [address] [facility] JOINT Right 1/16/2020    RIGHT SACROILIAC JOINT STEROID INJECTION UNDER X-RAY GUIDANCE performed by Cecilia Velez DO at [address] [facility] Right 2015    NERVE BLOCK Right 12/19/2019    lumbar trasnforaminal    NERVE BLOCK Right 01/16/2020    right sacroiliac joint steroid injection     NERVE BLOCK Right 09/03/2020    right l3-4transforaminal epidural steroid injection    NERVE BLOCK Right 9/3/2020    RIGHT L3-4 TRANSFORAMINAL EPIDURAL STEROID INJECTION performed by Columba Tang DO at [facility] Bilateral 11/05/2020    medial branch block    NERVE BLOCK Bilateral 11/5/2020    MEDIAL BRANCH BLOCK BILATERAL L4-5 AND L5-S1 (CPT 23758) performed by Columba Tang DO at [facility] Bilateral 12/10/2020    medial branch block    NERVE BLOCK Bilateral 12/10/2020    BILATERAL MEDIAL BRANCH BLOCK L4-5 AND L5-S1 (CPT 54745) performed by Columba Tang DO at [facility] Right 01/21/2021    RADIOFREQUENCY RIGHT L4-5 AND L5-S1    NERVE BLOCK Left 03/04/2021    fluoroscopic guided left lumbar medial branch  neurolysis (RFA)) at levels L4-5, L5-S1    NERVE BLOCK Right 05/06/2021    si    PAIN MANAGEMENT PROCEDURE Right 1/21/2021    RADIOFREQUENCY RIGHT L4-5 AND L5-S1 (CPT 33145) performed by Columba Tang DO at [address] Left 3/4/2021    FLUOROSCOPIC-GUIDED LEFT LUMBAR MEDIAL BRANCH NUEROLYSIS (RADIOFREQUENCY ABLATION) AT LEVELS L4-5, L5-S1 (CPT 05281) performed by Columba Tang DO at [address]    SHOULDER SURGERY Left 2019       Family History   Problem Relation Age of Onset    Diabetes Mother     Atrial Fibrillation Mother     High Blood Pressure Mother     High Cholesterol Mother     Diabetes Father     Breast Cancer Maternal Grandmother        Social History     Tobacco Use    Smoking status: Never Smoker    Smokeless tobacco: Never Used   Vaping Use    Vaping Use: Never used   Substance Use Topics    Alcohol use: Yes     Comment: 2 cups a day     Drug use: Never        Functional Status: The patient is able to ambulate and perform activities of daily living without the use of an assistive device.          ROS:    Constitutional: Denies fevers, chills, night sweats, unintentional weight loss     Skin: Denies rash or skin changes     Eyes: Denies vision changes    Ears/Nose/Throat: Denies nasal congestion or sore throat     Respiratory: Denies SOB or cough     Cardiovascular: Denies CP, palpitations, edema      Gastrointestinal: Denies abdominal pain,  N/V, constipation, or diarrhea    Genitourinary: Denies urinary symptoms    Neurologic: See HPI.     MSK: See HPI. Psychiatric: + sleep disturbance, anxiety, depression    Hematologic/Lymphatic/Immunologic: Denies bruising       Physical Exam:   Blood pressure (!) 166/93, pulse 100, height 5' 3\" (1.6 m), weight 281 lb 9.6 oz (127.7 kg). General: well developed and well nourished in no acute distress. Body habitus is morbidly obese  HEENT: No rhinorrhea, sneezing, yawning, or lacrimation. No scleral icterus or conjunctival injection. Resp: symmetrical chest expansion, unlabored breathing, respirations unlabored. CV: Heart rate is regular. Peripheral pulses are palpable  Lymph: No visible regional lymphadenopathy. Skin: No rashes or ecchymosis. Normal turgor. Psych: Mood is calm. Affect is normal.   Ext: No edema noted     MSK:   Back/Hip Exam:   Inspection: Pelvis was symmetric. Lumbar lordotic curvature was decreased. There was no scoliosis. No ecchymoses or erythema. Palpation: Palpatory exam revealed mild tenderness along lumbosacral paraspinals, no ttp midline spine, ttp right SI joint sulcus, no ttp greater trochanters and TFL on both side. There was no paraspinal spasms. There were no trigger points. ROM: ROM decreased. Special/provocative testing:   Supine SLR negative   Positive Gaenslen's   Positive SI compression test   positive FABERS. Neurological Exam:  Strength 5/5 BLE  Gait is Antalgic. Right foot externally rotated      Imaging: (personally reviewed by me 10/18/21)  Lumbar x-ray   MRI L Spine   CT C spine       Impression:   Elizabeth Omalley is a 60 y.o. female     1. Sacroiliac joint dysfunction    2. Lumbar spondylosis    3.  Morbid obesity with BMI of 45.0-49.9, adult ([facility] [address]    4. Chronic right-sided low back pain without sciatica        Plan:    · Continue chiropractic care as she is doing. · Discussed possible need for repeat lumbar MRI if no continued improvement. · Recommend weight loss through healthy diet and exercise.  The patient was educated about the diagnosis, prognosis, indications, risks and benefits of treatment. An opportunity to ask questions was given to the patient and questions were answered. The patient agreed to proceed with the recommended treatment as described above. Follow up after chiropractic care.      Sylvie Givens DO, [facility]   Board Certified Physical Medicine and Rehabilitation

## 2021-10-28 ENCOUNTER — PROCEDURE VISIT (OUTPATIENT)
Dept: OBGYN | Age: 56
End: 2021-10-28
Payer: MEDICAID

## 2021-10-28 VITALS
SYSTOLIC BLOOD PRESSURE: 130 MMHG | HEART RATE: 73 BPM | BODY MASS INDEX: 49.26 KG/M2 | DIASTOLIC BLOOD PRESSURE: 77 MMHG | HEIGHT: 63 IN | WEIGHT: 278 LBS

## 2021-10-28 DIAGNOSIS — N95.0 POSTMENOPAUSAL BLEEDING: Primary | ICD-10-CM

## 2021-10-28 PROCEDURE — 99214 OFFICE O/P EST MOD 30 MIN: CPT | Performed by: OBSTETRICS & GYNECOLOGY

## 2021-10-28 PROCEDURE — 58100 BIOPSY OF UTERUS LINING: CPT | Performed by: OBSTETRICS & GYNECOLOGY

## 2021-10-28 NOTE — PROGRESS NOTES
Here today for colposcopy. Instructed on the procedure of EMB, voiced understanding and permit signed for EMB. Prepared for procedure. Time out done by  Prior to starting the procedure. Tolerated procedure. No bleeding noted after procedure, solange pad applied. To return in one week for results. Discharge instructions reviewed verbally and written AVS given to patient. Voiced understanding and agreement.   Two biopsys obtained, labeled and sent to lab

## 2021-10-28 NOTE — PROGRESS NOTES
Endometrial Biopsy     Katie Tsang 60 y.o. presents today for endometrial biopsy. She has had postmenopausal bleeding . Pelvic ultrasound showed 8cm uterus. Endometrium unable to be visualized due to multiple calcified fibroids. Reviewed normal pap smear and negative HPV. Vitals:  /77 (Position: Sitting)   Pulse 73   Ht 5' 3\" (1.6 m)   Wt 278 lb (126.1 kg)   BMI 49.25 kg/m²       The risks, benefits and alternatives to the procedure were discussed. Patient agrees to proceed. Verbal and written consent obtained. Endometrial biospy:      Procedure note: Speculum was placed with visualization of the cervix. Betadine was applied. A single tooth tenaculum was applied on the anterior lip of the cervix. The uterus sounded to 9cm. Tissue specimen was obtained. Small tissue/ mucous protruding from external os, grasped with ringed forceps and removed. Also sent to pathology. All instruments were removed and hemostasis was noted. Assessment:        Diagnosis Orders   1. Postmenopausal bleeding  Surgical Pathology    Surgical Pathology         Plan:       Tissue sent to pathology. Patient willfollow up for results in two weeks by phone.       Hayley Ball MD  10/28/21, 10:53 AM EDT

## 2021-11-10 NOTE — PROGRESS NOTES
Ban Singer is a 60 y.o. female evaluated via telephone on 11/11/2021. Consent:  She and/or health care decision maker is aware that that she may receive a bill for this telephone service, depending on her insurance coverage, and has provided verbal consent to proceed: Yes      Documentation:  Discussed EMB results with patient. Patient had heavy vaginal bleeding with passage of clots this July. EMB showed scant inactive appearing endometrial glandular cells and benign endometrium. Discussed further evaluation with [facility] due to concern for missed pathology. Ultrasound did not assess uterine lining due to calcified fibroids obscuring measurements. Procedure reviewed in detail. Patient agrees to proceed. Will return for preop appointment. I affirm this is a Patient Initiated Episode with a Patient who has not had a related appointment within my department in the past 7 days or scheduled within the next 24 hours. Patient identification was verified at the start of the visit: Yes    Total Time: minutes: 5-10 minutes    The visit was conducted pursuant to the emergency declaration under the [address] [place] [address], [address] [place] [address] authority and the [employer] General Act. Patient identification was verified, and a caregiver was present when appropriate. The patient was located in a state where the provider was credentialed to provide care.     Note: not billable if this call serves to triage the patient into an appointment for the relevant concern      Marciano Drew MD

## 2021-11-11 ENCOUNTER — OFFICE VISIT (OUTPATIENT)
Dept: FAMILY MEDICINE CLINIC | Age: 56
End: 2021-11-11
Payer: MEDICAID

## 2021-11-11 ENCOUNTER — VIRTUAL VISIT (OUTPATIENT)
Dept: OBGYN | Age: 56
End: 2021-11-11
Payer: MEDICAID

## 2021-11-11 VITALS
SYSTOLIC BLOOD PRESSURE: 134 MMHG | DIASTOLIC BLOOD PRESSURE: 82 MMHG | WEIGHT: 276 LBS | HEIGHT: 63 IN | OXYGEN SATURATION: 98 % | BODY MASS INDEX: 48.9 KG/M2 | RESPIRATION RATE: 20 BRPM | HEART RATE: 100 BPM

## 2021-11-11 DIAGNOSIS — M13.0 POLYARTHRITIS: ICD-10-CM

## 2021-11-11 DIAGNOSIS — L30.9 DERMATITIS: Primary | ICD-10-CM

## 2021-11-11 DIAGNOSIS — N95.0 POSTMENOPAUSAL BLEEDING: Primary | ICD-10-CM

## 2021-11-11 LAB
ANION GAP SERPL CALCULATED.3IONS-SCNC: 14 MMOL/L (ref 7–16)
BUN BLDV-MCNC: 7 MG/DL (ref 6–20)
CALCIUM SERPL-MCNC: 9.9 MG/DL (ref 8.6–10.2)
CHLORIDE BLD-SCNC: 101 MMOL/L (ref 98–107)
CO2: 23 MMOL/L (ref 22–29)
CREAT SERPL-MCNC: 0.9 MG/DL (ref 0.5–1)
GFR AFRICAN AMERICAN: >60
GFR NON-AFRICAN AMERICAN: >60 ML/MIN/1.73
GLUCOSE BLD-MCNC: 88 MG/DL (ref 74–99)
PARATHYROID HORMONE INTACT: 74 PG/ML (ref 15–65)
POTASSIUM SERPL-SCNC: 5.2 MMOL/L (ref 3.5–5)
RHEUMATOID FACTOR: <10 IU/ML (ref 0–13)
SEDIMENTATION RATE, ERYTHROCYTE: 16 MM/HR (ref 0–20)
SODIUM BLD-SCNC: 138 MMOL/L (ref 132–146)
VITAMIN D 25-HYDROXY: 26 NG/ML (ref 30–100)

## 2021-11-11 PROCEDURE — G8417 CALC BMI ABV UP PARAM F/U: HCPCS | Performed by: FAMILY MEDICINE

## 2021-11-11 PROCEDURE — 99212 OFFICE O/P EST SF 10 MIN: CPT | Performed by: OBSTETRICS & GYNECOLOGY

## 2021-11-11 PROCEDURE — 3017F COLORECTAL CA SCREEN DOC REV: CPT | Performed by: FAMILY MEDICINE

## 2021-11-11 PROCEDURE — G8427 DOCREV CUR MEDS BY ELIG CLIN: HCPCS | Performed by: FAMILY MEDICINE

## 2021-11-11 PROCEDURE — 99214 OFFICE O/P EST MOD 30 MIN: CPT | Performed by: FAMILY MEDICINE

## 2021-11-11 PROCEDURE — 1036F TOBACCO NON-USER: CPT | Performed by: FAMILY MEDICINE

## 2021-11-11 PROCEDURE — G8484 FLU IMMUNIZE NO ADMIN: HCPCS | Performed by: FAMILY MEDICINE

## 2021-11-11 ASSESSMENT — ENCOUNTER SYMPTOMS
SHORTNESS OF BREATH: 0
VOMITING: 0
DIARRHEA: 0
NAUSEA: 0

## 2021-11-11 NOTE — PROGRESS NOTES
[address] [facility], [address]   [address]   Taina Licea MD     Patient: Darrel Coley Birth: 1965  Visit Date: 11/11/21    Kalin Lopez is a 60y.o. year old female here today for   Chief Complaint   Patient presents with    Rash     on both hands , no itching but they hurt  denies any poison ivy        HPI  Rash  This is a new problem. The current episode started more than 1 month ago (Started initially on right hand, now on left hand). The affected locations include the right hand and left hand. The rash is characterized by pain, blistering and dryness. She was exposed to nothing. Pertinent negatives include no diarrhea, fever, shortness of breath or vomiting. Patient concerned about an autoimmune condition, especially psoriatic arthritis. Patient saw a specialist in the past that told her she could have some type of autoimmune condition causing this. Also has noticed joint pain. Review of Systems   Constitutional: Negative for chills and fever. Eyes: Positive for visual disturbance (left eye iritis). Respiratory: Negative for shortness of breath. Cardiovascular: Negative for chest pain, palpitations and leg swelling. Gastrointestinal: Negative for diarrhea, nausea and vomiting. Skin: Positive for rash. Psychiatric/Behavioral: Negative for dysphoric mood. Past medical, surgical, social and/or family historyreviewed, updated as needed, and are non-contributory (unless otherwise stated). Medications, allergies, and problem list also reviewed and updated as needed in patient's record. Current Outpatient Medications   Medication Sig Dispense Refill    tobramycin-dexamethasone (TOBRADEX) 0.3-0.1 % ophthalmic ointment Place into the left eye 3 times daily 3 times daily.  triamcinolone (KENALOG) 0.1 % ointment Apply topically 2 times daily to affected area.  80 g 0    prednisoLONE acetate (PRED FORTE) 1 % ophthalmic suspension INSTILL 1 DROP INTO LEFT EYE 4 TIMES DAILY      buPROPion (WELLBUTRIN XL) 300 MG extended release tablet TAKE 1 TABLET BY MOUTH ONCE DAILY      nystatin-triamcinolone (MYCOLOG II) 027638-5.1 UNIT/GM-% cream Apply topically 2 times daily. 30 g 1    busPIRone (BUSPAR) 10 MG tablet TAKE 1 2 (ONE HALF) TABLET BY MOUTH TWICE DAILY FOR 7 DAYS THEN 1 TABLET TWICE DAILY THEREAFTER      amLODIPine (NORVASC) 10 MG tablet Take 1 tablet by mouth daily 90 tablet 1    atorvastatin (LIPITOR) 10 MG tablet Take 1 tablet by mouth daily 90 tablet 1    vitamin D (ERGOCALCIFEROL) 1.25 MG (45881 UT) CAPS capsule Take 1 capsule by mouth once a week 12 capsule 1    cyclobenzaprine (FLEXERIL) 5 MG tablet Take 1 tablet by mouth 3 times daily as needed for Muscle spasms 90 tablet 2    buPROPion (WELLBUTRIN XL) 150 MG extended release tablet Take 450 mg by mouth every morning       clonazePAM (KLONOPIN) 1 MG tablet TAKE 1 TABLET BY MOUTH THREE TIMES DAILY AS NEEDED  2    DULoxetine (CYMBALTA) 60 MG extended release capsule Take 60 mg by mouth daily 2 capsules  2    zolpidem (AMBIEN) 10 MG tablet TAKE 1 2 (ONE HALF) TO 1 WHOLE TABLET BY MOUTH AT BEDTIME AS DIRECETED ON EMPTY STOMACH  2    gabapentin (NEURONTIN) 300 MG capsule TAKE 1 CAPSULE BY MOUTH THREE TIMES DAILY 90 capsule 3     No current facility-administered medications for this visit. Wt Readings from Last 3 Encounters:   11/11/21 276 lb (125.2 kg)   10/28/21 278 lb (126.1 kg)   10/18/21 281 lb 9.6 oz (127.7 kg)                   Vitals:    11/11/21 1530   BP: 134/82   Pulse: 100   Resp: 20   SpO2: 98%       Physical Exam  Vitals reviewed. Constitutional:       Appearance: She is well-developed. Cardiovascular:      Rate and Rhythm: Normal rate and regular rhythm. Heart sounds: Normal heart sounds. No murmur heard. No friction rub. Pulmonary:      Effort: Pulmonary effort is normal. No respiratory distress. Breath sounds: Normal breath sounds. No wheezing or rales. Abdominal:      General: Bowel sounds are normal. There is no distension. Palpations: Abdomen is soft. Tenderness: There is no abdominal tenderness. There is no guarding or rebound. Skin:     General: Skin is warm and dry. Findings: Rash (Mildly erythematous papules in clusters on dorsal aspect of both hands including fingers; no rash in webs of fingers) present. Neurological:      Mental Status: She is alert and oriented to person, place, and time. ASSESSMENT/PLAN  Abdulkadir Emanuel was seen today for rash. Diagnoses and all orders for this visit:    Dermatitis  -     triamcinolone (KENALOG) 0.1 % ointment; Apply topically 2 times daily to affected area. Polyarthritis  -     ALTA; Future  -     Rheumatoid Factor; Future  -     Sedimentation Rate; Future          BMI was elevated today, and weight loss plan recommended is : conventional weight loss. /MyChart follow up if tests abnormal.    Return for scheduled appointment. or sooner if necessary. I have reviewed my findings and recommendations with Abdulkadir Emanuel.      Pj Singh MD, M.D

## 2021-11-12 LAB — ANTI-NUCLEAR ANTIBODY (ANA): NEGATIVE

## 2021-11-14 PROBLEM — M13.0 POLYARTHRITIS: Status: ACTIVE | Noted: 2021-11-14

## 2021-11-15 ENCOUNTER — TELEPHONE (OUTPATIENT)
Dept: OBGYN | Age: 56
End: 2021-11-15

## 2021-11-15 ENCOUNTER — IMMUNIZATION (OUTPATIENT)
Dept: PRIMARY CARE CLINIC | Age: 56
End: 2021-11-15
Payer: MEDICAID

## 2021-11-15 PROCEDURE — 0064A COVID-19, MODERNA BOOSTER VACCINE 0.25ML DOSE: CPT | Performed by: NURSE PRACTITIONER

## 2021-11-15 PROCEDURE — 91306 COVID-19, MODERNA BOOSTER VACCINE 0.25ML DOSE: CPT | Performed by: NURSE PRACTITIONER

## 2021-11-15 NOTE — TELEPHONE ENCOUNTER
Surgery scheduled 1/18/22 10:00 AM   [address]  Pre-op appointment 1/12/22  Patient made aware of surgery date and time.   Patient voiced understanding   [facility]

## 2021-11-15 NOTE — TELEPHONE ENCOUNTER
Called and left message on patient voicemail that I was calling to see if she needed a refill on Flexeril. Will wait for return call from patient.

## 2021-11-15 NOTE — TELEPHONE ENCOUNTER
Patient returned our call, said yes she does need the Flexeril 5 mg refilled. Patient takes 2 tablets nightly as needed. Last appointment 10/18/2021. No future appt scheduled at this time. Medication is keyed up. Please advise.

## 2021-11-15 NOTE — TELEPHONE ENCOUNTER
Called an left message on patient voicemail that I was calling to see if she needed refills on Flexeril. Will wait for return call from patient.

## 2021-11-16 RX ORDER — CYCLOBENZAPRINE HCL 5 MG
TABLET ORAL
Qty: 60 TABLET | Refills: 2 | Status: SHIPPED
Start: 2021-11-16 | End: 2022-03-07

## 2021-12-21 ENCOUNTER — OFFICE VISIT (OUTPATIENT)
Dept: FAMILY MEDICINE CLINIC | Age: 56
End: 2021-12-21
Payer: MEDICAID

## 2021-12-21 VITALS
RESPIRATION RATE: 16 BRPM | OXYGEN SATURATION: 98 % | HEIGHT: 63 IN | HEART RATE: 95 BPM | SYSTOLIC BLOOD PRESSURE: 122 MMHG | WEIGHT: 270 LBS | BODY MASS INDEX: 47.84 KG/M2 | DIASTOLIC BLOOD PRESSURE: 84 MMHG | TEMPERATURE: 96.4 F

## 2021-12-21 DIAGNOSIS — R21 RASH AND NONSPECIFIC SKIN ERUPTION: Primary | ICD-10-CM

## 2021-12-21 DIAGNOSIS — L92.0 GRANULOMA ANNULARE: ICD-10-CM

## 2021-12-21 PROBLEM — E87.5 HYPERKALEMIA: Status: ACTIVE | Noted: 2021-11-10

## 2021-12-21 PROBLEM — E55.9 VITAMIN D DEFICIENCY: Status: ACTIVE | Noted: 2021-11-10

## 2021-12-21 PROBLEM — I10 ESSENTIAL HYPERTENSION: Status: ACTIVE | Noted: 2021-11-10

## 2021-12-21 PROCEDURE — G8417 CALC BMI ABV UP PARAM F/U: HCPCS | Performed by: FAMILY MEDICINE

## 2021-12-21 PROCEDURE — 3017F COLORECTAL CA SCREEN DOC REV: CPT | Performed by: FAMILY MEDICINE

## 2021-12-21 PROCEDURE — G8427 DOCREV CUR MEDS BY ELIG CLIN: HCPCS | Performed by: FAMILY MEDICINE

## 2021-12-21 PROCEDURE — 99213 OFFICE O/P EST LOW 20 MIN: CPT | Performed by: FAMILY MEDICINE

## 2021-12-21 PROCEDURE — G8484 FLU IMMUNIZE NO ADMIN: HCPCS | Performed by: FAMILY MEDICINE

## 2021-12-21 PROCEDURE — 1036F TOBACCO NON-USER: CPT | Performed by: FAMILY MEDICINE

## 2021-12-21 RX ORDER — CLOBETASOL PROPIONATE 0.5 MG/G
CREAM TOPICAL
Qty: 30 G | Refills: 0 | Status: SHIPPED
Start: 2021-12-21 | End: 2022-01-14

## 2021-12-21 SDOH — ECONOMIC STABILITY: HOUSING INSECURITY: IN THE LAST 12 MONTHS, HOW MANY PLACES HAVE YOU LIVED?: 1

## 2021-12-21 SDOH — ECONOMIC STABILITY: INCOME INSECURITY: IN THE LAST 12 MONTHS, WAS THERE A TIME WHEN YOU WERE NOT ABLE TO PAY THE MORTGAGE OR RENT ON TIME?: NO

## 2021-12-21 ASSESSMENT — COLUMBIA-SUICIDE SEVERITY RATING SCALE - C-SSRS
6. HAVE YOU EVER DONE ANYTHING, STARTED TO DO ANYTHING, OR PREPARED TO DO ANYTHING TO END YOUR LIFE?: NO
2. HAVE YOU ACTUALLY HAD ANY THOUGHTS OF KILLING YOURSELF?: NO
1. WITHIN THE PAST MONTH, HAVE YOU WISHED YOU WERE DEAD OR WISHED YOU COULD GO TO SLEEP AND NOT WAKE UP?: NO

## 2021-12-21 ASSESSMENT — PATIENT HEALTH QUESTIONNAIRE - PHQ9
1. LITTLE INTEREST OR PLEASURE IN DOING THINGS: 3
6. FEELING BAD ABOUT YOURSELF - OR THAT YOU ARE A FAILURE OR HAVE LET YOURSELF OR YOUR FAMILY DOWN: 2
3. TROUBLE FALLING OR STAYING ASLEEP: 3
2. FEELING DOWN, DEPRESSED OR HOPELESS: 3
9. THOUGHTS THAT YOU WOULD BE BETTER OFF DEAD, OR OF HURTING YOURSELF: 0
5. POOR APPETITE OR OVEREATING: 0
10. IF YOU CHECKED OFF ANY PROBLEMS, HOW DIFFICULT HAVE THESE PROBLEMS MADE IT FOR YOU TO DO YOUR WORK, TAKE CARE OF THINGS AT HOME, OR GET ALONG WITH OTHER PEOPLE: 0
4. FEELING TIRED OR HAVING LITTLE ENERGY: 0
7. TROUBLE CONCENTRATING ON THINGS, SUCH AS READING THE NEWSPAPER OR WATCHING TELEVISION: 0
SUM OF ALL RESPONSES TO PHQ QUESTIONS 1-9: 11
SUM OF ALL RESPONSES TO PHQ9 QUESTIONS 1 & 2: 6
8. MOVING OR SPEAKING SO SLOWLY THAT OTHER PEOPLE COULD HAVE NOTICED. OR THE OPPOSITE, BEING SO FIGETY OR RESTLESS THAT YOU HAVE BEEN MOVING AROUND A LOT MORE THAN USUAL: 0
SUM OF ALL RESPONSES TO PHQ QUESTIONS 1-9: 11
SUM OF ALL RESPONSES TO PHQ QUESTIONS 1-9: 11

## 2021-12-21 ASSESSMENT — ENCOUNTER SYMPTOMS
VOMITING: 0
COUGH: 0
SHORTNESS OF BREATH: 0
CONSTIPATION: 0
NAUSEA: 0

## 2021-12-21 ASSESSMENT — LIFESTYLE VARIABLES
HOW MANY STANDARD DRINKS CONTAINING ALCOHOL DO YOU HAVE ON A TYPICAL DAY: 1 OR 2
HOW OFTEN DO YOU HAVE A DRINK CONTAINING ALCOHOL: MONTHLY OR LESS

## 2021-12-21 NOTE — PROGRESS NOTES
Kulwant Masy (:  1965) is a 60 y.o. female,Established patient, here for evaluation of the following chief complaint(s):  Rash (on both hands and up to her arms and labs were done )      ASSESSMENT/PLAN:  1. Rash and nonspecific skin eruption  -     clobetasol (TEMOVATE) 0.05 % cream; Apply topically 2 times daily. , Disp-30 g, R-0, Normal  -     External Referral To Dermatology  2. Granuloma annulare  -     External Referral To Dermatology    As above. ?granulma annulare vs. Lichen planus. Trial topical clobetasol and refer to Derm    Return for previously scheduled OV. SUBJECTIVE/OBJECTIVE:  HPI    Review of Systems   Constitutional: Negative for chills, fatigue and fever. Respiratory: Negative for cough and shortness of breath. Cardiovascular: Negative for chest pain. Gastrointestinal: Negative for constipation, nausea and vomiting. Skin: Positive for rash. Vitals:    21 1028   BP: 122/84   Pulse: 95   Resp: 16   Temp: 96.4 °F (35.8 °C)   SpO2: 98%   Weight: 270 lb (122.5 kg)   Height: 5' 3\" (1.6 m)     Estimated body mass index is 47.83 kg/m² as calculated from the following:    Height as of this encounter: 5' 3\" (1.6 m). Weight as of this encounter: 270 lb (122.5 kg). Physical Exam  Constitutional:       General: She is not in acute distress. Appearance: She is obese. Eyes:      Extraocular Movements: Extraocular movements intact. Conjunctiva/sclera: Conjunctivae normal.   Cardiovascular:      Rate and Rhythm: Normal rate. Pulmonary:      Effort: Pulmonary effort is normal. No respiratory distress. Skin:     Findings: Rash present. Neurological:      Mental Status: She is alert. Mental status is at baseline. Prior to Visit Medications    Medication Sig Taking? Authorizing Provider   clobetasol (TEMOVATE) 0.05 % cream Apply topically 2 times daily.  Yes Pavel Naqvi,    cyclobenzaprine (FLEXERIL) 5 MG tablet TAKE 2 TABLETS BY MOUTH NIGHTLY AS NEEDED FOR MUSCLE SPASM  Kayla Mederos DO   tobramycin-dexamethasone (TOBRADEX) 0.3-0.1 % ophthalmic ointment Place into the left eye 3 times daily 3 times daily. Historical Provider, MD   triamcinolone (KENALOG) 0.1 % ointment Apply topically 2 times daily to affected area. Bary Najjar, MD   prednisoLONE acetate (PRED FORTE) 1 % ophthalmic suspension INSTILL 1 DROP INTO LEFT EYE 4 TIMES DAILY  Historical Provider, MD   buPROPion (WELLBUTRIN XL) 300 MG extended release tablet TAKE 1 TABLET BY MOUTH ONCE DAILY  Historical Provider, MD   nystatin-triamcinolone (MYCOLOG II) 130949-1.3 UNIT/GM-% cream Apply topically 2 times daily.   Apple Cruz MD   busPIRone (BUSPAR) 10 MG tablet TAKE 1 2 (ONE HALF) TABLET BY MOUTH TWICE DAILY FOR 7 DAYS THEN 1 TABLET TWICE DAILY THEREAFTER  Historical Provider, MD   amLODIPine (NORVASC) 10 MG tablet Take 1 tablet by mouth daily  Pavel Naqvi DO   gabapentin (NEURONTIN) 300 MG capsule TAKE 1 CAPSULE BY MOUTH THREE TIMES DAILY  Kayla Mederos DO   atorvastatin (LIPITOR) 10 MG tablet Take 1 tablet by mouth daily  Pavel Naqvi DO   vitamin D (ERGOCALCIFEROL) 1.25 MG (38380 UT) CAPS capsule Take 1 capsule by mouth once a week  Pavel Naqvi DO   buPROPion (WELLBUTRIN XL) 150 MG extended release tablet Take 450 mg by mouth every morning   Historical Provider, MD   clonazePAM (KLONOPIN) 1 MG tablet TAKE 1 TABLET BY MOUTH THREE TIMES DAILY AS NEEDED  Historical Provider, MD   DULoxetine (CYMBALTA) 60 MG extended release capsule Take 60 mg by mouth daily 2 capsules  Historical Provider, MD   zolpidem (AMBIEN) 10 MG tablet TAKE 1 2 (ONE HALF) TO 1 WHOLE TABLET BY MOUTH AT BEDTIME AS DIRECETED ON EMPTY STOMACH  Historical Provider, MD            Future Appointments   Date Time Provider Cadence Titus   1/12/2022  1:30 PM Apple Cruz MD [facility]   2/2/2022  1:00 PM Anayeli Canales DO [facility]       An electronic signature was used to authenticate this note.     --Brooks Espinoza, DO

## 2022-01-12 ENCOUNTER — OFFICE VISIT (OUTPATIENT)
Dept: OBGYN | Age: 57
End: 2022-01-12
Payer: MEDICAID

## 2022-01-12 VITALS
DIASTOLIC BLOOD PRESSURE: 89 MMHG | WEIGHT: 262 LBS | SYSTOLIC BLOOD PRESSURE: 139 MMHG | BODY MASS INDEX: 46.42 KG/M2 | HEIGHT: 63 IN | HEART RATE: 91 BPM

## 2022-01-12 DIAGNOSIS — N95.0 POSTMENOPAUSAL BLEEDING: Primary | ICD-10-CM

## 2022-01-12 PROCEDURE — G8417 CALC BMI ABV UP PARAM F/U: HCPCS | Performed by: OBSTETRICS & GYNECOLOGY

## 2022-01-12 PROCEDURE — 1036F TOBACCO NON-USER: CPT | Performed by: OBSTETRICS & GYNECOLOGY

## 2022-01-12 PROCEDURE — 3017F COLORECTAL CA SCREEN DOC REV: CPT | Performed by: OBSTETRICS & GYNECOLOGY

## 2022-01-12 PROCEDURE — G8427 DOCREV CUR MEDS BY ELIG CLIN: HCPCS | Performed by: OBSTETRICS & GYNECOLOGY

## 2022-01-12 PROCEDURE — G8484 FLU IMMUNIZE NO ADMIN: HCPCS | Performed by: OBSTETRICS & GYNECOLOGY

## 2022-01-12 PROCEDURE — 99213 OFFICE O/P EST LOW 20 MIN: CPT | Performed by: OBSTETRICS & GYNECOLOGY

## 2022-01-12 NOTE — PROGRESS NOTES
Patient alert and pleasant with no new complaints  Here today for pre-op appt. [facility] 1/18/22 10:00 am [address]  All pre-op information given to patient. Date,time and place. Patient voiced understanding. Discharge instructions have been discussed with the patient. Patient advised to call our office with any questions or concerns. Voiced understanding.

## 2022-01-12 NOTE — H&P
HISTORY AND PHYSICAL             Date: 1/12/2022        Patient Name: Gisela Ovalle     YOB: 1965      Age:  60 y.o. Chief Complaint     Chief Complaint   Patient presents with   Ana Ion Exam     [facility] 1/18/22           History Obtained From   patient    History of Present Illness   Patient presented with episodes of heavy post menopausal bleeding. Bleeding occurred in July. EMB showed scant inactive appearing endometrial glandular cells and benign endometrium. Discussed further evaluation with [facility] due to concern for missed pathology. Ultrasound did not assess uterine lining due to calcified fibroids obscuring measurements. Risks of surgery discussed, including blood loss requiring transfusion, injury to surrounding structures (bowel, bladder, ureters, etc.), and infection.        Past Medical History     Past Medical History:   Diagnosis Date    Anxiety     Depression     Dyslipidemia     Hyperlipidemia     Obesity     Osteoarthritis         Past Surgical History     Past Surgical History:   Procedure Laterality Date    ANESTHESIA NERVE BLOCK Right 12/19/2019    RIGHT L3-4 TRANSFORAMINAL EPIDURAL STEROID INJECTION (CPT 34575) performed by Gladys Neri DO at [address] Right 5/6/2021    RIGHT SACROILIAC JOINT INJECTION UNDER X-RAY GUIDANCE performed by Gladys Neri DO at [address] [facility] [address] JOINT Right 1/16/2020    RIGHT SACROILIAC JOINT STEROID INJECTION UNDER X-RAY GUIDANCE performed by Gladys Neri DO at [address] [facility] Right 2015    NERVE BLOCK Right 12/19/2019    lumbar trasnforaminal    NERVE BLOCK Right 01/16/2020    right sacroiliac joint steroid injection     NERVE BLOCK Right 09/03/2020    right l3-4transforaminal epidural steroid injection    NERVE BLOCK Right 9/3/2020    RIGHT L3-4 TRANSFORAMINAL EPIDURAL STEROID INJECTION performed by Yajaira Erazo Gatito, DO at [facility] Bilateral 11/05/2020    medial branch block    NERVE BLOCK Bilateral 11/5/2020    MEDIAL BRANCH BLOCK BILATERAL L4-5 AND L5-S1 (CPT 08027) performed by Kristina See DO at [facility] Bilateral 12/10/2020    medial branch block    NERVE BLOCK Bilateral 12/10/2020    BILATERAL MEDIAL BRANCH BLOCK L4-5 AND L5-S1 (CPT 98126) performed by Kristina See DO at [facility] Right 01/21/2021    RADIOFREQUENCY RIGHT L4-5 AND L5-S1    NERVE BLOCK Left 03/04/2021    fluoroscopic guided left lumbar medial branch  neurolysis (RFA)) at levels L4-5, L5-S1    NERVE BLOCK Right 05/06/2021    si    PAIN MANAGEMENT PROCEDURE Right 1/21/2021    RADIOFREQUENCY RIGHT L4-5 AND L5-S1 (CPT 69088) performed by Kristina See DO at [id number] [place] [address] [address] Left 3/4/2021    FLUOROSCOPIC-GUIDED LEFT LUMBAR MEDIAL BRANCH NUEROLYSIS (RADIOFREQUENCY ABLATION) AT LEVELS L4-5, L5-S1 (CPT 14984) performed by Kristina See DO at [facility] Right 2000    SHOULDER SURGERY Left 2019        Medications Prior to Admission     Prior to Admission medications    Medication Sig Start Date End Date Taking? Authorizing Provider   atorvastatin (LIPITOR) 10 MG tablet Take 1 tablet by mouth once daily 12/22/21  Yes Pavel Naqvi DO   vitamin D (ERGOCALCIFEROL) 1.25 MG (06115 UT) CAPS capsule Take 1 capsule by mouth once a week 12/22/21  Yes Pavel Naqvi DO   cyclobenzaprine (FLEXERIL) 5 MG tablet TAKE 2 TABLETS BY MOUTH NIGHTLY AS NEEDED FOR MUSCLE SPASM 11/16/21  Yes Kristina See, DO   tobramycin-dexamethasone (TOBRADEX) 0.3-0.1 % ophthalmic ointment Place into the left eye 3 times daily 3 times daily.    Yes Historical Provider, MD   prednisoLONE acetate (PRED FORTE) 1 % ophthalmic suspension INSTILL 1 DROP INTO LEFT EYE 4 TIMES DAILY 8/7/21  Yes Historical Provider, MD   busPIRone (BUSPAR) 10 MG tablet TAKE 1 2 (ONE HALF) TABLET BY MOUTH TWICE DAILY FOR 7 DAYS THEN 1 TABLET TWICE DAILY THEREAFTER 6/28/21  Yes Historical Provider, MD   amLODIPine (NORVASC) 10 MG tablet Take 1 tablet by mouth daily 8/2/21  Yes Arielle Finley DO   buPROPion (WELLBUTRIN XL) 150 MG extended release tablet Take 450 mg by mouth every morning  12/7/20  Yes Historical Provider, MD   clonazePAM (KLONOPIN) 1 MG tablet TAKE 1 TABLET BY MOUTH THREE TIMES DAILY AS NEEDED 9/19/19  Yes Historical Provider, MD   DULoxetine (CYMBALTA) 60 MG extended release capsule Take 60 mg by mouth daily 2 capsules 9/28/19  Yes Historical Provider, MD   zolpidem (AMBIEN) 10 MG tablet TAKE 1 2 (ONE HALF) TO 1 WHOLE TABLET BY MOUTH AT BEDTIME AS DIRECETED ON EMPTY STOMACH 9/28/19  Yes Historical Provider, MD   clobetasol (TEMOVATE) 0.05 % cream Apply topically 2 times daily. 12/21/21   Pavel Naqvi DO   triamcinolone (KENALOG) 0.1 % ointment Apply topically 2 times daily to affected area. 11/11/21   Amanda Sue MD   buPROPion (WELLBUTRIN XL) 300 MG extended release tablet TAKE 1 TABLET BY MOUTH ONCE DAILY 9/7/21   Historical Provider, MD   nystatin-triamcinolone (MYCOLOG II) 312165-3.5 UNIT/GM-% cream Apply topically 2 times daily. 10/5/21   Yvette Salazar MD   gabapentin (NEURONTIN) 300 MG capsule TAKE 1 CAPSULE BY MOUTH THREE TIMES DAILY 7/26/21 10/5/21  [employer]oughDO        Allergies   Patient has no known allergies.     Social History     Social History     Tobacco History     Smoking Status  Never Smoker    Smokeless Tobacco Use  Never Used          Alcohol History     Alcohol Use Status  Yes Comment  OCC          Drug Use     Drug Use Status  Never          Sexual Activity     Sexually Active  Yes Partners  Female                Family History     Family History   Problem Relation Age of Onset    Diabetes Mother     Atrial Fibrillation Mother     High Blood Pressure Mother  High Cholesterol Mother     Diabetes Father     Breast Cancer Maternal Grandmother        Review of Systems   Review of Systems    Physical Exam   /89 (Position: Sitting)   Pulse 91   Ht 5' 3\" (1.6 m)   Wt 262 lb (118.8 kg)   BMI 46.41 kg/m²     Physical Exam   General: pleasant, alert  GYN: deferred     Labs    EMB  Diagnosis:   A.  Endometrium, biopsy: Scant inactive appearing endometrial glandular   cells and benign squamous epithelium     B.  Endometrial tissue, biopsy: Insufficient for diagnostic evaluation     Imaging/Diagnostics Last 24 Hours     FINDINGS:       Measurements:       Uterus: 8.0 cm       Endometrial stripe: Not seen secondary to the presence of intrauterine   calcifications.       Right Ovary:Not seen       Left Ovary: Not seen           Ultrasound Findings:       Uterus: Heterogeneous appearance of the uterus.  There are multiple   intrauterine calcifications possibly associated with degenerating fibroids.       Endometrial stripe: The endometrium is not seen well secondary to the   presence of intrauterine calcifications.       Neither ovary was visualized secondary to anatomic position and overlying   bowel gas.  There are no adnexal masses.       Free Fluid: No evidence of free fluid.           Impression   1.  Limited evaluation of the endometrium given the presence of intrauterine   calcifications.       2.  Intrauterine calcifications possibly associated with degenerating   fibroids.  Heterogeneous appearance of the uterus.       3.  Nonvisualization of either ovary.  There are no adnexal masses.       RECOMMENDATIONS:   Recommend screening pelvic MRI with and without gadolinium to fully evaluate   the endometrium and ovaries. Assessment    60year old female with postmenopausal bleeding    Plan   1. CBC, T&S  2. NPO  3. IVF  4.  Consent form reviewed and signed     Consultations Ordered:  None    Electronically signed by Sylvain Iyer MD on 1/12/22 at 1:55 PM EST

## 2022-01-14 NOTE — PROGRESS NOTES
[facility] PRE-ADMISSION TESTING INSTRUCTIONS    The Preadmission Testing patient is instructed accordingly using the following criteria (check applicable):    ARRIVAL INSTRUCTIONS:  [x] Parking the day of Surgery is located in the Main Entrance lot. Upon entering the door, make an immediate right to the surgery reception desk    [x] Bring photo ID and insurance card    [] Bring in a copy of Living will or Durable Power of  papers. [x] Please be sure to arrange for responsible adult to provide transportation to and from the hospital    [x] Please arrange for responsible adult to be with you for the 24 hour period post procedure due to having anesthesia      GENERAL INSTRUCTIONS:    [x] Nothing by mouth after midnight, including gum, candy, mints or water    [x] You may brush your teeth, but do not swallow any water    [x] Take medications as instructed with 1-2 oz of water    [x] Stop herbal supplements and vitamins 5 days prior to procedure    [x] Follow preop dosing of blood thinners per physician instructions    [] Take 1/2 dose of evening insulin, but no insulin after midnight    [] No oral diabetic medications after midnight    [] If diabetic and have low blood sugar or feel symptomatic, take 1-2oz apple juice only    [] Bring inhalers day of surgery    [] Bring C-PAP/ Bi-Pap day of surgery    [] Bring urine specimen day of surgery    [x] Shower or bath with soap, lather and rinse well, AM of Surgery, no lotion, powders or creams to surgical site    [] Follow bowel prep as instructed per surgeon    [x] No tobacco products within 24 hours of surgery     [x] No alcohol or illegal drug use within 24 hours of surgery.     [x] Jewelry, body piercing's, eyeglasses, contact lenses and dentures are not permitted into surgery (bring cases)      [x] Please do not wear any nail polish, make up or hair products on the day of surgery    [x] You can expect a call the business day prior to procedure to notify you if your arrival time changes    [x] If you receive a survey after surgery we would greatly appreciate your comments    [] Parent/guardian of a minor must accompany their child and remain on the premises  the entire time they are under our care     [] Pediatric patients may bring favorite toy, blanket or comfort item with them    [] A caregiver or family member must remain with the patient during their stay if they are mentally handicapped, have dementia, disoriented or unable to use a call light or would be a safety concern if left unattended    [x] Please notify surgeon if you develop any illness between now and time of surgery (cold, cough, sore throat, fever, nausea, vomiting) or any signs of infections  including skin, wounds, and dental.    [x]  The Outpatient Pharmacy is available to fill your prescription here on your day of surgery, ask your preop nurse for details    [x] Other instructions: Wear comfortable clothing    EDUCATIONAL MATERIALS PROVIDED:    [] PAT Preoperative Education Packet/Booklet     [] Medication List    [] Transfusion bracelet applied with instructions    [] Shower with soap, lather and rinse well, and use CHG wipes provided the evening before surgery as instructed    [] Incentive spirometer with instructions        Have you been tested for COVID  No           Have you been told you were positive for COVID No  Have you had any known exposure to someone that is positive for COVID No  Do you have a cough                   No              Do you have shortness of breath No                 Do you have a sore throat            No                Are you having chills                    No                Are you having muscle aches. No                    Please come to the hospital wearing a mask and have your significant other wear a mask as well.   Both of you should check your temperature before leaving to come here,  if it is 100 or higher please call [phone] for instruction.

## 2022-01-17 ENCOUNTER — ANESTHESIA EVENT (OUTPATIENT)
Dept: OPERATING ROOM | Age: 57
End: 2022-01-17
Payer: MEDICAID

## 2022-01-18 ENCOUNTER — ANESTHESIA (OUTPATIENT)
Dept: OPERATING ROOM | Age: 57
End: 2022-01-18
Payer: MEDICAID

## 2022-01-18 ENCOUNTER — HOSPITAL ENCOUNTER (OUTPATIENT)
Age: 57
Setting detail: OUTPATIENT SURGERY
Discharge: HOME | End: 2022-01-18
Attending: OBSTETRICS & GYNECOLOGY | Admitting: OBSTETRICS & GYNECOLOGY
Payer: MEDICAID

## 2022-01-18 VITALS
OXYGEN SATURATION: 98 % | TEMPERATURE: 97.5 F | HEIGHT: 63 IN | DIASTOLIC BLOOD PRESSURE: 68 MMHG | HEART RATE: 87 BPM | WEIGHT: 260 LBS | RESPIRATION RATE: 18 BRPM | BODY MASS INDEX: 46.07 KG/M2 | SYSTOLIC BLOOD PRESSURE: 111 MMHG

## 2022-01-18 VITALS
OXYGEN SATURATION: 100 % | TEMPERATURE: 98.2 F | RESPIRATION RATE: 20 BRPM | SYSTOLIC BLOOD PRESSURE: 120 MMHG | DIASTOLIC BLOOD PRESSURE: 82 MMHG

## 2022-01-18 LAB
ABO/RH: NORMAL
ANTIBODY SCREEN: NORMAL
EKG ATRIAL RATE: 75 BPM
EKG P AXIS: 58 DEGREES
EKG P-R INTERVAL: 174 MS
EKG Q-T INTERVAL: 404 MS
EKG QRS DURATION: 98 MS
EKG QTC CALCULATION (BAZETT): 451 MS
EKG R AXIS: -3 DEGREES
EKG T AXIS: 17 DEGREES
EKG VENTRICULAR RATE: 75 BPM
HCT VFR BLD CALC: 38.6 % (ref 34–48)
HEMOGLOBIN: 11.8 G/DL (ref 11.5–15.5)
MCH RBC QN AUTO: 26.5 PG (ref 26–35)
MCHC RBC AUTO-ENTMCNC: 30.6 % (ref 32–34.5)
MCV RBC AUTO: 86.5 FL (ref 80–99.9)
PDW BLD-RTO: 15.8 FL (ref 11.5–15)
PLATELET # BLD: 257 E9/L (ref 130–450)
PMV BLD AUTO: 9.7 FL (ref 7–12)
RBC # BLD: 4.46 E12/L (ref 3.5–5.5)
WBC # BLD: 5 E9/L (ref 4.5–11.5)

## 2022-01-18 PROCEDURE — 36415 COLL VENOUS BLD VENIPUNCTURE: CPT

## 2022-01-18 PROCEDURE — 7100000000 HC PACU RECOVERY - FIRST 15 MIN: Performed by: OBSTETRICS & GYNECOLOGY

## 2022-01-18 PROCEDURE — 2500000003 HC RX 250 WO HCPCS: Performed by: NURSE ANESTHETIST, CERTIFIED REGISTERED

## 2022-01-18 PROCEDURE — 85027 COMPLETE CBC AUTOMATED: CPT

## 2022-01-18 PROCEDURE — 86901 BLOOD TYPING SEROLOGIC RH(D): CPT

## 2022-01-18 PROCEDURE — 88305 TISSUE EXAM BY PATHOLOGIST: CPT

## 2022-01-18 PROCEDURE — 3700000000 HC ANESTHESIA ATTENDED CARE: Performed by: OBSTETRICS & GYNECOLOGY

## 2022-01-18 PROCEDURE — 3600000013 HC SURGERY LEVEL 3 ADDTL 15MIN: Performed by: OBSTETRICS & GYNECOLOGY

## 2022-01-18 PROCEDURE — 6360000002 HC RX W HCPCS: Performed by: NURSE ANESTHETIST, CERTIFIED REGISTERED

## 2022-01-18 PROCEDURE — 7100000001 HC PACU RECOVERY - ADDTL 15 MIN: Performed by: OBSTETRICS & GYNECOLOGY

## 2022-01-18 PROCEDURE — 86850 RBC ANTIBODY SCREEN: CPT

## 2022-01-18 PROCEDURE — 3600000003 HC SURGERY LEVEL 3 BASE: Performed by: OBSTETRICS & GYNECOLOGY

## 2022-01-18 PROCEDURE — 86900 BLOOD TYPING SEROLOGIC ABO: CPT

## 2022-01-18 PROCEDURE — 2580000003 HC RX 258: Performed by: OBSTETRICS & GYNECOLOGY

## 2022-01-18 PROCEDURE — 2709999900 HC NON-CHARGEABLE SUPPLY: Performed by: OBSTETRICS & GYNECOLOGY

## 2022-01-18 PROCEDURE — 3700000001 HC ADD 15 MINUTES (ANESTHESIA): Performed by: OBSTETRICS & GYNECOLOGY

## 2022-01-18 PROCEDURE — 7100000010 HC PHASE II RECOVERY - FIRST 15 MIN: Performed by: OBSTETRICS & GYNECOLOGY

## 2022-01-18 PROCEDURE — 7100000011 HC PHASE II RECOVERY - ADDTL 15 MIN: Performed by: OBSTETRICS & GYNECOLOGY

## 2022-01-18 PROCEDURE — 93005 ELECTROCARDIOGRAM TRACING: CPT

## 2022-01-18 RX ORDER — ACETAMINOPHEN 325 MG/1
650 TABLET ORAL EVERY 4 HOURS PRN
Status: DISCONTINUED | OUTPATIENT
Start: 2022-01-18 | End: 2022-01-18

## 2022-01-18 RX ORDER — ACETAMINOPHEN 325 MG/1
650 TABLET ORAL EVERY 4 HOURS PRN
Status: DISCONTINUED | OUTPATIENT
Start: 2022-01-18 | End: 2022-01-18 | Stop reason: HOSPADM

## 2022-01-18 RX ORDER — ONDANSETRON 2 MG/ML
4 INJECTION INTRAMUSCULAR; INTRAVENOUS EVERY 6 HOURS PRN
Status: DISCONTINUED | OUTPATIENT
Start: 2022-01-18 | End: 2022-01-18 | Stop reason: HOSPADM

## 2022-01-18 RX ORDER — SODIUM CHLORIDE 0.9 % (FLUSH) 0.9 %
5-40 SYRINGE (ML) INJECTION EVERY 12 HOURS SCHEDULED
Status: DISCONTINUED | OUTPATIENT
Start: 2022-01-18 | End: 2022-01-18 | Stop reason: HOSPADM

## 2022-01-18 RX ORDER — LIDOCAINE HYDROCHLORIDE 20 MG/ML
INJECTION, SOLUTION EPIDURAL; INFILTRATION; INTRACAUDAL; PERINEURAL PRN
Status: DISCONTINUED | OUTPATIENT
Start: 2022-01-18 | End: 2022-01-18 | Stop reason: SDUPTHER

## 2022-01-18 RX ORDER — MIDAZOLAM HYDROCHLORIDE 1 MG/ML
INJECTION INTRAMUSCULAR; INTRAVENOUS PRN
Status: DISCONTINUED | OUTPATIENT
Start: 2022-01-18 | End: 2022-01-18 | Stop reason: SDUPTHER

## 2022-01-18 RX ORDER — PROPOFOL 10 MG/ML
INJECTION, EMULSION INTRAVENOUS PRN
Status: DISCONTINUED | OUTPATIENT
Start: 2022-01-18 | End: 2022-01-18 | Stop reason: SDUPTHER

## 2022-01-18 RX ORDER — SODIUM CHLORIDE 0.9 % (FLUSH) 0.9 %
5-40 SYRINGE (ML) INJECTION PRN
Status: DISCONTINUED | OUTPATIENT
Start: 2022-01-18 | End: 2022-01-18 | Stop reason: HOSPADM

## 2022-01-18 RX ORDER — MEPERIDINE HYDROCHLORIDE 25 MG/ML
12.5 INJECTION INTRAMUSCULAR; INTRAVENOUS; SUBCUTANEOUS EVERY 5 MIN PRN
Status: DISCONTINUED | OUTPATIENT
Start: 2022-01-18 | End: 2022-01-18 | Stop reason: HOSPADM

## 2022-01-18 RX ORDER — IBUPROFEN 800 MG/1
800 TABLET ORAL EVERY 8 HOURS PRN
Status: DISCONTINUED | OUTPATIENT
Start: 2022-01-18 | End: 2022-01-18 | Stop reason: HOSPADM

## 2022-01-18 RX ORDER — SODIUM CHLORIDE 9 MG/ML
25 INJECTION, SOLUTION INTRAVENOUS PRN
Status: DISCONTINUED | OUTPATIENT
Start: 2022-01-18 | End: 2022-01-18 | Stop reason: HOSPADM

## 2022-01-18 RX ORDER — ONDANSETRON 2 MG/ML
4 INJECTION INTRAMUSCULAR; INTRAVENOUS
Status: DISCONTINUED | OUTPATIENT
Start: 2022-01-18 | End: 2022-01-18 | Stop reason: HOSPADM

## 2022-01-18 RX ORDER — FENTANYL CITRATE 50 UG/ML
INJECTION, SOLUTION INTRAMUSCULAR; INTRAVENOUS PRN
Status: DISCONTINUED | OUTPATIENT
Start: 2022-01-18 | End: 2022-01-18 | Stop reason: SDUPTHER

## 2022-01-18 RX ORDER — ONDANSETRON 4 MG/1
4 TABLET, ORALLY DISINTEGRATING ORAL EVERY 8 HOURS PRN
Status: DISCONTINUED | OUTPATIENT
Start: 2022-01-18 | End: 2022-01-18 | Stop reason: HOSPADM

## 2022-01-18 RX ORDER — DEXAMETHASONE SODIUM PHOSPHATE 4 MG/ML
INJECTION, SOLUTION INTRA-ARTICULAR; INTRALESIONAL; INTRAMUSCULAR; INTRAVENOUS; SOFT TISSUE PRN
Status: DISCONTINUED | OUTPATIENT
Start: 2022-01-18 | End: 2022-01-18 | Stop reason: SDUPTHER

## 2022-01-18 RX ORDER — ONDANSETRON 2 MG/ML
INJECTION INTRAMUSCULAR; INTRAVENOUS PRN
Status: DISCONTINUED | OUTPATIENT
Start: 2022-01-18 | End: 2022-01-18 | Stop reason: SDUPTHER

## 2022-01-18 RX ADMIN — DEXAMETHASONE SODIUM PHOSPHATE 8 MG: 4 INJECTION, SOLUTION INTRAMUSCULAR; INTRAVENOUS at 10:21

## 2022-01-18 RX ADMIN — SODIUM CHLORIDE: 9 INJECTION, SOLUTION INTRAVENOUS at 10:15

## 2022-01-18 RX ADMIN — LIDOCAINE HYDROCHLORIDE 50 MG: 20 INJECTION, SOLUTION EPIDURAL; INFILTRATION; INTRACAUDAL; PERINEURAL at 10:21

## 2022-01-18 RX ADMIN — MIDAZOLAM 2 MG: 1 INJECTION INTRAMUSCULAR; INTRAVENOUS at 10:08

## 2022-01-18 RX ADMIN — ONDANSETRON 4 MG: 2 INJECTION INTRAMUSCULAR; INTRAVENOUS at 10:21

## 2022-01-18 RX ADMIN — FENTANYL CITRATE 25 MCG: 50 INJECTION, SOLUTION INTRAMUSCULAR; INTRAVENOUS at 10:40

## 2022-01-18 RX ADMIN — PROPOFOL 300 MG: 10 INJECTION, EMULSION INTRAVENOUS at 10:21

## 2022-01-18 RX ADMIN — FENTANYL CITRATE 75 MCG: 50 INJECTION, SOLUTION INTRAMUSCULAR; INTRAVENOUS at 10:57

## 2022-01-18 ASSESSMENT — PAIN DESCRIPTION - ORIENTATION
ORIENTATION: ANTERIOR
ORIENTATION: ANTERIOR

## 2022-01-18 ASSESSMENT — PAIN SCALES - GENERAL
PAINLEVEL_OUTOF10: 0
PAINLEVEL_OUTOF10: 0
PAINLEVEL_OUTOF10: 3
PAINLEVEL_OUTOF10: 3
PAINLEVEL_OUTOF10: 0

## 2022-01-18 ASSESSMENT — PULMONARY FUNCTION TESTS
PIF_VALUE: 26
PIF_VALUE: 2
PIF_VALUE: 23
PIF_VALUE: 3
PIF_VALUE: 0
PIF_VALUE: 2
PIF_VALUE: 2
PIF_VALUE: 0
PIF_VALUE: 11
PIF_VALUE: 2
PIF_VALUE: 3
PIF_VALUE: 2
PIF_VALUE: 4
PIF_VALUE: 1
PIF_VALUE: 0
PIF_VALUE: 0
PIF_VALUE: 3
PIF_VALUE: 2
PIF_VALUE: 2
PIF_VALUE: 21
PIF_VALUE: 3
PIF_VALUE: 2
PIF_VALUE: 20
PIF_VALUE: 3
PIF_VALUE: 19
PIF_VALUE: 14
PIF_VALUE: 10
PIF_VALUE: 3
PIF_VALUE: 2
PIF_VALUE: 14
PIF_VALUE: 16
PIF_VALUE: 18
PIF_VALUE: 2
PIF_VALUE: 7
PIF_VALUE: 16
PIF_VALUE: 20
PIF_VALUE: 0
PIF_VALUE: 3
PIF_VALUE: 29
PIF_VALUE: 15

## 2022-01-18 ASSESSMENT — PAIN DESCRIPTION - LOCATION
LOCATION: ABDOMEN

## 2022-01-18 ASSESSMENT — PAIN DESCRIPTION - PAIN TYPE
TYPE: SURGICAL PAIN

## 2022-01-18 ASSESSMENT — PAIN DESCRIPTION - DESCRIPTORS
DESCRIPTORS: CRAMPING
DESCRIPTORS: CRAMPING

## 2022-01-18 ASSESSMENT — PAIN DESCRIPTION - FREQUENCY
FREQUENCY: INTERMITTENT
FREQUENCY: INTERMITTENT

## 2022-01-18 ASSESSMENT — PAIN DESCRIPTION - ONSET: ONSET: GRADUAL

## 2022-01-18 ASSESSMENT — LIFESTYLE VARIABLES: SMOKING_STATUS: 0

## 2022-01-18 ASSESSMENT — PAIN - FUNCTIONAL ASSESSMENT: PAIN_FUNCTIONAL_ASSESSMENT: 0-10

## 2022-01-18 NOTE — ANESTHESIA PRE PROCEDURE
Department of Anesthesiology  Preprocedure Note       Name:  Sari Hayes   Age:  60 y.o.  :  1965                                          MRN:  [id number]         Date:  2022      Surgeon: Madeleine Charles):  Simeon Cooper MD    Procedure: Procedure(s): HYSTEROSCOPY DILATION AND CURETTAGE    Medications prior to admission:   Prior to Admission medications    Medication Sig Start Date End Date Taking? Authorizing Provider   atorvastatin (LIPITOR) 10 MG tablet Take 1 tablet by mouth once daily 21  Yes Pavel Naqvi, DO   vitamin D (ERGOCALCIFEROL) 1.25 MG (86411 UT) CAPS capsule Take 1 capsule by mouth once a week 21  Yes Pavel Naqvi DO   cyclobenzaprine (FLEXERIL) 5 MG tablet TAKE 2 TABLETS BY MOUTH NIGHTLY AS NEEDED FOR MUSCLE SPASM 21  Yes [employer], DO   tobramycin-dexamethasone (TOBRADEX) 0.3-0.1 % ophthalmic ointment Place into the left eye 3 times daily 3 times daily.    Yes Historical Provider, MD   prednisoLONE acetate (PRED FORTE) 1 % ophthalmic suspension INSTILL 1 DROP INTO LEFT EYE 4 TIMES DAILY 21  Yes Historical Provider, MD   buPROPion (WELLBUTRIN XL) 300 MG extended release tablet TAKE 1 TABLET BY MOUTH ONCE DAILY 21  Yes Historical Provider, MD   busPIRone (BUSPAR) 10 MG tablet TAKE 1 2 (ONE HALF) TABLET BY MOUTH TWICE DAILY FOR 7 DAYS THEN 1 TABLET TWICE DAILY THEREAFTER 21  Yes Historical Provider, MD   amLODIPine (NORVASC) 10 MG tablet Take 1 tablet by mouth daily 21  Yes Pavel Naqvi,    buPROPion (WELLBUTRIN XL) 150 MG extended release tablet Take 450 mg by mouth every morning  20  Yes Historical Provider, MD   clonazePAM (KLONOPIN) 1 MG tablet TAKE 1 TABLET BY MOUTH THREE TIMES DAILY AS NEEDED 19  Yes Historical Provider, MD   DULoxetine (CYMBALTA) 60 MG extended release capsule Take 60 mg by mouth daily 2 capsules 19  Yes Historical Provider, MD   zolpidem (AMBIEN) 10 MG tablet TAKE 1 2 (ONE HALF) TO 1 12/19/2019    RIGHT L3-4 TRANSFORAMINAL EPIDURAL STEROID INJECTION (CPT 04095) performed by Pablo Mills DO at [address] Right 5/6/2021    RIGHT SACROILIAC JOINT INJECTION UNDER X-RAY GUIDANCE performed by Pablo Mills DO at [address] [facility] [address] JOINT Right 1/16/2020    RIGHT SACROILIAC JOINT STEROID INJECTION UNDER X-RAY GUIDANCE performed by Pablo Mills DO at [address] [facility] Right 2015    NERVE BLOCK Right 12/19/2019    lumbar trasnforaminal    NERVE BLOCK Right 01/16/2020    right sacroiliac joint steroid injection     NERVE BLOCK Right 09/03/2020    right l3-4transforaminal epidural steroid injection    NERVE BLOCK Right 9/3/2020    RIGHT L3-4 TRANSFORAMINAL EPIDURAL STEROID INJECTION performed by Pablo Mills DO at [facility] Bilateral 11/05/2020    medial branch block    NERVE BLOCK Bilateral 11/5/2020    MEDIAL BRANCH BLOCK BILATERAL L4-5 AND L5-S1 (CPT 26851) performed by Pablo Mills DO at [facility] Bilateral 12/10/2020    medial branch block    NERVE BLOCK Bilateral 12/10/2020    BILATERAL MEDIAL BRANCH BLOCK L4-5 AND L5-S1 (CPT 54706) performed by Pablo Mills DO at [facility] Right 01/21/2021    RADIOFREQUENCY RIGHT L4-5 AND L5-S1    NERVE BLOCK Left 03/04/2021    fluoroscopic guided left lumbar medial branch  neurolysis (RFA)) at levels L4-5, L5-S1    NERVE BLOCK Right 05/06/2021    si    PAIN MANAGEMENT PROCEDURE Right 1/21/2021    RADIOFREQUENCY RIGHT L4-5 AND L5-S1 (CPT 01302) performed by Pablo Mills DO at [address] [address] [address] Left 3/4/2021    FLUOROSCOPIC-GUIDED LEFT LUMBAR MEDIAL BRANCH NUEROLYSIS (RADIOFREQUENCY ABLATION) AT LEVELS L4-5, L5-S1 (CPT 58651) performed by Pablo Mills DO at [address] [facility] Right 2000    SHOULDER SURGERY Left 2019       Social History:    Social History     Tobacco Use    Smoking status: Never Smoker    Smokeless tobacco: Never Used   Substance Use Topics    Alcohol use: Yes                                Counseling given: Not Answered      Vital Signs (Current):   Vitals:    01/14/22 1037   Weight: 260 lb (117.9 kg)   Height: 5' 3\" (1.6 m)                                              BP Readings from Last 3 Encounters:   01/12/22 139/89   12/21/21 122/84   11/11/21 134/82       NPO Status:  NPO after 2230 on 01/17/2022                                                                               BMI:   Wt Readings from Last 3 Encounters:   01/14/22 260 lb (117.9 kg)   01/12/22 262 lb (118.8 kg)   12/21/21 270 lb (122.5 kg)     Body mass index is 46.06 kg/m². CBC:   Lab Results   Component Value Date    WBC 5.6 12/17/2021    RBC 4.86 12/17/2021    HGB 12.8 12/17/2021    HCT 42.0 12/17/2021    MCV 86.4 12/17/2021    RDW 15.3 12/17/2021     12/17/2021       CMP:   Lab Results   Component Value Date     12/17/2021    K 5.0 12/17/2021     12/17/2021    CO2 27 12/17/2021    BUN 12 12/17/2021    CREATININE 0.9 12/17/2021    GFRAA >60 12/17/2021    LABGLOM >60 12/17/2021    GLUCOSE 164 12/17/2021    PROT 7.3 06/22/2021    CALCIUM 9.9 12/17/2021    BILITOT 0.4 06/22/2021    ALKPHOS 72 06/22/2021    AST 27 06/22/2021    ALT 34 06/22/2021       POC Tests: No results for input(s): POCGLU, POCNA, POCK, POCCL, POCBUN, POCHEMO, POCHCT in the last 72 hours.     Coags: No results found for: PROTIME, INR, APTT    HCG (If Applicable): No results found for: PREGTESTUR, PREGSERUM, HCG, HCGQUANT     ABGs: No results found for: PHART, PO2ART, UCR2YFF, OPE1YFD, BEART, S0QVUKFP     Type & Screen (If Applicable):  No results found for: LABABO, LABRH    Drug/Infectious Status (If Applicable):  No results found for: HIV, HEPCAB    COVID-19 Screening (If Applicable):   Lab Results Component Value Date    COVID19 Not Detected 02/26/2021     EKG 12/18/2020:  Normal sinus rhythm    ECHO 12/09/2020:   Conclusions   Summary   Normal left ventricular size and systolic function. Ejection fraction is visually estimated at 60-65%. Normal diastolic function. No regional wall motion abnormalities seen. Mild left ventricular concentric hypertrophy noted. Mildly enlarged right ventricle cavity. Right ventricle global systolic function is normal.   Mild aortic valve regurgitation. Signature   ----------------------------------------------------------------   Electronically signed by Dorota Pulido MD(Interpreting   physician) on 12/09/2020 03:00 PM   ----------------------------------------------------------------      Anesthesia Evaluation  Patient summary reviewed and Nursing notes reviewed no history of anesthetic complications:   Airway: Mallampati: III  TM distance: <3 FB   Neck ROM: full  Mouth opening: > = 3 FB Dental:      Comment: Grossly intact    Pulmonary: breath sounds clear to auscultation      (-) not a current smoker                           Cardiovascular:    (+) hypertension: moderate, hyperlipidemia      ECG reviewed  Rhythm: regular  Rate: normal  Echocardiogram reviewed         Beta Blocker:  Not on Beta Blocker         Neuro/Psych:   (+) neuromuscular disease (Right sided low back pain with right-sided sciatica, right hip pain, spondyliosthesis of lumbar region, leg length discrepancy, lumbar radiculitis, sacroiliitis, left knee pain):, psychiatric history:depression/anxiety             GI/Hepatic/Renal:   (+) morbid obesity          Endo/Other:    (+) : arthritis (Sacroiliitis, polyarthiritis): OA., .    Diabetes: Patient denies diabetes. Abdominal:   (+) obese,           Vascular: negative vascular ROS. Other Findings:           Anesthesia Plan      general     ASA 3       Induction: inhalational and intravenous.   BIS  MIPS: Postoperative

## 2022-01-18 NOTE — OP NOTE
Operative Note      Patient: Mira Velazquez  YOB: 1965  MRN: [id number]    Date of Procedure: 1/18/2022    Pre-Op Diagnosis: POSTMENOPAUSAL BLEEDING    Post-Op Diagnosis: Same       Procedure(s): [address] CURETTAGE    Surgeon:   Mona Sifuentes MD    Assistant:   None    Anesthesia: General    Estimated Blood Loss (mL): 10    Complications: None    Specimens:   ID Type Source Tests Collected by Time Destination   A :  Tissue Tissue SURGICAL PATHOLOGY Mona Sifuentes MD 1/18/2022 2765        Findings: Ostia visualized, uterine polyp noted on the anterior uterine wall     Detailed Description of Procedure: The patient was taken to the operating room where general anesthesia was administered without difficulty. Patient was placed in the dorsal lithotomy position and was prepped and draped in the normal sterile fashion. A sterile retractor was placed in the posterior and anterior vagina. The cervix was visualized. A tenaculum was placed on the anterior lip of the cervix. The cervix was serially dilated using Lester dilators to size 15. Hysteroscopy was then performed. The scope was passed through the cervix to the uterine fundus under direct visualization. Both ostia were visualized and patent. The above findings were noted. Hysteroscopic forceps were used to remove the uterine polyp. Cervical canal was observed as scope withdrawn. No signs of uterine perforation were noted. [address] [place] with sharp currettage was then performed   with limited tissue sampling. There was minimal bleeding noted. The tenaculum was removed with good hemostasis. The patient tolerated the procedure well. The instrument and sponge counts were correct times two. The patient was taken to   the recovery room in stable condition.           Electronically signed by Mona Sifuentes MD on 1/18/2022 at 9:50 AM

## 2022-01-18 NOTE — PROGRESS NOTES
1 admitted to stage 2 pacu  Family member here   1200 taking po well   1230 up to bathroom and voided   [phone] discharge instructions reviewed with pt and her family member and they verbalized understanding of instructions  [id number] discharged into the care of her family member

## 2022-01-18 NOTE — ANESTHESIA POSTPROCEDURE EVALUATION
Department of Anesthesiology  Postprocedure Note    Patient: Adam Beatty  MRN: [id number]  [place]: 1965  Date of evaluation: 1/18/2022  Time:  11:23 AM     Procedure Summary     Date: 01/18/22 Room / Location: [facility] 03 / [facility]    Anesthesia Start: 1008 Anesthesia Stop: 1108    Procedure: HYSTEROSCOPY DILATION AND CURETTAGE (N/A Vagina ) Diagnosis: (POSTMENOPAUSAL BLEEDING)    Surgeons: Liane Ambrzi MD Responsible Provider: Fern Bueno MD    Anesthesia Type: general ASA Status: 3          Anesthesia Type: general    Radha Phase I: Radha Score: 10    Radha Phase II:      Last vitals: Reviewed and per EMR flowsheets.        Anesthesia Post Evaluation    Patient location during evaluation: PACU  Patient participation: complete - patient participated  Level of consciousness: awake and alert  Pain score: 0  Airway patency: patent  Nausea & Vomiting: no nausea and no vomiting  Complications: no  Cardiovascular status: blood pressure returned to baseline  Respiratory status: acceptable  Hydration status: stable  Multimodal analgesia pain management approach    ONEIL Barksdale - CRNA

## 2022-01-24 ENCOUNTER — TELEPHONE (OUTPATIENT)
Dept: ADMINISTRATIVE | Age: 57
End: 2022-01-24

## 2022-01-24 NOTE — TELEPHONE ENCOUNTER
Pt called and requested to schedule a post-op visit for next week with Dr. L[address]. No availability, staff unavailable due to pt care. Please contact pt to schedule.

## 2022-02-01 ENCOUNTER — OFFICE VISIT (OUTPATIENT)
Dept: OBGYN | Age: 57
End: 2022-02-01
Payer: MEDICAID

## 2022-02-01 VITALS
BODY MASS INDEX: 46.94 KG/M2 | SYSTOLIC BLOOD PRESSURE: 145 MMHG | WEIGHT: 265 LBS | HEART RATE: 94 BPM | DIASTOLIC BLOOD PRESSURE: 85 MMHG

## 2022-02-01 DIAGNOSIS — N84.0 UTERINE POLYP: Primary | ICD-10-CM

## 2022-02-01 PROCEDURE — G8484 FLU IMMUNIZE NO ADMIN: HCPCS | Performed by: OBSTETRICS & GYNECOLOGY

## 2022-02-01 PROCEDURE — G8417 CALC BMI ABV UP PARAM F/U: HCPCS | Performed by: OBSTETRICS & GYNECOLOGY

## 2022-02-01 PROCEDURE — G8427 DOCREV CUR MEDS BY ELIG CLIN: HCPCS | Performed by: OBSTETRICS & GYNECOLOGY

## 2022-02-01 PROCEDURE — 3017F COLORECTAL CA SCREEN DOC REV: CPT | Performed by: OBSTETRICS & GYNECOLOGY

## 2022-02-01 PROCEDURE — 99213 OFFICE O/P EST LOW 20 MIN: CPT | Performed by: OBSTETRICS & GYNECOLOGY

## 2022-02-01 PROCEDURE — 99214 OFFICE O/P EST MOD 30 MIN: CPT | Performed by: OBSTETRICS & GYNECOLOGY

## 2022-02-01 PROCEDURE — 1036F TOBACCO NON-USER: CPT | Performed by: OBSTETRICS & GYNECOLOGY

## 2022-02-01 NOTE — PROGRESS NOTES
Haig Homans     Patient presents for postop appointment. Patient is s/p [facility] due to postmenopasual bleeding 1/18/22. A  Uterine polyp was noted on the anterior uterine wall. Pathology showed polyp fragments with atrophic/ inactive endometrium. No hyperplasia or cancer. Patient denies pain. States her bleeding stopped 1 week after surgery.      Past Medical History:   Diagnosis Date    Anxiety     Depression     Dyslipidemia     Hyperlipidemia     Obesity     Osteoarthritis     Post-menopausal bleeding         Past Surgical History:   Procedure Laterality Date    ANESTHESIA NERVE BLOCK Right 12/19/2019    RIGHT L3-4 TRANSFORAMINAL EPIDURAL STEROID INJECTION (CPT 19285) performed by Hetal Rosales DO at [address] Right 5/6/2021    RIGHT SACROILIAC JOINT INJECTION UNDER X-RAY GUIDANCE performed by Hetal Rosales DO at [facility] N/A 1/18/2022    HYSTEROSCOPY DILATION AND CURETTAGE performed by Olivia Potts MD at [facility] [address] JOINT Right 1/16/2020    RIGHT SACROILIAC JOINT STEROID INJECTION UNDER X-RAY GUIDANCE performed by Hetal Rosales DO at [address] [place] [address] Right 2015    NERVE BLOCK Right 12/19/2019    lumbar trasnforaminal    NERVE BLOCK Right 01/16/2020    right sacroiliac joint steroid injection     NERVE BLOCK Right 09/03/2020    right l3-4transforaminal epidural steroid injection    NERVE BLOCK Right 9/3/2020    RIGHT L3-4 TRANSFORAMINAL EPIDURAL STEROID INJECTION performed by Hetal Rosales DO at [facility] Bilateral 11/05/2020    medial branch block    NERVE BLOCK Bilateral 11/5/2020    MEDIAL BRANCH BLOCK BILATERAL L4-5 AND L5-S1 (CPT 64938) performed by Hetal Rosales DO at [facility] Bilateral 12/10/2020    medial branch block    NERVE BLOCK Bilateral 12/10/2020 BILATERAL MEDIAL BRANCH BLOCK L4-5 AND L5-S1 (CPT 34516) performed by Ana Stevens DO at [facility] Right 01/21/2021    RADIOFREQUENCY RIGHT L4-5 AND L5-S1    NERVE BLOCK Left 03/04/2021    fluoroscopic guided left lumbar medial branch  neurolysis (RFA)) at levels L4-5, L5-S1    NERVE BLOCK Right 05/06/2021    si    PAIN MANAGEMENT PROCEDURE Right 1/21/2021    RADIOFREQUENCY RIGHT L4-5 AND L5-S1 (CPT 17055) performed by Ana Stevens DO at [address] Left 3/4/2021    FLUOROSCOPIC-GUIDED LEFT LUMBAR MEDIAL BRANCH NUEROLYSIS (RADIOFREQUENCY ABLATION) AT LEVELS L4-5, L5-S1 (CPT 42917) performed by Ana Stevens DO at [address] Right [address]    SHOULDER SURGERY Left 2019        Family History   Problem Relation Age of Onset    Diabetes Mother     Atrial Fibrillation Mother     High Blood Pressure Mother     High Cholesterol Mother     Diabetes Father     Breast Cancer Maternal Grandmother         Social History     Tobacco History     Smoking Status  Never Smoker    Smokeless Tobacco Use  Never Used          Alcohol History     Alcohol Use Status  Yes Comment  occ          Drug Use     Drug Use Status  Never          Sexual Activity     Sexually Active  Yes Partners  Female Comment  occcasional                  Current Outpatient Medications:     atorvastatin (LIPITOR) 10 MG tablet, Take 1 tablet by mouth once daily, Disp: 90 tablet, Rfl: 1    vitamin D (ERGOCALCIFEROL) 1.25 MG (13368 UT) CAPS capsule, Take 1 capsule by mouth once a week, Disp: 12 capsule, Rfl: 1    cyclobenzaprine (FLEXERIL) 5 MG tablet, TAKE 2 TABLETS BY MOUTH NIGHTLY AS NEEDED FOR MUSCLE SPASM, Disp: 60 tablet, Rfl: 2    tobramycin-dexamethasone (TOBRADEX) 0.3-0.1 % ophthalmic ointment, Place into the left eye 3 times daily 3 times daily. , Disp: , Rfl:     prednisoLONE acetate (PRED FORTE) 1 % ophthalmic suspension, INSTILL 1 DROP INTO LEFT EYE 4 TIMES DAILY, Disp: , Rfl:     buPROPion (WELLBUTRIN XL) 300 MG extended release tablet, TAKE 1 TABLET BY MOUTH ONCE DAILY, Disp: , Rfl:     nystatin-triamcinolone (MYCOLOG II) 566700-9.1 UNIT/GM-% cream, Apply topically 2 times daily. , Disp: 30 g, Rfl: 1    busPIRone (BUSPAR) 10 MG tablet, TAKE 1 2 (ONE HALF) TABLET BY MOUTH TWICE DAILY FOR 7 DAYS THEN 1 TABLET TWICE DAILY THEREAFTER, Disp: , Rfl:     amLODIPine (NORVASC) 10 MG tablet, Take 1 tablet by mouth daily, Disp: 90 tablet, Rfl: 1    buPROPion (WELLBUTRIN XL) 150 MG extended release tablet, Take 450 mg by mouth every morning , Disp: , Rfl:     clonazePAM (KLONOPIN) 1 MG tablet, TAKE 1 TABLET BY MOUTH THREE TIMES DAILY AS NEEDED, Disp: , Rfl: 2    DULoxetine (CYMBALTA) 60 MG extended release capsule, Take 60 mg by mouth daily 2 capsules, Disp: , Rfl: 2    zolpidem (AMBIEN) 10 MG tablet, TAKE 1 2 (ONE HALF) TO 1 WHOLE TABLET BY MOUTH AT BEDTIME AS DIRECETED ON EMPTY STOMACH, Disp: , Rfl: 2    gabapentin (NEURONTIN) 300 MG capsule, TAKE 1 CAPSULE BY MOUTH THREE TIMES DAILY, Disp: 90 capsule, Rfl: 3     No Known Allergies     Vitals:    02/01/22 1319   BP: (!) 145/85   Pulse: 94        Physical Exam:  General: pleasant, alert     Breasts: deferred     Pelvic exam: deferred     Jennifer Desouza was seen today for post-op check. Diagnoses and all orders for this visit:    Uterine polyp      Advised to call with questions or concerns. Return for Annual, or as needed.      Kathi Conner MD

## 2022-02-01 NOTE — PROGRESS NOTES
Patient alert and pleasant with no new concerns  Here today for post-op exam  [facility] 1/18/22  Discharge instructions have been discussed with the patient. Patient advised to call our office with any questions or concerns. Voiced understanding.

## 2022-02-02 ENCOUNTER — OFFICE VISIT (OUTPATIENT)
Dept: FAMILY MEDICINE CLINIC | Age: 57
End: 2022-02-02
Payer: MEDICAID

## 2022-02-02 VITALS
WEIGHT: 274 LBS | HEART RATE: 92 BPM | HEIGHT: 63 IN | RESPIRATION RATE: 16 BRPM | DIASTOLIC BLOOD PRESSURE: 80 MMHG | BODY MASS INDEX: 48.55 KG/M2 | TEMPERATURE: 97.1 F | SYSTOLIC BLOOD PRESSURE: 130 MMHG | OXYGEN SATURATION: 98 %

## 2022-02-02 DIAGNOSIS — B00.1 RECURRENT COLD SORES: ICD-10-CM

## 2022-02-02 DIAGNOSIS — I10 ESSENTIAL HYPERTENSION: Primary | ICD-10-CM

## 2022-02-02 DIAGNOSIS — R73.03 PREDIABETES: ICD-10-CM

## 2022-02-02 DIAGNOSIS — E78.2 MIXED HYPERLIPIDEMIA: ICD-10-CM

## 2022-02-02 DIAGNOSIS — E55.9 VITAMIN D DEFICIENCY: ICD-10-CM

## 2022-02-02 PROCEDURE — G8427 DOCREV CUR MEDS BY ELIG CLIN: HCPCS | Performed by: FAMILY MEDICINE

## 2022-02-02 PROCEDURE — 99214 OFFICE O/P EST MOD 30 MIN: CPT | Performed by: FAMILY MEDICINE

## 2022-02-02 PROCEDURE — G8484 FLU IMMUNIZE NO ADMIN: HCPCS | Performed by: FAMILY MEDICINE

## 2022-02-02 PROCEDURE — 3017F COLORECTAL CA SCREEN DOC REV: CPT | Performed by: FAMILY MEDICINE

## 2022-02-02 PROCEDURE — G8417 CALC BMI ABV UP PARAM F/U: HCPCS | Performed by: FAMILY MEDICINE

## 2022-02-02 PROCEDURE — 1036F TOBACCO NON-USER: CPT | Performed by: FAMILY MEDICINE

## 2022-02-02 RX ORDER — VALACYCLOVIR HYDROCHLORIDE 1 G/1
TABLET, FILM COATED ORAL
Qty: 4 TABLET | Refills: 1 | Status: SHIPPED
Start: 2022-02-02 | End: 2022-03-24

## 2022-02-02 RX ORDER — AMLODIPINE BESYLATE 10 MG/1
10 TABLET ORAL DAILY
Qty: 90 TABLET | Refills: 1 | Status: SHIPPED
Start: 2022-02-02 | End: 2022-06-07 | Stop reason: SDUPTHER

## 2022-02-02 ASSESSMENT — ENCOUNTER SYMPTOMS
SHORTNESS OF BREATH: 0
WHEEZING: 0
COUGH: 0
NAUSEA: 0
VOMITING: 0
DIARRHEA: 0
CONSTIPATION: 0

## 2022-02-02 NOTE — PROGRESS NOTES
Sid Morocho (:  1965) is a 60 y.o. female,Established patient, here for evaluation of the following chief complaint(s):  Hypertension (6 month follow up ) and Blister (bottom lip x 2 days  and up left nostril x 1 week )      ASSESSMENT/PLAN:  1. Essential hypertension  -     amLODIPine (NORVASC) 10 MG tablet; Take 1 tablet by mouth daily, Disp-90 tablet, R-1Normal  -     CBC Auto Differential; Future  -     Comprehensive Metabolic Panel; Future  -     TSH; Future  2. Recurrent cold sores  -     valACYclovir (VALTREX) 1 g tablet; Take 2 g twice daily for 1 day, Disp-4 tablet, R-1Normal  3. Vitamin D deficiency  -     Vitamin D 25 Hydroxy; Future  4. Mixed hyperlipidemia  -     Comprehensive Metabolic Panel; Future  -     LIPID PANEL; Future  5. Prediabetes  -     Comprehensive Metabolic Panel; Future  -     TSH; Future  -     HEMOGLOBIN A1C; Future      Return in about 6 months (around 2022) for Physical, Hypertension, Hyperlipidemia, medication refills.     SUBJECTIVE/OBJECTIVE:  HPI  She also follows with PMR, Nephrology, Psych    F/U of chronic problem(s) and new or recent complaint of lip lesions   Chronic problems reviewed today include:  Hypertension  Current status of this/these condition(s):  stable  Tolerating meds: Yes    Hypertension  Current treatment: Amlodipine 10 mg daily  Recent medication changes: none; amlodipine previously increased    BP Readings from Last 3 Encounters:   22 130/80   22 (!) 145/85   22 120/82                                          Sodium (mmol/L)   Date Value   2021 142    BUN (mg/dL)   Date Value   2021 12    Glucose (mg/dL)   Date Value   2021 164 (H)      Potassium (mmol/L)   Date Value   2021 5.0    CREATININE (mg/dL)   Date Value   2021 0.9         Hyperlipidemia  Current treatment: Atorvastatin 10 mg daily  Recent medication changes: none    Lab Results   Component Value Date    CHOL 180 2021 TRIG 107 06/22/2021    HDL 62 06/22/2021    LDLCALC 97 06/22/2021     Lab Results   Component Value Date    ALT 34 (H) 06/22/2021    AST 27 06/22/2021        Vitamin D deficiency  On supplementation    Review of Systems   Constitutional: Negative for chills, fatigue and fever. HENT: Positive for mouth sores. Respiratory: Negative for cough, shortness of breath and wheezing. Cardiovascular: Negative for chest pain and palpitations. Gastrointestinal: Negative for constipation, diarrhea, nausea and vomiting. Genitourinary: Negative for difficulty urinating and dysuria. Neurological: Negative for headaches. Vitals:    02/02/22 1310   BP: 130/80   Pulse: 92   Resp: 16   Temp: 97.1 °F (36.2 °C)   TempSrc: Temporal   SpO2: 98%   Weight: 274 lb (124.3 kg)   Height: 5' 3\" (1.6 m)     Estimated body mass index is 48.54 kg/m² as calculated from the following:    Height as of this encounter: 5' 3\" (1.6 m). Weight as of this encounter: 274 lb (124.3 kg). Physical Exam  Constitutional:       General: She is not in acute distress. Appearance: She is well-developed. She is obese. HENT:      Head: Normocephalic and atraumatic. Mouth/Throat:      Mouth: Oral lesions present. Eyes:      Extraocular Movements: Extraocular movements intact. Conjunctiva/sclera: Conjunctivae normal.   Cardiovascular:      Rate and Rhythm: Normal rate and regular rhythm. Pulmonary:      Effort: Pulmonary effort is normal. No respiratory distress. Breath sounds: Normal breath sounds. No wheezing, rhonchi or rales. Abdominal:      General: There is no distension. Musculoskeletal:      Right lower leg: No edema. Left lower leg: No edema. Neurological:      General: No focal deficit present. Mental Status: She is alert. Mental status is at baseline. Prior to Visit Medications    Medication Sig Taking?  Authorizing Provider   amLODIPine (NORVASC) 10 MG tablet Take 1 tablet by mouth daily Yes Pavel Naqvi DO   valACYclovir (VALTREX) 1 g tablet Take 2 g twice daily for 1 day Yes Pavel Naqvi DO   atorvastatin (LIPITOR) 10 MG tablet Take 1 tablet by mouth once daily Yes Pavel Naqvi DO   vitamin D (ERGOCALCIFEROL) 1.25 MG (67769 UT) CAPS capsule Take 1 capsule by mouth once a week Yes Pavel Naqvi, DO   cyclobenzaprine (FLEXERIL) 5 MG tablet TAKE 2 TABLETS BY MOUTH NIGHTLY AS NEEDED FOR MUSCLE SPASM Yes Morales Tapia, DO   tobramycin-dexamethasone (TOBRADEX) 0.3-0.1 % ophthalmic ointment Place into the left eye 3 times daily 3 times daily. Yes Historical Provider, MD   prednisoLONE acetate (PRED FORTE) 1 % ophthalmic suspension INSTILL 1 DROP INTO LEFT EYE 4 TIMES DAILY Yes Historical Provider, MD   buPROPion (WELLBUTRIN XL) 300 MG extended release tablet TAKE 1 TABLET BY MOUTH ONCE DAILY Yes Historical Provider, MD   nystatin-triamcinolone (MYCOLOG II) 586200-2.7 UNIT/GM-% cream Apply topically 2 times daily. Yes Yvette Salazar MD   busPIRone (BUSPAR) 10 MG tablet Take 10 mg by mouth 2 times daily  Yes Historical Provider, MD   gabapentin (NEURONTIN) 300 MG capsule TAKE 1 CAPSULE BY MOUTH THREE TIMES DAILY Yes Morales Tapia, DO   buPROPion (WELLBUTRIN XL) 150 MG extended release tablet Take 450 mg by mouth every morning  Yes Historical Provider, MD   clonazePAM (KLONOPIN) 1 MG tablet Take 1 mg by mouth 2 times daily as needed.   Yes Historical Provider, MD   DULoxetine (CYMBALTA) 60 MG extended release capsule Take 60 mg by mouth daily 2 capsules Yes Historical Provider, MD   zolpidem (AMBIEN) 10 MG tablet TAKE 1 2 (ONE HALF) TO 1 WHOLE TABLET BY MOUTH AT BEDTIME AS DIRECETED ON EMPTY STOMACH Yes Historical Provider, MD        \plain    Future Appointments   Date Time Provider Cadence Titus   8/3/2022 10:00 AM DO Bettie [address] [place] [address]   10/4/2022  3:00 PM Yvette Salazar MD [address] [place] [address]       An electronic signature was used to authenticate this note.     --Balbir Rosa, DO

## 2022-02-18 ENCOUNTER — OFFICE VISIT (OUTPATIENT)
Dept: NEUROSURGERY | Age: 57
End: 2022-02-18
Payer: MEDICAID

## 2022-02-18 ENCOUNTER — TELEPHONE (OUTPATIENT)
Dept: FAMILY MEDICINE CLINIC | Age: 57
End: 2022-02-18

## 2022-02-18 VITALS
WEIGHT: 274 LBS | BODY MASS INDEX: 48.55 KG/M2 | TEMPERATURE: 97.6 F | RESPIRATION RATE: 20 BRPM | DIASTOLIC BLOOD PRESSURE: 88 MMHG | SYSTOLIC BLOOD PRESSURE: 151 MMHG | HEIGHT: 63 IN | OXYGEN SATURATION: 97 % | HEART RATE: 105 BPM

## 2022-02-18 DIAGNOSIS — G89.29 CHRONIC BILATERAL LOW BACK PAIN WITHOUT SCIATICA: Primary | ICD-10-CM

## 2022-02-18 DIAGNOSIS — M54.50 CHRONIC BILATERAL LOW BACK PAIN WITHOUT SCIATICA: Primary | ICD-10-CM

## 2022-02-18 DIAGNOSIS — M43.16 SPONDYLOLISTHESIS OF LUMBAR REGION: ICD-10-CM

## 2022-02-18 DIAGNOSIS — M54.16 LUMBAR RADICULOPATHY: ICD-10-CM

## 2022-02-18 PROCEDURE — 1036F TOBACCO NON-USER: CPT | Performed by: STUDENT IN AN ORGANIZED HEALTH CARE EDUCATION/TRAINING PROGRAM

## 2022-02-18 PROCEDURE — 99203 OFFICE O/P NEW LOW 30 MIN: CPT | Performed by: STUDENT IN AN ORGANIZED HEALTH CARE EDUCATION/TRAINING PROGRAM

## 2022-02-18 PROCEDURE — G8417 CALC BMI ABV UP PARAM F/U: HCPCS | Performed by: STUDENT IN AN ORGANIZED HEALTH CARE EDUCATION/TRAINING PROGRAM

## 2022-02-18 PROCEDURE — G8427 DOCREV CUR MEDS BY ELIG CLIN: HCPCS | Performed by: STUDENT IN AN ORGANIZED HEALTH CARE EDUCATION/TRAINING PROGRAM

## 2022-02-18 PROCEDURE — G8484 FLU IMMUNIZE NO ADMIN: HCPCS | Performed by: STUDENT IN AN ORGANIZED HEALTH CARE EDUCATION/TRAINING PROGRAM

## 2022-02-18 PROCEDURE — 3017F COLORECTAL CA SCREEN DOC REV: CPT | Performed by: STUDENT IN AN ORGANIZED HEALTH CARE EDUCATION/TRAINING PROGRAM

## 2022-02-18 ASSESSMENT — ENCOUNTER SYMPTOMS
PHOTOPHOBIA: 0
BACK PAIN: 1
SHORTNESS OF BREATH: 0
TROUBLE SWALLOWING: 0
ABDOMINAL PAIN: 0

## 2022-02-18 NOTE — PROGRESS NOTES
intact  Motor Strength full   Sensation intact to light touch   Reflexes normal   Tenderness to palpation of lumbar spine   Psychiatric:         Thought Content: Thought content normal.         Assessment:      -Brock Gross is a 59 y/o female who presents with chronic low back pain without radiculopathy. Patient has tried  PT, KATHY and chirpractor manipulation with minor relief.        Plan:      -Pain control and expectations discuseed  -Obtain MRI lumbar spine spine to evaluate for stenosis   -OARRS report reviewed   -Return to Neurosurgery clinic after completion of imaging to discuss results and further treatment plan  -Call/return sooner if symptoms worsen or new issues arise in the interim         Sheryl Plummer PA-C

## 2022-02-18 NOTE — TELEPHONE ENCOUNTER
----- Message from Sabine [place] Day sent at 2/18/2022 12:51 PM EST -----  Subject: Message to Provider    QUESTIONS  Information for Provider? pt got notice that Dr. Arlene Vasquez was leaving the   practice, Pt said she has seen Dr. Radhika Berumen (sp) in the office there   when Dr. Arlene Vasquez wasn't available and would like to know if Dr. Radhika Berumen would take her as a pt, pt would like to continue care in   that office if possible. thank you  ---------------------------------------------------------------------------  --------------  CALL BACK INFO  What is the best way for the office to contact you? OK to leave message on   voicemail  Preferred Call Back Phone Number? [phone]  ---------------------------------------------------------------------------  --------------  SCRIPT ANSWERS  Relationship to Patient?  Self

## 2022-02-28 RX ORDER — CYCLOBENZAPRINE HCL 5 MG
TABLET ORAL
Qty: 60 TABLET | Refills: 0 | OUTPATIENT
Start: 2022-02-28

## 2022-03-07 ENCOUNTER — HOSPITAL ENCOUNTER (OUTPATIENT)
Dept: MRI IMAGING | Age: 57
Discharge: HOME | End: 2022-03-09
Payer: MEDICAID

## 2022-03-07 ENCOUNTER — HOSPITAL ENCOUNTER (OUTPATIENT)
Dept: GENERAL RADIOLOGY | Age: 57
Discharge: HOME | End: 2022-03-09
Payer: MEDICAID

## 2022-03-07 ENCOUNTER — HOSPITAL ENCOUNTER (OUTPATIENT)
Age: 57
Discharge: HOME | End: 2022-03-09
Payer: MEDICAID

## 2022-03-07 DIAGNOSIS — M54.16 LUMBAR RADICULOPATHY: ICD-10-CM

## 2022-03-07 DIAGNOSIS — M54.50 CHRONIC BILATERAL LOW BACK PAIN WITHOUT SCIATICA: ICD-10-CM

## 2022-03-07 DIAGNOSIS — G89.29 CHRONIC BILATERAL LOW BACK PAIN WITHOUT SCIATICA: ICD-10-CM

## 2022-03-07 DIAGNOSIS — M43.16 SPONDYLOLISTHESIS OF LUMBAR REGION: ICD-10-CM

## 2022-03-07 PROCEDURE — 72120 X-RAY BEND ONLY L-S SPINE: CPT

## 2022-03-07 PROCEDURE — 72148 MRI LUMBAR SPINE W/O DYE: CPT

## 2022-03-07 RX ORDER — CYCLOBENZAPRINE HCL 5 MG
TABLET ORAL
Qty: 60 TABLET | Refills: 0 | Status: SHIPPED
Start: 2022-03-07 | End: 2022-06-07

## 2022-03-07 NOTE — TELEPHONE ENCOUNTER
Patient of Dr Oma Pickard. Patient last visit 10-18-21 no return visit scheduled. Pharmacy requesting refill for flexeril 5 mg 2 tabs qhs prn sent 11-16-21 #60 2 refills. Please advise.

## 2022-03-15 ENCOUNTER — OFFICE VISIT (OUTPATIENT)
Dept: NEUROSURGERY | Age: 57
End: 2022-03-15
Payer: MEDICAID

## 2022-03-15 VITALS
HEIGHT: 63 IN | SYSTOLIC BLOOD PRESSURE: 153 MMHG | TEMPERATURE: 98.1 F | RESPIRATION RATE: 20 BRPM | DIASTOLIC BLOOD PRESSURE: 95 MMHG | HEART RATE: 99 BPM | OXYGEN SATURATION: 100 % | WEIGHT: 274 LBS | BODY MASS INDEX: 48.55 KG/M2

## 2022-03-15 DIAGNOSIS — M54.50 CHRONIC BILATERAL LOW BACK PAIN WITHOUT SCIATICA: Primary | ICD-10-CM

## 2022-03-15 DIAGNOSIS — G89.29 CHRONIC BILATERAL LOW BACK PAIN WITHOUT SCIATICA: Primary | ICD-10-CM

## 2022-03-15 PROCEDURE — G8484 FLU IMMUNIZE NO ADMIN: HCPCS | Performed by: STUDENT IN AN ORGANIZED HEALTH CARE EDUCATION/TRAINING PROGRAM

## 2022-03-15 PROCEDURE — G8417 CALC BMI ABV UP PARAM F/U: HCPCS | Performed by: STUDENT IN AN ORGANIZED HEALTH CARE EDUCATION/TRAINING PROGRAM

## 2022-03-15 PROCEDURE — 3017F COLORECTAL CA SCREEN DOC REV: CPT | Performed by: STUDENT IN AN ORGANIZED HEALTH CARE EDUCATION/TRAINING PROGRAM

## 2022-03-15 PROCEDURE — G8427 DOCREV CUR MEDS BY ELIG CLIN: HCPCS | Performed by: STUDENT IN AN ORGANIZED HEALTH CARE EDUCATION/TRAINING PROGRAM

## 2022-03-15 PROCEDURE — 1036F TOBACCO NON-USER: CPT | Performed by: STUDENT IN AN ORGANIZED HEALTH CARE EDUCATION/TRAINING PROGRAM

## 2022-03-15 PROCEDURE — 99213 OFFICE O/P EST LOW 20 MIN: CPT | Performed by: STUDENT IN AN ORGANIZED HEALTH CARE EDUCATION/TRAINING PROGRAM
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
## 2022-03-24 ENCOUNTER — OFFICE VISIT (OUTPATIENT)
Dept: PAIN MANAGEMENT | Age: 57
End: 2022-03-24
Payer: MEDICAID

## 2022-03-24 VITALS
OXYGEN SATURATION: 99 % | DIASTOLIC BLOOD PRESSURE: 81 MMHG | TEMPERATURE: 96.8 F | RESPIRATION RATE: 16 BRPM | HEIGHT: 63 IN | SYSTOLIC BLOOD PRESSURE: 128 MMHG | BODY MASS INDEX: 48.55 KG/M2 | HEART RATE: 83 BPM | WEIGHT: 274 LBS

## 2022-03-24 DIAGNOSIS — G89.4 CHRONIC PAIN SYNDROME: ICD-10-CM

## 2022-03-24 DIAGNOSIS — M47.816 LUMBAR FACET ARTHROPATHY: ICD-10-CM

## 2022-03-24 DIAGNOSIS — M47.816 LUMBAR SPONDYLOSIS: ICD-10-CM

## 2022-03-24 DIAGNOSIS — M51.9 LUMBAR DISC DISORDER: Primary | ICD-10-CM

## 2022-03-24 DIAGNOSIS — E66.01 MORBID OBESITY WITH BMI OF 45.0-49.9, ADULT (HCC): ICD-10-CM

## 2022-03-24 PROCEDURE — 99204 OFFICE O/P NEW MOD 45 MIN: CPT | Performed by: PAIN MEDICINE

## 2022-03-24 PROCEDURE — G8484 FLU IMMUNIZE NO ADMIN: HCPCS | Performed by: PAIN MEDICINE

## 2022-03-24 PROCEDURE — 1036F TOBACCO NON-USER: CPT | Performed by: PAIN MEDICINE

## 2022-03-24 PROCEDURE — G8427 DOCREV CUR MEDS BY ELIG CLIN: HCPCS | Performed by: PAIN MEDICINE

## 2022-03-24 PROCEDURE — 3017F COLORECTAL CA SCREEN DOC REV: CPT | Performed by: PAIN MEDICINE

## 2022-03-24 PROCEDURE — G8417 CALC BMI ABV UP PARAM F/U: HCPCS | Performed by: PAIN MEDICINE
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
## 2022-03-24 NOTE — PROGRESS NOTES
Do you currently have any of the following:    Fever: No  Headache:  No  Cough: No  Shortness of breath: No  Exposed to anyone with these symptoms: No                                                                                                                Mira Mention presents to the [facility] on 3/24/2022. Chanda Rose is complaining of pain from shoulder blades down to lower back. . The pain is constant. The pain is described as aching, shooting, stabbing and sharp. Pain is rated on her best day at a 7, on her worst day at a 10, and on average at a 8 on the VAS scale. She took her last dose of flexeril and tylenol . Chanda Rose does not have issues with constipation. Any procedures since your last visit: No,    She is not on NSAIDS and  is not on anticoagulation medications to include none and is managed by Austen Edmond DO  . Pacemaker or defibrillator: No Physician managing device is NA. Medication Contract and Consent for Opioid Use Documents Filed      No documents found                   /81   Pulse 83   Temp 96.8 °F (36 °C) (Infrared)   Resp 16   Ht 5' 3\" (1.6 m)   Wt 274 lb (124.3 kg)   LMP  (LMP Unknown)   SpO2 99%   BMI 48.54 kg/m²      No LMP recorded (lmp unknown).  Patient is postmenopausal.

## 2022-03-29 RX ORDER — CYCLOBENZAPRINE HCL 5 MG
TABLET ORAL
Qty: 60 TABLET | Refills: 0 | OUTPATIENT
Start: 2022-03-29

## 2022-03-30 ENCOUNTER — OFFICE VISIT (OUTPATIENT)
Dept: NEUROSURGERY | Age: 57
End: 2022-03-30
Payer: MEDICAID

## 2022-03-30 VITALS
RESPIRATION RATE: 20 BRPM | TEMPERATURE: 98.1 F | DIASTOLIC BLOOD PRESSURE: 86 MMHG | HEART RATE: 100 BPM | HEIGHT: 63 IN | OXYGEN SATURATION: 97 % | BODY MASS INDEX: 48.55 KG/M2 | WEIGHT: 274 LBS | SYSTOLIC BLOOD PRESSURE: 140 MMHG

## 2022-03-30 DIAGNOSIS — M47.816 LUMBAR SPONDYLOSIS: Primary | ICD-10-CM

## 2022-03-30 DIAGNOSIS — M54.16 LUMBAR RADICULOPATHY: ICD-10-CM

## 2022-03-30 DIAGNOSIS — M54.41 CHRONIC RIGHT-SIDED LOW BACK PAIN WITH RIGHT-SIDED SCIATICA: ICD-10-CM

## 2022-03-30 DIAGNOSIS — G89.29 CHRONIC RIGHT-SIDED LOW BACK PAIN WITH RIGHT-SIDED SCIATICA: ICD-10-CM

## 2022-03-30 PROCEDURE — 1036F TOBACCO NON-USER: CPT | Performed by: STUDENT IN AN ORGANIZED HEALTH CARE EDUCATION/TRAINING PROGRAM

## 2022-03-30 PROCEDURE — G8417 CALC BMI ABV UP PARAM F/U: HCPCS | Performed by: STUDENT IN AN ORGANIZED HEALTH CARE EDUCATION/TRAINING PROGRAM

## 2022-03-30 PROCEDURE — G8484 FLU IMMUNIZE NO ADMIN: HCPCS | Performed by: STUDENT IN AN ORGANIZED HEALTH CARE EDUCATION/TRAINING PROGRAM

## 2022-03-30 PROCEDURE — G8427 DOCREV CUR MEDS BY ELIG CLIN: HCPCS | Performed by: STUDENT IN AN ORGANIZED HEALTH CARE EDUCATION/TRAINING PROGRAM

## 2022-03-30 PROCEDURE — 3017F COLORECTAL CA SCREEN DOC REV: CPT | Performed by: STUDENT IN AN ORGANIZED HEALTH CARE EDUCATION/TRAINING PROGRAM

## 2022-03-30 PROCEDURE — 99213 OFFICE O/P EST LOW 20 MIN: CPT | Performed by: STUDENT IN AN ORGANIZED HEALTH CARE EDUCATION/TRAINING PROGRAM
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
## 2022-04-01 ENCOUNTER — TELEPHONE (OUTPATIENT)
Dept: NEUROSURGERY | Age: 57
End: 2022-04-01

## 2022-04-01 RX ORDER — CYCLOBENZAPRINE HCL 5 MG
TABLET ORAL
Qty: 60 TABLET | Refills: 0 | OUTPATIENT
Start: 2022-04-01

## 2022-04-07 ENCOUNTER — TELEPHONE (OUTPATIENT)
Dept: NEUROSURGERY | Age: 57
End: 2022-04-07

## 2022-04-07 NOTE — TELEPHONE ENCOUNTER
Shannan from [facility] called stating appeal for MRI was approved    [facility]  Auth: [id number]  Dates: 3/4/22 - 06/02/2022    Electronically signed by Rebekah Velasquez MA on 4/7/22 at 3:48 PM EDT

## 2022-05-18 ENCOUNTER — OFFICE VISIT (OUTPATIENT)
Dept: NEUROSURGERY | Age: 57
End: 2022-05-18
Payer: MEDICAID

## 2022-05-18 ENCOUNTER — CLINICAL DOCUMENTATION (OUTPATIENT)
Dept: SPIRITUAL SERVICES | Age: 57
End: 2022-05-18

## 2022-05-18 VITALS — BODY MASS INDEX: 44.3 KG/M2 | WEIGHT: 250 LBS | HEIGHT: 63 IN

## 2022-05-18 DIAGNOSIS — M54.41 ACUTE MIDLINE LOW BACK PAIN WITH BILATERAL SCIATICA: Primary | ICD-10-CM

## 2022-05-18 DIAGNOSIS — M54.42 ACUTE MIDLINE LOW BACK PAIN WITH BILATERAL SCIATICA: Primary | ICD-10-CM

## 2022-05-18 PROCEDURE — G8428 CUR MEDS NOT DOCUMENT: HCPCS | Performed by: NEUROLOGICAL SURGERY

## 2022-05-18 PROCEDURE — 99212 OFFICE O/P EST SF 10 MIN: CPT

## 2022-05-18 PROCEDURE — 3017F COLORECTAL CA SCREEN DOC REV: CPT | Performed by: NEUROLOGICAL SURGERY

## 2022-05-18 PROCEDURE — G8417 CALC BMI ABV UP PARAM F/U: HCPCS | Performed by: NEUROLOGICAL SURGERY

## 2022-05-18 PROCEDURE — 99213 OFFICE O/P EST LOW 20 MIN: CPT | Performed by: NEUROLOGICAL SURGERY

## 2022-05-18 PROCEDURE — 1036F TOBACCO NON-USER: CPT | Performed by: NEUROLOGICAL SURGERY

## 2022-05-18 RX ORDER — CYCLOBENZAPRINE HCL 10 MG
10 TABLET ORAL NIGHTLY PRN
Qty: 30 TABLET | Refills: 0 | Status: SHIPPED
Start: 2022-05-18 | End: 2022-08-02

## 2022-05-18 NOTE — ACP (ADVANCE CARE PLANNING)
given to staff to scan into medical record. Routed ACP note to attending provider or other IDT member.     Patient / Healthcare Decision Maker Instructions:  Review completed ACP document(s) and update, if needed, with changes health or future preferences    Electronically signed by Kristin [facility] on 5/18/2022 at 1:07 PM.

## 2022-05-18 NOTE — PROGRESS NOTES
Patient is here for follow up consult for: back and bilateral leg pain    Physical exam  Alert and Oriented X3  PERRLA, EOMI  HARRIS 5/5 except 4/5 in RLE  Sensation intact to LT and PP  Reflexes are 2+ and symmetric    A/P: patient is here for follow up for: back and right leg pain. Her MRI shows spondylolisthesis at L3-L4 with stenosis from L3-L5. She has failed over 3 months of physical therapy and epidural steroid injections.  I am recommending an L3-L4 and L4-L5 posterior ;lumbar interbody fusion    Genevieve Travis MD

## 2022-06-03 ENCOUNTER — PREP FOR PROCEDURE (OUTPATIENT)
Dept: NEUROSURGERY | Age: 57
End: 2022-06-03

## 2022-06-03 DIAGNOSIS — Z01.818 PRE-OP TESTING: Primary | ICD-10-CM

## 2022-06-03 RX ORDER — SODIUM CHLORIDE 0.9 % (FLUSH) 0.9 %
5-40 SYRINGE (ML) INJECTION PRN
Status: CANCELLED | OUTPATIENT
Start: 2022-06-03

## 2022-06-03 RX ORDER — SODIUM CHLORIDE 9 MG/ML
INJECTION, SOLUTION INTRAVENOUS CONTINUOUS
Status: CANCELLED | OUTPATIENT
Start: 2022-06-03

## 2022-06-03 RX ORDER — SODIUM CHLORIDE 0.9 % (FLUSH) 0.9 %
5-40 SYRINGE (ML) INJECTION EVERY 12 HOURS SCHEDULED
Status: CANCELLED | OUTPATIENT
Start: 2022-06-03

## 2022-06-03 RX ORDER — SODIUM CHLORIDE 9 MG/ML
INJECTION, SOLUTION INTRAVENOUS PRN
Status: CANCELLED | OUTPATIENT
Start: 2022-06-03

## 2022-06-07 ENCOUNTER — OFFICE VISIT (OUTPATIENT)
Dept: FAMILY MEDICINE CLINIC | Age: 57
End: 2022-06-07
Payer: MEDICAID

## 2022-06-07 VITALS
BODY MASS INDEX: 46.6 KG/M2 | DIASTOLIC BLOOD PRESSURE: 84 MMHG | HEART RATE: 100 BPM | HEIGHT: 63 IN | WEIGHT: 263 LBS | SYSTOLIC BLOOD PRESSURE: 132 MMHG | RESPIRATION RATE: 20 BRPM | OXYGEN SATURATION: 96 %

## 2022-06-07 DIAGNOSIS — E55.9 VITAMIN D DEFICIENCY: ICD-10-CM

## 2022-06-07 DIAGNOSIS — I10 ESSENTIAL HYPERTENSION: Primary | ICD-10-CM

## 2022-06-07 DIAGNOSIS — E78.2 MIXED HYPERLIPIDEMIA: ICD-10-CM

## 2022-06-07 PROCEDURE — G8417 CALC BMI ABV UP PARAM F/U: HCPCS | Performed by: FAMILY MEDICINE

## 2022-06-07 PROCEDURE — 99214 OFFICE O/P EST MOD 30 MIN: CPT | Performed by: FAMILY MEDICINE

## 2022-06-07 PROCEDURE — G8427 DOCREV CUR MEDS BY ELIG CLIN: HCPCS | Performed by: FAMILY MEDICINE

## 2022-06-07 PROCEDURE — 1036F TOBACCO NON-USER: CPT | Performed by: FAMILY MEDICINE

## 2022-06-07 PROCEDURE — 3017F COLORECTAL CA SCREEN DOC REV: CPT | Performed by: FAMILY MEDICINE

## 2022-06-07 RX ORDER — ERGOCALCIFEROL 1.25 MG/1
50000 CAPSULE ORAL WEEKLY
Qty: 12 CAPSULE | Refills: 1 | Status: SHIPPED | OUTPATIENT
Start: 2022-06-07

## 2022-06-07 RX ORDER — AMLODIPINE BESYLATE 10 MG/1
10 TABLET ORAL DAILY
Qty: 90 TABLET | Refills: 1 | Status: SHIPPED | OUTPATIENT
Start: 2022-06-07

## 2022-06-07 RX ORDER — ATORVASTATIN CALCIUM 10 MG/1
TABLET, FILM COATED ORAL
Qty: 90 TABLET | Refills: 1 | Status: SHIPPED | OUTPATIENT
Start: 2022-06-07

## 2022-06-07 RX ORDER — VALACYCLOVIR HYDROCHLORIDE 1 G/1
2000 TABLET, FILM COATED ORAL DAILY
Qty: 4 TABLET | Refills: 3 | Status: SHIPPED | OUTPATIENT
Start: 2022-06-07

## 2022-06-07 ASSESSMENT — PATIENT HEALTH QUESTIONNAIRE - PHQ9
SUM OF ALL RESPONSES TO PHQ QUESTIONS 1-9: 6
4. FEELING TIRED OR HAVING LITTLE ENERGY: 1
9. THOUGHTS THAT YOU WOULD BE BETTER OFF DEAD, OR OF HURTING YOURSELF: 0
SUM OF ALL RESPONSES TO PHQ9 QUESTIONS 1 & 2: 3
5. POOR APPETITE OR OVEREATING: 0
SUM OF ALL RESPONSES TO PHQ QUESTIONS 1-9: 6
2. FEELING DOWN, DEPRESSED OR HOPELESS: 1
SUM OF ALL RESPONSES TO PHQ QUESTIONS 1-9: 6
8. MOVING OR SPEAKING SO SLOWLY THAT OTHER PEOPLE COULD HAVE NOTICED. OR THE OPPOSITE, BEING SO FIGETY OR RESTLESS THAT YOU HAVE BEEN MOVING AROUND A LOT MORE THAN USUAL: 0
1. LITTLE INTEREST OR PLEASURE IN DOING THINGS: 2
SUM OF ALL RESPONSES TO PHQ QUESTIONS 1-9: 6
3. TROUBLE FALLING OR STAYING ASLEEP: 1
7. TROUBLE CONCENTRATING ON THINGS, SUCH AS READING THE NEWSPAPER OR WATCHING TELEVISION: 0
6. FEELING BAD ABOUT YOURSELF - OR THAT YOU ARE A FAILURE OR HAVE LET YOURSELF OR YOUR FAMILY DOWN: 1

## 2022-06-07 ASSESSMENT — ENCOUNTER SYMPTOMS
NAUSEA: 0
VOMITING: 0
SHORTNESS OF BREATH: 0
DIARRHEA: 0

## 2022-06-07 NOTE — PROGRESS NOTES
Patient is a new patient here to get established. Iris Gamboa(:  1965) is a 59 y.o. female, here for     Patient has history of hypertension. Has been under control. Patient planning to have upcoming back surgery by Dr. Lindsey Angulo for L3-L5 instability. Denies difficulty with anesthesia in the past.    Patient has history of depression. Sees psychiatry regularly. Patient doing well on current regimen for vitamin D deficiency. Review of Systems   Eyes: Negative for visual disturbance. Respiratory: Negative for shortness of breath. Cardiovascular: Positive for leg swelling. Negative for chest pain and palpitations. Gastrointestinal: Negative for diarrhea, nausea and vomiting. Genitourinary: Negative for difficulty urinating, dysuria and frequency. Skin: Positive for rash (under breasts comes and goes and rash on hands). Psychiatric/Behavioral: Positive for dysphoric mood. Prior to Visit Medications    Medication Sig Taking? Authorizing Provider   nystatin-triamcinolone (MYCOLOG II) 384773-5.4 UNIT/GM-% cream Apply topically 2 times daily. Yes Maurisio Coelho MD   amLODIPine (NORVASC) 10 MG tablet Take 1 tablet by mouth daily Yes Maurisio Coelho MD   atorvastatin (LIPITOR) 10 MG tablet Take 1 tablet by mouth once daily Yes Maurisio Coelho MD   valACYclovir (VALTREX) 1 g tablet Take 2 tablets by mouth daily Yes Maurisio Coelho MD   vitamin D (ERGOCALCIFEROL) 1.25 MG (65578 UT) CAPS capsule Take 1 capsule by mouth once a week Yes Maurisio Coelho MD   cyclobenzaprine (FLEXERIL) 10 MG tablet Take 1 tablet by mouth nightly as needed for Muscle spasms Yes Renee Herrera MD   tobramycin-dexamethasone (TOBRADEX) 0.3-0.1 % ophthalmic ointment Place into the left eye 3 times daily 3 times daily.  Yes Historical Provider, MD   prednisoLONE acetate (PRED FORTE) 1 % ophthalmic suspension INSTILL 1 DROP INTO LEFT EYE 4 TIMES DAILY Yes TRANSFORAMINAL EPIDURAL STEROID INJECTION performed by Wesley Chris DO at [facility] Bilateral 11/05/2020    medial branch block    NERVE BLOCK Bilateral 11/5/2020    MEDIAL BRANCH BLOCK BILATERAL L4-5 AND L5-S1 (CPT 77139) performed by Wesley Chris DO at [facility] Bilateral 12/10/2020    medial branch block    NERVE BLOCK Bilateral 12/10/2020    BILATERAL MEDIAL BRANCH BLOCK L4-5 AND L5-S1 (CPT 98337) performed by Wesley Chris DO at [facility] Right 01/21/2021    RADIOFREQUENCY RIGHT L4-5 AND L5-S1    NERVE BLOCK Left 03/04/2021    fluoroscopic guided left lumbar medial branch  neurolysis (RFA)) at levels L4-5, L5-S1    NERVE BLOCK Right 05/06/2021    si    PAIN MANAGEMENT PROCEDURE Right 1/21/2021    RADIOFREQUENCY RIGHT L4-5 AND L5-S1 (CPT 52885) performed by Wesley Chris DO at [address] Left 3/4/2021    FLUOROSCOPIC-GUIDED LEFT LUMBAR MEDIAL BRANCH NUEROLYSIS (RADIOFREQUENCY ABLATION) AT LEVELS L4-5, L5-S1 (CPT 29176) performed by Wesley Chris DO at [address] Right 2000    SHOULDER SURGERY Left 2019       Social History     Socioeconomic History    Marital status:      Spouse name: Not on file    Number of children: Not on file    Years of education: Not on file    Highest education level: Not on file   Occupational History    Not on file   Tobacco Use    Smoking status: Never Smoker    Smokeless tobacco: Never Used   Vaping Use    Vaping Use: Never used   Substance and Sexual Activity    Alcohol use: Yes     Comment: occ    Drug use: Never    Sexual activity: Yes     Partners: Female     Comment: occcasional   Other Topics Concern    Not on file   Social History Narrative    Not on file     Social Determinants of Health     Financial Resource Strain: Low Risk     Difficulty of Paying Living Expenses: Not hard at all   Food Insecurity: No Food Insecurity    Worried About [facility] in the Last Year: Never true    Maximiliano of Food in the Last Year: Never true   Transportation Needs: No Transportation Needs    Lack of Transportation (Medical): No    Lack of Transportation (Non-Medical): No   Physical Activity:     Days of Exercise per Week: Not on file    Minutes of Exercise per Session: Not on file   Stress:     Feeling of Stress : Not on file   Social Connections:     Frequency of Communication with Friends and Family: Not on file    Frequency of Social Gatherings with Friends and Family: Not on file    Attends Religion Services: Not on file    Active Member of [address] [place] [address] or Organizations: Not on file    Attends Club or Organization Meetings: Not on file    Marital Status: Not on file   Intimate Partner Violence:     Fear of Current or Ex-Partner: Not on file    Emotionally Abused: Not on file    Physically Abused: Not on file    Sexually Abused: Not on file   Housing Stability: [address] Unable to Pay for Housing in the Last Year: No    Number of [place] in the Last Year: 1    Unstable Housing in the Last Year: No        Family History   Problem Relation Age of Onset    Diabetes Mother     Atrial Fibrillation Mother     High Blood Pressure Mother     High Cholesterol Mother     Diabetes Father     Breast Cancer Maternal Grandmother        Vitals:    06/07/22 1206   BP: 132/84   Pulse: 100   Resp: 20   SpO2: 96%   Weight: 263 lb (119.3 kg)   Height: 5' 3\" (1.6 m)     Estimated body mass index is 46.59 kg/m² as calculated from the following:    Height as of this encounter: 5' 3\" (1.6 m). Weight as of this encounter: 263 lb (119.3 kg). Physical Exam  Vitals reviewed. Constitutional:       General: She is not in acute distress. Appearance: She is well-developed. Neck:      Vascular: No carotid bruit.    Cardiovascular:      Rate and Rhythm: Normal rate and regular rhythm. Heart sounds: Normal heart sounds. No murmur heard. No gallop. Pulmonary:      Effort: Pulmonary effort is normal.      Breath sounds: Normal breath sounds. No wheezing or rales. Abdominal:      General: Bowel sounds are normal. There is no distension. Palpations: Abdomen is soft. Tenderness: There is no abdominal tenderness. Musculoskeletal:      Cervical back: Neck supple. Right lower leg: No edema. Left lower leg: No edema. Skin:     General: Skin is warm and dry. Neurological:      Mental Status: She is alert and oriented to person, place, and time. ASSESSMENT/PLAN:  Neo Tejada was seen today for established new doctor. Diagnoses and all orders for this visit:    Essential hypertension  -     amLODIPine (NORVASC) 10 MG tablet; Take 1 tablet by mouth daily    Mixed hyperlipidemia  -     TSH; Future  -     Lipid Panel; Future  -     atorvastatin (LIPITOR) 10 MG tablet; Take 1 tablet by mouth once daily    Vitamin D deficiency  -     vitamin D (ERGOCALCIFEROL) 1.25 MG (98801 UT) CAPS capsule; Take 1 capsule by mouth once a week  -     Vitamin D 25 Hydroxy; Future              Return in about 6 months (around 12/7/2022) for hypertension.     Maribel Gama MD

## 2022-06-07 NOTE — PATIENT INSTRUCTIONS
Patient Education        High Blood Pressure: Care Instructions  Overview     It's normal for blood pressure to go up and down throughout the day. But if it stays up, you have high blood pressure. Another name for high blood pressure ishypertension. Despite what a lot of people think, high blood pressure usually doesn't cause headaches or make you feel dizzy or lightheaded. It usually has no symptoms. But it does increase your risk of stroke, heart attack, and other problems. You and your doctor will talk about your risks of these problems based on yourblood pressure. Your doctor will give you a goal for your blood pressure. Your goal will bebased on your health and your age. Lifestyle changes, such as eating healthy and being active, are always important to help lower blood pressure. You might also take medicine to reachyour blood pressure goal.  Follow-up care is a key part of your treatment and safety. Be sure to make and go to all appointments, and call your doctor if you are having problems. It's also a good idea to know your test results and keep alist of the medicines you take. How can you care for yourself at home? Medical treatment   If you stop taking your medicine, your blood pressure will go back up. You may take one or more types of medicine to lower your blood pressure. Be safe with medicines. Take your medicine exactly as prescribed. Call your doctor if you think you are having a problem with your medicine.  Talk to your doctor before you start taking aspirin every day. Aspirin can help certain people lower their risk of a heart attack or stroke. But taking aspirin isn't right for everyone, because it can cause serious bleeding.  See your doctor regularly. You may need to see the doctor more often at first or until your blood pressure comes down.  If you are taking blood pressure medicine, talk to your doctor before you take decongestants or anti-inflammatory medicine, such as ibuprofen. Some of these medicines can raise blood pressure.  Learn how to check your blood pressure at home. Lifestyle changes   Stay at a healthy weight. This is especially important if you put on weight around the waist. Losing even 10 pounds can help you lower your blood pressure.  If your doctor recommends it, get more exercise. Walking is a good choice. Bit by bit, increase the amount you walk every day. Try for at least 30 minutes on most days of the week. You also may want to swim, bike, or do other activities.  Avoid or limit alcohol. Talk to your doctor about whether you can drink any alcohol.  Try to limit how much sodium you eat to less than 2,300 milligrams (mg) a day. Your doctor may ask you to try to eat less than 1,500 mg a day.  Eat plenty of fruits (such as bananas and oranges), vegetables, legumes, whole grains, and low-fat dairy products.  Lower the amount of saturated fat in your diet. Saturated fat is found in animal products such as milk, cheese, and meat. Limiting these foods may help you lose weight and also lower your risk for heart disease.  Do not smoke. Smoking increases your risk for heart attack and stroke. If you need help quitting, talk to your doctor about stop-smoking programs and medicines. These can increase your chances of quitting for good. When should you call for help? Call 911  anytime you think you may need emergency care. This may mean having symptoms that suggest that your blood pressure is causing a serious heart or blood vessel problem. Your blood pressure may be over 180/120. For example, call 911 if:     You have symptoms of a heart attack. These may include:  ? Chest pain or pressure, or a strange feeling in the chest.  ? Sweating. ? Shortness of breath. ? Nausea or vomiting. ? Pain, pressure, or a strange feeling in the back, neck, jaw, or upper belly or in one or both shoulders or arms. ? Lightheadedness or sudden weakness.   ? A fast or irregular heartbeat.      You have symptoms of a stroke. These may include:  ? Sudden numbness, tingling, weakness, or loss of movement in your face, arm, or leg, especially on only one side of your body. ? Sudden vision changes. ? Sudden trouble speaking. ? Sudden confusion or trouble understanding simple statements. ? Sudden problems with walking or balance. ? A sudden, severe headache that is different from past headaches.      You have severe back or belly pain. Do not wait until your blood pressure comes down on its own. Get help right away. Call your doctor now or seek immediate care if:     Your blood pressure is much higher than normal (such as 180/120 or higher), but you don't have symptoms.      You think high blood pressure is causing symptoms, such as:  ? Severe headache.  ? Blurry vision. Watch closely for changes in your health, and be sure to contact your doctor if:     Your blood pressure measures higher than your doctor recommends at least 2 times. That means the top number is higher or the bottom number is higher, or both.      You think you may be having side effects from your blood pressure medicine. Where can you learn more? Go to https://[email or web address] org and sign in to your [employer] account. Enter F508 in the [employer] box to learn more about \"High Blood Pressure: Care Instructions. \"     If you do not have an account, please click on the \"Sign Up Now\" link. Current as of: January 10, 2022               Content Version: 13.2  © 5977-7635 Healthwise, [employer]. Care instructions adapted under license by [facility] [place] ([facility]). If you have questions about a medical condition or this instruction, always ask your healthcare professional. [place] [address] any warranty or liability for your use of this information.

## 2022-06-13 DIAGNOSIS — M43.16 SPONDYLOLISTHESIS OF LUMBAR REGION: Primary | ICD-10-CM

## 2022-06-17 NOTE — PROGRESS NOTES
[address]   PRE-ADMISSION TESTING GENERAL INSTRUCTIONS  PAT Phone Number: [phone]      GENERAL INSTRUCTIONS:    [x] Antibacterial Soap Shower Night before and/or AM of Surgery  [x] Carlos (VICKY) wipe instruction sheet and wipes given. [x] Do not wear makeup, lotions, powders, deodorant. [x] Nothing by mouth after midnight, including gum, candy, mints, or water. [x] You may brush your teeth, gargle, but do NOT swallow water. [x] No tobacco products, illegal drugs, or alcohol within 24 hours of your surgery. [x] Jewelry or valuables should not be brought to the hospital. All body and/or tongue piercing's must be removed prior to arriving to hospital. ALL hair pins must be removed. [x] Bring insurance card and photo ID. [x] Transfusion Bracelet: Please bring with you to hospital, day of surgery     PARKING INSTRUCTIONS:     [x] ARRIVAL TIME: 6/27 at 5 am  · [x] Enter into the [employer]. Two people may accompany you. Masks are required. · [x] Parking Lot \"I\" is where you will park. It is located on the corner of [facility] and [facility]. The entrance is on [facility]. · To enter, press the button and the gate will lift. A free token will be provided to exit the lot. EDUCATION INSTRUCTIONS:      [x] Pre-admission Testing educational folder given  [x] Incentive Spirometry,coughing & deep breathing exercises reviewed. [x] Medication information sheet(s)   [x] Fluoroscopy-Xray used in surgery reviewed with patient. Educational pamphlet placed in chart. [x] Pain: Post-op pain is normal and to be expected. You will be asked to rate your pain from 0-10. [x] Spine Navigator to see in PAT. MEDICATION INSTRUCTIONS:    [x] Bring a complete list of your medications, please write the last time you took the medicine, give this list to the nurse.   [x] Take the following medications the morning of surgery with 1-2 ounces of water: buspar, wellbutrin, cymbalta, norvasc  [x] Stop herbal supplements, NSAIDS and vitamins 5 days before your surgery. [x] Follow physician instructions regarding any blood thinners you may be taking. WHAT TO EXPECT:    [x] The day of surgery you will be greeted and checked in by the [employer].  In addition, you will be registered in the [facility] by a Patient Access Representative. Please bring your photo ID and insurance card. A nurse will greet you in accordance to the time you are needed in the pre-op area to prepare you for surgery. Please do not be discouraged if you are not greeted in the order you arrive as there are many variables that are involved in patient preparation. Your patience is greatly appreciated as you wait for your nurse. Please bring in items such as: books, magazines, newspapers, electronics, or any other items  to occupy your time in the waiting area. [x]  Delays may occur with surgery and staff will make a sincere effort to keep you informed of delays. If any delays occur with your procedure, we apologize ahead of time for your inconvenience as we recognize the value of your time.

## 2022-06-22 ENCOUNTER — HOSPITAL ENCOUNTER (OUTPATIENT)
Dept: GENERAL RADIOLOGY | Age: 57
Discharge: HOME | End: 2022-06-24
Payer: MEDICAID

## 2022-06-22 ENCOUNTER — HOSPITAL ENCOUNTER (OUTPATIENT)
Dept: PREADMISSION TESTING | Age: 57
Discharge: HOME | End: 2022-06-22
Payer: MEDICAID

## 2022-06-22 VITALS
OXYGEN SATURATION: 97 % | HEIGHT: 63 IN | DIASTOLIC BLOOD PRESSURE: 78 MMHG | TEMPERATURE: 98 F | HEART RATE: 83 BPM | WEIGHT: 263 LBS | SYSTOLIC BLOOD PRESSURE: 147 MMHG | BODY MASS INDEX: 46.6 KG/M2 | RESPIRATION RATE: 16 BRPM

## 2022-06-22 DIAGNOSIS — Z01.812 PRE-OPERATIVE LABORATORY EXAMINATION: ICD-10-CM

## 2022-06-22 DIAGNOSIS — Z01.818 PRE-OP TESTING: ICD-10-CM

## 2022-06-22 DIAGNOSIS — E78.2 MIXED HYPERLIPIDEMIA: ICD-10-CM

## 2022-06-22 LAB
ABO/RH: NORMAL
ANION GAP SERPL CALCULATED.3IONS-SCNC: 14 MMOL/L (ref 7–16)
ANTIBODY SCREEN: NORMAL
BACTERIA: ABNORMAL /HPF
BASOPHILS ABSOLUTE: 0.04 E9/L (ref 0–0.2)
BASOPHILS RELATIVE PERCENT: 0.8 % (ref 0–2)
BILIRUBIN URINE: NEGATIVE
BLOOD, URINE: NEGATIVE
BUN BLDV-MCNC: 9 MG/DL (ref 6–20)
CALCIUM SERPL-MCNC: 9.7 MG/DL (ref 8.6–10.2)
CHLORIDE BLD-SCNC: 104 MMOL/L (ref 98–107)
CHOLESTEROL, TOTAL: 191 MG/DL (ref 0–199)
CLARITY: CLEAR
CO2: 22 MMOL/L (ref 22–29)
COLOR: YELLOW
CREAT SERPL-MCNC: 0.8 MG/DL (ref 0.5–1)
EOSINOPHILS ABSOLUTE: 0.16 E9/L (ref 0.05–0.5)
EOSINOPHILS RELATIVE PERCENT: 3.2 % (ref 0–6)
GFR AFRICAN AMERICAN: >60
GFR NON-AFRICAN AMERICAN: >60 ML/MIN/1.73
GLUCOSE BLD-MCNC: 114 MG/DL (ref 74–99)
GLUCOSE URINE: NEGATIVE MG/DL
HCT VFR BLD CALC: 41.6 % (ref 34–48)
HDLC SERPL-MCNC: 67 MG/DL
HEMOGLOBIN: 13 G/DL (ref 11.5–15.5)
IMMATURE GRANULOCYTES #: 0.01 E9/L
IMMATURE GRANULOCYTES %: 0.2 % (ref 0–5)
INR BLD: 1
KETONES, URINE: NEGATIVE MG/DL
LDL CHOLESTEROL CALCULATED: 101 MG/DL (ref 0–99)
LEUKOCYTE ESTERASE, URINE: ABNORMAL
LYMPHOCYTES ABSOLUTE: 1.82 E9/L (ref 1.5–4)
LYMPHOCYTES RELATIVE PERCENT: 36.5 % (ref 20–42)
MCH RBC QN AUTO: 26.9 PG (ref 26–35)
MCHC RBC AUTO-ENTMCNC: 31.3 % (ref 32–34.5)
MCV RBC AUTO: 86.1 FL (ref 80–99.9)
MONOCYTES ABSOLUTE: 0.35 E9/L (ref 0.1–0.95)
MONOCYTES RELATIVE PERCENT: 7 % (ref 2–12)
NEUTROPHILS ABSOLUTE: 2.6 E9/L (ref 1.8–7.3)
NEUTROPHILS RELATIVE PERCENT: 52.3 % (ref 43–80)
NITRITE, URINE: NEGATIVE
PDW BLD-RTO: 15 FL (ref 11.5–15)
PH UA: 8 (ref 5–9)
PLATELET # BLD: 277 E9/L (ref 130–450)
PMV BLD AUTO: 9.8 FL (ref 7–12)
POTASSIUM REFLEX MAGNESIUM: 4.2 MMOL/L (ref 3.5–5)
PROTEIN UA: NEGATIVE MG/DL
PROTHROMBIN TIME: 11.2 SEC (ref 9.3–12.4)
RBC # BLD: 4.83 E12/L (ref 3.5–5.5)
RBC UA: ABNORMAL /HPF (ref 0–2)
SODIUM BLD-SCNC: 140 MMOL/L (ref 132–146)
SPECIFIC GRAVITY UA: 1.01 (ref 1–1.03)
TRIGL SERPL-MCNC: 114 MG/DL (ref 0–149)
TSH SERPL DL<=0.05 MIU/L-ACNC: 2.74 UIU/ML (ref 0.27–4.2)
UROBILINOGEN, URINE: 1 E.U./DL
VLDLC SERPL CALC-MCNC: 23 MG/DL
WBC # BLD: 5 E9/L (ref 4.5–11.5)
WBC UA: ABNORMAL /HPF (ref 0–5)

## 2022-06-22 PROCEDURE — 87081 CULTURE SCREEN ONLY: CPT

## 2022-06-22 PROCEDURE — 84443 ASSAY THYROID STIM HORMONE: CPT

## 2022-06-22 PROCEDURE — 86900 BLOOD TYPING SEROLOGIC ABO: CPT

## 2022-06-22 PROCEDURE — 36415 COLL VENOUS BLD VENIPUNCTURE: CPT

## 2022-06-22 PROCEDURE — 86850 RBC ANTIBODY SCREEN: CPT

## 2022-06-22 PROCEDURE — 80048 BASIC METABOLIC PNL TOTAL CA: CPT

## 2022-06-22 PROCEDURE — 85610 PROTHROMBIN TIME: CPT

## 2022-06-22 PROCEDURE — 87088 URINE BACTERIA CULTURE: CPT

## 2022-06-22 PROCEDURE — 71046 X-RAY EXAM CHEST 2 VIEWS: CPT

## 2022-06-22 PROCEDURE — 80061 LIPID PANEL: CPT

## 2022-06-22 PROCEDURE — 85025 COMPLETE CBC W/AUTO DIFF WBC: CPT

## 2022-06-22 PROCEDURE — 81001 URINALYSIS AUTO W/SCOPE: CPT

## 2022-06-22 PROCEDURE — 86901 BLOOD TYPING SEROLOGIC RH(D): CPT

## 2022-06-22 NOTE — PROGRESS NOTES
Saw pt in PAT, reviewed:    Surgical Procedure-reviewed procedure and post-op events:pre-op/PACU, Unit admission, PT/OT evaluation, Discharge Zee 18 Stay expectations-reviewed importance of early mobilization. Instructions of Spine Precautions given-PT to review on evaluation. Surgical Pathway Booklet-reviewed and given  Post-op/Discharge Instructions-reviewed activity allowances/restrictions  Use of Incentive Spirometer-instructed on importance of use post-op and continued use @ home to prevent complications. Instructions on use given  Setting realistic pain goals-reaching goal before discharge. MRI : spondylolisthesis L3-L4 w/stenosis L3-L5. REC:  L3-L4 and L4-L5 PLIF    ORTHOTICS: has appt for brace fitting today at [facility], reminded to bring DOS    Discharge Plans- home with self/family care. Receptive to Ivy Serrato, but otherwise has good support from parent/wife. Verbalized understanding of all instructions and questions answered. Contact number given.     Earl Asher RN, Spine Navigator  [phone] Office  [phone] Cell

## 2022-06-23 ENCOUNTER — PATIENT MESSAGE (OUTPATIENT)
Dept: FAMILY MEDICINE CLINIC | Age: 57
End: 2022-06-23

## 2022-06-23 LAB — MRSA CULTURE ONLY: NORMAL

## 2022-06-23 NOTE — TELEPHONE ENCOUNTER
From: Mahad Rob  To: Dr. Angel Duvall  Sent: 6/23/2022 2:32 PM EDT  Subject: Pre op authorization    Dr. Kary Vasquez,  Pre op testing done. Results in my chart. No EKG needed per pre op nurse because I recently had one. Dr. Joseluis Mcqueen just needs your note in my chart to slice and dice on me!!  Surgery is scheduled for Monday at 5am. Don't you Drs ever sleep? Good thing I'm a morning person. ... Thank you for everything.   Julian Shepherd

## 2022-06-24 LAB — URINE CULTURE, ROUTINE: NORMAL

## 2022-06-26 ENCOUNTER — ANESTHESIA EVENT (OUTPATIENT)
Dept: OPERATING ROOM | Age: 57
End: 2022-06-26
Payer: MEDICAID

## 2022-06-27 ENCOUNTER — ANESTHESIA (OUTPATIENT)
Dept: OPERATING ROOM | Age: 57
End: 2022-06-27
Payer: MEDICAID

## 2022-06-27 ENCOUNTER — HOSPITAL ENCOUNTER (OUTPATIENT)
Age: 57
Setting detail: OBSERVATION
Discharge: HOME | End: 2022-06-29
Attending: NEUROLOGICAL SURGERY | Admitting: NEUROLOGICAL SURGERY
Payer: MEDICAID

## 2022-06-27 ENCOUNTER — APPOINTMENT (OUTPATIENT)
Dept: GENERAL RADIOLOGY | Age: 57
End: 2022-06-27
Attending: NEUROLOGICAL SURGERY
Payer: MEDICAID

## 2022-06-27 DIAGNOSIS — M54.9 BACK PAIN, UNSPECIFIED BACK LOCATION, UNSPECIFIED BACK PAIN LATERALITY, UNSPECIFIED CHRONICITY: ICD-10-CM

## 2022-06-27 DIAGNOSIS — Z01.812 PRE-OPERATIVE LABORATORY EXAMINATION: Primary | ICD-10-CM

## 2022-06-27 DIAGNOSIS — I65.29 STENOSIS OF CAROTID ARTERY, UNSPECIFIED LATERALITY: ICD-10-CM

## 2022-06-27 PROCEDURE — 7100000001 HC PACU RECOVERY - ADDTL 15 MIN: Performed by: NEUROLOGICAL SURGERY

## 2022-06-27 PROCEDURE — 2780000010 HC IMPLANT OTHER: Performed by: NEUROLOGICAL SURGERY

## 2022-06-27 PROCEDURE — 2720000010 HC SURG SUPPLY STERILE: Performed by: NEUROLOGICAL SURGERY

## 2022-06-27 PROCEDURE — C1713 ANCHOR/SCREW BN/BN,TIS/BN: HCPCS | Performed by: NEUROLOGICAL SURGERY

## 2022-06-27 PROCEDURE — 2500000003 HC RX 250 WO HCPCS: Performed by: NEUROLOGICAL SURGERY

## 2022-06-27 PROCEDURE — G0378 HOSPITAL OBSERVATION PER HR: HCPCS

## 2022-06-27 PROCEDURE — 63052 LAM FACETC/FRMT ARTHRD LUM 1: CPT | Performed by: PHYSICIAN ASSISTANT

## 2022-06-27 PROCEDURE — 6360000002 HC RX W HCPCS: Performed by: ANESTHESIOLOGY

## 2022-06-27 PROCEDURE — 6360000002 HC RX W HCPCS

## 2022-06-27 PROCEDURE — 95940 IONM IN OPERATNG ROOM 15 MIN: CPT | Performed by: AUDIOLOGIST

## 2022-06-27 PROCEDURE — 7100000000 HC PACU RECOVERY - FIRST 15 MIN: Performed by: NEUROLOGICAL SURGERY

## 2022-06-27 PROCEDURE — 95909 NRV CNDJ TST 5-6 STUDIES: CPT | Performed by: AUDIOLOGIST

## 2022-06-27 PROCEDURE — A4217 STERILE WATER/SALINE, 500 ML: HCPCS | Performed by: NEUROLOGICAL SURGERY

## 2022-06-27 PROCEDURE — 2580000003 HC RX 258: Performed by: PHYSICIAN ASSISTANT

## 2022-06-27 PROCEDURE — 6360000002 HC RX W HCPCS: Performed by: NEUROLOGICAL SURGERY

## 2022-06-27 PROCEDURE — 22634 ARTHRD CMBN 1NTRSPC EA ADDL: CPT | Performed by: NEUROLOGICAL SURGERY

## 2022-06-27 PROCEDURE — 2580000003 HC RX 258: Performed by: NEUROLOGICAL SURGERY

## 2022-06-27 PROCEDURE — 22633 ARTHRD CMBN 1NTRSPC LUMBAR: CPT | Performed by: PHYSICIAN ASSISTANT

## 2022-06-27 PROCEDURE — 3600000005 HC SURGERY LEVEL 5 BASE: Performed by: NEUROLOGICAL SURGERY

## 2022-06-27 PROCEDURE — 3600000015 HC SURGERY LEVEL 5 ADDTL 15MIN: Performed by: NEUROLOGICAL SURGERY

## 2022-06-27 PROCEDURE — 63053 LAM FACTC/FRMT ARTHRD LUM EA: CPT | Performed by: PHYSICIAN ASSISTANT

## 2022-06-27 PROCEDURE — 22842 INSERT SPINE FIXATION DEVICE: CPT | Performed by: PHYSICIAN ASSISTANT

## 2022-06-27 PROCEDURE — 61783 SCAN PROC SPINAL: CPT | Performed by: NEUROLOGICAL SURGERY

## 2022-06-27 PROCEDURE — 6360000002 HC RX W HCPCS: Performed by: PHYSICIAN ASSISTANT

## 2022-06-27 PROCEDURE — 6370000000 HC RX 637 (ALT 250 FOR IP): Performed by: NEUROLOGICAL SURGERY

## 2022-06-27 PROCEDURE — 61783 SCAN PROC SPINAL: CPT | Performed by: PHYSICIAN ASSISTANT

## 2022-06-27 PROCEDURE — 22842 INSERT SPINE FIXATION DEVICE: CPT | Performed by: NEUROLOGICAL SURGERY

## 2022-06-27 PROCEDURE — 22633 ARTHRD CMBN 1NTRSPC LUMBAR: CPT | Performed by: NEUROLOGICAL SURGERY

## 2022-06-27 PROCEDURE — 22634 ARTHRD CMBN 1NTRSPC EA ADDL: CPT | Performed by: PHYSICIAN ASSISTANT

## 2022-06-27 PROCEDURE — 22853 INSJ BIOMECHANICAL DEVICE: CPT | Performed by: NEUROLOGICAL SURGERY

## 2022-06-27 PROCEDURE — 2709999900 HC NON-CHARGEABLE SUPPLY: Performed by: NEUROLOGICAL SURGERY

## 2022-06-27 PROCEDURE — 3700000001 HC ADD 15 MINUTES (ANESTHESIA): Performed by: NEUROLOGICAL SURGERY

## 2022-06-27 PROCEDURE — 3209999900 FLUORO FOR SURGICAL PROCEDURES

## 2022-06-27 PROCEDURE — 63053 LAM FACTC/FRMT ARTHRD LUM EA: CPT | Performed by: NEUROLOGICAL SURGERY

## 2022-06-27 PROCEDURE — 2500000003 HC RX 250 WO HCPCS

## 2022-06-27 PROCEDURE — 22853 INSJ BIOMECHANICAL DEVICE: CPT | Performed by: PHYSICIAN ASSISTANT

## 2022-06-27 PROCEDURE — 3700000000 HC ANESTHESIA ATTENDED CARE: Performed by: NEUROLOGICAL SURGERY

## 2022-06-27 PROCEDURE — 63052 LAM FACETC/FRMT ARTHRD LUM 1: CPT | Performed by: NEUROLOGICAL SURGERY

## 2022-06-27 PROCEDURE — C1729 CATH, DRAINAGE: HCPCS | Performed by: NEUROLOGICAL SURGERY

## 2022-06-27 PROCEDURE — 88304 TISSUE EXAM BY PATHOLOGIST: CPT

## 2022-06-27 DEVICE — THREADED LOCKING CAP, CREO
Type: IMPLANTABLE DEVICE | Site: SPINE LUMBAR | Status: FUNCTIONAL
Brand: CREO

## 2022-06-27 DEVICE — CREO® THREADED 7.5 X 45MM POLYAXIAL SCREW
Type: IMPLANTABLE DEVICE | Site: SPINE LUMBAR | Status: FUNCTIONAL
Brand: CREO

## 2022-06-27 DEVICE — VIASORB STRP 20X50X5MM: Type: IMPLANTABLE DEVICE | Site: SPINE LUMBAR | Status: FUNCTIONAL

## 2022-06-27 DEVICE — CREO® THREADED 7.5 X 55MM POLYAXIAL SCREW
Type: IMPLANTABLE DEVICE | Site: SPINE LUMBAR | Status: FUNCTIONAL
Brand: CREO

## 2022-06-27 DEVICE — 5.5MM CURVED ROD, TITANIUM ALLOY, 65MM LENGTH
Type: IMPLANTABLE DEVICE | Site: SPINE LUMBAR | Status: FUNCTIONAL
Brand: CREO

## 2022-06-27 DEVICE — CREO® THREADED 7.5 X 40MM POLYAXIAL SCREW
Type: IMPLANTABLE DEVICE | Site: SPINE LUMBAR | Status: FUNCTIONAL
Brand: CREO

## 2022-06-27 DEVICE — CREO® THREADED 7.5 X 50MM POLYAXIAL SCREW
Type: IMPLANTABLE DEVICE | Site: SPINE LUMBAR | Status: FUNCTIONAL
Brand: CREO

## 2022-06-27 DEVICE — STIMULAN® RAPID CURE PROVIDED STERILE FOR SINGLE PATIENT USE. STIMULAN® RAPID CURE CONTAINS CALCIUM SULFATE POWDER AND MIXING SOLUTION IN PRE-MEASURED QUANTITIES SO THAT WHEN MIXED TOGETHER IN A STERILE MIXING BOWL, THE RESULTANT PASTE IS TO BE DIGITALLY PACKED INTO OPEN BONE VOID/GAP TO SET INSITU OR PLACED INTO THE MOULD PROVIDED, THE MIXTURE SETS TO FORM BEADS. THE BIODEGRADABLE, RADIOPAQUE BEADS ARE RESORBED IN APPROXIMATELY 30 – 60 DAYS WHEN USED IN ACCORDANCE WITH THE DEVICE LABELLING. STIMULAN® RAPID CURE IS MANUFACTURED FROM SYNTHETIC IMPLANT GRADE CALCIUM SULFATE DIHYDRATE(CASO4.2H2O) THAT RESORBS AND IS REPLACED WITH BONE DURING THE HEALING PROCESS. ALSO, AS THE BONE VOID FILLER BEADS ARE BIODEGRADABLE AND BIOCOMPATIBLE, THEY MAY BE USED AT AN INFECTED SITE.
Type: IMPLANTABLE DEVICE | Site: SPINE LUMBAR | Status: FUNCTIONAL
Brand: STIMULAN® RAPID CURE

## 2022-06-27 RX ORDER — DROPERIDOL 2.5 MG/ML
0.62 INJECTION, SOLUTION INTRAMUSCULAR; INTRAVENOUS
Status: DISCONTINUED | OUTPATIENT
Start: 2022-06-27 | End: 2022-06-27 | Stop reason: HOSPADM

## 2022-06-27 RX ORDER — ONDANSETRON 4 MG/1
4 TABLET, ORALLY DISINTEGRATING ORAL EVERY 8 HOURS PRN
Status: DISCONTINUED | OUTPATIENT
Start: 2022-06-27 | End: 2022-06-29 | Stop reason: HOSPADM

## 2022-06-27 RX ORDER — SODIUM CHLORIDE 0.9 % (FLUSH) 0.9 %
5-40 SYRINGE (ML) INJECTION EVERY 12 HOURS SCHEDULED
Status: DISCONTINUED | OUTPATIENT
Start: 2022-06-27 | End: 2022-06-27 | Stop reason: HOSPADM

## 2022-06-27 RX ORDER — SODIUM CHLORIDE 0.9 % (FLUSH) 0.9 %
5-40 SYRINGE (ML) INJECTION PRN
Status: DISCONTINUED | OUTPATIENT
Start: 2022-06-27 | End: 2022-06-27 | Stop reason: HOSPADM

## 2022-06-27 RX ORDER — CLONAZEPAM 0.5 MG/1
1 TABLET ORAL 2 TIMES DAILY PRN
Status: DISCONTINUED | OUTPATIENT
Start: 2022-06-27 | End: 2022-06-29 | Stop reason: HOSPADM

## 2022-06-27 RX ORDER — DIPHENHYDRAMINE HYDROCHLORIDE 50 MG/ML
25 INJECTION INTRAMUSCULAR; INTRAVENOUS EVERY 6 HOURS PRN
Status: DISCONTINUED | OUTPATIENT
Start: 2022-06-27 | End: 2022-06-29 | Stop reason: HOSPADM

## 2022-06-27 RX ORDER — BUPROPION HYDROCHLORIDE 150 MG/1
450 TABLET ORAL EVERY MORNING
Status: DISCONTINUED | OUTPATIENT
Start: 2022-06-28 | End: 2022-06-29 | Stop reason: HOSPADM

## 2022-06-27 RX ORDER — ONDANSETRON 2 MG/ML
INJECTION INTRAMUSCULAR; INTRAVENOUS PRN
Status: DISCONTINUED | OUTPATIENT
Start: 2022-06-27 | End: 2022-06-27 | Stop reason: SDUPTHER

## 2022-06-27 RX ORDER — OXYCODONE HYDROCHLORIDE 5 MG/1
5 TABLET ORAL EVERY 4 HOURS PRN
Status: DISCONTINUED | OUTPATIENT
Start: 2022-06-27 | End: 2022-06-29 | Stop reason: HOSPADM

## 2022-06-27 RX ORDER — AMLODIPINE BESYLATE 10 MG/1
10 TABLET ORAL DAILY
Status: DISCONTINUED | OUTPATIENT
Start: 2022-06-28 | End: 2022-06-29 | Stop reason: HOSPADM

## 2022-06-27 RX ORDER — VANCOMYCIN HYDROCHLORIDE 500 MG/10ML
INJECTION, POWDER, LYOPHILIZED, FOR SOLUTION INTRAVENOUS PRN
Status: DISCONTINUED | OUTPATIENT
Start: 2022-06-27 | End: 2022-06-27 | Stop reason: ALTCHOICE

## 2022-06-27 RX ORDER — MIDAZOLAM HYDROCHLORIDE 2 MG/2ML
2 INJECTION, SOLUTION INTRAMUSCULAR; INTRAVENOUS
Status: DISCONTINUED | OUTPATIENT
Start: 2022-06-27 | End: 2022-06-27 | Stop reason: HOSPADM

## 2022-06-27 RX ORDER — SODIUM CHLORIDE 9 MG/ML
INJECTION, SOLUTION INTRAVENOUS PRN
Status: DISCONTINUED | OUTPATIENT
Start: 2022-06-27 | End: 2022-06-27 | Stop reason: HOSPADM

## 2022-06-27 RX ORDER — DOCUSATE SODIUM 100 MG/1
200 CAPSULE, LIQUID FILLED ORAL 2 TIMES DAILY
Status: DISCONTINUED | OUTPATIENT
Start: 2022-06-27 | End: 2022-06-29 | Stop reason: HOSPADM

## 2022-06-27 RX ORDER — TRAMADOL HYDROCHLORIDE 50 MG/1
50 TABLET ORAL
Status: DISCONTINUED | OUTPATIENT
Start: 2022-06-27 | End: 2022-06-27 | Stop reason: HOSPADM

## 2022-06-27 RX ORDER — MORPHINE SULFATE 2 MG/ML
2 INJECTION, SOLUTION INTRAMUSCULAR; INTRAVENOUS
Status: DISCONTINUED | OUTPATIENT
Start: 2022-06-27 | End: 2022-06-29 | Stop reason: HOSPADM

## 2022-06-27 RX ORDER — ACETAMINOPHEN 325 MG/1
650 TABLET ORAL
Status: DISCONTINUED | OUTPATIENT
Start: 2022-06-27 | End: 2022-06-27 | Stop reason: HOSPADM

## 2022-06-27 RX ORDER — BISACODYL 10 MG
10 SUPPOSITORY, RECTAL RECTAL DAILY PRN
Status: DISCONTINUED | OUTPATIENT
Start: 2022-06-27 | End: 2022-06-29 | Stop reason: HOSPADM

## 2022-06-27 RX ORDER — DIAZEPAM 5 MG/1
5 TABLET ORAL EVERY 6 HOURS PRN
Status: DISCONTINUED | OUTPATIENT
Start: 2022-06-27 | End: 2022-06-29 | Stop reason: HOSPADM

## 2022-06-27 RX ORDER — SENNA AND DOCUSATE SODIUM 50; 8.6 MG/1; MG/1
1 TABLET, FILM COATED ORAL 2 TIMES DAILY
Status: DISCONTINUED | OUTPATIENT
Start: 2022-06-27 | End: 2022-06-29 | Stop reason: HOSPADM

## 2022-06-27 RX ORDER — ONDANSETRON 2 MG/ML
4 INJECTION INTRAMUSCULAR; INTRAVENOUS
Status: DISCONTINUED | OUTPATIENT
Start: 2022-06-27 | End: 2022-06-27 | Stop reason: HOSPADM

## 2022-06-27 RX ORDER — ROCURONIUM BROMIDE 10 MG/ML
INJECTION, SOLUTION INTRAVENOUS PRN
Status: DISCONTINUED | OUTPATIENT
Start: 2022-06-27 | End: 2022-06-27 | Stop reason: SDUPTHER

## 2022-06-27 RX ORDER — POLYETHYLENE GLYCOL 3350 17 G/17G
17 POWDER, FOR SOLUTION ORAL DAILY
Status: DISCONTINUED | OUTPATIENT
Start: 2022-06-27 | End: 2022-06-29 | Stop reason: HOSPADM

## 2022-06-27 RX ORDER — ATORVASTATIN CALCIUM 10 MG/1
10 TABLET, FILM COATED ORAL DAILY
Status: DISCONTINUED | OUTPATIENT
Start: 2022-06-27 | End: 2022-06-29 | Stop reason: HOSPADM

## 2022-06-27 RX ORDER — SODIUM CHLORIDE 9 MG/ML
INJECTION, SOLUTION INTRAVENOUS CONTINUOUS
Status: DISCONTINUED | OUTPATIENT
Start: 2022-06-27 | End: 2022-06-27 | Stop reason: HOSPADM

## 2022-06-27 RX ORDER — SODIUM CHLORIDE 0.9 % (FLUSH) 0.9 %
5-40 SYRINGE (ML) INJECTION PRN
Status: DISCONTINUED | OUTPATIENT
Start: 2022-06-27 | End: 2022-06-29 | Stop reason: HOSPADM

## 2022-06-27 RX ORDER — SODIUM CHLORIDE 0.9 % (FLUSH) 0.9 %
5-40 SYRINGE (ML) INJECTION EVERY 12 HOURS SCHEDULED
Status: DISCONTINUED | OUTPATIENT
Start: 2022-06-27 | End: 2022-06-29 | Stop reason: HOSPADM

## 2022-06-27 RX ORDER — ONDANSETRON 2 MG/ML
4 INJECTION INTRAMUSCULAR; INTRAVENOUS EVERY 6 HOURS PRN
Status: DISCONTINUED | OUTPATIENT
Start: 2022-06-27 | End: 2022-06-29 | Stop reason: HOSPADM

## 2022-06-27 RX ORDER — DIPHENHYDRAMINE HCL 25 MG
25 TABLET ORAL EVERY 6 HOURS PRN
Status: DISCONTINUED | OUTPATIENT
Start: 2022-06-27 | End: 2022-06-29 | Stop reason: HOSPADM

## 2022-06-27 RX ORDER — FAMOTIDINE 20 MG/1
20 TABLET, FILM COATED ORAL 2 TIMES DAILY
Status: DISCONTINUED | OUTPATIENT
Start: 2022-06-27 | End: 2022-06-29 | Stop reason: HOSPADM

## 2022-06-27 RX ORDER — LABETALOL HYDROCHLORIDE 5 MG/ML
5 INJECTION, SOLUTION INTRAVENOUS
Status: DISCONTINUED | OUTPATIENT
Start: 2022-06-27 | End: 2022-06-27 | Stop reason: HOSPADM

## 2022-06-27 RX ORDER — GLYCOPYRROLATE 1 MG/5 ML
SYRINGE (ML) INTRAVENOUS PRN
Status: DISCONTINUED | OUTPATIENT
Start: 2022-06-27 | End: 2022-06-27 | Stop reason: SDUPTHER

## 2022-06-27 RX ORDER — MORPHINE SULFATE 4 MG/ML
4 INJECTION, SOLUTION INTRAMUSCULAR; INTRAVENOUS
Status: DISCONTINUED | OUTPATIENT
Start: 2022-06-27 | End: 2022-06-29 | Stop reason: HOSPADM

## 2022-06-27 RX ORDER — LIDOCAINE HYDROCHLORIDE 20 MG/ML
INJECTION, SOLUTION INTRAVENOUS PRN
Status: DISCONTINUED | OUTPATIENT
Start: 2022-06-27 | End: 2022-06-27 | Stop reason: SDUPTHER

## 2022-06-27 RX ORDER — MIDAZOLAM HYDROCHLORIDE 1 MG/ML
INJECTION INTRAMUSCULAR; INTRAVENOUS PRN
Status: DISCONTINUED | OUTPATIENT
Start: 2022-06-27 | End: 2022-06-27 | Stop reason: SDUPTHER

## 2022-06-27 RX ORDER — SODIUM CHLORIDE 9 MG/ML
INJECTION, SOLUTION INTRAVENOUS PRN
Status: DISCONTINUED | OUTPATIENT
Start: 2022-06-27 | End: 2022-06-29 | Stop reason: HOSPADM

## 2022-06-27 RX ORDER — SODIUM CHLORIDE 9 MG/ML
INJECTION, SOLUTION INTRAVENOUS CONTINUOUS
Status: DISCONTINUED | OUTPATIENT
Start: 2022-06-27 | End: 2022-06-28

## 2022-06-27 RX ORDER — DIPHENHYDRAMINE HYDROCHLORIDE 50 MG/ML
12.5 INJECTION INTRAMUSCULAR; INTRAVENOUS
Status: DISCONTINUED | OUTPATIENT
Start: 2022-06-27 | End: 2022-06-27 | Stop reason: HOSPADM

## 2022-06-27 RX ORDER — SODIUM PHOSPHATE, DIBASIC AND SODIUM PHOSPHATE, MONOBASIC 7; 19 G/133ML; G/133ML
1 ENEMA RECTAL DAILY PRN
Status: DISCONTINUED | OUTPATIENT
Start: 2022-06-27 | End: 2022-06-29 | Stop reason: HOSPADM

## 2022-06-27 RX ORDER — ZOLPIDEM TARTRATE 5 MG/1
10 TABLET ORAL NIGHTLY PRN
Status: DISCONTINUED | OUTPATIENT
Start: 2022-06-27 | End: 2022-06-29 | Stop reason: HOSPADM

## 2022-06-27 RX ORDER — BUPROPION HYDROCHLORIDE 300 MG/1
300 TABLET ORAL DAILY
Status: DISCONTINUED | OUTPATIENT
Start: 2022-06-28 | End: 2022-06-29 | Stop reason: HOSPADM

## 2022-06-27 RX ORDER — ACETAMINOPHEN 325 MG/1
650 TABLET ORAL EVERY 6 HOURS
Status: DISCONTINUED | OUTPATIENT
Start: 2022-06-27 | End: 2022-06-29 | Stop reason: HOSPADM

## 2022-06-27 RX ORDER — DULOXETIN HYDROCHLORIDE 60 MG/1
60 CAPSULE, DELAYED RELEASE ORAL DAILY
Status: DISCONTINUED | OUTPATIENT
Start: 2022-06-28 | End: 2022-06-29 | Stop reason: HOSPADM

## 2022-06-27 RX ORDER — OXYCODONE HYDROCHLORIDE 10 MG/1
10 TABLET ORAL EVERY 4 HOURS PRN
Status: DISCONTINUED | OUTPATIENT
Start: 2022-06-27 | End: 2022-06-29 | Stop reason: HOSPADM

## 2022-06-27 RX ORDER — IPRATROPIUM BROMIDE AND ALBUTEROL SULFATE 2.5; .5 MG/3ML; MG/3ML
1 SOLUTION RESPIRATORY (INHALATION)
Status: DISCONTINUED | OUTPATIENT
Start: 2022-06-27 | End: 2022-06-27 | Stop reason: HOSPADM

## 2022-06-27 RX ORDER — HYDRALAZINE HYDROCHLORIDE 20 MG/ML
5 INJECTION INTRAMUSCULAR; INTRAVENOUS
Status: DISCONTINUED | OUTPATIENT
Start: 2022-06-27 | End: 2022-06-27 | Stop reason: HOSPADM

## 2022-06-27 RX ORDER — BUSPIRONE HYDROCHLORIDE 10 MG/1
10 TABLET ORAL 2 TIMES DAILY
Status: DISCONTINUED | OUTPATIENT
Start: 2022-06-27 | End: 2022-06-29 | Stop reason: HOSPADM

## 2022-06-27 RX ORDER — PROPOFOL 10 MG/ML
INJECTION, EMULSION INTRAVENOUS PRN
Status: DISCONTINUED | OUTPATIENT
Start: 2022-06-27 | End: 2022-06-27 | Stop reason: SDUPTHER

## 2022-06-27 RX ORDER — DEXAMETHASONE SODIUM PHOSPHATE 10 MG/ML
INJECTION INTRAMUSCULAR; INTRAVENOUS PRN
Status: DISCONTINUED | OUTPATIENT
Start: 2022-06-27 | End: 2022-06-27 | Stop reason: SDUPTHER

## 2022-06-27 RX ORDER — SODIUM CHLORIDE 9 MG/ML
25 INJECTION, SOLUTION INTRAVENOUS PRN
Status: DISCONTINUED | OUTPATIENT
Start: 2022-06-27 | End: 2022-06-27 | Stop reason: HOSPADM

## 2022-06-27 RX ORDER — PHENYLEPHRINE HCL IN 0.9% NACL 1 MG/10 ML
SYRINGE (ML) INTRAVENOUS PRN
Status: DISCONTINUED | OUTPATIENT
Start: 2022-06-27 | End: 2022-06-27 | Stop reason: SDUPTHER

## 2022-06-27 RX ORDER — VANCOMYCIN HYDROCHLORIDE 1 G/20ML
INJECTION, POWDER, LYOPHILIZED, FOR SOLUTION INTRAVENOUS PRN
Status: DISCONTINUED | OUTPATIENT
Start: 2022-06-27 | End: 2022-06-27 | Stop reason: ALTCHOICE

## 2022-06-27 RX ORDER — NEOSTIGMINE METHYLSULFATE 1 MG/ML
INJECTION, SOLUTION INTRAVENOUS PRN
Status: DISCONTINUED | OUTPATIENT
Start: 2022-06-27 | End: 2022-06-27 | Stop reason: SDUPTHER

## 2022-06-27 RX ORDER — FENTANYL CITRATE 50 UG/ML
INJECTION, SOLUTION INTRAMUSCULAR; INTRAVENOUS PRN
Status: DISCONTINUED | OUTPATIENT
Start: 2022-06-27 | End: 2022-06-27 | Stop reason: SDUPTHER

## 2022-06-27 RX ORDER — LIDOCAINE HYDROCHLORIDE AND EPINEPHRINE 5; 5 MG/ML; UG/ML
INJECTION, SOLUTION INFILTRATION; PERINEURAL PRN
Status: DISCONTINUED | OUTPATIENT
Start: 2022-06-27 | End: 2022-06-27 | Stop reason: ALTCHOICE

## 2022-06-27 RX ORDER — BUPIVACAINE HYDROCHLORIDE 2.5 MG/ML
INJECTION, SOLUTION EPIDURAL; INFILTRATION; INTRACAUDAL PRN
Status: DISCONTINUED | OUTPATIENT
Start: 2022-06-27 | End: 2022-06-27 | Stop reason: ALTCHOICE

## 2022-06-27 RX ADMIN — OXYCODONE HYDROCHLORIDE 10 MG: 10 TABLET ORAL at 12:39

## 2022-06-27 RX ADMIN — ONDANSETRON HYDROCHLORIDE 4 MG: 2 SOLUTION INTRAMUSCULAR; INTRAVENOUS at 10:06

## 2022-06-27 RX ADMIN — SODIUM CHLORIDE: 9 INJECTION, SOLUTION INTRAVENOUS at 05:49

## 2022-06-27 RX ADMIN — ROCURONIUM BROMIDE 40 MG: 10 SOLUTION INTRAVENOUS at 06:38

## 2022-06-27 RX ADMIN — FAMOTIDINE 20 MG: 20 TABLET ORAL at 20:32

## 2022-06-27 RX ADMIN — Medication 200 MCG: at 09:40

## 2022-06-27 RX ADMIN — SENNOSIDES AND DOCUSATE SODIUM 1 TABLET: 50; 8.6 TABLET ORAL at 20:32

## 2022-06-27 RX ADMIN — Medication 0.2 MG: at 10:06

## 2022-06-27 RX ADMIN — DOCUSATE SODIUM 200 MG: 100 CAPSULE, LIQUID FILLED ORAL at 12:05

## 2022-06-27 RX ADMIN — Medication 1 MG: at 10:06

## 2022-06-27 RX ADMIN — DOCUSATE SODIUM 200 MG: 100 CAPSULE, LIQUID FILLED ORAL at 20:32

## 2022-06-27 RX ADMIN — Medication 100 MCG: at 07:19

## 2022-06-27 RX ADMIN — HYDROMORPHONE HYDROCHLORIDE 0.5 MG: 1 INJECTION, SOLUTION INTRAMUSCULAR; INTRAVENOUS; SUBCUTANEOUS at 10:50

## 2022-06-27 RX ADMIN — SODIUM CHLORIDE: 9 INJECTION, SOLUTION INTRAVENOUS at 11:49

## 2022-06-27 RX ADMIN — OXYCODONE HYDROCHLORIDE 10 MG: 10 TABLET ORAL at 20:33

## 2022-06-27 RX ADMIN — Medication 100 MCG: at 08:55

## 2022-06-27 RX ADMIN — HYDROMORPHONE HYDROCHLORIDE 0.5 MG: 1 INJECTION, SOLUTION INTRAMUSCULAR; INTRAVENOUS; SUBCUTANEOUS at 11:05

## 2022-06-27 RX ADMIN — ROCURONIUM BROMIDE 20 MG: 10 SOLUTION INTRAVENOUS at 07:15

## 2022-06-27 RX ADMIN — MORPHINE SULFATE 4 MG: 4 INJECTION, SOLUTION INTRAMUSCULAR; INTRAVENOUS at 13:36

## 2022-06-27 RX ADMIN — BUSPIRONE HYDROCHLORIDE 10 MG: 10 TABLET ORAL at 20:32

## 2022-06-27 RX ADMIN — PROPOFOL 150 MG: 10 INJECTION, EMULSION INTRAVENOUS at 06:38

## 2022-06-27 RX ADMIN — FENTANYL CITRATE 100 MCG: 50 INJECTION, SOLUTION INTRAMUSCULAR; INTRAVENOUS at 06:38

## 2022-06-27 RX ADMIN — LIDOCAINE HYDROCHLORIDE 100 MG: 20 INJECTION, SOLUTION INTRAVENOUS at 06:38

## 2022-06-27 RX ADMIN — Medication 1 MG: at 09:02

## 2022-06-27 RX ADMIN — Medication 100 MCG: at 08:50

## 2022-06-27 RX ADMIN — POLYETHYLENE GLYCOL 3350 17 G: 17 POWDER, FOR SOLUTION ORAL at 12:04

## 2022-06-27 RX ADMIN — BISACODYL 5 MG: 5 TABLET, COATED ORAL at 12:04

## 2022-06-27 RX ADMIN — SENNOSIDES AND DOCUSATE SODIUM 1 TABLET: 50; 8.6 TABLET ORAL at 12:05

## 2022-06-27 RX ADMIN — CEFAZOLIN 2000 MG: 2 INJECTION, POWDER, FOR SOLUTION INTRAMUSCULAR; INTRAVENOUS at 14:59

## 2022-06-27 RX ADMIN — MIDAZOLAM 2 MG: 1 INJECTION INTRAMUSCULAR; INTRAVENOUS at 06:24

## 2022-06-27 RX ADMIN — Medication 200 MCG: at 09:57

## 2022-06-27 RX ADMIN — Medication 100 MCG: at 09:32

## 2022-06-27 RX ADMIN — Medication 0.2 MG: at 09:02

## 2022-06-27 RX ADMIN — ACETAMINOPHEN 325MG 650 MG: 325 TABLET ORAL at 12:04

## 2022-06-27 RX ADMIN — TOBRAMYCIN AND DEXAMETHASONE: 3; 1 OINTMENT OPHTHALMIC at 20:32

## 2022-06-27 RX ADMIN — DEXAMETHASONE SODIUM PHOSPHATE 10 MG: 10 INJECTION INTRAMUSCULAR; INTRAVENOUS at 07:01

## 2022-06-27 RX ADMIN — CEFAZOLIN 2000 MG: 2 INJECTION, POWDER, FOR SOLUTION INTRAMUSCULAR; INTRAVENOUS at 06:49

## 2022-06-27 ASSESSMENT — PAIN SCALES - GENERAL
PAINLEVEL_OUTOF10: 0
PAINLEVEL_OUTOF10: 8
PAINLEVEL_OUTOF10: 6
PAINLEVEL_OUTOF10: 10
PAINLEVEL_OUTOF10: 8
PAINLEVEL_OUTOF10: 10
PAINLEVEL_OUTOF10: 8
PAINLEVEL_OUTOF10: 10

## 2022-06-27 ASSESSMENT — PAIN DESCRIPTION - DESCRIPTORS
DESCRIPTORS: ACHING;BURNING
DESCRIPTORS: ACHING;SORE;DULL
DESCRIPTORS: SHARP;ACHING;DISCOMFORT;SORE

## 2022-06-27 ASSESSMENT — PAIN DESCRIPTION - PAIN TYPE
TYPE: SURGICAL PAIN
TYPE: CHRONIC PAIN;SURGICAL PAIN
TYPE: SURGICAL PAIN;ACUTE PAIN
TYPE: SURGICAL PAIN;CHRONIC PAIN

## 2022-06-27 ASSESSMENT — PAIN DESCRIPTION - FREQUENCY
FREQUENCY: CONTINUOUS

## 2022-06-27 ASSESSMENT — PAIN DESCRIPTION - LOCATION
LOCATION: BACK
LOCATION: BACK;LEG
LOCATION: BACK

## 2022-06-27 ASSESSMENT — PAIN DESCRIPTION - ORIENTATION
ORIENTATION: RIGHT;LEFT;LOWER;POSTERIOR
ORIENTATION: RIGHT;LEFT;LOWER;POSTERIOR
ORIENTATION: LOWER
ORIENTATION: LOWER;POSTERIOR;RIGHT;LEFT

## 2022-06-27 ASSESSMENT — PAIN DESCRIPTION - DIRECTION
RADIATING_TOWARDS: LEGS

## 2022-06-27 ASSESSMENT — PAIN - FUNCTIONAL ASSESSMENT
PAIN_FUNCTIONAL_ASSESSMENT: PREVENTS OR INTERFERES SOME ACTIVE ACTIVITIES AND ADLS
PAIN_FUNCTIONAL_ASSESSMENT: PREVENTS OR INTERFERES SOME ACTIVE ACTIVITIES AND ADLS
PAIN_FUNCTIONAL_ASSESSMENT: NONE - DENIES PAIN
PAIN_FUNCTIONAL_ASSESSMENT: PREVENTS OR INTERFERES SOME ACTIVE ACTIVITIES AND ADLS
PAIN_FUNCTIONAL_ASSESSMENT: PREVENTS OR INTERFERES SOME ACTIVE ACTIVITIES AND ADLS

## 2022-06-27 ASSESSMENT — PAIN DESCRIPTION - ONSET
ONSET: ON-GOING
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
## 2022-06-27 NOTE — BRIEF OP NOTE
Brief Postoperative Note      Patient: Major Mckenzie  YOB: 1965  MRN: [id number]    Date of Procedure: 6/27/2022    Pre-Op Diagnosis: L3-L5 STENOSIS L3-L4 SPONDYLOLISTHESIS    Post-Op Diagnosis: Same       Procedure(s):  L3-L4 AND L4-L5 POSTERIOR LUMBAR INTERBODY FUSION    Surgeon(s):  Hair Sandhu MD    Assistant:  Physician Assistant: AINSLEY Virgen    Anesthesia: General    Estimated Blood Loss (mL): 051    Complications: None    Specimens:   ID Type Source Tests Collected by Time Destination   A : [address] [place] [address] Hair Sandhu MD 6/27/2022 3188        Implants:  Implant Name Type Inv. Item Serial No.  Lot No. LRB No. Used Action   VIASORB STRP [id number] - [id number]  VIASORB STRP [id number] Nutrabolt  N/A 1 Implanted   VIASORB STRP [id number] - [id number]  VIASORB STRP [id number] WeGreek  N/A 1 Implanted   GRAFT BNE SUB 10CC BEAD 25CC CA SULPHATE RAP SET W/ INDIV - [id number]  GRAFT BNE SUB 10CC BEAD 25CC CA SULPHATE RAP SET W/ INDIV  [employer]WD [id number] N/A 1 Implanted   SCREW SPNL L55MM DIA7. 5MM THORLUM THRD PREASSEMBLED FOR - [id number]  SCREW SPNL L55MM DIA7. 5MM THORLUM THRD PREASSEMBLED FOR  [employer]  N/A 1 Implanted   SCREW SPNL L50MM DIA7. 5MM THORLUM THRD PREASSEMBLED FOR - [id number]  SCREW SPNL L50MM DIA7. 5MM THORLUM THRD PREASSEMBLED FOR  [employer]  N/A 2 Implanted   SCREW SPNL L45MM DIA7. 5MM THORLUM PEDCL NONCANNULATED LEDA - [id number]  SCREW SPNL L45MM DIA7. [address] NONCANNULATED LEDA  Nutrabolt  N/A 1 Implanted   SCREW SPNL L40MM DIA7. 5MM THORLUM THRD PREASSEMBLED FOR - [id number]  SCREW SPNL L40MM DIA7. 5MM THORLUM THRD PREASSEMBLED FOR  [employer]WD  N/A 2 Implanted   CAP SPNL THORLUM LEDA THRD CREO - [id number]  CAP SPNL THORLUM LEDA THRD CREO  [employer]-WD  N/A 6 Implanted   sable 10 x 22 6-12 8 degree Product #

## 2022-06-27 NOTE — PROGRESS NOTES
INTRAOPERATIVE MONITORING EMG REPORT    Diagnosis: stenosis, spondylolisthesis  Procedure: PLIF L3/4, L4/5   Anesthesia: isoflurane  Surgeon: James Correa M.D. Intra-op Monitorin.5  hours    Procedure:     EMG recording electrodes were placed over the vastus medialis, vastus lateralis, anterior tibialis, and medial gastrocnemius   musles for recording spontaneous EMG activity. A silent control was performed to test the integrity of the system prior to the procedure. Results:  Spontaneous EMG: was quiet during screw placement. EMG was noted intermittently throughout decompression. EMG was noted from all right recording sites primarily. All activity quieted and no sustained activity was noted. EMG was quiet during cage and darlene placement. EMG was quiet at closing.       Evoked EMG:   LEFT    Direct Nerve (mA)            Screw (mA)   L3                                                      >30  L4                                                      >30  L5     21      RIGHT     L3                                                      >30  L4                                                      >30  L5                                                      26

## 2022-06-27 NOTE — H&P
I have examined the patient and reviewed the H and P and no changes are noted.   The left leg is worse    Yvette Galaviz MD

## 2022-06-27 NOTE — ANESTHESIA POSTPROCEDURE EVALUATION
Department of Anesthesiology  Postprocedure Note    Patient: Mahad Rob  MRN: [id number]  YOB: 1965  Date of evaluation: 6/27/2022      Procedure Summary     Date: 06/27/22 Room / Location: [facility] OR 06 / [facility]    Anesthesia Start: 2925 Anesthesia Stop:     Procedure: L32-L4 AND L4-L5 POSTERIOR LUMBAR INTERBODY FUSION-NEEDS O-ARM, C-ARM, AUDIOLOGY, POSTERIOR INSTRUMENTATION, MAXIMILIAN TABLE , CELL SAVER, PLATELET GEL, GLOBUS (N/A ) Diagnosis:       Stenosis of carotid artery, unspecified laterality      (L3-L5 STENOSIS L3-L4 SPONDYLOLISTHESIS)    Surgeons: Sandy Bright MD Responsible Provider: Nawaf Maurice MD    Anesthesia Type: general ASA Status: 3          Anesthesia Type: General    Radha Phase I: Radha Score: 8    Radha Phase II:        Anesthesia Post Evaluation    Patient location during evaluation: PACU  Patient participation: complete - patient participated  Level of consciousness: awake  Pain score: 3  Airway patency: patent  Nausea & Vomiting: no nausea and no vomiting  Complications: no  Cardiovascular status: blood pressure returned to baseline  Respiratory status: acceptable  Hydration status: euvolemic

## 2022-06-27 NOTE — OP NOTE
[address]                  [address] [facility],  [facility]                                OPERATIVE REPORT    PATIENT NAME: Ivleisse Rodriguez              :        1965  MED REC NO:   [id number]                            ROOM:       [facility]  ACCOUNT NO:   [de-identified]                           ADMIT DATE: 2022  PROVIDER:     James Correa MD    DATE OF PROCEDURE:  2022    NEUROSURGERY OPERATIVE REPORT    PREOPERATIVE DIAGNOSES:  1. L3-L4 and L4-L5 spondylolisthesis. 2.  L3 to L5 stenosis. POSTOPERATIVE DIAGNOSES:  1. L3-L4 and L4-L5 spondylolisthesis. 2.  L3 to L5 stenosis. OPERATIVE PROCEDURES:  1.  Bilateral segmental arthrodesis and fusion from L3 to L5 with use of  bilateral L3, L4, and L5 pedicle screws with use of locally harvested  autograft plus allograft in the form of BioZorb strips for  posterolateral fusion from L3 to L5.  2.  360-degree fusion with posterior lumbar interbody fusion at L3-L4  and L4-L5 with use of bilateral Globus Sable expandable cages packed  with locally harvested autograft. 3.  Bilateral L3, L4 and L5 laminectomy; bilateral L3-L4 and L4-L5  medial facetectomy; bilateral L3, L4, L5 foraminotomy; and bilateral  L3-L4 and L4-L5 diskectomy. 4.  Use of O-arm assisted navigation with placement of pedicle screws. 5.  Use of free-running EMG to test the pedicle screw heads. 6.  Use of intraoperative fluoroscopy interpreted by myself, the  surgeon. 7.  AS modifier for Ramirez Sanchez PA-C who assisted with primary  exposure and primary closure. 8.  22 modifier for the patient being morbidly obese with a BMI of 46.5. ANESTHESIA:  Generalized endotracheal anesthesia. SURGEON:  James Correa MD    ASSISTANT:  Ramirez Sanchez PA-C    COMPLICATIONS:  None. ESTIMATED BLOOD LOSS:  500 mL. SPECIMEN:  Disk.     OPERATIVE INDICATIONS:  The patient is a 68-year-old lady who presented  to the office the  field. I then proceeded to use a handheld stimulator to test the  pedicle screw heads. There was no evidence of pedicle breach. I then  replaced the self-retaining Zelpi retractors into the wound. I used a  L[place]ell rongeur to bite up the spinous process at L3, L4 and L5. I used  a high-speed bur to thin out the lamina bilaterally at L3, L4 and L5. After this was done, I used a small straight curette to detach the  ligamentum flavum from the undersurface of the L5 lamina and superior  aspect of the S1 lamina. I used #4 Kerrison punch to perform bilateral  L3, L4 and L5 laminectomy. Once the laminectomies were completed, I  then focused my attention to lateral recesses. I used #3 Kerrison punch  to perform bilateral L3-L4 and L4-L5 medial facetectomy and bilateral  L3, L4 and L5 foraminotomy. After this was done, I identified the L3-L4  disk space on the left, retracted the thecal sac medially, performed an  annulotomy with a #11 blade. I removed disk material with pituitary  rongeurs and curettes. I prepared both the superior and inferior  endplates. After this was done, I then inserted a #8 shaver that was  rotated 360 degrees. I then subsequently proceeded to then place a #7  [place] Duran expandable cage packed with locally harvested autograft. I  then identified the L4-L5 disk space on the left, retracted the thecal  sac medially, performed an annulotomy with a #11 blade. I removed disk  material with pituitary rongeurs and curettes. I prepared both the  superior and inferior endplates. I inserted a #8 shaver that was  rotated 360 degrees. After this was done, I placed a #7 Globus Sable  expandable cage packed with locally harvested autograft. Next, I then  went over the patient's right side, identified the L3-L4 disk space,  performed an annulotomy with a #11 blade. I removed disk material with  pituitary rongeurs and curettes.   I prepared both the superior and  inferior endplates, and inserted a #8 shaver, that was rotated 360  degrees. After this was done, I placed #7 [place] expandable cage,  packed with locally harvested autograft. Finally, I identified the  L4-L5 disk space on the right, performed an annulotomy with a #11 blade. I removed disk material with pituitary rongeurs and curettes. I  prepared both the superior and inferior endplates. I then proceeded to  insert a #8 shaver that was rotated 360 degrees. After this was done, I  placed #7 Globus Sable expandable cage packed with locally harvested  autograft. After this was done, I used intraoperative fluoroscopy to  inspect the construct, it appeared sound. I placed rods into the screw  heads. I locked the rods now using locking caps in  torque/counter-torque mechanism. I used a high-speed bur to decorticate  the transverse processes bilaterally at L3, L4 and L5. I irrigated the  wound copiously with antibiotic-impregnated saline. I laid out my bone  grafting material in the lateral gutters which consisted of locally  harvested autograft plus allograft in the form of BioZorb strips. After  this was done I obtained adequate hemostasis with monopolar and bipolar  cautery. A Hemovac drain was placed into the wound. I then proceeded  to close the wound in layers using 0 Vicryl for the fascia, 2-0 Vicryl  for the subcutaneous layer and 4-0 Monocryl in subcuticular fashion for  the skin. Dermabond was applied over the skin surface. A dry sterile  dressing was placed over this. The patient was then subsequently  flipped into supine position on her hospital bed, was extubated and  transported to the postanesthesia care unit in stable condition. There  were no complications. Counts were correct. I was present for the  entire case. Please note Sherren Matte, PA-C was the only qualified assistant. He  assisted with primary exposure and primary closure.         Pardeep Horn MD    D: 06/27/2022 15:38:15       T: 06/27/2022 15:41:34     [email or web address]  Job#: [id number]     Doc#: [id number]    CC:

## 2022-06-27 NOTE — ANESTHESIA PRE PROCEDURE
Department of Anesthesiology  Preprocedure Note       Name:  Rob Giordano   Age:  59 y.o.  :  1965                                          MRN:  [id number]         Date:  2022      Surgeon: Julian Melgar):  Renee Herrera MD    Procedure: Procedure(s):  L32-L4 AND L4-L5 POSTERIOR LUMBAR INTERBODY FUSION-NEEDS O-ARM, C-ARM, AUDIOLOGY, POSTERIOR INSTRUMENTATION, MAXIMILIAN TABLE , CELL SAVER, PLATELET GEL, GLOBUS    Medications prior to admission:   Prior to Admission medications    Medication Sig Start Date End Date Taking? Authorizing Provider   nystatin-triamcinolone (MYCOLOG II) 558210-4.7 UNIT/GM-% cream Apply topically 2 times daily. Patient taking differently: as needed Apply topically 2 times daily. 22   Maurisio Coelho MD   amLODIPine (NORVASC) 10 MG tablet Take 1 tablet by mouth daily 22   Maurisio Coelho MD   atorvastatin (LIPITOR) 10 MG tablet Take 1 tablet by mouth once daily 22   Maurisio Coelho MD   valACYclovir (VALTREX) 1 g tablet Take 2 tablets by mouth daily  Patient taking differently: Take 2,000 mg by mouth as needed  22   Maurisio Coelho MD   vitamin D (ERGOCALCIFEROL) 1.25 MG (99771 UT) CAPS capsule Take 1 capsule by mouth once a week 22   Maurisio Coelho MD   tobramycin-dexamethasone (TOBRADEX) 0.3-0.1 % ophthalmic ointment Place into the left eye 3 times daily 3 times daily.     Historical Provider, MD   prednisoLONE acetate (PRED FORTE) 1 % ophthalmic suspension as needed  21   Historical Provider, MD   buPROPion (WELLBUTRIN XL) 300 MG extended release tablet TAKE 1 TABLET BY MOUTH ONCE DAILY 21   Historical Provider, MD   busPIRone (BUSPAR) 10 MG tablet Take 10 mg by mouth 2 times daily  21   Historical Provider, MD   buPROPion (WELLBUTRIN XL) 150 MG extended release tablet Take 450 mg by mouth every morning  20   Historical Provider, MD   clonazePAM (KLONOPIN) 1 MG tablet Take 1 mg by mouth 2 times daily as needed. 9/19/19   Historical Provider, MD   DULoxetine (CYMBALTA) 60 MG extended release capsule Take 60 mg by mouth daily 2 capsules 9/28/19   Historical Provider, MD   zolpidem (AMBIEN) 10 MG tablet TAKE 1 2 (ONE HALF) TO 1 WHOLE TABLET BY MOUTH AT BEDTIME AS DIRECETED ON EMPTY STOMACH 9/28/19   Historical Provider, MD       Current medications:    Current Facility-Administered Medications   Medication Dose Route Frequency Provider Last Rate Last Admin    0.9 % sodium chloride infusion   IntraVENous PRN AINSLEY Cox-DIANA        0.9 % sodium chloride infusion   IntraVENous Continuous CHAPO CoxC 125 mL/hr at 06/27/22 0549 New Bag at 06/27/22 0549    ceFAZolin (ANCEF) 2,000 mg in sterile water 20 mL IV syringe  2,000 mg IntraVENous On Call to [address]        sodium chloride flush 0.9 % injection 5-40 mL  5-40 mL IntraVENous 2 times per day Sheri Davis PA-C        sodium chloride flush 0.9 % injection 5-40 mL  5-40 mL IntraVENous PRN Brittany Baker PA-C           Allergies:  No Known Allergies    Problem List:    Patient Active Problem List   Diagnosis Code    Class 3 severe obesity due to excess calories with body mass index (BMI) of 45.0 to 49.9 in adult ([facility] E66.01, Z68.42    Chronic right-sided low back pain with right-sided sciatica M54.41, G89.29    Right hip pain M25.551    Anxiety and depression F41.9, F32. A    Insomnia G47.00    Prediabetes R73.03    Spondylolisthesis of lumbar region M43.16    Lumbar spondylosis M47.816    Other spondylosis with radiculopathy, lumbar region M47.26    Leg length discrepancy M21.70    Facet arthropathy M47.819    Lumbar radiculitis M54.16    Sacroiliitis (HCC) M46.1    Sprain of left knee S83. 92XA    Left knee pain M25.562    Contusion of left knee S80. 02XA    Polyarthritis M13.0    Essential hypertension I10    Hyperkalemia E87.5    Vitamin D deficiency E55.9    Chronic pain syndrome G89.4    Lumbar disc disorder M51.9    Lumbar facet arthropathy M47.816    Morbid obesity with BMI of 45.0-49.9, adult ([facility]) E66.01, Z68.42    Mixed hyperlipidemia E78.2       Past Medical History:        Diagnosis Date    Anxiety     Depression     Dyslipidemia     Hyperlipidemia     Obesity     Osteoarthritis     Post-menopausal bleeding        Past Surgical History:        Procedure Laterality Date    ANESTHESIA NERVE BLOCK Right 12/19/2019    RIGHT L3-4 TRANSFORAMINAL EPIDURAL STEROID INJECTION (CPT 29117) performed by Reynold Benitez DO at [facility] [place] Right 5/6/2021    RIGHT SACROILIAC JOINT INJECTION UNDER X-RAY GUIDANCE performed by Reynold Benitez DO at [facility] N/A 1/18/2022    HYSTEROSCOPY DILATION AND CURETTAGE performed by Shyam Brown MD at [facility] [address] JOINT Right 1/16/2020    RIGHT SACROILIAC JOINT STEROID INJECTION UNDER X-RAY GUIDANCE performed by Reynold Benitez DO at [address] [place] [address],[address]lo[address] 3,4, [address] 5 Right 2015    NERVE BLOCK Right 12/19/2019    lumbar trasnforaminal    NERVE BLOCK Right 01/16/2020    right sacroiliac joint steroid injection     NERVE BLOCK Right 09/03/2020    right l3-4transforaminal epidural steroid injection    NERVE BLOCK Right 9/3/2020    RIGHT L3-4 TRANSFORAMINAL EPIDURAL STEROID INJECTION performed by Reynold Benitez DO at [facility] Bilateral 11/05/2020    medial branch block    NERVE BLOCK Bilateral 11/5/2020    MEDIAL BRANCH BLOCK BILATERAL L4-5 AND L5-S1 (CPT 70865) performed by Reynold Benitez DO at [facility] Bilateral 12/10/2020    medial branch block    NERVE BLOCK Bilateral 12/10/2020    BILATERAL MEDIAL BRANCH BLOCK L4-5 AND L5-S1 (CPT H6702030) performed by Reynold Benitez DO at [facility] Right 01/21/2021    RADIOFREQUENCY RIGHT L4-5 AND L5-S1    NERVE BLOCK Left 03/04/2021    fluoroscopic guided left lumbar medial branch  neurolysis (RFA)) at levels L4-5, L5-S1    NERVE BLOCK Right 05/06/2021    si    PAIN MANAGEMENT PROCEDURE Right 1/21/2021    RADIOFREQUENCY RIGHT L4-5 AND L5-S1 (CPT 66593) performed by Myra Boo DO at [address] Left 3/4/2021    FLUOROSCOPIC-GUIDED LEFT LUMBAR MEDIAL BRANCH NUEROLYSIS (Joel Heman) AT LEVELS L4-5, L5-S1 (CPT 40768) performed by Myra Boo DO at [address] Right 2000    SHOULDER SURGERY Left 2019       Social History:    Social History     Tobacco Use    Smoking status: Never Smoker    Smokeless tobacco: Never Used   Substance Use Topics    Alcohol use: Yes     Comment: occ                                Counseling given: Not Answered      Vital Signs (Current):   Vitals:    06/27/22 0516 06/27/22 0527   BP:  (!) 148/84   Pulse:  88   Resp:  20   Temp:  36.1 °C (96.9 °F)   TempSrc:  Temporal   SpO2:  97%   Weight: 263 lb (119.3 kg)    Height: 5' 3\" (1.6 m)                                               BP Readings from Last 3 Encounters:   06/27/22 (!) 148/84   06/22/22 (!) 147/78   06/07/22 132/84       NPO Status: Time of last liquid consumption: 2230                        Time of last solid consumption: 2230                        Date of last liquid consumption: 06/26/22                        Date of last solid food consumption: 06/26/22    BMI:   Wt Readings from Last 3 Encounters:   06/27/22 263 lb (119.3 kg)   06/22/22 263 lb (119.3 kg)   06/07/22 263 lb (119.3 kg)     Body mass index is 46.59 kg/m².     CBC:   Lab Results   Component Value Date    WBC 5.0 06/22/2022    RBC 4.83 06/22/2022    HGB 13.0 06/22/2022    HCT 41.6 06/22/2022    MCV 86.1 06/22/2022    RDW 15.0 06/22/2022     06/22/2022       CMP:   Lab Results   Component Value Date     06/22/2022    K 4.2 06/22/2022  06/22/2022    CO2 22 06/22/2022    BUN 9 06/22/2022    CREATININE 0.8 06/22/2022    GFRAA >60 06/22/2022    LABGLOM >60 06/22/2022    GLUCOSE 114 06/22/2022    PROT 7.3 06/22/2021    CALCIUM 9.7 06/22/2022    BILITOT 0.4 06/22/2021    ALKPHOS 72 06/22/2021    AST 27 06/22/2021    ALT 34 06/22/2021       POC Tests: No results for input(s): POCGLU, POCNA, POCK, POCCL, POCBUN, POCHEMO, POCHCT in the last 72 hours. Coags:   Lab Results   Component Value Date    PROTIME 11.2 06/22/2022    INR 1.0 06/22/2022       HCG (If Applicable): No results found for: PREGTESTUR, PREGSERUM, HCG, HCGQUANT     ABGs: No results found for: PHART, PO2ART, UEU6FZI, NBI1YBJ, BEART, L8CLIIWT     Type & Screen (If Applicable):  No results found for: LABABO, LABRH    Drug/Infectious Status (If Applicable):  No results found for: HIV, HEPCAB    COVID-19 Screening (If Applicable):   Lab Results   Component Value Date    COVID19 Not Detected 02/26/2021 1/18/2022- \"Sinus rhythm with premature atrial complexes  Delayed precordial transition  Abnormal ECG  No previous ECGs available\"        Anesthesia Evaluation  Patient summary reviewed no history of anesthetic complications:   Airway: Mallampati: II  TM distance: >3 FB   Neck ROM: full  Mouth opening: > = 3 FB   Dental: normal exam         Pulmonary:normal exam  breath sounds clear to auscultation                             Cardiovascular:    (+) hypertension:, hyperlipidemia        Rhythm: regular  Rate: normal                    Neuro/Psych:   (+) neuromuscular disease:, depression/anxiety             GI/Hepatic/Renal:   (+) morbid obesity          Endo/Other:    (+) : arthritis:., .                 Abdominal:   (+) obese,     Abdomen: soft. Vascular: Other Findings:           Anesthesia Plan      general     ASA 3       Induction: intravenous. MIPS: Postoperative opioids intended and Prophylactic antiemetics administered.   Anesthetic plan and risks discussed with patient. Use of blood products discussed with patient whom consented to blood products. Plan discussed with CRNA and attending. Nette Escobar RN   6/27/2022      Pt seen, examined, chart reviewed, plan discussed.   Shayna Garcia MD  6/27/2022  7:03 AM

## 2022-06-27 NOTE — PROGRESS NOTES
Messaged Dr Ashley Luis, with Family Medicine, regarding medical management consult. Dr Ashley Luis does not follow for her PCP. Will message [facility]. [phone]: [address] [facility].

## 2022-06-28 LAB
ANION GAP SERPL CALCULATED.3IONS-SCNC: 11 MMOL/L (ref 7–16)
BUN BLDV-MCNC: 10 MG/DL (ref 6–20)
CALCIUM SERPL-MCNC: 8.5 MG/DL (ref 8.6–10.2)
CHLORIDE BLD-SCNC: 103 MMOL/L (ref 98–107)
CO2: 24 MMOL/L (ref 22–29)
CREAT SERPL-MCNC: 0.7 MG/DL (ref 0.5–1)
GFR AFRICAN AMERICAN: >60
GFR NON-AFRICAN AMERICAN: >60 ML/MIN/1.73
GLUCOSE BLD-MCNC: 113 MG/DL (ref 74–99)
HCT VFR BLD CALC: 32.5 % (ref 34–48)
HEMOGLOBIN: 10.1 G/DL (ref 11.5–15.5)
MCH RBC QN AUTO: 27.6 PG (ref 26–35)
MCHC RBC AUTO-ENTMCNC: 31.1 % (ref 32–34.5)
MCV RBC AUTO: 88.8 FL (ref 80–99.9)
PDW BLD-RTO: 14.6 FL (ref 11.5–15)
PLATELET # BLD: 264 E9/L (ref 130–450)
PMV BLD AUTO: 10.4 FL (ref 7–12)
POTASSIUM SERPL-SCNC: 4.5 MMOL/L (ref 3.5–5)
RBC # BLD: 3.66 E12/L (ref 3.5–5.5)
SODIUM BLD-SCNC: 138 MMOL/L (ref 132–146)
WBC # BLD: 10.9 E9/L (ref 4.5–11.5)

## 2022-06-28 PROCEDURE — G0378 HOSPITAL OBSERVATION PER HR: HCPCS

## 2022-06-28 PROCEDURE — 97535 SELF CARE MNGMENT TRAINING: CPT

## 2022-06-28 PROCEDURE — 36415 COLL VENOUS BLD VENIPUNCTURE: CPT

## 2022-06-28 PROCEDURE — 97165 OT EVAL LOW COMPLEX 30 MIN: CPT

## 2022-06-28 PROCEDURE — 85027 COMPLETE CBC AUTOMATED: CPT

## 2022-06-28 PROCEDURE — 96374 THER/PROPH/DIAG INJ IV PUSH: CPT

## 2022-06-28 PROCEDURE — 80048 BASIC METABOLIC PNL TOTAL CA: CPT

## 2022-06-28 PROCEDURE — 97161 PT EVAL LOW COMPLEX 20 MIN: CPT

## 2022-06-28 PROCEDURE — 2580000003 HC RX 258: Performed by: NEUROLOGICAL SURGERY

## 2022-06-28 PROCEDURE — 97530 THERAPEUTIC ACTIVITIES: CPT

## 2022-06-28 PROCEDURE — 96361 HYDRATE IV INFUSION ADD-ON: CPT

## 2022-06-28 PROCEDURE — 96375 TX/PRO/DX INJ NEW DRUG ADDON: CPT

## 2022-06-28 PROCEDURE — 6370000000 HC RX 637 (ALT 250 FOR IP): Performed by: NEUROLOGICAL SURGERY

## 2022-06-28 PROCEDURE — 6360000002 HC RX W HCPCS: Performed by: NEUROLOGICAL SURGERY

## 2022-06-28 RX ADMIN — CEFAZOLIN 2000 MG: 2 INJECTION, POWDER, FOR SOLUTION INTRAMUSCULAR; INTRAVENOUS at 06:04

## 2022-06-28 RX ADMIN — SODIUM CHLORIDE, PRESERVATIVE FREE 10 ML: 5 INJECTION INTRAVENOUS at 09:46

## 2022-06-28 RX ADMIN — BISACODYL 5 MG: 5 TABLET, COATED ORAL at 09:47

## 2022-06-28 RX ADMIN — DOCUSATE SODIUM 200 MG: 100 CAPSULE, LIQUID FILLED ORAL at 20:24

## 2022-06-28 RX ADMIN — SODIUM CHLORIDE: 9 INJECTION, SOLUTION INTRAVENOUS at 01:36

## 2022-06-28 RX ADMIN — CEFAZOLIN 2000 MG: 2 INJECTION, POWDER, FOR SOLUTION INTRAMUSCULAR; INTRAVENOUS at 16:14

## 2022-06-28 RX ADMIN — ACETAMINOPHEN 325MG 650 MG: 325 TABLET ORAL at 00:32

## 2022-06-28 RX ADMIN — SENNOSIDES AND DOCUSATE SODIUM 1 TABLET: 50; 8.6 TABLET ORAL at 20:24

## 2022-06-28 RX ADMIN — OXYCODONE HYDROCHLORIDE 10 MG: 10 TABLET ORAL at 00:33

## 2022-06-28 RX ADMIN — MORPHINE SULFATE 4 MG: 4 INJECTION, SOLUTION INTRAMUSCULAR; INTRAVENOUS at 20:24

## 2022-06-28 RX ADMIN — OXYCODONE HYDROCHLORIDE 10 MG: 10 TABLET ORAL at 12:44

## 2022-06-28 RX ADMIN — FAMOTIDINE 20 MG: 20 TABLET ORAL at 20:24

## 2022-06-28 RX ADMIN — FAMOTIDINE 20 MG: 20 TABLET ORAL at 09:47

## 2022-06-28 RX ADMIN — DULOXETINE HYDROCHLORIDE 60 MG: 60 CAPSULE, DELAYED RELEASE ORAL at 09:51

## 2022-06-28 RX ADMIN — BUPROPION HYDROCHLORIDE 450 MG: 150 TABLET, EXTENDED RELEASE ORAL at 09:52

## 2022-06-28 RX ADMIN — OXYCODONE HYDROCHLORIDE 10 MG: 10 TABLET ORAL at 23:42

## 2022-06-28 RX ADMIN — POLYETHYLENE GLYCOL 3350 17 G: 17 POWDER, FOR SOLUTION ORAL at 09:47

## 2022-06-28 RX ADMIN — OXYCODONE HYDROCHLORIDE 10 MG: 10 TABLET ORAL at 06:04

## 2022-06-28 RX ADMIN — ATORVASTATIN CALCIUM 10 MG: 10 TABLET, FILM COATED ORAL at 09:51

## 2022-06-28 RX ADMIN — BUSPIRONE HYDROCHLORIDE 10 MG: 10 TABLET ORAL at 09:47

## 2022-06-28 RX ADMIN — OXYCODONE 5 MG: 5 TABLET ORAL at 17:05

## 2022-06-28 RX ADMIN — BUSPIRONE HYDROCHLORIDE 10 MG: 10 TABLET ORAL at 20:24

## 2022-06-28 RX ADMIN — DIAZEPAM 5 MG: 5 TABLET ORAL at 20:23

## 2022-06-28 RX ADMIN — ACETAMINOPHEN 325MG 650 MG: 325 TABLET ORAL at 06:04

## 2022-06-28 RX ADMIN — CEFAZOLIN 2000 MG: 2 INJECTION, POWDER, FOR SOLUTION INTRAMUSCULAR; INTRAVENOUS at 00:33

## 2022-06-28 RX ADMIN — MORPHINE SULFATE 4 MG: 4 INJECTION, SOLUTION INTRAMUSCULAR; INTRAVENOUS at 01:35

## 2022-06-28 RX ADMIN — AMLODIPINE BESYLATE 10 MG: 10 TABLET ORAL at 09:47

## 2022-06-28 RX ADMIN — ACETAMINOPHEN 325MG 650 MG: 325 TABLET ORAL at 12:48

## 2022-06-28 RX ADMIN — CEFAZOLIN 2000 MG: 2 INJECTION, POWDER, FOR SOLUTION INTRAMUSCULAR; INTRAVENOUS at 23:43

## 2022-06-28 RX ADMIN — ACETAMINOPHEN 325MG 650 MG: 325 TABLET ORAL at 18:47

## 2022-06-28 RX ADMIN — SODIUM CHLORIDE, PRESERVATIVE FREE 10 ML: 5 INJECTION INTRAVENOUS at 20:36

## 2022-06-28 RX ADMIN — MORPHINE SULFATE 4 MG: 4 INJECTION, SOLUTION INTRAMUSCULAR; INTRAVENOUS at 09:58

## 2022-06-28 RX ADMIN — SENNOSIDES AND DOCUSATE SODIUM 1 TABLET: 50; 8.6 TABLET ORAL at 09:46

## 2022-06-28 ASSESSMENT — PAIN DESCRIPTION - LOCATION
LOCATION: BACK

## 2022-06-28 ASSESSMENT — PAIN SCALES - GENERAL
PAINLEVEL_OUTOF10: 10
PAINLEVEL_OUTOF10: 9
PAINLEVEL_OUTOF10: 7
PAINLEVEL_OUTOF10: 9
PAINLEVEL_OUTOF10: 10

## 2022-06-28 ASSESSMENT — PAIN DESCRIPTION - ORIENTATION
ORIENTATION: LOWER;MID
ORIENTATION: MID
ORIENTATION: LOWER;MID
ORIENTATION: LOWER;MID
ORIENTATION: MID
ORIENTATION: MID
ORIENTATION: LOWER;MID
ORIENTATION: MID

## 2022-06-28 ASSESSMENT — PAIN DESCRIPTION - FREQUENCY
FREQUENCY: CONTINUOUS
FREQUENCY: CONTINUOUS

## 2022-06-28 ASSESSMENT — PAIN DESCRIPTION - DESCRIPTORS
DESCRIPTORS: ACHING;SHOOTING;SORE
DESCRIPTORS: SHOOTING;THROBBING
DESCRIPTORS: SHOOTING;THROBBING;TENDER
DESCRIPTORS: ACHING;DISCOMFORT;SORE
DESCRIPTORS: SHARP;STABBING;THROBBING
DESCRIPTORS: ACHING;DISCOMFORT;SORE
DESCRIPTORS: SHOOTING;THROBBING
DESCRIPTORS: ACHING;THROBBING;SORE

## 2022-06-28 ASSESSMENT — PAIN DESCRIPTION - PAIN TYPE
TYPE: SURGICAL PAIN
TYPE: SURGICAL PAIN

## 2022-06-28 ASSESSMENT — PAIN DESCRIPTION - ONSET: ONSET: ON-GOING

## 2022-06-28 ASSESSMENT — PAIN DESCRIPTION - DIRECTION: RADIATING_TOWARDS: LEGS

## 2022-06-28 NOTE — CARE COORDINATION
Care Coordiantion    Patient is post op day #1 post PLIF L3-L5 . She did well in therapy . Pt [facility] 17/24 she walked 100 feet with fww., ot [facility] 15/24. Spoke to the patient and her wife they want a referral made to [facility] referral was made. She will need a front wheeled [facility]er [facility] Referral made to [facility]. Dme order is in. Plan is home once she is medically stable. . [facility] cannot accept the patient . A referral was made to S[facility]chez at [facility] await if accepted.

## 2022-06-28 NOTE — CARE COORDINATION
Care Coordination  Met with the patient at bedside to discuss transition care planning. The patient lives with her wife in a 2 story home 3 steps to enter. She has a cane, raised toilet seat at home but she will need a wheeled walker upon discharge. The patient has had home care in the past but does not remember the name. She hasn't been to a skilled facility before. Her primary care Physician is Sallie Can MD and her pharmacy in [place]. If dme needed she has no preference. If Home care needed she has no preference. The patient is post op day #1 post PLIF L3-L5 by Dr Herber Jean. The patient is on 2 liters . Pt Ot are pending. Pmr consult placed. The patient would like to return home upon discharge.

## 2022-06-28 NOTE — PROGRESS NOTES
Removed patient catheter at this time. Encouraged drinking plenty of fluids to activate bladder response. Times to void written on white board (4:10-6:10pm). Patient satisfied.

## 2022-06-28 NOTE — PROGRESS NOTES
Department of Neurosurgery  Progress Note    CHIEF COMPLAINT: s/p lumbar fusion    SUBJECTIVE:  C/o op site pain    REVIEW OF SYSTEMS :  Constitutional: Negative for chills and fever. Neurological: Negative for dizziness, tremors and speech change.      OBJECTIVE:   VITALS:  BP (!) 113/57   Pulse 100   Temp 97.7 °F (36.5 °C) (Temporal)   Resp 16   Ht 5' 3\" (1.6 m)   Wt 263 lb (119.3 kg)   LMP  (LMP Unknown)   SpO2 96%   BMI 46.59 kg/m²   PHYSICAL:  CONSTITUTIONAL:  awake, alert, cooperative, no apparent distress, and appears stated age    DATA:  CBC:   Lab Results   Component Value Date    WBC 10.9 06/28/2022    RBC 3.66 06/28/2022    HGB 10.1 06/28/2022    HCT 32.5 06/28/2022    MCV 88.8 06/28/2022    MCH 27.6 06/28/2022    MCHC 31.1 06/28/2022    RDW 14.6 06/28/2022     06/28/2022    MPV 10.4 06/28/2022     BMP:    Lab Results   Component Value Date     06/28/2022    K 4.5 06/28/2022    K 4.2 06/22/2022     06/28/2022    CO2 24 06/28/2022    BUN 10 06/28/2022    LABALBU 4.0 06/22/2021    CREATININE 0.7 06/28/2022    CALCIUM 8.5 06/28/2022    GFRAA >60 06/28/2022    LABGLOM >60 06/28/2022    GLUCOSE 113 06/28/2022     PT/INR:    Lab Results   Component Value Date    PROTIME 11.2 06/22/2022    INR 1.0 06/22/2022     PTT:  No results found for: APTT, PTT[APTT}    Current Inpatient Medications  Current Facility-Administered Medications: amLODIPine (NORVASC) tablet 10 mg, 10 mg, Oral, Daily  atorvastatin (LIPITOR) tablet 10 mg, 10 mg, Oral, Daily  buPROPion (WELLBUTRIN XL) extended release tablet 450 mg, 450 mg, Oral, QAM  buPROPion (WELLBUTRIN XL) extended release tablet 300 mg, 300 mg, Oral, Daily  busPIRone (BUSPAR) tablet 10 mg, 10 mg, Oral, BID  clonazePAM (KLONOPIN) tablet 1 mg, 1 mg, Oral, BID PRN  DULoxetine (CYMBALTA) extended release capsule 60 mg, 60 mg, Oral, Daily  zolpidem (AMBIEN) tablet 10 mg, 10 mg, Oral, Nightly PRN  sodium chloride flush 0.9 % injection 5-40 mL, 5-40 mL,
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
## 2022-06-28 NOTE — PROGRESS NOTES
Physical Therapy Initial Assessment     Name: Anjel Reynolds  : 1965  MRN: [id number]      Date of Service: 2022    Evaluating PT:  Aleena Cardenas, PT [place] [address] [facility]    Room #:  1784/8774-G  Diagnosis:  Stenosis of carotid artery, unspecified laterality [I65.29]  Spondylolisthesis of lumbar region [M43.16]  PMHx/PSHx:   has a past medical history of Anxiety, Depression, Dyslipidemia, Hyperlipidemia, Obesity, Osteoarthritis, and Post-menopausal bleeding. Procedure/Surgery:   has a past surgical history that includes shoulder surgery (Left, ); knee surgery (Right, ); Rotator cuff repair (Right, ); Nerve Block (Right, 2019); Anesthesia Nerve Block (Right, 2019); Nerve Block (Right, 2020); Injection Procedure For Sacroiliac Joint (Right, 2020); Nerve Block (Right, 2020); Nerve Block (Right, 9/3/2020); Nerve Block (Bilateral, 2020); Nerve Block (Bilateral, 2020); Nerve Block (Bilateral, 12/10/2020); Nerve Block (Bilateral, 12/10/2020); Nerve Block (Right, 2021); Pain management procedure (Right, 2021); Nerve Block (Left, 2021); Pain management procedure (Left, 3/4/2021); Nerve Block (Right, 2021); Back Injection (Right, 2021); Dilation and curettage of uterus (N/A, 2022); and Lumbar spine surgery (N/A, 2022). Precautions:  Falls. Spinal precautions are no bending, lifting, or twisting. LSO whenever out of bed, Hemovac. Equipment Needs:  FWW. Patient has raised toilet seat and other equipment at home. SUBJECTIVE:    Pt lives with wife and the caregiver for her mother in a 3 story home with 3 stairs to enter and 1 rail. Bed and bath in the basement floor, however, she will be staying on the 1st floor upon discharge. PLOF patient ambulated independently, no device. OBJECTIVE:   Initial Evaluation  Date: 22 Treatment Short Term/ Long Term   Goals   AM-PAC 6 Clicks 33/66     Was pt agreeable to Eval/treatment? yes     Does pt have pain? 9/10 post therapy session. Bed Mobility  Rolling: SBA  Supine to sit: Monika  Sit to supine: Monika  Scooting: SBA  Rolling: Ind  Supine to sit: Ind  Sit to supine: Ind  Scooting: Ind   Transfers Sit to stand: Monika  Stand to sit: SBA  Stand pivot: SBA  Sit to stand: Mod Ind  Stand to sit: Mod Ind  Stand pivot: Mod Ind   Ambulation    100 feet with FWW SBA. No knee buckling or loss of balance when turning. 200+ feet with FWW Mod Ind   Stair negotiation: ascended and descended NT  3 steps with 1 rail Monika   ROM BUE:  WFL  BLE:  WFL     Strength BUE:  WNL  BLE:  WNL  WNL   Balance Sitting EOB:  Ind  Dynamic Standing: SBA with FWW  Sitting EOB:  Ind  Dynamic Standing: Mod Ind     Pt is A & O x 3  Sensation:  Pt denies numbness and tingling to extremities  Edema:  NT    Vitals: NT    Therapeutic Exercises:  Patient was instructed to complete neuro mobilization exercises for LEs when sitting in a chair such as hip flexion, knee extension, and ankle PF and DF. Patient education  Pt educated on wearing her soft LSO whenever out of bed and her precautions such as no bending, lifting, or twisting, and use of call light for safety. Patient response to education:   Pt verbalized understanding Pt demonstrated skill Pt requires further education in this area   yes yes no     ASSESSMENT:    Conditions Requiring Skilled Therapeutic Intervention:    [x]Decreased strength     []Decreased ROM  [x]Decreased functional mobility  []Decreased balance   [x]Decreased endurance   []Decreased posture  []Decreased sensation  []Decreased coordination   []Decreased vision  []Decreased safety awareness   []Increased pain       Comments:  Patient cleared by nursing. Patient was supine in bed prior to therapy session. Patient log rolled keeping her shoulders and hips together with SBA to the L in order to apply her soft LSO brace.  Patient then performed a log roll to the R and then transferred supine to sit with min A making sure to adhere to spinal precautions. Patient then performed sit to stand with min A from the therapist and walked a total of 100 ft with SBA which patient tolerated well. Patient then sat in her chair with LSO brace still on to end the session. Patient was educated on not bending, lifting, or twisting and wearing her LSO brace whenever out of bed. Patient was also instructed on completing AROM exercises with her BLEs while sitting in a chair. Treatment:  Patient practiced and was instructed in the following treatment:     Verbal cueing/ducation to perform bed mobility and transfers.  Education on spinal precautions and wearing her LSO brace. Also completing BLEs exercises whenever sitting up.  Gait training and increase working on patients endurance.  Bed mobility training and transfer training. Pt's/ family goals   1. Patient to get discharged home. 2. Patient able to demonstrate an increase in endurance during ambulation. 2. Patient able to demonstrate an increase in strength of LE and balance. Prognosis is good for reaching above PT goals. Patient and or family understand(s) diagnosis, prognosis, and plan of care. yes    PHYSICAL THERAPY PLAN OF CARE:    PT POC is established based on physician order and patient diagnosis     Referring provider/PT Order:    06/27/22 1145  PT eval and treat           Evita Yi MD Acknowledge        Diagnosis:  Stenosis of carotid artery, unspecified laterality [I65.29]  Spondylolisthesis of lumbar region [M43.16]  Specific instructions for next treatment: Increase ambulation endurance, instruct stair negotiation sequencing, and review spinal precautions.     Current Treatment Recommendations:     [x] Strengthening to improve independence with functional mobility   [] ROM to improve independence with functional mobility   [] Balance Training to improve static/dynamic balance and to reduce fall risk  [x] Endurance Training to improve activity tolerance during functional mobility   [x] Transfer Training to improve safety and independence with all functional transfers   [x] Gait Training to improve gait mechanics, endurance and assess need for appropriate assistive device  [x] Stair Training in preparation for safe discharge home and/or into the community   [] Positioning to prevent skin breakdown and contractures  [x] Safety and Education Training   [] Patient/Caregiver Education   [x] HEP  [] Other     PT long term treatment goals are located in above grid    Frequency of treatments: 2-5x/week x 1-2 weeks. Time in  755  Time out  825    Total Treatment Time 15 minutes     Evaluation Time includes thorough review of current medical information, gathering information on past medical history/social history and prior level of function, completion of standardized testing/informal observation of tasks, assessment of data and education on plan of care and goals.     CPT codes:  [x] Low Complexity PT evaluation 30036  [] Moderate Complexity PT evaluation 27858  [] High Complexity PT evaluation 15371  [] PT Re-evaluation 79432  [] Gait training 82425 - minutes  [] Manual therapy 07669 - minutes  [x] Therapeutic activities 81111 15 minutes  [] Therapeutic exercises 14896 - minutes  [] Neuromuscular reeducation 38195 - minutes         Reema Hernandes, SPT  [place] [address] [address]29

## 2022-06-28 NOTE — PROGRESS NOTES
Fine Coordination                         [x] Balance      [] Vision/perception   [x]Sensation      []Gross Motor Coordination             [] ROM           [] Delirium                   [] Motor Control      OT PLAN OF CARE   OT POC based on physician orders, patient diagnosis and results of clinical assessment     Frequency/Duration: 2-4 days/wk for 2 weeks PRN   Specific OT Treatment Interventions to include: Instruction/training on adapted ADL techniques and AE recommendations to increase functional independence within precautions  Training on energy conservation strategies, correct breathing pattern and techniques to improve independence/tolerance for self-care routine  Functional transfer/mobility training/DME recommendations for increased independence, safety, and fall prevention  Patient/Family education to increase follow through with safety techniques and functional independence  Recommendation of environmental modifications for increased safety with functional transfers/mobility and ADLs  Splinting/positioning for increased function, prevention of contractures, and improve skin integrity  Therapeutic exercise to improve motor endurance, ROM, and functional strength for ADLs/functional transfers  Therapeutic activities to facilitate/challenge dynamic balance, stand tolerance for increased safety and independence with ADLs  Therapeutic activities to facilitate gross/fine motor skills for increased independence with ADLs  Positioning to improve skin integrity, interaction with environment and functional independence     Recommended Adaptive Equipment: TBD for A.E. to increase Ind. With LB ADLs     Home Living: Pt lives with wife (able to assist) & elderly mother in a 2 story with 3 steps to enter with 1 HR. B&B on main level.   Bathroom setup: tub/shower, std. commode   Equipment owned: spc, shower bench, toilet riser, HH shower     Prior Level of Function: Ind. with ADLs , shared with wife/ IADLs; ambulated spc  Driving: active  Occupation: retired Pharmacy tech. Pain Level: 9/10 back pain prior to OT session; 7/10 after light ax. & with brace on  Cognition: A&O: 4/4; Follows multi- step directions              Memory:  good              Sequencing:  good              Problem solving:  good              Judgement/safety:  good                Functional Assessment:  AM-PAC Daily Activity Raw Score: 15/24    Initial Eval Status  Date: 6-28-22 Treatment Status  Date: STGs = LTGs  Time frame: 10-14 days   Feeding Independent    Mod I/ Ind   Grooming Set up    Modified Caroline    UB Dressing Max A to change gown & fred LSO brace, instruction provided throughout   Modified Caroline    LB Dressing Max A with attempt figure 4 tech. Pt. will benefit from use of A.E. Modified Caroline    Bathing Mod A with sim. task   Modified Caroline    Toileting NT   Modified Caroline    Bed Mobility  Logroll: SBA  Supine to sit:Min A  Sit to supine: NT   Supine to sit: Modified Caroline   Sit to supine: Modified Caroline    Functional Transfers Min A with sit <> stand, SPT using ww   Modified Caroline    Functional Mobility Min A with ww > home distances   Modified Caroline    Balance Sitting:     Static:  Sup    Dynamic:SBA  Standing: Min A       Activity Tolerance fair   good   Visual/  Perceptual Glasses: readers          Vitals spO2 99% on 3L   after ax. WFL      Hand Dominance R    AROM (PROM) Strength Additional Info:    RUE  WFL 4/5 good  and wfl FMC/dexterity noted during ADL tasks      LUE WFL 4/5 good  and wfl FMC/dexterity noted during ADL tasks         Hearing: [facility]   Sensation:  No c/o numbness or tingling B UE  Tone: WFL B UE  Edema: none noted B UE     Comments: Upon arrival patient supine in bed, agreeable to OT, cleared by Nursing.   Therapist facilitated bed mobility/ADLs/ brace insturction/ functional transfer/mobility training with focus on safety, technique & precautions. Pt. Instructed RE: safe transfers/mobility, ADLs, role of OT, treatment plan, recs. , prec. At end of session, patient seated in bedside chair with breakfast tray available, all needs met, RN notified, with call light and phone within reach, all lines and tubes intact. Overall patient demonstrated decreased strength, balance, independence & safety during completion of ADL/functional transfer/mobility tasks. Pt would benefit from continued skilled OT to increase safety and independence with completion of ADL/IADL tasks for functional independence and quality of life. Treatment: OT treatment provided this date includes:  ·  Instruction/training on safety and adapted techniques for completion of ADLs: to increase [place] in self care  ·  Instruction/training on safe functional mobility/transfer techniques: with focus on safety, technique & precautions  ·  Instruction/training on energy conservation/work simplification for completion of ADLs: techniques to increase [place] with self care ADLs & iADLs, work simplification to improve endurance  ·  Proper Positioning/Alignment: for optimal healing, skin integrity to prevent breakdown, decrease edema  · Skilled monitoring of vitals: to include BP, spO2 & HR during session  · Sitting/standing Balance/Tolerance- to increased balance & activity tolerance during ADLs as well as facilitate proper posture and/or positioning. · Therapeutic exercise- Instruction on B UE ROM exercises to improve strength/function for increased [place] with ADLs & iADLs     Rehab Potential: Good for established goals     Patient / Family Goal: to return home soon       Patient and/or family were instructed on functional diagnosis, prognosis/goals and OT plan of care. Demonstrated good understanding.       Eval Complexity: Low     Time In: 7:40  Time Out: 8:15  Total Treatment Time: 20    Min Units   OT Eval Low 62745  x     OT Eval Medium 13265       OT Eval High [id number]       OT Re-Eval [id number]       Therapeutic Ex (61) 2094-7067       Therapeutic Activities 09986  05     ADL/Self Care 58973  15  1   Orthotic Management 00781       Manual 71090       Neuro Re-Ed 43275       Non-Billable Time          Evaluation Time additionally includes thorough review of current medical information, gathering information on past medical history/social history and prior level of function, interpretation of standardized testing/informal observation of tasks, assessment of data and development of plan of care and goals. Soniya Lester, OTR/L   License #  [id number]

## 2022-06-28 NOTE — PROGRESS NOTES
Patient is too high functioning for ARU. Recommend home with Ivy Serrato at discharge.     Sofia Haider RN, Pre-screener for Acute Rehab  P: [phone]

## 2022-06-29 VITALS
RESPIRATION RATE: 16 BRPM | OXYGEN SATURATION: 97 % | BODY MASS INDEX: 46.6 KG/M2 | TEMPERATURE: 98.6 F | WEIGHT: 263 LBS | SYSTOLIC BLOOD PRESSURE: 95 MMHG | HEART RATE: 101 BPM | DIASTOLIC BLOOD PRESSURE: 62 MMHG | HEIGHT: 63 IN

## 2022-06-29 LAB
ANION GAP SERPL CALCULATED.3IONS-SCNC: 9 MMOL/L (ref 7–16)
BUN BLDV-MCNC: 7 MG/DL (ref 6–20)
CALCIUM SERPL-MCNC: 8.3 MG/DL (ref 8.6–10.2)
CHLORIDE BLD-SCNC: 100 MMOL/L (ref 98–107)
CO2: 28 MMOL/L (ref 22–29)
CREAT SERPL-MCNC: 0.7 MG/DL (ref 0.5–1)
GFR AFRICAN AMERICAN: >60
GFR NON-AFRICAN AMERICAN: >60 ML/MIN/1.73
GLUCOSE BLD-MCNC: 100 MG/DL (ref 74–99)
HCT VFR BLD CALC: 28.3 % (ref 34–48)
HEMOGLOBIN: 8.8 G/DL (ref 11.5–15.5)
MCH RBC QN AUTO: 27.8 PG (ref 26–35)
MCHC RBC AUTO-ENTMCNC: 31.1 % (ref 32–34.5)
MCV RBC AUTO: 89.3 FL (ref 80–99.9)
PDW BLD-RTO: 14.7 FL (ref 11.5–15)
PLATELET # BLD: 212 E9/L (ref 130–450)
PMV BLD AUTO: 10.8 FL (ref 7–12)
POTASSIUM SERPL-SCNC: 3.9 MMOL/L (ref 3.5–5)
RBC # BLD: 3.17 E12/L (ref 3.5–5.5)
SODIUM BLD-SCNC: 137 MMOL/L (ref 132–146)
WBC # BLD: 8.1 E9/L (ref 4.5–11.5)

## 2022-06-29 PROCEDURE — 6360000002 HC RX W HCPCS: Performed by: NEUROLOGICAL SURGERY

## 2022-06-29 PROCEDURE — 96376 TX/PRO/DX INJ SAME DRUG ADON: CPT

## 2022-06-29 PROCEDURE — G0378 HOSPITAL OBSERVATION PER HR: HCPCS

## 2022-06-29 PROCEDURE — 6370000000 HC RX 637 (ALT 250 FOR IP): Performed by: NEUROLOGICAL SURGERY

## 2022-06-29 PROCEDURE — 2580000003 HC RX 258: Performed by: NEUROLOGICAL SURGERY

## 2022-06-29 PROCEDURE — 85027 COMPLETE CBC AUTOMATED: CPT

## 2022-06-29 PROCEDURE — 96361 HYDRATE IV INFUSION ADD-ON: CPT

## 2022-06-29 PROCEDURE — 97535 SELF CARE MNGMENT TRAINING: CPT

## 2022-06-29 PROCEDURE — 80048 BASIC METABOLIC PNL TOTAL CA: CPT

## 2022-06-29 PROCEDURE — 36415 COLL VENOUS BLD VENIPUNCTURE: CPT

## 2022-06-29 RX ORDER — DIAZEPAM 5 MG/1
5 TABLET ORAL EVERY 6 HOURS PRN
Qty: 40 TABLET | Refills: 0 | Status: SHIPPED | OUTPATIENT
Start: 2022-06-29 | End: 2022-07-25 | Stop reason: SDUPTHER

## 2022-06-29 RX ORDER — OXYCODONE HYDROCHLORIDE 5 MG/1
5 TABLET ORAL EVERY 4 HOURS PRN
Qty: 42 TABLET | Refills: 0 | Status: SHIPPED | OUTPATIENT
Start: 2022-06-29 | End: 2022-07-25 | Stop reason: SDUPTHER

## 2022-06-29 RX ADMIN — OXYCODONE HYDROCHLORIDE 10 MG: 10 TABLET ORAL at 11:42

## 2022-06-29 RX ADMIN — OXYCODONE HYDROCHLORIDE 10 MG: 10 TABLET ORAL at 07:00

## 2022-06-29 RX ADMIN — CEFAZOLIN 2000 MG: 2 INJECTION, POWDER, FOR SOLUTION INTRAMUSCULAR; INTRAVENOUS at 07:00

## 2022-06-29 RX ADMIN — POLYETHYLENE GLYCOL 3350 17 G: 17 POWDER, FOR SOLUTION ORAL at 09:44

## 2022-06-29 RX ADMIN — BUSPIRONE HYDROCHLORIDE 10 MG: 10 TABLET ORAL at 09:43

## 2022-06-29 RX ADMIN — ATORVASTATIN CALCIUM 10 MG: 10 TABLET, FILM COATED ORAL at 09:43

## 2022-06-29 RX ADMIN — SODIUM CHLORIDE, PRESERVATIVE FREE 10 ML: 5 INJECTION INTRAVENOUS at 09:44

## 2022-06-29 RX ADMIN — DIAZEPAM 5 MG: 5 TABLET ORAL at 07:00

## 2022-06-29 RX ADMIN — SENNOSIDES AND DOCUSATE SODIUM 1 TABLET: 50; 8.6 TABLET ORAL at 09:43

## 2022-06-29 RX ADMIN — FAMOTIDINE 20 MG: 20 TABLET ORAL at 09:43

## 2022-06-29 RX ADMIN — BISACODYL 10 MG: 10 SUPPOSITORY RECTAL at 07:14

## 2022-06-29 RX ADMIN — BUPROPION HYDROCHLORIDE 450 MG: 150 TABLET, EXTENDED RELEASE ORAL at 09:47

## 2022-06-29 RX ADMIN — DOCUSATE SODIUM 200 MG: 100 CAPSULE, LIQUID FILLED ORAL at 09:43

## 2022-06-29 RX ADMIN — DULOXETINE HYDROCHLORIDE 60 MG: 60 CAPSULE, DELAYED RELEASE ORAL at 09:51

## 2022-06-29 ASSESSMENT — PAIN SCALES - GENERAL
PAINLEVEL_OUTOF10: 8
PAINLEVEL_OUTOF10: 6

## 2022-06-29 ASSESSMENT — PAIN DESCRIPTION - LOCATION: LOCATION: BACK

## 2022-06-29 ASSESSMENT — PAIN DESCRIPTION - ORIENTATION: ORIENTATION: POSTERIOR;OTHER (COMMENT)

## 2022-06-29 ASSESSMENT — PAIN DESCRIPTION - PAIN TYPE: TYPE: SURGICAL PAIN

## 2022-06-29 ASSESSMENT — PAIN DESCRIPTION - ONSET: ONSET: ON-GOING

## 2022-06-29 ASSESSMENT — PAIN DESCRIPTION - DESCRIPTORS: DESCRIPTORS: DISCOMFORT;SORE;TENDER

## 2022-06-29 ASSESSMENT — PAIN DESCRIPTION - FREQUENCY: FREQUENCY: CONTINUOUS

## 2022-06-29 ASSESSMENT — PAIN - FUNCTIONAL ASSESSMENT: PAIN_FUNCTIONAL_ASSESSMENT: PREVENTS OR INTERFERES SOME ACTIVE ACTIVITIES AND ADLS

## 2022-06-29 NOTE — DISCHARGE SUMMARY
Discharge Summary    [facility] SUMMARY:                The patient is a 59 y.o. female who was admitted to the hospital on 6/27/2022  5:14 AM for treatment of back pain. On the day of admission, a lumbar fusion was performed. The patient's hospital course was uncomplicated and consisted of physical therapy, incision observation, and a return to normal oral intake. The patient was discharged on 6/29/2022 12:45 PM tolerating a diet, moving bowels, and urinating without difficulty. The incisions were clean and intact. The patient was discharged to home in satisfactory condition with instructions to call the office for a follow up appointment. Hospital Problem List:  Principal Problem:    Spondylolisthesis of lumbar region  Resolved Problems:    * No resolved hospital problems. *     Procedure(s) (LRB):  L3-L4 AND L4-L5 POSTERIOR LUMBAR INTERBODY FUSION (N/A)    Discharge Medications:      Medication List      START taking these medications    diazePAM 5 MG tablet  Commonly known as: VALIUM  Take 1 tablet by mouth every 6 hours as needed for Anxiety (Muscle spasms) for up to 10 days. oxyCODONE 5 MG immediate release tablet  Commonly known as: ROXICODONE  Take 1 tablet by mouth every 4 hours as needed for Pain for up to 7 days. CHANGE how you take these medications    nystatin-triamcinolone 576786-8.1 UNIT/GM-% cream  Commonly known as: MYCOLOG II  Apply topically 2 times daily.   What changed:   · when to take this  · reasons to take this     valACYclovir 1 g tablet  Commonly known as: Valtrex  Take 2 tablets by mouth daily  What changed:   · when to take this  · reasons to take this        CONTINUE taking these medications    amLODIPine 10 MG tablet  Commonly known as: NORVASC  Take 1 tablet by mouth daily     atorvastatin 10 MG tablet  Commonly known as: LIPITOR  Take 1 tablet by mouth once daily     * buPROPion 150 MG extended release tablet  Commonly known as: WELLBUTRIN XL     * buPROPion 300 MG extended release tablet  Commonly known as: WELLBUTRIN XL     busPIRone 10 MG tablet  Commonly known as: BUSPAR     clonazePAM 1 MG tablet  Commonly known as: KLONOPIN     DULoxetine 60 MG extended release capsule  Commonly known as: CYMBALTA     prednisoLONE acetate 1 % ophthalmic suspension  Commonly known as: PRED FORTE     TobraDex 0.3-0.1 % ophthalmic ointment  Generic drug: tobramycin-dexamethasone     vitamin D 1.25 MG (49449 UT) Caps capsule  Commonly known as: ERGOCALCIFEROL  Take 1 capsule by mouth once a week     zolpidem 10 MG tablet  Commonly known as: AMBIEN         * This list has 2 medication(s) that are the same as other medications prescribed for you. Read the directions carefully, and ask your doctor or other care provider to review them with you.                Where to Get Your Medications      These medications were sent to [facility] [address] [address]    Phone: [phone]   · diazePAM 5 MG tablet  · oxyCODONE 5 MG immediate release tablet         Elizabeth Virgen  6/29/2022
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
## 2022-06-29 NOTE — PROGRESS NOTES
Occupational Therapy  OT BEDSIDE TREATMENT NOTE   [address] [facility] [address] [address], [address] [place] [address] Drive      Date:2022  Patient Name: Emmy Diaz  MRN: [id number]  : 1965  Room: [address] [place] [address]     Evaluating OT:Soniya Lester, OTR/L   License #  [id number]        Referring Provider: Aria Gonsalez MD    Specific Provider Orders/Date: OT evaluation & treatment        Diagnosis:  L3-L5 STENOSIS L3-L4 SPONDYLOLISTHESIS    Pertinent Medical History:  has a past medical history of Anxiety, Depression, Dyslipidemia, Hyperlipidemia, Obesity, Osteoarthritis, and Post-menopausal bleeding. DAQLBWD: 5-60-00: L3-L4 AND L4-L5 POSTERIOR LUMBAR INTERBODY FUSION    Past Surgical History:  has a past surgical history that includes shoulder surgery (Left, ); knee surgery (Right, ); Rotator cuff repair (Right, ); Nerve Block (Right, 2019); Anesthesia Nerve Block (Right, 2019); Nerve Block (Right, 2020); Injection Procedure For Sacroiliac Joint (Right, 2020); Nerve Block (Right, 2020); Nerve Block (Right, 9/3/2020); Nerve Block (Bilateral, 2020); Nerve Block (Bilateral, 2020); Nerve Block (Bilateral, 12/10/2020); Nerve Block (Bilateral, 12/10/2020); Nerve Block (Right, 2021); Pain management procedure (Right, 2021); Nerve Block (Left, 2021); Pain management procedure (Left, 3/4/2021); Nerve Block (Right, 2021); Back Injection (Right, 2021); Dilation and curettage of uterus (N/A, 2022); and Lumbar spine surgery (N/A, 2022).      Precautions:  Fall Risk, LSO brace, neutral spine, hemovac drain, urinary cath, IV      Assessment of current deficits    [x]? ? Functional mobility            [x]? ?ADLs           [x]? ? Strength                  [x]? ? Cognition    [x]? ? Functional transfers          [x]? ? IADLs         [x]? ? Safety Awareness   [x]? ?Endurance    [x]? ? Fine Coordination spc  Driving: active  Occupation: retired Pharmacy tech.     Pain Level: min back pain  Cognition: A&O: 4/4; Follows multi- step directions              Memory:  good              Sequencing:  good              Problem solving:  good              Judgement/safety:  good                Functional Assessment:  AM-PAC Daily Activity Raw Score: 19/24    Initial Eval Status  Date: 6-28-22 Treatment Status  Date: 6/29/22 STGs = LTGs  Time frame: 10-14 days   Feeding Independent  Ind  Mod I/ Ind   Grooming Set up  Set up seated  Modified [place]    UB Dressing Max A to change gown & fred LSO brace, instruction provided throughout Per last tx  Modified [place]    LB Dressing Max A with attempt figure 4 tech. Pt educated on LB Ae. Min A to don/doff socks seated EOB using reacher and sock aid Modified [place]    Bathing Mod A with sim. task  Min A  Simulated seated Modified [place]    Toileting NT Per last tx  Modified [place]    Bed Mobility  Logroll: SBA  Supine to sit:Min A  Sit to supine: NT Per last tx  Supine to sit: Modified [place]   Sit to supine: Modified [place]    Functional Transfers Min A with sit <> stand, SPT using ww Per last tx  Modified [place]    Functional Mobility Min A with ww > home distances Per last tx  Modified [place]    Balance Sitting:     Static:  Sup    Dynamic:SBA  Standing: Min A Sitting:     Static:  Ind    Dynamic: Ind  Standing: N/T      Activity Tolerance fair Fair  good   Visual/  Perceptual Glasses: readers          Vitals spO2 99% on 3L   after ax.   WFL       Comments: Upon arrival pt seated EOB. Pt educated on techniques to increase independence and safety during ADL's, and functional transfers. Discussed home set up with pt, giving suggestions to increase safety at discharge. At end of session pt left seated EOB, call light within reach, friend present. · Pt has made fair progress towards set goals.      · Continue with current plan of care    Treatment Time In: 11:15            Treatment Time Out: 11:30             Treatment Charges: Mins Units   Ther Ex  93745     Manual Therapy 85143     Thera Activities 97285     ADL/Home Mgt 89763 15 1   Neuro Re-ed 25720     Group Therapy      Orthotic manage/training  20797     Non-Billable Time     Total Timed Treatment 15 69 Jaquan Hernandez

## 2022-06-29 NOTE — PROGRESS NOTES
Department of Neurosurgery  Progress Note    CHIEF COMPLAINT: s/p lumbar fusion    SUBJECTIVE:  C/o op site pain,  Ambulating well. REVIEW OF SYSTEMS :  Constitutional: Negative for chills and fever. Neurological: Negative for dizziness, tremors and speech change.      OBJECTIVE:   VITALS:  BP 95/62   Pulse (!) 101   Temp 98.6 °F (37 °C) (Temporal)   Resp 16   Ht 5' 3\" (1.6 m)   Wt 263 lb (119.3 kg)   LMP  (LMP Unknown)   SpO2 97%   BMI 46.59 kg/m²   PHYSICAL:  CONSTITUTIONAL:  awake, alert, cooperative, no apparent distress, and appears stated age    DATA:  CBC:   Lab Results   Component Value Date    WBC 8.1 06/29/2022    RBC 3.17 06/29/2022    HGB 8.8 06/29/2022    HCT 28.3 06/29/2022    MCV 89.3 06/29/2022    MCH 27.8 06/29/2022    MCHC 31.1 06/29/2022    RDW 14.7 06/29/2022     06/29/2022    MPV 10.8 06/29/2022     BMP:    Lab Results   Component Value Date     06/29/2022    K 3.9 06/29/2022    K 4.2 06/22/2022     06/29/2022    CO2 28 06/29/2022    BUN 7 06/29/2022    LABALBU 4.0 06/22/2021    CREATININE 0.7 06/29/2022    CALCIUM 8.3 06/29/2022    GFRAA >60 06/29/2022    LABGLOM >60 06/29/2022    GLUCOSE 100 06/29/2022     PT/INR:    Lab Results   Component Value Date    PROTIME 11.2 06/22/2022    INR 1.0 06/22/2022     PTT:  No results found for: APTT, PTT[APTT}    Current Inpatient Medications  Current Facility-Administered Medications: amLODIPine (NORVASC) tablet 10 mg, 10 mg, Oral, Daily  atorvastatin (LIPITOR) tablet 10 mg, 10 mg, Oral, Daily  buPROPion (WELLBUTRIN XL) extended release tablet 450 mg, 450 mg, Oral, QAM  buPROPion (WELLBUTRIN XL) extended release tablet 300 mg, 300 mg, Oral, Daily  busPIRone (BUSPAR) tablet 10 mg, 10 mg, Oral, BID  clonazePAM (KLONOPIN) tablet 1 mg, 1 mg, Oral, BID PRN  DULoxetine (CYMBALTA) extended release capsule 60 mg, 60 mg, Oral, Daily  zolpidem (AMBIEN) tablet 10 mg, 10 mg, Oral, Nightly PRN  sodium chloride flush 0.9 % injection 5-40 mL, 5-40 mL, IntraVENous, 2 times per day  sodium chloride flush 0.9 % injection 5-40 mL, 5-40 mL, IntraVENous, PRN  0.9 % sodium chloride infusion, , IntraVENous, PRN  acetaminophen (TYLENOL) tablet 650 mg, 650 mg, Oral, Q6H  ondansetron (ZOFRAN-ODT) disintegrating tablet 4 mg, 4 mg, Oral, Q8H PRN **OR** ondansetron (ZOFRAN) injection 4 mg, 4 mg, IntraVENous, Q6H PRN  oxyCODONE (ROXICODONE) immediate release tablet 5 mg, 5 mg, Oral, Q4H PRN **OR** oxyCODONE HCl (OXY-IR) immediate release tablet 10 mg, 10 mg, Oral, Q4H PRN  morphine (PF) injection 2 mg, 2 mg, IntraVENous, Q2H PRN **OR** morphine sulfate (PF) injection 4 mg, 4 mg, IntraVENous, Q2H PRN  diazePAM (VALIUM) tablet 5 mg, 5 mg, Oral, Q6H PRN  diphenhydrAMINE (BENADRYL) tablet 25 mg, 25 mg, Oral, Q6H PRN **OR** diphenhydrAMINE (BENADRYL) injection 25 mg, 25 mg, IntraVENous, Q6H PRN  polyethylene glycol (GLYCOLAX) packet 17 g, 17 g, Oral, Daily  bisacodyl (DULCOLAX) EC tablet 5 mg, 5 mg, Oral, Daily  sennosides-docusate sodium (SENOKOT-S) 8.6-50 MG tablet 1 tablet, 1 tablet, Oral, BID  bisacodyl (DULCOLAX) suppository 10 mg, 10 mg, Rectal, Daily PRN  fleet rectal enema 1 enema, 1 enema, Rectal, Daily PRN  famotidine (PEPCID) tablet 20 mg, 20 mg, Oral, BID **OR** famotidine (PEPCID) 20 mg in sodium chloride (PF) 10 mL injection, 20 mg, IntraVENous, BID  docusate sodium (COLACE) capsule 200 mg, 200 mg, Oral, BID  benzocaine-menthol (CEPACOL SORE THROAT) lozenge 1 lozenge, 1 lozenge, Oral, Q2H PRN  tobramycin-dexamethasone (TOBRADEX) ophthalmic ointment, , Left Eye, TID    ASSESSMENT:   · S/p L3-5 PLIF on 6/27 - stable  · IV pain control  · Drain management    PLAN:  · PT/OT  · WBAT  · Pain control  · Drain removal today  D/c home with [facility]    Electronically signed by AINSLEY Hinojosa on 6/29/2022 at 9:27 AM

## 2022-06-29 NOTE — PROGRESS NOTES
CLINICAL PHARMACY NOTE: MEDS TO BEDS    Total # of Prescriptions Filled: 2   The following medications were delivered to the patient:  · Oxycodone 5  · Diazepam 5    Additional Documentation:

## 2022-06-29 NOTE — CARE COORDINATION
Care Coordination  The plan is for the patient to return home today. Her wheeled walker was delivered from [facility]. She is set up with [facility] and home care orders are in. The patients spouse is here to take her home.

## 2022-06-29 NOTE — PROGRESS NOTES
Date: 2022       Patient Name: Jose Cowan  : 1965      MRN: [id number]    PT not completed today patient waiting on discharge. Patient politely declined and was asked if she had any other questions.     Rosetta DeL una, PT

## 2022-07-23 DIAGNOSIS — M43.16 SPONDYLOLISTHESIS AT L3-L4 LEVEL: Primary | ICD-10-CM

## 2022-07-25 ENCOUNTER — TELEPHONE (OUTPATIENT)
Dept: NEUROSURGERY | Age: 57
End: 2022-07-25

## 2022-07-25 DIAGNOSIS — M54.9 BACK PAIN, UNSPECIFIED BACK LOCATION, UNSPECIFIED BACK PAIN LATERALITY, UNSPECIFIED CHRONICITY: ICD-10-CM

## 2022-07-25 RX ORDER — DIAZEPAM 5 MG/1
5 TABLET ORAL EVERY 6 HOURS PRN
Qty: 40 TABLET | Refills: 0 | Status: SHIPPED | OUTPATIENT
Start: 2022-07-25 | End: 2022-08-04

## 2022-07-25 RX ORDER — OXYCODONE HYDROCHLORIDE 5 MG/1
5 TABLET ORAL EVERY 4 HOURS PRN
Qty: 42 TABLET | Refills: 0 | Status: SHIPPED | OUTPATIENT
Start: 2022-07-25 | End: 2022-08-01

## 2022-07-25 NOTE — TELEPHONE ENCOUNTER
Patient contacted office to cxl 1 month po due to she had tested positive for COVID (patient was scheduled two (2) weeks out for followup; patient will need one (1) week worth of medication please.

## 2022-08-02 RX ORDER — CYCLOBENZAPRINE HCL 10 MG
TABLET ORAL
Qty: 30 TABLET | Refills: 0 | Status: SHIPPED
Start: 2022-08-02 | End: 2022-09-06

## 2022-08-09 ENCOUNTER — HOSPITAL ENCOUNTER (OUTPATIENT)
Dept: GENERAL RADIOLOGY | Age: 57
Discharge: HOME | End: 2022-08-11
Payer: MEDICAID

## 2022-08-09 ENCOUNTER — OFFICE VISIT (OUTPATIENT)
Dept: NEUROSURGERY | Age: 57
End: 2022-08-09
Payer: MEDICAID

## 2022-08-09 ENCOUNTER — HOSPITAL ENCOUNTER (OUTPATIENT)
Age: 57
Discharge: HOME | End: 2022-08-11
Payer: MEDICAID

## 2022-08-09 VITALS
OXYGEN SATURATION: 95 % | SYSTOLIC BLOOD PRESSURE: 116 MMHG | HEIGHT: 63 IN | BODY MASS INDEX: 44.47 KG/M2 | TEMPERATURE: 97 F | HEART RATE: 86 BPM | DIASTOLIC BLOOD PRESSURE: 74 MMHG | WEIGHT: 251 LBS

## 2022-08-09 DIAGNOSIS — M43.16 SPONDYLOLISTHESIS AT L3-L4 LEVEL: ICD-10-CM

## 2022-08-09 DIAGNOSIS — Z98.1 S/P LUMBAR FUSION: ICD-10-CM

## 2022-08-09 DIAGNOSIS — M43.16 SPONDYLOLISTHESIS AT L3-L4 LEVEL: Primary | ICD-10-CM

## 2022-08-09 PROCEDURE — 72100 X-RAY EXAM L-S SPINE 2/3 VWS: CPT

## 2022-08-09 PROCEDURE — 99024 POSTOP FOLLOW-UP VISIT: CPT | Performed by: PHYSICIAN ASSISTANT

## 2022-08-09 RX ORDER — DIAZEPAM 5 MG/1
5 TABLET ORAL EVERY 8 HOURS PRN
Qty: 30 TABLET | Refills: 0 | Status: SHIPPED | OUTPATIENT
Start: 2022-08-09 | End: 2022-08-19

## 2022-08-09 RX ORDER — OXYCODONE HYDROCHLORIDE 5 MG/1
5 TABLET ORAL EVERY 6 HOURS PRN
Qty: 42 TABLET | Refills: 0 | Status: SHIPPED | OUTPATIENT
Start: 2022-08-09 | End: 2022-08-16

## 2022-08-09 NOTE — PROGRESS NOTES
Post Operative Follow-up     This is a 59year old female who presents to the office for a 1 month follow-up s/p L3-L5 PLIF. Subjective: Patient states she has been doing well. Admits to intermittent pain into her hips. LSO compliance. Ambulating well. Denies new complaints. No issues with incision site. Lumbar XR reviewed. Physical Exam:              WDWN, no apparent distress              Non-labored breathing               Vitals Stable              Alert and oriented x3              CN 3-12 intact              PERRL              EOMI              HARRIS well              Motor strength symmetric              Sensation to LT intact bilaterally   Incision healing well with no signs of infection                 Imaging: Lumbar XR 8/9: Stable alignment, stable fusion. No acute abnormalities noted. Final report pending. Assessment: This is a 59 y.o.  female presenting for a 1 month follow-up s/p L3-L5 PLIF. Plan:  -Pain control and expectations discussed  -Continue brace and restrictions   -Pain medication refill sent to pharmacy  -OARRS report reviewed   -Follow-up in neurosurgery clinic in 2 months for 3 month follow up with repeat lumbar XR  -Call or return to neurosurgery office sooner if symptoms worsen or if new issues arise in the interim.     Electronically signed by Dev Freedman PA-C on 8/9/2022 at 5:51 PM

## 2022-09-06 RX ORDER — CYCLOBENZAPRINE HCL 10 MG
10 TABLET ORAL 3 TIMES DAILY PRN
Qty: 21 TABLET | Refills: 0 | Status: SHIPPED | OUTPATIENT
Start: 2022-09-06 | End: 2022-09-13

## 2022-09-19 ENCOUNTER — HOSPITAL ENCOUNTER (OUTPATIENT)
Age: 57
Discharge: HOME | End: 2022-09-21
Payer: MEDICAID

## 2022-09-19 ENCOUNTER — OFFICE VISIT (OUTPATIENT)
Dept: NEUROSURGERY | Age: 57
End: 2022-09-19
Payer: MEDICAID

## 2022-09-19 ENCOUNTER — HOSPITAL ENCOUNTER (OUTPATIENT)
Dept: GENERAL RADIOLOGY | Age: 57
Discharge: HOME | End: 2022-09-21
Payer: MEDICAID

## 2022-09-19 VITALS
HEIGHT: 63 IN | HEART RATE: 90 BPM | OXYGEN SATURATION: 97 % | TEMPERATURE: 98.6 F | DIASTOLIC BLOOD PRESSURE: 87 MMHG | SYSTOLIC BLOOD PRESSURE: 135 MMHG | RESPIRATION RATE: 16 BRPM | BODY MASS INDEX: 44.3 KG/M2 | WEIGHT: 250 LBS

## 2022-09-19 DIAGNOSIS — M43.16 SPONDYLOLISTHESIS AT L3-L4 LEVEL: Primary | ICD-10-CM

## 2022-09-19 DIAGNOSIS — Z98.1 S/P LUMBAR FUSION: ICD-10-CM

## 2022-09-19 DIAGNOSIS — M43.16 SPONDYLOLISTHESIS AT L3-L4 LEVEL: ICD-10-CM

## 2022-09-19 PROCEDURE — 99212 OFFICE O/P EST SF 10 MIN: CPT

## 2022-09-19 PROCEDURE — 99024 POSTOP FOLLOW-UP VISIT: CPT | Performed by: PHYSICIAN ASSISTANT

## 2022-09-19 PROCEDURE — 72100 X-RAY EXAM L-S SPINE 2/3 VWS: CPT

## 2022-09-19 RX ORDER — CYCLOBENZAPRINE HCL 10 MG
10 TABLET ORAL 3 TIMES DAILY PRN
Qty: 90 TABLET | Refills: 0 | Status: SHIPPED | OUTPATIENT
Start: 2022-09-19 | End: 2022-10-19

## 2022-09-19 RX ORDER — OXYCODONE HYDROCHLORIDE 5 MG/1
5 TABLET ORAL EVERY 4 HOURS PRN
Qty: 42 TABLET | Refills: 0 | Status: SHIPPED | OUTPATIENT
Start: 2022-09-19 | End: 2022-09-26

## 2022-09-19 RX ORDER — BUSPIRONE HYDROCHLORIDE 15 MG/1
TABLET ORAL
COMMUNITY
Start: 2022-09-14

## 2022-09-21 ENCOUNTER — TELEPHONE (OUTPATIENT)
Dept: NEUROSURGERY | Age: 57
End: 2022-09-21

## 2022-09-21 NOTE — TELEPHONE ENCOUNTER
oxycodone 5mg approved [employer] (Mica Maryann valid [phone] through 10.12.2022); pharmacy contacted and advised of medication being approved -patient. contacted and advised of same.

## 2022-09-26 ENCOUNTER — EVALUATION (OUTPATIENT)
Dept: PHYSICAL THERAPY | Age: 57
End: 2022-09-26
Payer: MEDICAID

## 2022-09-26 DIAGNOSIS — M43.16 SPONDYLOLISTHESIS AT L3-L4 LEVEL: Primary | ICD-10-CM

## 2022-09-26 DIAGNOSIS — Z98.1 S/P LUMBAR FUSION: ICD-10-CM

## 2022-09-26 PROCEDURE — 97163 PT EVAL HIGH COMPLEX 45 MIN: CPT | Performed by: PHYSICAL THERAPIST

## 2022-09-26 NOTE — PROGRESS NOTES
Physical Therapy Treatment Note    Date: 2022  Patient Name: Esteban Rose  : 1965   MRN: [id number]  DOInjury: --  DOSx: 2022  Referring Provider: Joanie Dixon PA-C  [address] [place] [address]. [facility],  [address] [place] [address]     Medical Diagnosis:      Diagnosis Orders   1. Spondylolisthesis at L3-L4 level        2. S/P lumbar fusion          Lumbar fusion L3-5    Satnam is 3 months post L3-4 fusion. She is doing well. She does have trunk weakness typical with this phase of recovery. Treatment will be lumbar strengthening program with long-term HEP. X = TO BE PERFORMED NEXT VISIT  > = PROGRESS TO THIS    S: See eval  O:   Time [id number]     Visit  Repeat outcome measure at mid point and end. Pain Pain 2-3/10     ROM      Modalities Use sparingly if at all. Prefer an active program.     MH + ES   MO      MO         Manual         MT   ROM         NR      TE      TE      TE      TE         Exercise            ROWS: H X  TE   ROWS: M  Reach and Pull X  TE   ROWS: L   Reach and Pull X  TE   Tubing Pushes >  TE   Marching X  TA   Squats X  TE      TE      TE                     A:  Tolerated well. Discussed anatomy, physiology, body mechanics, principles of loading, and progressive loading/activity. Reviewed home exercise program extensively; written copy provided.    P: Continue with rehab plan  Musa Ferro, PT    Treatment Charges: Mins Units   Initial Evaluation 40 1   Re-Evaluation     Ther Exercise         TE     Manual Therapy     MT     Ther Activities        TA     Gait Training          GT     Neuro Re-education NR     Modalities     Non-Billable Service Time     Other     Total Time/Units 40 1

## 2022-09-26 NOTE — PROGRESS NOTES
[facility] OUTPATIENT REHABILITATION  PHYSICAL THERAPY INITIAL EVALUATION         Date:  2022   Patient: Maureen Troy  : 1965  MRN: [id number]  Referring Provider: Cha Luna PA-C  [address] [place] [address]. [facility],  [facility]     Medical Diagnosis:      Diagnosis Orders   1. Spondylolisthesis at L3-L4 level        2. S/P lumbar fusion            Physician Order: Eval and Treat     SUBJECTIVE:     Onset date: Lumbar fusion L3-5 2022    History / Mechanism of Injury: Patient underwent L3-5 fusion 2022. Per last neurosurgery note, she no longer has restrictions and can discharge the brace. Patient states she was told she can wean off the brace. She no longer has leg symptoms since surgery. She does have typical back ache and stiffness. Wears bone stimulator 2 hours per day. Chief complaint: weakness, limited ability to complete ADLs (dressing ), limited ability to lift/carry/handle material, limited ability to complete home/outdoor chores/tasks, decreased endurance    Behavior: condition is getting better    Pain:   Pain 2-3/10    Symptom Type / Quality: general stiffness  Location[de-identified] Back: lumbar region       Imaging results: XR LUMBAR SPINE (2-3 VIEWS)    Result Date: 2022  EXAMINATION: THREE XRAY VIEWS OF THE LUMBAR SPINE 2022 12:22 pm COMPARISON: 2022 HISTORY: ORDERING SYSTEM PROVIDED HISTORY: Spondylolisthesis at L3-L4 level TECHNOLOGIST PROVIDED HISTORY: What reading provider will be dictating this exam?->CRC FINDINGS: There is mild levoconvex mid lumbar scoliosis. There are anterior spurs in the thoracolumbar junction. There is hardware transfixing L3-L5. There is very slight anterior subluxation of L3 with respect L4, unchanged. There has been prior laminectomy at L4, and possibly other levels. .     No interval change       Past Medical History:  Past Medical History:   Diagnosis Date    Anxiety     Depression Dyslipidemia     Hyperlipidemia     Obesity     Osteoarthritis     Post-menopausal bleeding      Past Surgical History:   Procedure Laterality Date    ANESTHESIA NERVE BLOCK Right 12/19/2019    RIGHT L3-4 TRANSFORAMINAL EPIDURAL STEROID INJECTION (CPT 76665) performed by DO Ambika at [address] Right 5/6/2021    RIGHT SACROILIAC JOINT INJECTION UNDER X-RAY GUIDANCE performed by DO Ambika at [place] N/A 1/18/2022    HYSTEROSCOPY DILATION AND CURETTAGE performed by Deya Dillard MD at [address] [facility] JOINT Right 1/16/2020    RIGHT SACROILIAC JOINT STEROID INJECTION UNDER X-RAY GUIDANCE performed by DO Ambika at [address] [facility] Right 2015    LUMBAR SPINE SURGERY N/A 6/27/2022    L3-L4 AND L4-L5 POSTERIOR LUMBAR INTERBODY FUSION performed by Meg Joseph MD at [address] Right 12/19/2019    lumbar trasnforaminal    NERVE BLOCK Right 01/16/2020    right sacroiliac joint steroid injection     NERVE BLOCK Right 09/03/2020    right l3-4transforaminal epidural steroid injection    NERVE BLOCK Right 9/3/2020    RIGHT L3-4 TRANSFORAMINAL EPIDURAL STEROID INJECTION performed by DO Ambika at [address]  Bilateral 11/05/2020    medial branch block    NERVE BLOCK Bilateral 11/5/2020    MEDIAL BRANCH BLOCK BILATERAL L4-5 AND L5-S1 (CPT 27102) performed by DO Ambika at [address]  Bilateral 12/10/2020    medial branch block    NERVE BLOCK Bilateral 12/10/2020    BILATERAL MEDIAL BRANCH BLOCK L4-5 AND L5-S1 (CPT N8701860) performed by ScheDO Stephania at [address]  Right 01/21/2021    RADIOFREQUENCY RIGHT L4-5 AND L5-S1    NERVE BLOCK Left 03/04/2021    fluoroscopic guided left lumbar medial branch  neurolysis (RFA)) at levels L4-5, L5-S1    NERVE BLOCK Right 05/06/2021    si    PAIN MANAGEMENT PROCEDURE Right 1/21/2021    RADIOFREQUENCY RIGHT L4-5 AND L5-S1 (CPT 87026) performed by Alivia Meza DO at [facility] [address] Left 3/4/2021    FLUOROSCOPIC-GUIDED LEFT LUMBAR MEDIAL BRANCH NUEROLYSIS (RADIOFREQUENCY ABLATION) AT LEVELS L4-5, L5-S1 (CPT 52372) performed by Alivia Meza DO at [facility] Right 2000    SHOULDER SURGERY Left 2019       Medications:   Current Outpatient Medications   Medication Sig Dispense Refill    busPIRone (BUSPAR) 15 MG tablet TAKE 1 TABLET BY MOUTH THREE TIMES DAILY AS DIRECTED      oxyCODONE (ROXICODONE) 5 MG immediate release tablet Take 1 tablet by mouth every 4 hours as needed for Pain for up to 7 days. Intended supply: 7 days. Take lowest dose possible to manage pain 42 tablet 0    cyclobenzaprine (FLEXERIL) 10 MG tablet Take 1 tablet by mouth 3 times daily as needed for Muscle spasms 90 tablet 0    nystatin-triamcinolone (MYCOLOG II) 525739-1.1 UNIT/GM-% cream Apply topically 2 times daily. (Patient taking differently: as needed Apply topically 2 times daily.) 90 g 1    amLODIPine (NORVASC) 10 MG tablet Take 1 tablet by mouth daily 90 tablet 1    atorvastatin (LIPITOR) 10 MG tablet Take 1 tablet by mouth once daily 90 tablet 1    valACYclovir (VALTREX) 1 g tablet Take 2 tablets by mouth daily (Patient taking differently: Take 2,000 mg by mouth as needed) 4 tablet 3    vitamin D (ERGOCALCIFEROL) 1.25 MG (08913 UT) CAPS capsule Take 1 capsule by mouth once a week 12 capsule 1    tobramycin-dexamethasone (TOBRADEX) 0.3-0.1 % ophthalmic ointment Place into the left eye 3 times daily 3 times daily.       prednisoLONE acetate (PRED FORTE) 1 % ophthalmic suspension as needed       buPROPion (WELLBUTRIN XL) 300 MG extended release tablet TAKE 1 TABLET BY MOUTH ONCE DAILY      busPIRone (BUSPAR) 10 MG tablet Take 10 mg by mouth 2 times daily       buPROPion (WELLBUTRIN XL) 150 MG extended release tablet Take 450 mg by mouth every morning       clonazePAM (KLONOPIN) 1 MG tablet Take 1 mg by mouth 2 times daily as needed. 2    DULoxetine (CYMBALTA) 60 MG extended release capsule Take 60 mg by mouth daily 2 capsules  2    zolpidem (AMBIEN) 10 MG tablet TAKE 1 2 (ONE HALF) TO 1 WHOLE TABLET BY MOUTH AT BEDTIME AS DIRECETED ON EMPTY STOMACH  2     No current facility-administered medications for this visit. Patient Goals: get back to normal    Contraindications/Precautions: none    OBJECTIVE:     Estimated body mass index is 44.29 kg/m² as calculated from the following:    Height as of 9/19/22: 5' 3\" (1.6 m). Weight as of 9/19/22: 250 lb (113.4 kg). Observations: well nourished female, normal affect     Inspection: normal orthopedic exam.  Incision well-healed. Gait: normal    Range of Motion:    Trunk:    Flexion:  [x] Normal   [] Limited    Extension:  [x] Normal   [] Limited     Right Rotation: [x] Normal   [] Limited    Left Rotation:  [x] Normal   [] Limited    Right Side Bending: [x] Normal   [] Limited    Left Side Bending: [x] Normal   [] Limited     Lower Extremity:   Right:   [x] Normal   [] Limited    Left:   [x] Normal   [] Limited       Strength:     Trunk: 3/5   R LE: 5/5   L LE: 5/5    Palpation: Non-tender to palpation  . Sensation: intact to light touch and temperature. Special Tests: None performed   [] Nerve Root Compression           Right []+ / [] -    Left []+ / [] -  [] Slump           Right []+ / [] -    Left []+ / [] -  [] FADIR          Right []+ / [] -    Left []+ / [] -  [] S-I Distraction          Right []+ / [] -    Left []+ / [] -     [] SLR           Right []+ / [] -    Left []+ / [] -     [] RAYMUNDO          Right []+ / [] -    Left []+ / [] -  [] S-I Compression          Right []+ / [] -    Left []+ / [] -   [] Other: []+ / [] -         ASSESSMENT     Mary Luu is 3 months post L3-4 fusion. She is doing well.   She

## 2022-09-28 ENCOUNTER — TREATMENT (OUTPATIENT)
Dept: PHYSICAL THERAPY | Age: 57
End: 2022-09-28
Payer: MEDICAID

## 2022-09-28 DIAGNOSIS — M43.16 SPONDYLOLISTHESIS AT L3-L4 LEVEL: Primary | ICD-10-CM

## 2022-09-28 DIAGNOSIS — Z98.1 S/P LUMBAR FUSION: ICD-10-CM

## 2022-09-28 PROCEDURE — 97110 THERAPEUTIC EXERCISES: CPT

## 2022-09-28 NOTE — PROGRESS NOTES
Physical Therapy Treatment Note    Date: 2022  Patient Name: Anjel Reynolds  : 1965   MRN: [id number]  DOInjury: --  DOSx: 2022  Referring Provider: Kami Arguelles PA-C  [address] [place] [address]. [facility],  [facility]     Medical Diagnosis:      Diagnosis Orders   1. Spondylolisthesis at L3-L4 level        2. S/P lumbar fusion            Lumbar fusion L3-5    Satnam is 3 months post L3-4 fusion. She is doing well. She does have trunk weakness typical with this phase of recovery. Treatment will be lumbar strengthening program with long-term HEP. Access Code: [email or web address]  URL: [email or web address]  Date: 2022  Prepared by: Kathy Sit    Exercises  Standing High Row with Resistance - 3-4 x weekly - 2 sets - 20 reps  Mid Row with anchored resistance - 3-4 x weekly - 2 sets - 20 reps  Standing Floor Level Row with Resistance Band with PLB - 3-4 x weekly - 2 sets - 20 reps  Standing march with counter support option - 3-4 x weekly - 2 sets - 20 reps  Alternating Hip Abduction with counter support - 3-4 x weekly - 2 sets - 20 reps  Mini Squat with Counter Support - 3-4 x weekly - 3 sets - 10 reps    X = TO BE PERFORMED NEXT VISIT  > = PROGRESS TO THIS    S: Pt reports feels pretty good. States states back is achy but not really painful, 1/10 at most  O:   Time 9147-3737     Visit 2/8 Repeat outcome measure at mid point and end. Pain Pain 1/10     ROM      Modalities Use sparingly if at all. Prefer an active program.     MH + ES   MO      MO         Manual         MT   ROM         NR      TE      TE      TE      TE         Exercise            ROWS: H Green 2 x 20  TE   ROWS: M  Reach and Pull Green 2 x 20  TE   ROWS: L   Reach and Pull Green 2 x 20  TE   Tubing Pushes >  TE   Marching 2 x 20  TA   Alt hip Abd 2 x 20     Squats 2 x 10  TE      TE   Ambulation  2 x 150ft  TE                     A: Tolerated well.   Performed exercises above with good outcome  P: Continue with rehab plan  Brandon Holman PTA    Treatment Charges: Mins Units   Initial Evaluation     Re-Evaluation     Ther Exercise         TE 40 3   Manual Therapy     MT     Ther Activities        TA     Gait Training          GT     Neuro Re-education NR     Modalities     Non-Billable Service Time     Other     Total Time/Units 40 3

## 2022-10-03 ENCOUNTER — TREATMENT (OUTPATIENT)
Dept: PHYSICAL THERAPY | Age: 57
End: 2022-10-03
Payer: MEDICAID

## 2022-10-03 DIAGNOSIS — M43.16 SPONDYLOLISTHESIS AT L3-L4 LEVEL: Primary | ICD-10-CM

## 2022-10-03 DIAGNOSIS — Z98.1 S/P LUMBAR FUSION: ICD-10-CM

## 2022-10-03 PROCEDURE — 97110 THERAPEUTIC EXERCISES: CPT

## 2022-10-03 PROCEDURE — 97530 THERAPEUTIC ACTIVITIES: CPT

## 2022-10-04 ENCOUNTER — OFFICE VISIT (OUTPATIENT)
Dept: OBGYN | Age: 57
End: 2022-10-04
Payer: MEDICAID

## 2022-10-04 VITALS
BODY MASS INDEX: 45.17 KG/M2 | SYSTOLIC BLOOD PRESSURE: 143 MMHG | WEIGHT: 255 LBS | DIASTOLIC BLOOD PRESSURE: 91 MMHG | HEART RATE: 89 BPM

## 2022-10-04 DIAGNOSIS — Z01.419 WOMEN'S ANNUAL ROUTINE GYNECOLOGICAL EXAMINATION: Primary | ICD-10-CM

## 2022-10-04 DIAGNOSIS — Z78.0 POST-MENOPAUSAL: ICD-10-CM

## 2022-10-04 PROCEDURE — 99213 OFFICE O/P EST LOW 20 MIN: CPT | Performed by: ADVANCED PRACTICE MIDWIFE

## 2022-10-04 PROCEDURE — 99396 PREV VISIT EST AGE 40-64: CPT | Performed by: ADVANCED PRACTICE MIDWIFE

## 2022-10-04 NOTE — PROGRESS NOTES
Subjective:      Abdiel Patino is a 59 y.o.  female, , here for GYN exam.      Female partner    Had Endometrial Bx for PMB last year: all normal.   No further bleeding since. 2020 colo ana laura negative    Pap  normal HPV negative  Offered Hep C - declines    Recent back surgery 90 days ago, just now cleared to go to PT    143/91: forgot meds this am.   Weaning off klonopin, doing well. Lesions on hand : cortisone injections    Current Complaints: none! Gynecologic History  No LMP recorded (lmp unknown). Patient is postmenopausal.  Contraception: none  Exercise: No PT for back surgery  OB History          0    Para   0    Term   0       0    AB   0    Living   0         SAB   0    IAB   0    Ectopic   0    Molar   0    Multiple   0    Live Births   0                  Review of Systems  Constitutional: negative    Respiratory: negative  Cardiovascular: negative  Gastrointestinal: negative  Genitourinary:negative, see above  Integument/breast: negative  Hematologic/lymphatic: negative  Musculoskeletal:negative, Just had back surgery and is recovering    Behavioral/Psych: negative       Objective:       BP (!) 143/91 (Position: Sitting)   Pulse 89   Wt 255 lb (115.7 kg)   LMP  (LMP Unknown)   BMI 45.17 kg/m²   General appearance: alert, appears stated age, and cooperative  Head: Normocephalic, without obvious abnormality, atraumatic  Eyes: conjunctivae/corneas clear. Ears: normal  external ear canals both ears  Neck: no adenopathy, supple, symmetrical, trachea midline, and thyroid not enlarged, symmetric, no tenderness/mass/nodules  Back: symmetric, no curvature. ROM normal. No CVA tenderness. Breasts: normal appearance, no masses or tenderness  Abdomen: soft, non-tender; bowel sounds normal; no masses,  no organomegaly  Pelvic: Pelvic exam: normal external genitalia, vulva, vagina, cervix, uterus and adnexa.   Urethra:normal appearing urethra with no masses, tenderness or lesions and hypermobile  Pelvic Floor:no cystocele, rectocele or prolapse noted  Extremities: extremities normal, atraumatic, no cyanosis or edema  Skin: Skin color, texture, turgor normal. No rashes or lesions     Assessment:     59 y.o.  female , post menopausal  Pap: Due this year  Mammogram: last one 2021  DEXA:N/A  Colonoscopy: colo guard 2020 - negative    Chief Complaint   Patient presents with    Annual Exam       1. Women's annual routine gynecological examination    2. Post-menopausal        Patient Active Problem List   Diagnosis    Class 3 severe obesity due to excess calories with body mass index (BMI) of 45.0 to 49.9 in adult [facility])    Chronic right-sided low back pain with right-sided sciatica    Right hip pain    Anxiety and depression    Insomnia    Prediabetes    Spondylolisthesis of lumbar region    Lumbar spondylosis    Other spondylosis with radiculopathy, lumbar region    Leg length discrepancy    Facet arthropathy    Lumbar radiculitis    Sacroiliitis (HCC)    Sprain of left knee    Left knee pain    Contusion of left knee    Polyarthritis    Essential hypertension    Hyperkalemia    Vitamin D deficiency    Chronic pain syndrome    Lumbar disc disorder    Lumbar facet arthropathy    Morbid obesity with BMI of 45.0-49.9, adult ([address]    Mixed hyperlipidemia    Spondylolisthesis at L3-L4 level    S/P lumbar fusion        Plan:   Counseled on monthly breast self exam, instructions reviewed, call with any changes. Counseled on osteoporosis prevention. Risks, benefits, SE, complications associated with osteoporosis treatment and prevention were discussed. Age appropriate calcium drequirements were reviewed (typically between 1200-29677 mg daily in a menopausal patient) as well as Vit D dosing as appropriate for this region (600-1000 IU per day). Is on weekly supplement per PCCP to correct deficiency.      DEXA   N/A from Jaquan Fisher on colonoscopy screening recommendations (every 10 years in an 52+ y.o. asymptomatic patient without family history). [place] negative 2020    call if menstrual bleeding ocurrs. Shaun Car was seen today for annual exam.    Diagnoses and all orders for this visit:    Women's annual routine gynecological examination    Post-menopausal  -     SUZY DIGITAL SCREEN W OR WO CAD BILATERAL; Future  -     PAP SMEAR      No follow-ups on file.     ONEIL Garcia CNM 10/4/2022 3:46 PM

## 2022-10-04 NOTE — PROGRESS NOTES
Patient here today for annual gyn visit  Assisted with pelvic exam, pap smear obtained, labeled  and delivered to lab. Discharge instructions have been discussed with the patient. Patient advised to call our office with any questions or concerns. Voiced understanding.

## 2022-10-05 ENCOUNTER — TREATMENT (OUTPATIENT)
Dept: PHYSICAL THERAPY | Age: 57
End: 2022-10-05
Payer: MEDICAID

## 2022-10-05 DIAGNOSIS — Z98.1 S/P LUMBAR FUSION: ICD-10-CM

## 2022-10-05 DIAGNOSIS — M43.16 SPONDYLOLISTHESIS AT L3-L4 LEVEL: Primary | ICD-10-CM

## 2022-10-05 PROCEDURE — 97110 THERAPEUTIC EXERCISES: CPT

## 2022-10-05 PROCEDURE — 97530 THERAPEUTIC ACTIVITIES: CPT

## 2022-10-05 NOTE — PROGRESS NOTES
Physical Therapy Treatment Note    Date: 10/5/2022  Patient Name: Brisa Luther  : 1965   MRN: [id number]  DOInjury: --  DOSx: 2022  Referring Provider: Aida Magana PA-C  [address] [place] [address]. [facility],  [facility]     Medical Diagnosis:      Diagnosis Orders   1. Spondylolisthesis at L3-L4 level        2. S/P lumbar fusion                Lumbar fusion L3-5    Satnam is 3 months post L3-4 fusion. She is doing well. She does have trunk weakness typical with this phase of recovery. Treatment will be lumbar strengthening program with long-term HEP. Access Code: [email or web address]  URL: [email or web address]  Date: 2022  Prepared by: Patel Alarcon    Exercises  Standing High Row with Resistance - 3-4 x weekly - 2 sets - 20 reps  Mid Row with anchored resistance - 3-4 x weekly - 2 sets - 20 reps  Standing Floor Level Row with Resistance Band with PLB - 3-4 x weekly - 2 sets - 20 reps  Standing march with counter support option - 3-4 x weekly - 2 sets - 20 reps  Alternating Hip Abduction with counter support - 3-4 x weekly - 2 sets - 20 reps  Mini Squat with Counter Support - 3-4 x weekly - 3 sets - 10 reps    X = TO BE PERFORMED NEXT VISIT  > = PROGRESS TO THIS    S: Pt reports 6-7/10 pain. States she had to lie on back for approx. 45min yesterday which may have aggravated LB  O:   Time 1282-6811     Visit  Repeat outcome measure at mid point and end. Pain Pain 7/10        ROM      Modalities Use sparingly if at all. Prefer an active program.     MH + ES   MO   Hot pack Prone x 10 min Before ex's MO         Manual         MT   ROM         NR      TE      TE      TE      TE         Exercise            ROWS: H Blue 2 x 20  TE   ROWS: M  Reach and Pull Blue 2 x 20  TE   ROWS: L   Reach and Pull Blue 2 x 20  TE   Tubing Pushes Blue 2 x 20  TE   Marching 2 x 20  TA   Alt hip Abd 2 x 20     Squats 2 x 10  TE      TE   Ambulation  TE                     A: Tolerated well.   Performed exercises above with good outcome  P: Continue with rehab plan  Xin Guo PTA    Treatment Charges: Mins Units   Initial Evaluation     Re-Evaluation     Ther Exercise         TE 15 1   Manual Therapy     MT     Ther Activities        TA 25 2   Gait Training          GT     Neuro Re-education NR     Modalities     Non-Billable Service Time 10 0   Other     Total Time/Units 50 3

## 2022-10-06 LAB
HPV SAMPLE: NORMAL
HPV TYPE 16: NOT DETECTED
HPV TYPE 18: NOT DETECTED
HPV, HIGH RISK OTHER: NOT DETECTED
INTERPRETATION: NORMAL
SOURCE: NORMAL

## 2022-10-11 ENCOUNTER — TREATMENT (OUTPATIENT)
Dept: PHYSICAL THERAPY | Age: 57
End: 2022-10-11
Payer: MEDICAID

## 2022-10-11 DIAGNOSIS — Z98.1 S/P LUMBAR FUSION: ICD-10-CM

## 2022-10-11 DIAGNOSIS — M43.16 SPONDYLOLISTHESIS AT L3-L4 LEVEL: Primary | ICD-10-CM

## 2022-10-11 PROCEDURE — 97014 ELECTRIC STIMULATION THERAPY: CPT

## 2022-10-11 NOTE — PROGRESS NOTES
Physical Therapy Treatment Note    Date: 10/11/2022  Patient Name: Angeline Deutsch  : 1965   MRN: [id number]  DOInjury: --  DOSx: 2022  Referring Provider: Lashell Ball PA-C  [address] [place] [address]. [place],  [address]     Medical Diagnosis:      Diagnosis Orders   1. Spondylolisthesis at L3-L4 level        2. S/P lumbar fusion          Lumbar fusion L3-5    Satnam is 3 months post L3-4 fusion. She is doing well. She does have trunk weakness typical with this phase of recovery. Treatment will be lumbar strengthening program with long-term HEP. Access Code: [email or web address]  URL: [email or web address]  Date: 2022  Prepared by: Aidan Morris    Exercises  Standing High Row with Resistance - 3-4 x weekly - 2 sets - 20 reps  Mid Row with anchored resistance - 3-4 x weekly - 2 sets - 20 reps  Standing Floor Level Row with Resistance Band with PLB - 3-4 x weekly - 2 sets - 20 reps  Standing march with counter support option - 3-4 x weekly - 2 sets - 20 reps  Alternating Hip Abduction with counter support - 3-4 x weekly - 2 sets - 20 reps  Mini Squat with Counter Support - 3-4 x weekly - 3 sets - 10 reps    X = TO BE PERFORMED NEXT VISIT  > = PROGRESS TO THIS    S: Pt reports increased LBP starting Saturday which has not improved. States was sore Friday but was able to [facility] lawn, turn mattress and take care of her mom.  morning unable to get out of bed secondary to pain. States pain was across LB and into Right glut. Today had shooting pain from LB up to between shoulder blades when she bent over to brush teeth. O: Ambulated into PT in slight flexed posture and wearing back brace  Time 2863-8215     Visit 5/8 Repeat outcome measure at mid point and end. Pain Pain 7/10        ROM      Modalities Use sparingly if at all.   Prefer an active program.     MH + ES X 20 min prone  MO   Hot pack Before ex's MO         Manual         MT   ROM         NR      TE      TE      TE      TE Exercise            ROWS: H  TE   ROWS: M  Reach and Pull  TE   ROWS: L   Reach and Pull  TE   Tubing Pushes  TE   Marching  TA   Alt hip Abd     Squats  TE      TE   Ambulation  TE                     A: Very flared up today. HP and IFC for pain control.  Discussed staying as mobile as able,  possibly in small spurts with walker if needed to off load leg   P: Continue with rehab plan  Katerin Gonzales PTA    Treatment Charges: Mins Units   Initial Evaluation     Re-Evaluation     Ther Exercise         TE     Manual Therapy     MT     Ther Activities        TA     Gait Training          GT     Neuro Re-education NR     Modalities 20 1   Non-Billable Service Time 15 0   Other     Total Time/Units 35 1

## 2022-11-01 ENCOUNTER — TREATMENT (OUTPATIENT)
Dept: PHYSICAL THERAPY | Age: 57
End: 2022-11-01
Payer: MEDICAID

## 2022-11-01 DIAGNOSIS — M43.16 SPONDYLOLISTHESIS AT L3-L4 LEVEL: Primary | ICD-10-CM

## 2022-11-01 DIAGNOSIS — Z98.1 S/P LUMBAR FUSION: ICD-10-CM

## 2022-11-01 PROCEDURE — 97110 THERAPEUTIC EXERCISES: CPT

## 2022-11-01 NOTE — PROGRESS NOTES
Physical Therapy Treatment Note    Date: 2022  Patient Name: Dary Howard  : 1965   MRN: [id number]  DOInjury: --  DOSx: 2022  Referring Provider: Verla Schilder, PA-C  [address] [place] [address]. [facility],  [address] [place] [address]     Medical Diagnosis:      Diagnosis Orders   1. Spondylolisthesis at L3-L4 level        2. S/P lumbar fusion            Lumbar fusion L3-5    Satnam is 3 months post L3-4 fusion. She is doing well. She does have trunk weakness typical with this phase of recovery. Treatment will be lumbar strengthening program with long-term HEP. Access Code: [email or web address]  URL: [email or web address]  Date: 2022  Prepared by: Rahulnis Laos    Exercises  Standing High Row with Resistance - 3-4 x weekly - 2 sets - 20 reps  Mid Row with anchored resistance - 3-4 x weekly - 2 sets - 20 reps  Standing Floor Level Row with Resistance Band with PLB - 3-4 x weekly - 2 sets - 20 reps  Standing march with counter support option - 3-4 x weekly - 2 sets - 20 reps  Alternating Hip Abduction with counter support - 3-4 x weekly - 2 sets - 20 reps  Mini Squat with Counter Support - 3-4 x weekly - 3 sets - 10 reps  Access Code: [email or web address]  URL: [email or web address]  Date: 2022  Prepared by: Rahulnis Laos    Exercises  Hooklying Single Knee to Chest Stretch - 1 x daily - 5 reps - 10s hold  Supine Lower Trunk Rotation - 1 x daily - 1-3 sets - 10 reps  Seated Flexion Stretch - 1 x daily - 10 reps - 10s hold    X = TO BE PERFORMED NEXT VISIT  > = PROGRESS TO THIS    S: Pt reports right side LBP that radiates to R buttock. States she has this pain 85% of the time. States she notices foot drags when she is fatigued. States symptoms are improving. O:   Time 3714-5795     Visit 8  30 visits Repeat outcome measure at mid point and end. Pain Pain 6/10        ROM      Modalities Use sparingly if at all.   Prefer an active program.     MH + ES  MO   Hot pack Before ex's MO         Manual         MT ROM      SKTC 5 x 10 sec hold ea  NR   Hook lying rotation X 10 ea side  TE   Trunk flexion stretch  X 10 hold 5 sec  TE      TE      TE         Exercise            ROWS: H  TE   ROWS: M  Reach and Pull Blue 2 x 20  TE   ROWS: L   Reach and Pull  TE   Tubing Pushes  TE   Marching 2 x 20  TA   Alt hip Abd 2 x 20     Squats  TE      TE   Ambulation  TE                     A: Added stretches this session to help decrease discomfort.  Given as HEP  P: Continue with rehab plan  Aidan Morris PTA    Treatment Charges: Mins Units   Initial Evaluation     Re-Evaluation     Ther Exercise         TE 40 3   Manual Therapy     MT     Ther Activities        TA     Gait Training          GT     Neuro Re-education NR     Modalities     Non-Billable Service Time     Other     Total Time/Units 40 3

## 2022-11-04 ENCOUNTER — TELEPHONE (OUTPATIENT)
Dept: PHYSICAL THERAPY | Age: 57
End: 2022-11-04

## 2022-11-04 NOTE — TELEPHONE ENCOUNTER
Date: 2022       Patient Name: Gladys Boyce  : 1965      MRN: [id number]    Patient cancelled appt today.  She is scheduled for next week    Ashley Handley PTA

## 2022-11-11 ENCOUNTER — TREATMENT (OUTPATIENT)
Dept: PHYSICAL THERAPY | Age: 57
End: 2022-11-11
Payer: MEDICAID

## 2022-11-11 DIAGNOSIS — M43.16 SPONDYLOLISTHESIS AT L3-L4 LEVEL: Primary | ICD-10-CM

## 2022-11-11 DIAGNOSIS — Z98.1 S/P LUMBAR FUSION: ICD-10-CM

## 2022-11-11 PROCEDURE — 97110 THERAPEUTIC EXERCISES: CPT

## 2022-11-11 NOTE — PROGRESS NOTES
Physical Therapy Treatment Note    Date: 2022  Patient Name: Collette uHi  : 1965   MRN: [id number]  DOInjury: --  DOSx: 2022  Referring Provider: David Austin PA-C  [address] [place] [address]. [facility],  [address] [place] [address]     Medical Diagnosis:      Diagnosis Orders   1. Spondylolisthesis at L3-L4 level        2. S/P lumbar fusion              Lumbar fusion L3-5    Satnam is 3 months post L3-4 fusion. She is doing well. She does have trunk weakness typical with this phase of recovery. Treatment will be lumbar strengthening program with long-term HEP. Access Code: [email or web address]  URL: [email or web address]  Date: 2022  Prepared by: Sonyena Martinez    Exercises  Standing High Row with Resistance - 3-4 x weekly - 2 sets - 20 reps  Mid Row with anchored resistance - 3-4 x weekly - 2 sets - 20 reps  Standing Floor Level Row with Resistance Band with PLB - 3-4 x weekly - 2 sets - 20 reps  Standing march with counter support option - 3-4 x weekly - 2 sets - 20 reps  Alternating Hip Abduction with counter support - 3-4 x weekly - 2 sets - 20 reps  Mini Squat with Counter Support - 3-4 x weekly - 3 sets - 10 reps  Access Code: [email or web address]  URL: [email or web address]  Date: 2022  Prepared by: Laurena Martinez    Exercises  Hooklying Single Knee to Chest Stretch - 1 x daily - 5 reps - 10s hold  Supine Lower Trunk Rotation - 1 x daily - 1-3 sets - 10 reps  Seated Flexion Stretch - 1 x daily - 10 reps - 10s hold    X = TO BE PERFORMED NEXT VISIT  > = PROGRESS TO THIS    S: Pt reports 5/10 pain. States she has not worn back brace for past few days and back feels ok. O:   Time 3788-1859     Visit -12  30 visits Repeat outcome measure at mid point and end. Pain Pain 5/10        ROM      Modalities Use sparingly if at all.   Prefer an active program.     MH + ES  MO   Hot pack Before ex's [place]         Manual         MT   ROM      SKTC 5 x 10 sec hold ea  NR   Hook lying rotation X 10 ea side  TE Trunk flexion stretch  X 10 hold 5 sec  TE      TE      TE         Exercise            ROWS: H Blue 2 x 20  TE   ROWS: M  Reach and Pull Blue 2 x 20  TE   ROWS: L   Reach and Pull Blue 2 x 20  TE   Tubing Pushes  TE   Marching 2 x 20  TA   Alt hip Abd 2 x 20     Squats  TE      TE   Ambulation  TE                     A: Able to perform strengthening exercises this session.   P: Continue with rehab plan  Guadlupe Card, PTA    Treatment Charges: Mins Units   Initial Evaluation     Re-Evaluation     Ther Exercise         TE 40 3   Manual Therapy     MT     Ther Activities        TA     Gait Training          GT     Neuro Re-education NR     Modalities     Non-Billable Service Time     Other     Total Time/Units 40 3

## 2022-11-14 ENCOUNTER — TREATMENT (OUTPATIENT)
Dept: PHYSICAL THERAPY | Age: 57
End: 2022-11-14
Payer: MEDICAID

## 2022-11-14 DIAGNOSIS — M43.16 SPONDYLOLISTHESIS AT L3-L4 LEVEL: Primary | ICD-10-CM

## 2022-11-14 DIAGNOSIS — Z98.1 S/P LUMBAR FUSION: ICD-10-CM

## 2022-11-14 PROCEDURE — 97110 THERAPEUTIC EXERCISES: CPT

## 2022-11-14 NOTE — PROGRESS NOTES
"Chief complaint: Follow-up CHF    S: Overall, patient reports feeling similar to may be slightly improved.  He still feels mildly short of breath, which is notably worse when lying down.  He feels that his leg edema is continue to improve.  He denies chest pain or palpitations; he is unaware of his A. fib and elevated heart rate.  His amiodarone was discontinued yesterday given concern for possible associated pneumonitis; he was started on steroids.    Medications: Reviewed    Review of Systems: All pertinent negative and positives as noted above.  Otherwise, all systems reviewed and found to be negative.    Telemetry: Review of telemetry for the last 24 hours is notable for paroxysmal atrial fibrillation with heart rates in the 100s to 1 teens as well as occasional NSVT.    O:  BP 96/62   Pulse 115   Temp 98.5 °F (36.9 °C)   Resp 18   Ht 176.8 cm (69.61\")   Wt 69.4 kg (152 lb 14.4 oz)   SpO2 100%   BMI 22.19 kg/m²   Temp:  [98 °F (36.7 °C)-98.5 °F (36.9 °C)] 98.5 °F (36.9 °C)  Heart Rate:  [] 115  Resp:  [18-28] 18  BP: ()/(61-96) 96/62    Gen: male sitting comfortably in chair in NAD  HEENT: NC/AT, sclera anicteric, wearing HFNC  Neck: Supple, JVD 2-3 cm above the clavicle while seated at 70 degrees  CV: Irregularly irregular rhythm with mild tachycardia, no m/r/g  Pulm: Mildly diminished bilateral lower lobe breath sounds without focal adventitious sounds, NWOB  Abd: Soft, ND/NT  Ext: WWP, 1+ bilateral pretibial edema  Neuro: Aox3, grossly nonfocal  Psych: Appropriate mood and affect        Diagnostic Data:  Review of data for the past 24 hours is notable for slight increase in creatinine up to 1.6 last night from 1.26 yesterday morning; this is slightly improved to 1.48 this morning.  BMP otherwise overall stable.  CBC notable for mildly increased WBC at 13.4 from 8.5 yesterday with stable hemoglobin and platelets.    Adult Transthoracic Echo Complete W/ Cont if Necessary Per Protocol " Physical Therapy Treatment Note    Date: 2022  Patient Name: Ramona Villalta  : 1965   MRN: [id number]  DOInjury: --  DOSx: 2022  Referring Provider: Dago Taylor PA-C  [address] [place] [address]. [facility],  [address] [place] [address]     Medical Diagnosis:      Diagnosis Orders   1. Spondylolisthesis at L3-L4 level        2. S/P lumbar fusion                Lumbar fusion L3-5    Stanam is 3 months post L3-4 fusion. She is doing well. She does have trunk weakness typical with this phase of recovery. Treatment will be lumbar strengthening program with long-term HEP. Access Code: [email or web address]  URL: [email or web address]  Date: 2022  Prepared by: Elda Garre    Exercises  Standing High Row with Resistance - 3-4 x weekly - 2 sets - 20 reps  Mid Row with anchored resistance - 3-4 x weekly - 2 sets - 20 reps  Standing Floor Level Row with Resistance Band with PLB - 3-4 x weekly - 2 sets - 20 reps  Standing march with counter support option - 3-4 x weekly - 2 sets - 20 reps  Alternating Hip Abduction with counter support - 3-4 x weekly - 2 sets - 20 reps  Mini Squat with Counter Support - 3-4 x weekly - 3 sets - 10 reps  Access Code: [email or web address]  URL: [email or web address]  Date: 2022  Prepared by: Elda Garre    Exercises  Hooklying Single Knee to Chest Stretch - 1 x daily - 5 reps - 10s hold  Supine Lower Trunk Rotation - 1 x daily - 1-3 sets - 10 reps  Seated Flexion Stretch - 1 x daily - 10 reps - 10s hold    X = TO BE PERFORMED NEXT VISIT  > = PROGRESS TO THIS    S: Pt reports 5/10 pain. States she has not worn back brace for past few days and back feels ok. O:   Time 8168-5618     Visit -  30 visits Repeat outcome measure at mid point and end. Pain Pain 5/10        ROM      Modalities Use sparingly if at all.   Prefer an active program.     MH + ES  [place]   Hot pack Before ex's [place]         Manual         MT   ROM      SKTC  NR   Hook lying rotation  TE   Trunk flexion stretch  X 10 (09/04/2022 11:17)  · Estimated left ventricular EF was in agreement with the calculated left ventricular EF. Left ventricular ejection fraction appears to be 36 - 40%. Left ventricular systolic function is moderately decreased.  · The following left ventricular wall segments are hypokinetic: mid anterior, apical anterior, apical lateral, apical inferior, mid inferior, apical septal, basal inferoseptal, mid inferoseptal, apex hypokinetic, mid anteroseptal, basal inferior and basal inferoseptal.  · Moderate aortic valve stenosis is present.  · Left ventricular wall thickness is consistent with mild concentric hypertrophy.  · Left atrial volume is moderately increased.  · Estimated right ventricular systolic pressure from tricuspid regurgitation is mildly elevated (35-45 mmHg).  · Normal size and function of the right ventricle.  · There is a small (<1cm) pericardial effusion.  · Compared to recent exam from 8/11/22, LVEF has declined and new wall motion abnormalities are present.        ASSESSMENT/PLAN:  Chito Govea is a 65 y.o. male with a PMH of HTN, HLD, DM, moderate arctic stenosis, CHF, and paroxysmal atrial fibrillation who is admitted for hypoxic respiratory failure.    1.  Acute hypoxic respiratory failure: As noted by Dr. Grossman yesterday, the patient's lack of improvement up until now with diuresis as well as CT scan concerning for interstitial inflammatory process raises suspicion for acute pneumonitis; pneumonitis possibly related to amiodarone as patient reported symptom correlation with when he was on amiodarone.  He also likely has a mild contribution from CHF (see discussion below).  - CHF management as below  - Agree with steroids and further management per pulmonology for suspected pneumonitis    2.  Acute systolic heart failure: TTE this admission notable for new reduction in EF with wall motion abnormalities (EF 36 to 40%).  Etiology for EF reduction is unclear; given wall motion abnormalities,  and ischemic evaluation may be reasonable in the outpatient setting, and stress-induced cardiomyopathy is also a consideration.  On exam, the patient does still appear to be slightly hypervolemic with mildly elevated JVD and some peripheral edema, but given fluctuating creatinine with an overall uptrend that is starting to appear contracted, I suspect that he is nearing euvolemia.  As such, we will scale back his diuresis from 60 mg IV Lasix 3 times daily with plan for just 1 dose 60 IV Lasix today and following up a BMP.  - IV Lasix 60 mg once this morning with I/O goal net -500 cc  - Monitor strict I's and O's and daily weights  - 2 L fluid restriction and at least 2 g sodium restriction  -HFrEF GDMT   - Starting beta-blocker as below   - Ultimately will need to implement RAAS inhibition prior to discharge but will defer for now with worsening creatinine.  May also consider initiation of SGLT2 inhibitor prior to discharge.    3.  Paroxysmal atrial fibrillation: Patient continuing to alternate between sinus rhythm and atrial fibrillation, which likely will worsen with stopping amiodarone.  - Continue Xarelto for CVA prophylaxis  - Starting metoprolol 12.5 mg twice daily with plan to titrate as needed  - HR goal <120 sustained while critically ill    4.  Valvular heart disease (moderate aortic stenosis): Stable.  No further evaluation or treatment needed at this time.  Plan to follow-up as an outpatient.    I spent 40 minutes on the day of service in the care of this patient including reviewing diagnostic data (labs, imaging, telemetry), obtaining a history and medically appropriate physical exam, ordering medications, and documenting in the EMR.    Michael Uriostegui MD

## 2022-11-18 ENCOUNTER — TREATMENT (OUTPATIENT)
Dept: PHYSICAL THERAPY | Age: 57
End: 2022-11-18
Payer: MEDICAID

## 2022-11-18 DIAGNOSIS — M43.16 SPONDYLOLISTHESIS AT L3-L4 LEVEL: Primary | ICD-10-CM

## 2022-11-18 DIAGNOSIS — Z98.1 S/P LUMBAR FUSION: ICD-10-CM

## 2022-11-18 PROCEDURE — 97110 THERAPEUTIC EXERCISES: CPT

## 2022-11-18 NOTE — PROGRESS NOTES
Physical Therapy Treatment Note    Date: 2022  Patient Name: Lorna Candelaria  : 1965   MRN: [id number]  DOInjury: --  DOSx: 2022  Referring Provider: Jim King PA-C  [address] [facility]. [facility],  [address] [facility]     Medical Diagnosis:      Diagnosis Orders   1. Spondylolisthesis at L3-L4 level        2. S/P lumbar fusion                  Lumbar fusion L3-5    Satnam is 3 months post L3-4 fusion. She is doing well. She does have trunk weakness typical with this phase of recovery. Treatment will be lumbar strengthening program with long-term HEP. Access Code: [email or web address]  URL: [email or web address]  Date: 2022  Prepared by: Rollen Rumpf    Exercises  Standing High Row with Resistance - 3-4 x weekly - 2 sets - 20 reps  Mid Row with anchored resistance - 3-4 x weekly - 2 sets - 20 reps  Standing Floor Level Row with Resistance Band with PLB - 3-4 x weekly - 2 sets - 20 reps  Standing march with counter support option - 3-4 x weekly - 2 sets - 20 reps  Alternating Hip Abduction with counter support - 3-4 x weekly - 2 sets - 20 reps  Mini Squat with Counter Support - 3-4 x weekly - 3 sets - 10 reps  Access Code: [email or web address]  URL: [email or web address]  Date: 2022  Prepared by: Rollen Rumpf    Exercises  Hooklying Single Knee to Chest Stretch - 1 x daily - 5 reps - 10s hold  Supine Lower Trunk Rotation - 1 x daily - 1-3 sets - 10 reps  Seated Flexion Stretch - 1 x daily - 10 reps - 10s hold    X = TO BE PERFORMED NEXT VISIT  > = PROGRESS TO THIS    S: Pt reports stiffness more than pain today. Does report pain right side LB/Glut. States has been going to [facility] to use pool. O:   Time 0851-0108     Visit -12  30 visits Repeat outcome measure at mid point and end. Pain Pain 5/10        ROM      Modalities Use sparingly if at all.   Prefer an active program.     MH + ES  MO   Hot pack Before ex's [place]         Manual         MT   ROM      SKTC  NR   Hook lying rotation  TE Trunk flexion stretch  X 10 hold 5 sec  TE      TE      TE         Exercise      Bike L3 x 6 min     ROWS: H Blue 2 x 20  TE   ROWS: M  Reach and Pull Blue 2 x 20  TE   ROWS: L   Reach and Pull Blue 2 x 20  TE   Tubing Pushes  TE   Marching 2 x 20  TA   Alt hip Abd 2 x 20     Squats  TE      TE   Ambulation  TE                     A: No new or significant changes this session   P: Continue with rehab plan  Katherine Schwartz, PTA    Treatment Charges: Mins Units   Initial Evaluation     Re-Evaluation     Ther Exercise         TE 30 2   Manual Therapy     MT     Ther Activities        TA     Gait Training          GT     Neuro Re-education NR     Modalities     Non-Billable Service Time     Other     Total Time/Units 30 3

## 2022-11-28 ENCOUNTER — TREATMENT (OUTPATIENT)
Dept: PHYSICAL THERAPY | Age: 57
End: 2022-11-28
Payer: MEDICAID

## 2022-11-28 DIAGNOSIS — E55.9 VITAMIN D DEFICIENCY: ICD-10-CM

## 2022-11-28 DIAGNOSIS — Z98.1 S/P LUMBAR FUSION: ICD-10-CM

## 2022-11-28 DIAGNOSIS — R73.03 PREDIABETES: ICD-10-CM

## 2022-11-28 DIAGNOSIS — I10 ESSENTIAL HYPERTENSION: ICD-10-CM

## 2022-11-28 DIAGNOSIS — E78.2 MIXED HYPERLIPIDEMIA: ICD-10-CM

## 2022-11-28 DIAGNOSIS — M43.16 SPONDYLOLISTHESIS AT L3-L4 LEVEL: Primary | ICD-10-CM

## 2022-11-28 LAB
ALBUMIN SERPL-MCNC: 4.4 G/DL (ref 3.5–5.2)
ALP BLD-CCNC: 72 U/L (ref 35–104)
ALT SERPL-CCNC: 16 U/L (ref 0–32)
ANION GAP SERPL CALCULATED.3IONS-SCNC: 20 MMOL/L (ref 7–16)
ANISOCYTOSIS: ABNORMAL
AST SERPL-CCNC: 21 U/L (ref 0–31)
BASOPHILS ABSOLUTE: 0.06 E9/L (ref 0–0.2)
BASOPHILS RELATIVE PERCENT: 0.9 % (ref 0–2)
BILIRUB SERPL-MCNC: 0.3 MG/DL (ref 0–1.2)
BUN BLDV-MCNC: 12 MG/DL (ref 6–20)
BURR CELLS: ABNORMAL
CALCIUM SERPL-MCNC: 10.3 MG/DL (ref 8.6–10.2)
CHLORIDE BLD-SCNC: 103 MMOL/L (ref 98–107)
CO2: 19 MMOL/L (ref 22–29)
CREAT SERPL-MCNC: 1 MG/DL (ref 0.5–1)
EOSINOPHILS ABSOLUTE: 0.18 E9/L (ref 0.05–0.5)
EOSINOPHILS RELATIVE PERCENT: 2.6 % (ref 0–6)
GFR SERPL CREATININE-BSD FRML MDRD: >60 ML/MIN/1.73
GLUCOSE BLD-MCNC: 141 MG/DL (ref 74–99)
HBA1C MFR BLD: 6.1 % (ref 4–5.6)
HCT VFR BLD CALC: 38.8 % (ref 34–48)
HEMOGLOBIN: 11.2 G/DL (ref 11.5–15.5)
LYMPHOCYTES ABSOLUTE: 1.84 E9/L (ref 1.5–4)
LYMPHOCYTES RELATIVE PERCENT: 27.2 % (ref 20–42)
MCH RBC QN AUTO: 21.7 PG (ref 26–35)
MCHC RBC AUTO-ENTMCNC: 28.9 % (ref 32–34.5)
MCV RBC AUTO: 75 FL (ref 80–99.9)
MONOCYTES ABSOLUTE: 0.75 E9/L (ref 0.1–0.95)
MONOCYTES RELATIVE PERCENT: 10.5 % (ref 2–12)
NEUTROPHILS ABSOLUTE: 4.01 E9/L (ref 1.8–7.3)
NEUTROPHILS RELATIVE PERCENT: 58.8 % (ref 43–80)
NUCLEATED RED BLOOD CELLS: 0.9 /100 WBC
PDW BLD-RTO: 20.7 FL (ref 11.5–15)
PLATELET # BLD: 391 E9/L (ref 130–450)
PMV BLD AUTO: 10.3 FL (ref 7–12)
POIKILOCYTES: ABNORMAL
POLYCHROMASIA: ABNORMAL
POTASSIUM SERPL-SCNC: 4.9 MMOL/L (ref 3.5–5)
RBC # BLD: 5.17 E12/L (ref 3.5–5.5)
SODIUM BLD-SCNC: 142 MMOL/L (ref 132–146)
TOTAL PROTEIN: 7.5 G/DL (ref 6.4–8.3)
VITAMIN D 25-HYDROXY: 29 NG/ML (ref 30–100)
WBC # BLD: 6.8 E9/L (ref 4.5–11.5)

## 2022-11-28 PROCEDURE — 97110 THERAPEUTIC EXERCISES: CPT

## 2022-11-28 PROCEDURE — 97530 THERAPEUTIC ACTIVITIES: CPT

## 2022-11-28 NOTE — PROGRESS NOTES
Physical Therapy Treatment Note    Date: 2022  Patient Name: Cecilia Haro  : 1965   MRN: [id number]  DOInjury: --  DOSx: 2022  Referring Provider: Bryan Arias PA-C  [address] [place] [address]. [facility],  [facility]     Medical Diagnosis:      Diagnosis Orders   1. Spondylolisthesis at L3-L4 level        2. S/P lumbar fusion                    Lumbar fusion L3-5    Satnam is 3 months post L3-4 fusion. She is doing well. She does have trunk weakness typical with this phase of recovery. Treatment will be lumbar strengthening program with long-term HEP. Access Code: [email or web address]  URL: [email or web address]  Date: 2022  Prepared by: Majorn Southold    Exercises  Standing High Row with Resistance - 3-4 x weekly - 2 sets - 20 reps  Mid Row with anchored resistance - 3-4 x weekly - 2 sets - 20 reps  Standing Floor Level Row with Resistance Band with PLB - 3-4 x weekly - 2 sets - 20 reps  Standing march with counter support option - 3-4 x weekly - 2 sets - 20 reps  Alternating Hip Abduction with counter support - 3-4 x weekly - 2 sets - 20 reps  Mini Squat with Counter Support - 3-4 x weekly - 3 sets - 10 reps  Access Code: [email or web address]  URL: [email or web address]  Date: 2022  Prepared by: Wilbern Southold    Exercises  Hooklying Single Knee to Chest Stretch - 1 x daily - 5 reps - 10s hold  Supine Lower Trunk Rotation - 1 x daily - 1-3 sets - 10 reps  Seated Flexion Stretch - 1 x daily - 10 reps - 10s hold    X = TO BE PERFORMED NEXT VISIT  > = PROGRESS TO THIS    S: Pt reports increased soreness over past week. States she has been doing a lot of cleaning for a friend and training her dog which aggravates her back  O:   Time 3166-8586     Visit 10/8-12  30 visits Repeat outcome measure at mid point and end. Pain Pain 5/10        ROM      Modalities Use sparingly if at all.   Prefer an active program.     MH + ES  MO   Hot pack Before ex's [place]         Manual         MT   ROM      SKTC NR   Hook lying rotation  TE   Trunk flexion stretch  TE      TE      TE         Exercise      Bike L2 x 10 min     ROWS: H Blue 2 x 20  TE   ROWS: M  Reach and Pull Blue 2 x 20  TE   ROWS: L   Reach and Pull Blue 2 x 20  TE   Tubing Pushes  TE   Marching 2 x 20  TA   Alt hip Abd 2 x 20     Squats  TE      TE   Ambulation  TE                     A: able to all exercises above with good outcome  P: Continue with rehab plan  Jennifer Barkley PTA    Treatment Charges: Mins Units   Initial Evaluation     Re-Evaluation     Ther Exercise         TE 10 1   Manual Therapy     MT     Ther Activities        TA 20 1   Gait Training          GT     Neuro Re-education NR     Modalities     Non-Billable Service Time     Other     Total Time/Units 30 2

## 2022-12-02 ENCOUNTER — TREATMENT (OUTPATIENT)
Dept: PHYSICAL THERAPY | Age: 57
End: 2022-12-02

## 2022-12-02 DIAGNOSIS — M43.16 SPONDYLOLISTHESIS AT L3-L4 LEVEL: Primary | ICD-10-CM

## 2022-12-02 DIAGNOSIS — Z98.1 S/P LUMBAR FUSION: ICD-10-CM

## 2022-12-02 NOTE — PROGRESS NOTES
Physical Therapy Treatment Note    Date: 2022  Patient Name: Carlean Schirmer  : 1965   MRN: [id number]  DOInjury: --  DOSx: 2022  Referring Provider: Liliane Niño PA-C  [address] [place] [address]. [facility],  [address] [place] [address]     Medical Diagnosis:      Diagnosis Orders   1. Spondylolisthesis at L3-L4 level        2. S/P lumbar fusion                      Lumbar fusion L3-5    Satnam is 3 months post L3-4 fusion. She is doing well. She does have trunk weakness typical with this phase of recovery. Treatment will be lumbar strengthening program with long-term HEP. Access Code: [email or web address]  URL: [email or web address]  Date: 2022  Prepared by: No Caldwell    Exercises  Standing High Row with Resistance - 3-4 x weekly - 2 sets - 20 reps  Mid Row with anchored resistance - 3-4 x weekly - 2 sets - 20 reps  Standing Floor Level Row with Resistance Band with PLB - 3-4 x weekly - 2 sets - 20 reps  Chest Press with Resistance - 1 x daily - 3-4 x weekly - 2-3 sets - 15-20 reps  Standing march with counter support option - 3-4 x weekly - 2 sets - 20 reps  Alternating Hip Abduction with counter support - 3-4 x weekly - 2 sets - 20 reps  Mini Squat with Counter Support - 3-4 x weekly - 3 sets - 10 reps  Standing alternating overhead press with DB - 1 x daily - 3-4 x weekly - 2-3 sets - 15-20 reps    Access Code: [email or web address]  URL: [email or web address]  Date: 2022  Prepared by: No Caldwell    Exercises  Hooklying Single Knee to Chest Stretch - 1 x daily - 5 reps - 10s hold  Supine Lower Trunk Rotation - 1 x daily - 1-3 sets - 10 reps  Seated Flexion Stretch - 1 x daily - 10 reps - 10s hold    X = TO BE PERFORMED NEXT VISIT  > = PROGRESS TO THIS    S: Pt reports swimming yesterday. Sore today but ok. O:   Time 4516-3606     Visit -  30 visits Repeat outcome measure at mid point and end. Pain Pain 5/10        ROM      Modalities Use sparingly if at all.   Prefer an active program.     MH + ES  MO   Hot pack Before ex's [place]         Manual         MT   ROM      SKTC  NR   Hook lying rotation  TE   Trunk flexion stretch  TE      TE      TE         Exercise      Bike L4 x 10 min     ROWS: H Blue 2 x 20  TE   ROWS: M  Reach and Pull Blue 2 x 20  TE   ROWS: L   Reach and Pull Blue 2 x 20  TE   Tubing Pushes  TE   Marching 2 x 20  TA   Alt hip Abd 2 x 20     Squats  TE      TE   Ambulation  TE         Alternating shoulder press 2#  2 x 10 ea UE           A: able to all exercises above with good outcome.  Given HEP  P: Continue with rehab plan  Aura Woodward PTA    Treatment Charges: Mins Units   Initial Evaluation     Re-Evaluation     Ther Exercise         TE 10 1   Manual Therapy     MT     Ther Activities        TA 30 2   Gait Training          GT     Neuro Re-education NR     Modalities     Non-Billable Service Time     Other     Total Time/Units 40 3

## 2022-12-05 RX ORDER — MULTIVIT-MIN/IRON/FOLIC ACID/K 18-600-40
1 CAPSULE ORAL DAILY
Qty: 30 CAPSULE | Refills: 5 | Status: SHIPPED
Start: 2022-12-05 | End: 2022-12-09

## 2022-12-06 ENCOUNTER — TREATMENT (OUTPATIENT)
Dept: PHYSICAL THERAPY | Age: 57
End: 2022-12-06

## 2022-12-06 DIAGNOSIS — M43.16 SPONDYLOLISTHESIS AT L3-L4 LEVEL: Primary | ICD-10-CM

## 2022-12-06 DIAGNOSIS — Z98.1 S/P LUMBAR FUSION: ICD-10-CM

## 2022-12-08 ENCOUNTER — TREATMENT (OUTPATIENT)
Dept: PHYSICAL THERAPY | Age: 57
End: 2022-12-08

## 2022-12-08 ENCOUNTER — OFFICE VISIT (OUTPATIENT)
Dept: FAMILY MEDICINE CLINIC | Age: 57
End: 2022-12-08
Payer: MEDICAID

## 2022-12-08 VITALS
TEMPERATURE: 97 F | HEIGHT: 63 IN | DIASTOLIC BLOOD PRESSURE: 74 MMHG | BODY MASS INDEX: 45.18 KG/M2 | HEART RATE: 90 BPM | WEIGHT: 255 LBS | SYSTOLIC BLOOD PRESSURE: 122 MMHG | OXYGEN SATURATION: 99 %

## 2022-12-08 DIAGNOSIS — M43.16 SPONDYLOLISTHESIS AT L3-L4 LEVEL: Primary | ICD-10-CM

## 2022-12-08 DIAGNOSIS — Z98.1 S/P LUMBAR FUSION: ICD-10-CM

## 2022-12-08 DIAGNOSIS — I10 ESSENTIAL HYPERTENSION: Primary | ICD-10-CM

## 2022-12-08 DIAGNOSIS — E55.9 VITAMIN D DEFICIENCY: ICD-10-CM

## 2022-12-08 DIAGNOSIS — E78.2 MIXED HYPERLIPIDEMIA: ICD-10-CM

## 2022-12-08 PROBLEM — S80.02XA CONTUSION OF LEFT KNEE: Status: RESOLVED | Noted: 2020-05-15 | Resolved: 2022-12-08

## 2022-12-08 PROBLEM — L92.0 GRANULOMA ANNULARE: Status: ACTIVE | Noted: 2022-12-08

## 2022-12-08 PROCEDURE — G8427 DOCREV CUR MEDS BY ELIG CLIN: HCPCS | Performed by: FAMILY MEDICINE

## 2022-12-08 PROCEDURE — 99214 OFFICE O/P EST MOD 30 MIN: CPT | Performed by: FAMILY MEDICINE

## 2022-12-08 PROCEDURE — 1036F TOBACCO NON-USER: CPT | Performed by: FAMILY MEDICINE

## 2022-12-08 PROCEDURE — G8484 FLU IMMUNIZE NO ADMIN: HCPCS | Performed by: FAMILY MEDICINE

## 2022-12-08 PROCEDURE — 3017F COLORECTAL CA SCREEN DOC REV: CPT | Performed by: FAMILY MEDICINE

## 2022-12-08 PROCEDURE — G8417 CALC BMI ABV UP PARAM F/U: HCPCS | Performed by: FAMILY MEDICINE

## 2022-12-08 PROCEDURE — 3074F SYST BP LT 130 MM HG: CPT | Performed by: FAMILY MEDICINE

## 2022-12-08 PROCEDURE — 3078F DIAST BP <80 MM HG: CPT | Performed by: FAMILY MEDICINE

## 2022-12-08 RX ORDER — ATORVASTATIN CALCIUM 10 MG/1
TABLET, FILM COATED ORAL
Qty: 90 TABLET | Refills: 1 | Status: SHIPPED | OUTPATIENT
Start: 2022-12-08

## 2022-12-08 RX ORDER — AMLODIPINE BESYLATE 10 MG/1
10 TABLET ORAL DAILY
Qty: 90 TABLET | Refills: 1 | Status: SHIPPED | OUTPATIENT
Start: 2022-12-08

## 2022-12-08 SDOH — ECONOMIC STABILITY: FOOD INSECURITY: WITHIN THE PAST 12 MONTHS, YOU WORRIED THAT YOUR FOOD WOULD RUN OUT BEFORE YOU GOT MONEY TO BUY MORE.: NEVER TRUE

## 2022-12-08 SDOH — ECONOMIC STABILITY: FOOD INSECURITY: WITHIN THE PAST 12 MONTHS, THE FOOD YOU BOUGHT JUST DIDN'T LAST AND YOU DIDN'T HAVE MONEY TO GET MORE.: NEVER TRUE

## 2022-12-08 ASSESSMENT — ENCOUNTER SYMPTOMS
DIARRHEA: 0
NAUSEA: 0
VOMITING: 0
SHORTNESS OF BREATH: 0

## 2022-12-08 ASSESSMENT — SOCIAL DETERMINANTS OF HEALTH (SDOH): HOW HARD IS IT FOR YOU TO PAY FOR THE VERY BASICS LIKE FOOD, HOUSING, MEDICAL CARE, AND HEATING?: NOT HARD AT ALL

## 2022-12-08 NOTE — PROGRESS NOTES
Physical Therapy Treatment Note    Date: 2022  Patient Name: Alyx Bullock  : 1965   MRN: [id number]  DOInjury: --  DOSx: 2022  Referring Provider: Dillon Sweet PA-C  [address] [place] [address]. [facility],  [address] [place] [address]     Medical Diagnosis:      Diagnosis Orders   1. Spondylolisthesis at L3-L4 level        2. S/P lumbar fusion            Lumbar fusion L3-5    Satnam is 3 months post L3-4 fusion. She is doing well. She does have trunk weakness typical with this phase of recovery. Treatment will be lumbar strengthening program with long-term HEP. Access Code: [email or web address]  URL: [email or web address]  Date: 2022  Prepared by: Carlos Tushar    Exercises  Standing High Row with Resistance - 3-4 x weekly - 2 sets - 20 reps  Mid Row with anchored resistance - 3-4 x weekly - 2 sets - 20 reps  Standing Floor Level Row with Resistance Band with PLB - 3-4 x weekly - 2 sets - 20 reps  Chest Press with Resistance - 1 x daily - 3-4 x weekly - 2-3 sets - 15-20 reps  Standing march with counter support option - 3-4 x weekly - 2 sets - 20 reps  Alternating Hip Abduction with counter support - 3-4 x weekly - 2 sets - 20 reps  Mini Squat with Counter Support - 3-4 x weekly - 3 sets - 10 reps  Standing alternating overhead press with DB - 1 x daily - 3-4 x weekly - 2-3 sets - 15-20 reps    Access Code: [email or web address]  URL: [email or web address]  Date: 2022  Prepared by: Carlos Tushar    Exercises  Hooklying Single Knee to Chest Stretch - 1 x daily - 5 reps - 10s hold  Supine Lower Trunk Rotation - 1 x daily - 1-3 sets - 10 reps  Seated Flexion Stretch - 1 x daily - 10 reps - 10s hold    X = TO BE PERFORMED NEXT VISIT  > = PROGRESS TO THIS    S: Pt reports LB feels ok. States she joined gym with a pool and is swimming. O:   Time 7439-4035     Visit -  30 visits Repeat outcome measure at mid point and end. Pain Pain 3/10        ROM      Modalities Use sparingly if at all.   Prefer an active program.     MH + ES  MO   Hot pack Before ex's [place]         Manual         MT   ROM      SKTC  NR   Hook lying rotation  TE   Trunk flexion stretch  TE      TE      TE         Exercise      Bike L4 x 10 min     ROWS: H Blk 2 x 10  TE   ROWS: M  Reach and Pull Blk 2 x 10  TE   ROWS: L   Reach and Pull Blk 2 x 10  TE   Tubing Pushes Blk 2 x 10  TE   Marching 2 x 20  TA   Alt hip Abd 2 x 20     Squats  TE      TE   Ambulation  TE         Alternating shoulder press 2#  2 x 10 ea UE           A: Reviewed pt's HEP, bands given  P: Last visit  Leonel Bhandari PTA    Treatment Charges: Mins Units   Initial Evaluation     Re-Evaluation     Ther Exercise         TE 10 1   Manual Therapy     MT     Ther Activities        TA 20 1   Gait Training          GT     Neuro Re-education NR     Modalities     Non-Billable Service Time     Other     Total Time/Units 30 2

## 2022-12-08 NOTE — PROGRESS NOTES
Chief Complaint   Patient presents with    Hypertension       Patient is here for follow up for hypertension    Hypertension:  Patient is here for follow up chronic hypertension. This is  generally controlled on current medication regimen. BP today is 122/74. Takes meds as directed and tolerates them well. No symptoms from htn standpoint per ROS. Patientis  compliant with lifestyle modifications. Patient does not smoke. Comorbid conditions include obesity. Patient's past medical, surgical, social and/or family history reviewed, updated inchart, and are non-contributory (unless otherwise stated). Medications and allergies also reviewed and updated in chart. Current Outpatient Medications   Medication Sig Dispense Refill    amLODIPine (NORVASC) 10 MG tablet Take 1 tablet by mouth daily 90 tablet 1    atorvastatin (LIPITOR) 10 MG tablet Take 1 tablet by mouth once daily 90 tablet 1    busPIRone (BUSPAR) 15 MG tablet TAKE 1 TABLET BY MOUTH THREE TIMES DAILY AS DIRECTED      nystatin-triamcinolone (MYCOLOG II) 331647-0.1 UNIT/GM-% cream Apply topically 2 times daily. (Patient taking differently: as needed Apply topically 2 times daily.) 90 g 1    valACYclovir (VALTREX) 1 g tablet Take 2 tablets by mouth daily (Patient taking differently: Take 2,000 mg by mouth as needed) 4 tablet 3    tobramycin-dexamethasone (TOBRADEX) 0.3-0.1 % ophthalmic ointment Place into the left eye 3 times daily 3 times daily. prednisoLONE acetate (PRED FORTE) 1 % ophthalmic suspension as needed       buPROPion (WELLBUTRIN XL) 300 MG extended release tablet TAKE 1 TABLET BY MOUTH ONCE DAILY      busPIRone (BUSPAR) 10 MG tablet Take 10 mg by mouth 2 times daily       buPROPion (WELLBUTRIN XL) 150 MG extended release tablet Take 450 mg by mouth every morning       clonazePAM (KLONOPIN) 1 MG tablet Take 1 mg by mouth 2 times daily as needed.    2    DULoxetine (CYMBALTA) 60 MG extended release capsule Take 60 mg by mouth daily 2 capsules  2    zolpidem (AMBIEN) 10 MG tablet TAKE 1 2 (ONE HALF) TO 1 WHOLE TABLET BY MOUTH AT BEDTIME AS DIRECETED ON EMPTY STOMACH  2    Vitamin D, Cholecalciferol, 50 MCG (2000 UT) CAPS Take 1 capsule by mouth daily 30 capsule 5     No current facility-administered medications for this visit. Wt Readings from Last 3 Encounters:   12/08/22 255 lb (115.7 kg)   10/04/22 255 lb (115.7 kg)   09/19/22 250 lb (113.4 kg)     /74   Pulse 90   Temp 97 °F (36.1 °C) (Temporal)   Ht 5' 3\" (1.6 m)   Wt 255 lb (115.7 kg)   LMP  (LMP Unknown)   SpO2 99%   BMI 45.17 kg/m²     Review of Systems   Eyes:  Negative for visual disturbance. Respiratory:  Negative for shortness of breath. Cardiovascular:  Negative for chest pain, palpitations and leg swelling. Gastrointestinal:  Negative for diarrhea, nausea and vomiting. Genitourinary:  Negative for difficulty urinating, dysuria and frequency. Skin:  Positive for rash (comes and goes on hands and wrist). Psychiatric/Behavioral:  Negative for dysphoric mood. Physical Exam  Vitals reviewed. Constitutional:       General: She is not in acute distress. Appearance: She is well-developed. Neck:      Vascular: No carotid bruit. Cardiovascular:      Rate and Rhythm: Normal rate and regular rhythm. Heart sounds: Normal heart sounds. No murmur heard. No gallop. Pulmonary:      Effort: Pulmonary effort is normal.      Breath sounds: Normal breath sounds. No wheezing or rales. Abdominal:      General: Bowel sounds are normal. There is no distension. Palpations: Abdomen is soft. Tenderness: There is no abdominal tenderness. Musculoskeletal:      Cervical back: Neck supple. Right lower leg: No edema. Left lower leg: No edema. Skin:     General: Skin is warm and dry. Neurological:      Mental Status: She is alert and oriented to person, place, and time.                Padmini Paige was seen today for hypertension. Diagnoses and all orders for this visit:    Essential hypertension  -     amLODIPine (NORVASC) 10 MG tablet; Take 1 tablet by mouth daily  -     CBC; Future  -     Comprehensive Metabolic Panel; Future  -     Lipid Panel; Future  -     TSH; Future    Mixed hyperlipidemia  -     atorvastatin (LIPITOR) 10 MG tablet; Take 1 tablet by mouth once daily  -     Lipid Panel; Future    Vitamin D deficiency  -     Vitamin D 25 Hydroxy; Future          Phone/MyChart follow up iftests abnormal.    Return in about 6 months (around 6/8/2023) for hypertension. , or sooner if necessary. Educational materials and/or home exercises printed for patient's review and wereincluded in patient instructions on his/her After Visit Summary and given to patient at the end of visit. Counseled regarding above diagnosis, including possible risks andcomplications,  especially if left uncontrolled. Counseled regarding the possible side effects, risks, benefits and alternatives to treatment; patient and/or guardian verbalizes understanding, agrees, feelscomfortable with and wishes to proceed with above treatment plan. Advised patient to call with any new medication issues, and read all Rx info from pharmacy to assure aware of all possible risks and side effects ofmedication before taking. Reviewed age and gender appropriate health screening exams and vaccinations. Advised patient regarding importance of keeping up with recommended health maintenance and to schedule as soonas possible if overdue, as this is important in assessing for undiagnosed pathology, especially cancer, as well as protecting against potentially harmful/life threatening disease. Patient and/or guardianverbalizes understanding and agrees with above counseling, assessment and plan. All questions answered.

## 2023-01-30 ENCOUNTER — PATIENT MESSAGE (OUTPATIENT)
Dept: FAMILY MEDICINE CLINIC | Age: 58
End: 2023-01-30

## 2023-01-30 DIAGNOSIS — H15.003 BILATERAL SCLERITIS: Primary | ICD-10-CM

## 2023-01-31 NOTE — TELEPHONE ENCOUNTER
From: Cayden Hernandes  To: Dr. Eliel Da Silva  Sent: 1/30/2023 7:50 PM EST  Subject: Call from Dr Ashley Garcias morning all. This is just an [employer]. I saw my eye  yesterday and she's going to be calling the doc. I believe she's going to ask for some blood work to be ordered. She believes I may be dealing with an auto immune condition.   Thanks,  [employer]

## 2023-01-31 NOTE — TELEPHONE ENCOUNTER
Dr. Erna Boykin called and spoke to me regarding patient. Concerned that she may have an autoimmune condition due to evidence of acute scleritis. I will add orders for ALTA, RF and ESR to patient's lab work that was already ordered. Patient will be contacted to scheduled sooner appointment with me since she will likely need a rheumatology referral due to this.

## 2023-02-03 DIAGNOSIS — E55.9 VITAMIN D DEFICIENCY: ICD-10-CM

## 2023-02-03 DIAGNOSIS — H15.003 BILATERAL SCLERITIS: ICD-10-CM

## 2023-02-03 DIAGNOSIS — E78.2 MIXED HYPERLIPIDEMIA: ICD-10-CM

## 2023-02-03 DIAGNOSIS — I10 ESSENTIAL HYPERTENSION: ICD-10-CM

## 2023-02-03 LAB
ALBUMIN SERPL-MCNC: 4.5 G/DL (ref 3.5–5.2)
ALP BLD-CCNC: 82 U/L (ref 35–104)
ALT SERPL-CCNC: 11 U/L (ref 0–32)
ANION GAP SERPL CALCULATED.3IONS-SCNC: 13 MMOL/L (ref 7–16)
AST SERPL-CCNC: 16 U/L (ref 0–31)
BILIRUB SERPL-MCNC: <0.2 MG/DL (ref 0–1.2)
BUN BLDV-MCNC: 15 MG/DL (ref 6–20)
CALCIUM SERPL-MCNC: 9.7 MG/DL (ref 8.6–10.2)
CHLORIDE BLD-SCNC: 101 MMOL/L (ref 98–107)
CHOLESTEROL, TOTAL: 214 MG/DL (ref 0–199)
CO2: 27 MMOL/L (ref 22–29)
CREAT SERPL-MCNC: 0.8 MG/DL (ref 0.5–1)
GFR SERPL CREATININE-BSD FRML MDRD: >60 ML/MIN/1.73
GLUCOSE BLD-MCNC: 100 MG/DL (ref 74–99)
HCT VFR BLD CALC: 40.7 % (ref 34–48)
HDLC SERPL-MCNC: 94 MG/DL
HEMOGLOBIN: 11.9 G/DL (ref 11.5–15.5)
LDL CHOLESTEROL CALCULATED: 99 MG/DL (ref 0–99)
MCH RBC QN AUTO: 21.5 PG (ref 26–35)
MCHC RBC AUTO-ENTMCNC: 29.2 % (ref 32–34.5)
MCV RBC AUTO: 73.5 FL (ref 80–99.9)
PDW BLD-RTO: 18.9 FL (ref 11.5–15)
PLATELET # BLD: 415 E9/L (ref 130–450)
PMV BLD AUTO: 9.7 FL (ref 7–12)
POTASSIUM SERPL-SCNC: 5.6 MMOL/L (ref 3.5–5)
RBC # BLD: 5.54 E12/L (ref 3.5–5.5)
RHEUMATOID FACTOR: 11 IU/ML (ref 0–13)
SEDIMENTATION RATE, ERYTHROCYTE: 7 MM/HR (ref 0–20)
SODIUM BLD-SCNC: 141 MMOL/L (ref 132–146)
TOTAL PROTEIN: 7.8 G/DL (ref 6.4–8.3)
TRIGL SERPL-MCNC: 104 MG/DL (ref 0–149)
TSH SERPL DL<=0.05 MIU/L-ACNC: 2.7 UIU/ML (ref 0.27–4.2)
VITAMIN D 25-HYDROXY: 34 NG/ML (ref 30–100)
VLDLC SERPL CALC-MCNC: 21 MG/DL
WBC # BLD: 8.1 E9/L (ref 4.5–11.5)

## 2023-02-07 ENCOUNTER — OFFICE VISIT (OUTPATIENT)
Dept: FAMILY MEDICINE CLINIC | Age: 58
End: 2023-02-07
Payer: MEDICAID

## 2023-02-07 VITALS
HEART RATE: 88 BPM | OXYGEN SATURATION: 98 % | SYSTOLIC BLOOD PRESSURE: 122 MMHG | BODY MASS INDEX: 44.65 KG/M2 | DIASTOLIC BLOOD PRESSURE: 82 MMHG | HEIGHT: 63 IN | RESPIRATION RATE: 20 BRPM | WEIGHT: 252 LBS

## 2023-02-07 DIAGNOSIS — I10 ESSENTIAL HYPERTENSION: ICD-10-CM

## 2023-02-07 DIAGNOSIS — H15.002 SCLERITIS OF LEFT EYE: Primary | ICD-10-CM

## 2023-02-07 LAB — ANTI-NUCLEAR ANTIBODY (ANA): NEGATIVE

## 2023-02-07 PROCEDURE — 1036F TOBACCO NON-USER: CPT | Performed by: FAMILY MEDICINE

## 2023-02-07 PROCEDURE — 3074F SYST BP LT 130 MM HG: CPT | Performed by: FAMILY MEDICINE

## 2023-02-07 PROCEDURE — G8484 FLU IMMUNIZE NO ADMIN: HCPCS | Performed by: FAMILY MEDICINE

## 2023-02-07 PROCEDURE — 3078F DIAST BP <80 MM HG: CPT | Performed by: FAMILY MEDICINE

## 2023-02-07 PROCEDURE — 3017F COLORECTAL CA SCREEN DOC REV: CPT | Performed by: FAMILY MEDICINE

## 2023-02-07 PROCEDURE — G8427 DOCREV CUR MEDS BY ELIG CLIN: HCPCS | Performed by: FAMILY MEDICINE

## 2023-02-07 PROCEDURE — 99214 OFFICE O/P EST MOD 30 MIN: CPT | Performed by: FAMILY MEDICINE

## 2023-02-07 PROCEDURE — G8417 CALC BMI ABV UP PARAM F/U: HCPCS | Performed by: FAMILY MEDICINE

## 2023-02-07 SDOH — ECONOMIC STABILITY: INCOME INSECURITY: HOW HARD IS IT FOR YOU TO PAY FOR THE VERY BASICS LIKE FOOD, HOUSING, MEDICAL CARE, AND HEATING?: PATIENT DECLINED

## 2023-02-07 SDOH — ECONOMIC STABILITY: HOUSING INSECURITY
IN THE LAST 12 MONTHS, WAS THERE A TIME WHEN YOU DID NOT HAVE A STEADY PLACE TO SLEEP OR SLEPT IN A SHELTER (INCLUDING NOW)?: PATIENT REFUSED

## 2023-02-07 SDOH — ECONOMIC STABILITY: FOOD INSECURITY: WITHIN THE PAST 12 MONTHS, THE FOOD YOU BOUGHT JUST DIDN'T LAST AND YOU DIDN'T HAVE MONEY TO GET MORE.: PATIENT DECLINED

## 2023-02-07 SDOH — ECONOMIC STABILITY: FOOD INSECURITY: WITHIN THE PAST 12 MONTHS, YOU WORRIED THAT YOUR FOOD WOULD RUN OUT BEFORE YOU GOT MONEY TO BUY MORE.: PATIENT DECLINED

## 2023-02-07 ASSESSMENT — PATIENT HEALTH QUESTIONNAIRE - PHQ9
SUM OF ALL RESPONSES TO PHQ QUESTIONS 1-9: 3
5. POOR APPETITE OR OVEREATING: 0
SUM OF ALL RESPONSES TO PHQ9 QUESTIONS 1 & 2: 2
SUM OF ALL RESPONSES TO PHQ QUESTIONS 1-9: 3
8. MOVING OR SPEAKING SO SLOWLY THAT OTHER PEOPLE COULD HAVE NOTICED. OR THE OPPOSITE, BEING SO FIGETY OR RESTLESS THAT YOU HAVE BEEN MOVING AROUND A LOT MORE THAN USUAL: 0
SUM OF ALL RESPONSES TO PHQ QUESTIONS 1-9: 3
1. LITTLE INTEREST OR PLEASURE IN DOING THINGS: 1
6. FEELING BAD ABOUT YOURSELF - OR THAT YOU ARE A FAILURE OR HAVE LET YOURSELF OR YOUR FAMILY DOWN: 0
9. THOUGHTS THAT YOU WOULD BE BETTER OFF DEAD, OR OF HURTING YOURSELF: 0
10. IF YOU CHECKED OFF ANY PROBLEMS, HOW DIFFICULT HAVE THESE PROBLEMS MADE IT FOR YOU TO DO YOUR WORK, TAKE CARE OF THINGS AT HOME, OR GET ALONG WITH OTHER PEOPLE: 0
SUM OF ALL RESPONSES TO PHQ QUESTIONS 1-9: 3
4. FEELING TIRED OR HAVING LITTLE ENERGY: 0
2. FEELING DOWN, DEPRESSED OR HOPELESS: 1
3. TROUBLE FALLING OR STAYING ASLEEP: 1
7. TROUBLE CONCENTRATING ON THINGS, SUCH AS READING THE NEWSPAPER OR WATCHING TELEVISION: 0

## 2023-02-07 ASSESSMENT — ENCOUNTER SYMPTOMS
NAUSEA: 0
EYE PAIN: 1
DIARRHEA: 0
SHORTNESS OF BREATH: 0
VOMITING: 0
EYE REDNESS: 1

## 2023-02-07 NOTE — PROGRESS NOTES
[address] [facility], [address]   [address] [facility]   Carlos Jefferson MD     Patient: Praful King Birth: 1965  Visit Date: 2/7/23    Ashok Shore is a 59y.o. year old female here today for   Chief Complaint   Patient presents with    Eye Problem       HPI  Patient has been seeing ophthalmologist for left scleritis. Patient has had left eye pain (behind eye), eye turns red. Patient has associated photophobia. Symptoms started 6 months ago and has episode once/month lasting a few days. Had similar episode 20 years ago. Worse with stress. Patient was prescribed medrol dose barrett. Has been clearing up, but needs referral to rheumagologist.    Patient doing well on current regimen for hypertension. Recent lab results reviewed, including CMP, CBC, TSH, and lipid panel which are remarkable for hyperlipidemia. Review of Systems   Eyes:  Positive for pain and redness. Negative for visual disturbance. Respiratory:  Negative for shortness of breath. Cardiovascular:  Negative for chest pain, palpitations and leg swelling. Gastrointestinal:  Negative for diarrhea, nausea and vomiting. Genitourinary:  Negative for difficulty urinating, dysuria and frequency. Skin:  Negative for rash. Psychiatric/Behavioral:  Negative for dysphoric mood. Past medical, surgical, social and/or family historyreviewed, updated as needed, and are non-contributory (unless otherwise stated). Medications, allergies, and problem list also reviewed and updated as needed in patient's record.      Current Outpatient Medications   Medication Sig Dispense Refill    amLODIPine (NORVASC) 10 MG tablet Take 1 tablet by mouth daily 90 tablet 1    atorvastatin (LIPITOR) 10 MG tablet Take 1 tablet by mouth once daily 90 tablet 1    busPIRone (BUSPAR) 15 MG tablet TAKE 1 TABLET BY MOUTH THREE TIMES DAILY AS DIRECTED      nystatin-triamcinolone (MYCOLOG II) 226913-9.1 UNIT/GM-% cream Apply topically 2 times daily. (Patient taking differently: as needed Apply topically 2 times daily.) 90 g 1    valACYclovir (VALTREX) 1 g tablet Take 2 tablets by mouth daily (Patient taking differently: Take 2,000 mg by mouth as needed) 4 tablet 3    tobramycin-dexamethasone (TOBRADEX) 0.3-0.1 % ophthalmic ointment Place into the left eye 3 times daily 3 times daily. prednisoLONE acetate (PRED FORTE) 1 % ophthalmic suspension as needed       buPROPion (WELLBUTRIN XL) 300 MG extended release tablet TAKE 1 TABLET BY MOUTH ONCE DAILY      busPIRone (BUSPAR) 10 MG tablet Take 10 mg by mouth 2 times daily       buPROPion (WELLBUTRIN XL) 150 MG extended release tablet Take 450 mg by mouth every morning       clonazePAM (KLONOPIN) 1 MG tablet Take 1 mg by mouth 2 times daily as needed. 2    DULoxetine (CYMBALTA) 60 MG extended release capsule Take 60 mg by mouth daily 2 capsules  2    zolpidem (AMBIEN) 10 MG tablet TAKE 1 2 (ONE HALF) TO 1 WHOLE TABLET BY MOUTH AT BEDTIME AS DIRECETED ON EMPTY STOMACH  2     No current facility-administered medications for this visit. Wt Readings from Last 3 Encounters:   02/07/23 252 lb (114.3 kg)   12/08/22 255 lb (115.7 kg)   10/04/22 255 lb (115.7 kg)                   Vitals:    02/07/23 1658   BP: 122/82   Pulse: 88   Resp: 20   SpO2: 98%       Physical Exam  Vitals reviewed. Constitutional:       Appearance: She is well-developed. She is obese. Cardiovascular:      Rate and Rhythm: Normal rate and regular rhythm. Heart sounds: Normal heart sounds. No murmur heard. No friction rub. Pulmonary:      Effort: Pulmonary effort is normal. No respiratory distress. Breath sounds: Normal breath sounds. No wheezing or rales. Abdominal:      General: Bowel sounds are normal. There is no distension. Palpations: Abdomen is soft. Tenderness: There is no abdominal tenderness. There is no guarding or rebound.    Skin: General: Skin is warm and dry. Neurological:      Mental Status: She is alert. ASSESSMENT/PLAN  Saniya Finch was seen today for eye problem. Diagnoses and all orders for this visit:    Scleritis of left eye  -     [id number], Rheumatology, [place]    Essential hypertension  -     amLODIPine (NORVASC) 10 MG tablet; Take 1 tablet by mouth daily      /MyChart follow up if tests abnormal.    Return for scheduled appointment. or sooner if necessary. I have reviewed my findings and recommendations with Saniya Finch.      Corazon Ashley MD, M.D

## 2023-02-09 ENCOUNTER — HOSPITAL ENCOUNTER (OUTPATIENT)
Dept: MAMMOGRAPHY | Age: 58
Discharge: HOME | End: 2023-02-11
Payer: MEDICAID

## 2023-02-09 DIAGNOSIS — Z12.31 VISIT FOR SCREENING MAMMOGRAM: ICD-10-CM

## 2023-02-09 DIAGNOSIS — Z78.0 POST-MENOPAUSAL: ICD-10-CM

## 2023-02-09 PROCEDURE — 77063 BREAST TOMOSYNTHESIS BI: CPT

## 2023-02-10 RX ORDER — AMLODIPINE BESYLATE 10 MG/1
10 TABLET ORAL DAILY
Qty: 90 TABLET | Refills: 1 | Status: SHIPPED
Start: 2023-02-10

## 2023-05-26 ENCOUNTER — OFFICE VISIT (OUTPATIENT)
Dept: RHEUMATOLOGY | Age: 58
End: 2023-05-26
Payer: MEDICAID

## 2023-05-26 VITALS — WEIGHT: 262 LBS | BODY MASS INDEX: 46.42 KG/M2 | HEIGHT: 63 IN

## 2023-05-26 DIAGNOSIS — M15.9 GENERALIZED OSTEOARTHRITIS: ICD-10-CM

## 2023-05-26 DIAGNOSIS — M13.0 POLYARTHRITIS: ICD-10-CM

## 2023-05-26 DIAGNOSIS — H15.002 SCLERITIS OF LEFT EYE: Primary | ICD-10-CM

## 2023-05-26 DIAGNOSIS — H15.002 SCLERITIS OF LEFT EYE: ICD-10-CM

## 2023-05-26 DIAGNOSIS — D84.9 IMMUNOSUPPRESSION (HCC): ICD-10-CM

## 2023-05-26 LAB
ALBUMIN SERPL-MCNC: 4.5 G/DL (ref 3.5–5.2)
ALP SERPL-CCNC: 82 U/L (ref 35–104)
ALT SERPL-CCNC: 14 U/L (ref 0–32)
ANION GAP SERPL CALCULATED.3IONS-SCNC: 14 MMOL/L (ref 7–16)
ANISOCYTOSIS: ABNORMAL
AST SERPL-CCNC: 19 U/L (ref 0–31)
BASOPHILS # BLD: 0.14 E9/L (ref 0–0.2)
BASOPHILS NFR BLD: 1.7 % (ref 0–2)
BILIRUB SERPL-MCNC: 0.3 MG/DL (ref 0–1.2)
BUN SERPL-MCNC: 15 MG/DL (ref 6–23)
BURR CELLS: ABNORMAL
CALCIUM SERPL-MCNC: 9.9 MG/DL (ref 8.6–10.2)
CHLORIDE SERPL-SCNC: 103 MMOL/L (ref 98–107)
CO2 SERPL-SCNC: 24 MMOL/L (ref 22–29)
CREAT SERPL-MCNC: 0.9 MG/DL (ref 0.5–1)
CRP SERPL HS-MCNC: 0.5 MG/DL (ref 0–0.4)
EOSINOPHIL # BLD: 0.07 E9/L (ref 0.05–0.5)
EOSINOPHIL NFR BLD: 0.9 % (ref 0–6)
ERYTHROCYTE [DISTWIDTH] IN BLOOD BY AUTOMATED COUNT: 19.1 FL (ref 11.5–15)
ERYTHROCYTE [SEDIMENTATION RATE] IN BLOOD BY WESTERGREN METHOD: 12 MM/HR (ref 0–20)
GLUCOSE SERPL-MCNC: 111 MG/DL (ref 74–99)
HBV SURFACE AB SERPL IA-ACNC: REACTIVE M[IU]/ML
HBV SURFACE AG SERPL QL IA: NORMAL
HCT VFR BLD AUTO: 44.4 % (ref 34–48)
HGB BLD-MCNC: 12.7 G/DL (ref 11.5–15.5)
LYMPHOCYTES # BLD: 2.92 E9/L (ref 1.5–4)
LYMPHOCYTES NFR BLD: 35.7 % (ref 20–42)
MCH RBC QN AUTO: 22.8 PG (ref 26–35)
MCHC RBC AUTO-ENTMCNC: 28.6 % (ref 32–34.5)
MCV RBC AUTO: 79.9 FL (ref 80–99.9)
MONOCYTES # BLD: 0.32 E9/L (ref 0.1–0.95)
MONOCYTES NFR BLD: 4.3 % (ref 2–12)
NEUTROPHILS # BLD: 4.62 E9/L (ref 1.8–7.3)
NEUTS SEG NFR BLD: 57.4 % (ref 43–80)
NRBC BLD-RTO: 0.9 /100 WBC
OVALOCYTES: ABNORMAL
PLATELET # BLD AUTO: 397 E9/L (ref 130–450)
PMV BLD AUTO: 10.5 FL (ref 7–12)
POIKILOCYTES: ABNORMAL
POLYCHROMASIA: ABNORMAL
POTASSIUM SERPL-SCNC: 4.4 MMOL/L (ref 3.5–5)
PROT SERPL-MCNC: 7.9 G/DL (ref 6.4–8.3)
RBC # BLD AUTO: 5.56 E12/L (ref 3.5–5.5)
RHEUMATOID FACT SER NEPH-ACNC: 11 IU/ML (ref 0–13)
SODIUM SERPL-SCNC: 141 MMOL/L (ref 132–146)
TEAR DROP CELLS: ABNORMAL
WBC # BLD: 8.1 E9/L (ref 4.5–11.5)

## 2023-05-26 PROCEDURE — G8427 DOCREV CUR MEDS BY ELIG CLIN: HCPCS | Performed by: INTERNAL MEDICINE

## 2023-05-26 PROCEDURE — 1036F TOBACCO NON-USER: CPT | Performed by: INTERNAL MEDICINE

## 2023-05-26 PROCEDURE — G8417 CALC BMI ABV UP PARAM F/U: HCPCS | Performed by: INTERNAL MEDICINE

## 2023-05-26 PROCEDURE — 99204 OFFICE O/P NEW MOD 45 MIN: CPT | Performed by: INTERNAL MEDICINE

## 2023-05-26 PROCEDURE — 3017F COLORECTAL CA SCREEN DOC REV: CPT | Performed by: INTERNAL MEDICINE

## 2023-05-26 ASSESSMENT — ENCOUNTER SYMPTOMS
VOMITING: 0
COLOR CHANGE: 0
NAUSEA: 0
COUGH: 0
ABDOMINAL PAIN: 0
SHORTNESS OF BREATH: 0
DIARRHEA: 0
TROUBLE SWALLOWING: 0

## 2023-05-26 NOTE — PROGRESS NOTES
Carolee Roqeu 1965 is a 59 y.o. female, here for evaluation of the following chief complaint(s):  New Patient (Patient here as a new patient for scleritis of the left eye. )         ASSESSMENT/PLAN:    Carolee Roque 1965 is a 59 y.o. female seen in consult for recurrent scleritis of the left eye. 1.  Recurrent scleritis of the left eye-clinically I see no signs to suggest an associated systemic connective tissue disease. She has had negative ALTA, negative rheumatoid factor, normal sed rate. That said will need further work-up as below. If work-up is unrevealing and I see no signs of systemic connective tissue disease she does not need immunosuppressive's from my standpoint. However if ophthalmology feels that she needs methotrexate or Humira from strictly ocular standpoint I am more than happy to manage either of those medications and we did discuss the risks, benefits, side effects of both today. 2.  Immunosuppression-in anticipation of immunosuppressive medicines we will check TB and hepatitis. 3.  Polyarthritis-I suspect her joint issues are all more mechanical.  She has negative rheumatoid factor normal sed rate but will get further work-up as below. 1. Scleritis of left eye  -     C-Reactive Protein; Future  -     Sedimentation Rate; Future  -     Rheumatoid Factor; Future  -     Cyclic Citrul Peptide Antibody, IgG; Future  -     Miscellaneous Sendout; Future  -     Hepatitis B Core Antibody, Total; Future  -     Hepatitis B Surface Antibody; Future  -     Hepatitis B Surface Antigen; Future  -     T-Spot TB Test; Future  -     Hepatitis C Antibody; Future  -     Angiotensin Converting Enzyme; Future  -     HLA-B27 Antigen; Future  -     Lyme Disease Acute Reflexive Panel; Future  -     IgG 1, 2, 3, and 4; Future  -     CBC with Auto Differential; Future  -     Comprehensive Metabolic Panel; Future  2. Polyarthritis  -     C-Reactive Protein;  Future  -

## 2023-05-27 LAB — HCV AB SERPL QL IA: NORMAL

## 2023-05-28 LAB
ACE SERPL-CCNC: 18 U/L (ref 16–85)
IGG1 SER-MCNC: 561 MG/DL (ref 240–1118)
IGG2 SER-MCNC: 314 MG/DL (ref 124–549)
IGG3 SER-MCNC: 27 MG/DL (ref 21–134)
IGG4 SER-MCNC: 24 MG/DL (ref 1–123)

## 2023-05-30 LAB — HLA-B27 QL FC: NEGATIVE

## 2023-05-31 LAB
COMMENT: NORMAL
HBV CORE AB SER QL: NONREACTIVE
REPORT: NORMAL

## 2023-06-02 LAB — CCP AB SER IA-ACNC: 0.8 U/ML (ref 0–7)

## 2023-06-06 LAB
Lab: 14 3 3
REPORT: NORMAL
THIS TEST SENT TO: NORMAL

## 2023-06-19 ENCOUNTER — HOSPITAL ENCOUNTER (OUTPATIENT)
Age: 58
Discharge: HOME | End: 2023-06-21
Payer: MEDICAID

## 2023-06-19 ENCOUNTER — HOSPITAL ENCOUNTER (OUTPATIENT)
Dept: GENERAL RADIOLOGY | Age: 58
Discharge: HOME | End: 2023-06-21
Payer: MEDICAID

## 2023-06-19 ENCOUNTER — OFFICE VISIT (OUTPATIENT)
Dept: NEUROSURGERY | Age: 58
End: 2023-06-19
Payer: MEDICAID

## 2023-06-19 DIAGNOSIS — Z98.1 S/P LUMBAR FUSION: ICD-10-CM

## 2023-06-19 DIAGNOSIS — M53.3 SACROILIAC JOINT DYSFUNCTION: ICD-10-CM

## 2023-06-19 DIAGNOSIS — G89.29 CHRONIC RIGHT-SIDED LOW BACK PAIN WITH RIGHT-SIDED SCIATICA: Primary | ICD-10-CM

## 2023-06-19 DIAGNOSIS — M54.41 CHRONIC RIGHT-SIDED LOW BACK PAIN WITH RIGHT-SIDED SCIATICA: Primary | ICD-10-CM

## 2023-06-19 DIAGNOSIS — M43.16 SPONDYLOLISTHESIS AT L3-L4 LEVEL: ICD-10-CM

## 2023-06-19 PROCEDURE — 1036F TOBACCO NON-USER: CPT | Performed by: STUDENT IN AN ORGANIZED HEALTH CARE EDUCATION/TRAINING PROGRAM

## 2023-06-19 PROCEDURE — G8427 DOCREV CUR MEDS BY ELIG CLIN: HCPCS | Performed by: STUDENT IN AN ORGANIZED HEALTH CARE EDUCATION/TRAINING PROGRAM

## 2023-06-19 PROCEDURE — G8417 CALC BMI ABV UP PARAM F/U: HCPCS | Performed by: STUDENT IN AN ORGANIZED HEALTH CARE EDUCATION/TRAINING PROGRAM

## 2023-06-19 PROCEDURE — 99212 OFFICE O/P EST SF 10 MIN: CPT

## 2023-06-19 PROCEDURE — 72100 X-RAY EXAM L-S SPINE 2/3 VWS: CPT

## 2023-06-19 PROCEDURE — 3017F COLORECTAL CA SCREEN DOC REV: CPT | Performed by: STUDENT IN AN ORGANIZED HEALTH CARE EDUCATION/TRAINING PROGRAM

## 2023-06-19 PROCEDURE — 99213 OFFICE O/P EST LOW 20 MIN: CPT | Performed by: STUDENT IN AN ORGANIZED HEALTH CARE EDUCATION/TRAINING PROGRAM

## 2023-06-19 NOTE — PROGRESS NOTES
Post Operative Follow-up     This is a 59year old female who presents to the office for a 1 year follow-up s/p L3-L5 PLIF. Subjective: Patient states overall she has been doing okay. States pre-op low back pain is gone, but does admit to bilateral low back pain that radiates into her butt, right>Left. Admits to some weakness. No associated numbness. No bowel or bladder incontinence. She has been going to Aqua therapy which has helped some. XR reviewed with patient. Physical Exam:              WDWN, no apparent distress              Non-labored breathing               Vitals Stable              Alert and oriented x3              CN 3-12 intact              PERRL              EOMI              HARRIS well              Motor strength symmetric              Sensation to LT intact bilaterally  (+)Right Domenica Finger                 Imagin2023 Lumbar XR  Stable alignment, stable fusion. No acute abnormalities noted. Final report pending. Assessment: This is a 59 y.o.  female presenting for a 1 year follow-up s/p L3-L5 PLIF. Plan:  -Pain control and expectations discussed  -XR reviewed.   -Obtain CT Myelogram Lumbar spine to evaluate hardware and for stenosis, MRI unable to be obtained d/t previously placed hardware  -Obtain CT Pelvis to evaluate SI joints  -OARRS report reviewed   -Return to Neurosurgery clinic after completion of imaging to discuss results and further treatment plan  -Call/return sooner if symptoms worsen or new issues arise in the interim       Electronically signed by Irvin Chaves PA-C on 2023 at 3:26 PM

## 2023-06-20 ENCOUNTER — TELEPHONE (OUTPATIENT)
Dept: NEUROSURGERY | Age: 58
End: 2023-06-20

## 2023-06-20 PROBLEM — M41.9 SCOLIOSIS, UNSPECIFIED: Status: ACTIVE | Noted: 2019-10-16

## 2023-06-20 NOTE — TELEPHONE ENCOUNTER
Site: [facility]  Insurance: [place]  Order: CT/Myelogram Lumbar  Request# [id number]  Status: APPROVED  Auth#:  [id number]  Dates: N/A  Documents scanned into media      Faxed to IR for scheduling

## 2023-06-22 ENCOUNTER — OFFICE VISIT (OUTPATIENT)
Dept: FAMILY MEDICINE CLINIC | Age: 58
End: 2023-06-22
Payer: MEDICAID

## 2023-06-22 VITALS
BODY MASS INDEX: 44.65 KG/M2 | RESPIRATION RATE: 20 BRPM | DIASTOLIC BLOOD PRESSURE: 78 MMHG | OXYGEN SATURATION: 99 % | WEIGHT: 252 LBS | HEART RATE: 72 BPM | HEIGHT: 63 IN | SYSTOLIC BLOOD PRESSURE: 132 MMHG

## 2023-06-22 DIAGNOSIS — Z12.11 COLON CANCER SCREENING: ICD-10-CM

## 2023-06-22 DIAGNOSIS — I10 ESSENTIAL HYPERTENSION: ICD-10-CM

## 2023-06-22 DIAGNOSIS — H15.002 SCLERITIS OF LEFT EYE: Primary | ICD-10-CM

## 2023-06-22 DIAGNOSIS — M25.511 CHRONIC RIGHT SHOULDER PAIN: ICD-10-CM

## 2023-06-22 DIAGNOSIS — M51.36 DDD (DEGENERATIVE DISC DISEASE), LUMBAR: ICD-10-CM

## 2023-06-22 DIAGNOSIS — G89.29 CHRONIC RIGHT SHOULDER PAIN: ICD-10-CM

## 2023-06-22 PROCEDURE — 3074F SYST BP LT 130 MM HG: CPT | Performed by: FAMILY MEDICINE

## 2023-06-22 PROCEDURE — 3078F DIAST BP <80 MM HG: CPT | Performed by: FAMILY MEDICINE

## 2023-06-22 PROCEDURE — G8427 DOCREV CUR MEDS BY ELIG CLIN: HCPCS | Performed by: FAMILY MEDICINE

## 2023-06-22 PROCEDURE — 3017F COLORECTAL CA SCREEN DOC REV: CPT | Performed by: FAMILY MEDICINE

## 2023-06-22 PROCEDURE — 1036F TOBACCO NON-USER: CPT | Performed by: FAMILY MEDICINE

## 2023-06-22 PROCEDURE — G8417 CALC BMI ABV UP PARAM F/U: HCPCS | Performed by: FAMILY MEDICINE

## 2023-06-22 PROCEDURE — 99214 OFFICE O/P EST MOD 30 MIN: CPT | Performed by: FAMILY MEDICINE

## 2023-06-22 NOTE — PROGRESS NOTES
tobramycin-dexamethasone (TOBRADEX) 0.3-0.1 % ophthalmic ointment Place into the left eye 3 times daily 3 times daily. prednisoLONE acetate (PRED FORTE) 1 % ophthalmic suspension as needed       buPROPion (WELLBUTRIN XL) 300 MG extended release tablet TAKE 1 TABLET BY MOUTH ONCE DAILY      busPIRone (BUSPAR) 10 MG tablet Take 1 tablet by mouth 2 times daily      buPROPion (WELLBUTRIN XL) 150 MG extended release tablet Take 3 tablets by mouth every morning      clonazePAM (KLONOPIN) 1 MG tablet Take 1 tablet by mouth 2 times daily as needed. 2    DULoxetine (CYMBALTA) 60 MG extended release capsule Take 1 capsule by mouth daily 2 capsules  2    zolpidem (AMBIEN) 10 MG tablet TAKE 1 2 (ONE HALF) TO 1 WHOLE TABLET BY MOUTH AT BEDTIME AS DIRECETED ON EMPTY STOMACH  2     No current facility-administered medications for this visit. Wt Readings from Last 3 Encounters:   06/22/23 252 lb (114.3 kg)   05/26/23 262 lb (118.8 kg)   02/07/23 252 lb (114.3 kg)                   Vitals:    06/22/23 1640   BP: 132/78   Pulse: 72   Resp: 20   SpO2: 99%       Physical Exam  Vitals reviewed. Constitutional:       Appearance: She is well-developed. Cardiovascular:      Rate and Rhythm: Normal rate and regular rhythm. Heart sounds: Normal heart sounds. No murmur heard. No friction rub. Pulmonary:      Effort: Pulmonary effort is normal. No respiratory distress. Breath sounds: Normal breath sounds. No wheezing or rales. Abdominal:      General: Bowel sounds are normal. There is no distension. Palpations: Abdomen is soft. Tenderness: There is no abdominal tenderness. There is no guarding or rebound. Skin:     General: Skin is warm and dry. Neurological:      Mental Status: She is alert. ASSESSMENT/PLAN  Yusra Medley was seen today for eye problem.     Diagnoses and all orders for this visit:    Scleritis of left eye        -     clinically resolved; continue follow up with rheumatology

## 2023-06-23 RX ORDER — AMLODIPINE BESYLATE 10 MG/1
10 TABLET ORAL DAILY
Qty: 90 TABLET | Refills: 1 | Status: SHIPPED
Start: 2023-06-23

## 2023-07-06 DIAGNOSIS — I10 ESSENTIAL HYPERTENSION: ICD-10-CM

## 2023-07-06 DIAGNOSIS — E78.2 MIXED HYPERLIPIDEMIA: ICD-10-CM

## 2023-07-06 RX ORDER — ATORVASTATIN CALCIUM 10 MG/1
TABLET, FILM COATED ORAL
Qty: 90 TABLET | Refills: 0 | Status: SHIPPED | OUTPATIENT
Start: 2023-07-06 | End: 2023-10-06

## 2023-07-06 RX ORDER — AMLODIPINE BESYLATE 10 MG/1
10 TABLET ORAL DAILY
Qty: 90 TABLET | Refills: 0 | Status: SHIPPED
Start: 2023-07-06 | End: 2023-09-07 | Stop reason: SDUPTHER

## 2023-07-06 NOTE — TELEPHONE ENCOUNTER
07/06/23 :  I will prescribe a 90 day supply of this prescription. Patient needs to schedule an office visit with PCP prior to refills for this or any other medications.

## 2023-07-18 ENCOUNTER — HOSPITAL ENCOUNTER (OUTPATIENT)
Dept: PREADMISSION TESTING | Age: 58
Discharge: HOME | End: 2023-07-18

## 2023-07-18 VITALS — BODY MASS INDEX: 42.52 KG/M2 | WEIGHT: 240 LBS | HEIGHT: 63 IN

## 2023-07-18 NOTE — PROGRESS NOTES
[address] [facility] [place]                                                                                                                    PRE OP INSTRUCTIONS FOR  Duane Narrow        Date: 7/18/2023    Date of surgery: 7-19-23 OR time 10:00 arrival time 08:30   Arrival Time: Hospital will call you between 5pm and 7pm with your final arrival time for surgery    Do not eat or drink anything after midnight prior to surgery. This includes no water, chewing gum, mints or ice chips. Take the following medications with a small sip of water on the morning of Surgery: norvasc, duloxetine, buspar, bupropion, klonopin (prn)     Diabetics may take evening dose of insulin but none after midnight. If you feel symptomatic or low blood sugar morning of surgery drink 1-2 ounces of apple juice only. Aspirin, Ibuprofen, Advil, Naproxen, Vitamin E and other Anti-inflammatory products should be stopped  before surgery  as directed by your physician. Take Tylenol only unless instructed otherwise by your surgeon. Check with your Doctor regarding stopping Plavix, Coumadin, Lovenox, Eliquis, Effient, or other blood thinners. Do not smoke,use illicit drugs and do not drink any alcoholic beverages 24 hours prior to surgery. You may brush your teeth the morning of surgery. DO NOT SWALLOW WATER    You MUST make arrangements for a responsible adult to take you home after your surgery. You will not be allowed to leave alone or drive yourself home. It is strongly suggested someone stay with you the first 24 hrs. Your surgery will be cancelled if you do not have a ride home. PEDIATRIC PATIENTS ONLY:  A parent/legal guardian must accompany a child scheduled for surgery and plan to stay at the hospital until the child is discharged. Please do not bring other children with you.     Please wear simple, loose fitting clothing to the hospital.  Do not bring valuables (money, credit cards, checkbooks, etc.) Do

## 2023-07-19 ENCOUNTER — HOSPITAL ENCOUNTER (OUTPATIENT)
Dept: CT IMAGING | Age: 58
Discharge: HOME | End: 2023-07-19
Payer: MEDICAID

## 2023-07-19 ENCOUNTER — HOSPITAL ENCOUNTER (OUTPATIENT)
Dept: INTERVENTIONAL RADIOLOGY/VASCULAR | Age: 58
Discharge: HOME | End: 2023-07-19
Payer: MEDICAID

## 2023-07-19 VITALS
SYSTOLIC BLOOD PRESSURE: 116 MMHG | BODY MASS INDEX: 43.77 KG/M2 | OXYGEN SATURATION: 100 % | WEIGHT: 247 LBS | HEIGHT: 63 IN | TEMPERATURE: 97.2 F | RESPIRATION RATE: 16 BRPM | DIASTOLIC BLOOD PRESSURE: 64 MMHG | HEART RATE: 87 BPM

## 2023-07-19 DIAGNOSIS — M54.41 CHRONIC RIGHT-SIDED LOW BACK PAIN WITH RIGHT-SIDED SCIATICA: ICD-10-CM

## 2023-07-19 DIAGNOSIS — G89.29 CHRONIC RIGHT-SIDED LOW BACK PAIN WITH RIGHT-SIDED SCIATICA: ICD-10-CM

## 2023-07-19 DIAGNOSIS — M53.3 SACROILIAC JOINT DYSFUNCTION: ICD-10-CM

## 2023-07-19 DIAGNOSIS — Z98.1 S/P LUMBAR FUSION: ICD-10-CM

## 2023-07-19 LAB
HCT VFR BLD AUTO: 38.2 % (ref 34–48)
HGB BLD-MCNC: 11.6 G/DL (ref 11.5–15.5)
INR PPP: 1
PLATELET # BLD AUTO: 367 K/UL (ref 130–450)
PROTHROMBIN TIME: 11.7 SEC (ref 9.3–12.4)

## 2023-07-19 PROCEDURE — 7100000011 HC PHASE II RECOVERY - ADDTL 15 MIN

## 2023-07-19 PROCEDURE — 62284 INJECTION FOR MYELOGRAM: CPT

## 2023-07-19 PROCEDURE — 85610 PROTHROMBIN TIME: CPT

## 2023-07-19 PROCEDURE — 7100000010 HC PHASE II RECOVERY - FIRST 15 MIN

## 2023-07-19 PROCEDURE — 72132 CT LUMBAR SPINE W/DYE: CPT

## 2023-07-19 PROCEDURE — 72192 CT PELVIS W/O DYE: CPT

## 2023-07-19 PROCEDURE — 85049 AUTOMATED PLATELET COUNT: CPT

## 2023-07-19 PROCEDURE — 85014 HEMATOCRIT: CPT

## 2023-07-19 PROCEDURE — 36415 COLL VENOUS BLD VENIPUNCTURE: CPT

## 2023-07-19 PROCEDURE — 85018 HEMOGLOBIN: CPT

## 2023-07-19 ASSESSMENT — PAIN DESCRIPTION - LOCATION: LOCATION: BACK

## 2023-07-19 ASSESSMENT — PAIN DESCRIPTION - ORIENTATION: ORIENTATION: LOWER

## 2023-07-19 ASSESSMENT — PAIN DESCRIPTION - DESCRIPTORS
DESCRIPTORS: DISCOMFORT
DESCRIPTORS: DISCOMFORT

## 2023-07-19 ASSESSMENT — PAIN - FUNCTIONAL ASSESSMENT: PAIN_FUNCTIONAL_ASSESSMENT: 0-10

## 2023-07-19 ASSESSMENT — PAIN SCALES - GENERAL: PAINLEVEL_OUTOF10: 1

## 2023-07-19 NOTE — DISCHARGE INSTRUCTIONS
Myelogram: About This Test  What is it? A myelogram uses X-rays and a special dye to make pictures of bones and nerves of the spine (spinal canal). The spinal canal holds the spinal cord, the spinal nerve roots, and the fluid-filled space between the bones in your spine. Why is this test done? A myelogram is done to check for: The cause of arm or leg numbness, weakness, or pain. Narrowing of the spinal canal (spinal stenosis). A tumor or infection causing problems with the spinal cord or nerve roots. A spinal disc that has ruptured (herniated disc). Inflammation of the membrane that covers the brain and spinal cord. Problems with the blood vessels to the spine. This test may help find the cause of pain that can't be found by other tests, such as an MRI or a CT scan. How do you prepare for the test?  Your doctor will tell you if you need to change how much you eat and drink before the myelogram. You may be asked to increase the amount of water you drink before the test. Follow the instructions your doctor gives you about eating and drinking. If you take a medicine that prevents blood clots, your doctor may tell you to stop taking it before your test. Or your doctor may tell you to keep taking it. (These medicines include aspirin and other blood thinners.) Make sure that you understand exactly what your doctor wants you to do. Be sure you have someone to take you home. Anesthesia and pain medicine will make it unsafe for you to drive or get home on your own. How is the test done? The dye is put into your spinal canal with a thin needle. This is called a lumbar puncture. The dye moves through the space so the nerve roots and spinal cord can be seen more clearly. After the dye is put in, you will lie still while the X-ray pictures are taken. How does it feel? You will feel a quick sting from a small needle that has medicine to numb the skin on your back.  You will also feel some pressure as the

## 2023-07-31 ENCOUNTER — OFFICE VISIT (OUTPATIENT)
Dept: NEUROSURGERY | Age: 58
End: 2023-07-31
Payer: MEDICAID

## 2023-07-31 VITALS
HEIGHT: 63 IN | OXYGEN SATURATION: 97 % | BODY MASS INDEX: 43.77 KG/M2 | TEMPERATURE: 97.7 F | HEART RATE: 91 BPM | DIASTOLIC BLOOD PRESSURE: 88 MMHG | SYSTOLIC BLOOD PRESSURE: 148 MMHG | WEIGHT: 247 LBS

## 2023-07-31 DIAGNOSIS — Z98.1 S/P LUMBAR FUSION: ICD-10-CM

## 2023-07-31 DIAGNOSIS — M53.3 SACROILIAC JOINT DYSFUNCTION: Primary | ICD-10-CM

## 2023-07-31 PROCEDURE — 3077F SYST BP >= 140 MM HG: CPT | Performed by: STUDENT IN AN ORGANIZED HEALTH CARE EDUCATION/TRAINING PROGRAM

## 2023-07-31 PROCEDURE — G8427 DOCREV CUR MEDS BY ELIG CLIN: HCPCS | Performed by: STUDENT IN AN ORGANIZED HEALTH CARE EDUCATION/TRAINING PROGRAM

## 2023-07-31 PROCEDURE — 99212 OFFICE O/P EST SF 10 MIN: CPT

## 2023-07-31 PROCEDURE — 99213 OFFICE O/P EST LOW 20 MIN: CPT | Performed by: STUDENT IN AN ORGANIZED HEALTH CARE EDUCATION/TRAINING PROGRAM

## 2023-07-31 PROCEDURE — 3079F DIAST BP 80-89 MM HG: CPT | Performed by: STUDENT IN AN ORGANIZED HEALTH CARE EDUCATION/TRAINING PROGRAM

## 2023-07-31 PROCEDURE — 1036F TOBACCO NON-USER: CPT | Performed by: STUDENT IN AN ORGANIZED HEALTH CARE EDUCATION/TRAINING PROGRAM

## 2023-07-31 PROCEDURE — G8417 CALC BMI ABV UP PARAM F/U: HCPCS | Performed by: STUDENT IN AN ORGANIZED HEALTH CARE EDUCATION/TRAINING PROGRAM

## 2023-07-31 PROCEDURE — 3017F COLORECTAL CA SCREEN DOC REV: CPT | Performed by: STUDENT IN AN ORGANIZED HEALTH CARE EDUCATION/TRAINING PROGRAM

## 2023-07-31 NOTE — PROGRESS NOTES
Problem Focused Office Visit     Subjective: Latasha Mckeon is a 59 y.o.  female who has a past medical history of L3-L5 PLIF by Lisset Calderon on 2022 who presents for office follow up/review CT Myelogram results. Patient states pain has remained the same since last visit. She admits to bilateral low back pain that radiates into her butt, right>left. She admits to associated weakness, no associated numbness. No bowel or bladder incontinence. She has been going to Aqua therapy which has helped some. No recent KATHY for this pain. Myelogram and CT reviewed with patient. Physical Exam  HENT:      Head: Normocephalic. Eyes:      Pupils: Pupils are equal, round, and reactive to light. Cardiovascular:      Rate and Rhythm: Normal rate. Pulmonary:      Effort: Pulmonary effort is normal.   Abdominal:      General: There is no distension. Musculoskeletal:         General: Normal range of motion. Cervical back: Normal range of motion. Skin:     General: Skin is warm and dry. Neurological:      Mental Status: She is alert. Comments: A&Ox3  CN3-12 intact  Motor Strength full   Sensation intact to light touch   Reflexes normal   (+)Right Domenica Finger    Psychiatric:         Thought Content: Thought content normal.                 Imagin2023 CT Myelogram Lumbar Spine   1. CT myelography was performed. The thecal sac was well opacified by contrast.  2.  Status post decompressive laminectomies with posterior interbody fusion from L3-L5. The hardware is intact. No perihardware lucency is seen to indicate loosening or infection. 3. Grade 1 anterolisthesis of L3 over L4 by approximately 5 mm. 4. No significant central canal stenosis. 5.  Multilevel neural foraminal stenoses, worst (mild-to-moderate) at the right L2-3, bilateral L3-4 and left L4-5 levels.      2023 CT Pelvis   Mild osteo-degenerative changes are identified at L5-S1, with anterior and lateral spondylitic ridging

## 2023-08-02 ENCOUNTER — TELEPHONE (OUTPATIENT)
Dept: NEUROSURGERY | Age: 58
End: 2023-08-02

## 2023-08-25 ENCOUNTER — OFFICE VISIT (OUTPATIENT)
Dept: PAIN MANAGEMENT | Age: 58
End: 2023-08-25
Payer: MEDICAID

## 2023-08-25 VITALS
TEMPERATURE: 97.1 F | HEIGHT: 63 IN | WEIGHT: 247 LBS | SYSTOLIC BLOOD PRESSURE: 122 MMHG | RESPIRATION RATE: 18 BRPM | HEART RATE: 81 BPM | DIASTOLIC BLOOD PRESSURE: 84 MMHG | BODY MASS INDEX: 43.77 KG/M2 | OXYGEN SATURATION: 97 %

## 2023-08-25 DIAGNOSIS — G89.4 CHRONIC PAIN SYNDROME: Primary | ICD-10-CM

## 2023-08-25 DIAGNOSIS — E66.01 MORBID OBESITY WITH BMI OF 45.0-49.9, ADULT (HCC): ICD-10-CM

## 2023-08-25 DIAGNOSIS — M53.3 DISORDER OF SACRUM: ICD-10-CM

## 2023-08-25 DIAGNOSIS — M47.816 LUMBAR SPONDYLOSIS: ICD-10-CM

## 2023-08-25 DIAGNOSIS — M51.9 LUMBAR DISC DISORDER: ICD-10-CM

## 2023-08-25 DIAGNOSIS — M47.816 LUMBAR FACET ARTHROPATHY: ICD-10-CM

## 2023-08-25 PROCEDURE — 99214 OFFICE O/P EST MOD 30 MIN: CPT | Performed by: PAIN MEDICINE

## 2023-08-25 PROCEDURE — 1036F TOBACCO NON-USER: CPT | Performed by: PAIN MEDICINE

## 2023-08-25 PROCEDURE — 3074F SYST BP LT 130 MM HG: CPT | Performed by: PAIN MEDICINE

## 2023-08-25 PROCEDURE — 99204 OFFICE O/P NEW MOD 45 MIN: CPT | Performed by: PAIN MEDICINE

## 2023-08-25 PROCEDURE — 3017F COLORECTAL CA SCREEN DOC REV: CPT | Performed by: PAIN MEDICINE

## 2023-08-25 PROCEDURE — 3079F DIAST BP 80-89 MM HG: CPT | Performed by: PAIN MEDICINE

## 2023-08-25 PROCEDURE — G8427 DOCREV CUR MEDS BY ELIG CLIN: HCPCS | Performed by: PAIN MEDICINE

## 2023-08-25 PROCEDURE — G8417 CALC BMI ABV UP PARAM F/U: HCPCS | Performed by: PAIN MEDICINE

## 2023-08-25 RX ORDER — SODIUM CHLORIDE 9 MG/ML
INJECTION, SOLUTION INTRAVENOUS PRN
OUTPATIENT
Start: 2023-08-25

## 2023-08-25 RX ORDER — CYCLOBENZAPRINE HCL 5 MG
5 TABLET ORAL 2 TIMES DAILY PRN
Qty: 60 TABLET | Refills: 0 | Status: SHIPPED | OUTPATIENT
Start: 2023-08-25 | End: 2023-09-24

## 2023-08-25 RX ORDER — SODIUM CHLORIDE 0.9 % (FLUSH) 0.9 %
5-40 SYRINGE (ML) INJECTION EVERY 12 HOURS SCHEDULED
OUTPATIENT
Start: 2023-08-25

## 2023-08-25 RX ORDER — SODIUM CHLORIDE 0.9 % (FLUSH) 0.9 %
5-40 SYRINGE (ML) INJECTION PRN
OUTPATIENT
Start: 2023-08-25

## 2023-09-01 NOTE — PROGRESS NOTES
Physical Therapy Treatment Note    Date: 2022  Patient Name: Chasity Ambriz  : 1965   MRN: [id number]  DOInjury: --  DOSx: 2022  Referring Provider: Rocky Bright PA-C  [address] [place] [address]. [facility],  [facility] James Luis Rangel     Medical Diagnosis:      Diagnosis Orders   1. Spondylolisthesis at L3-L4 level        2. S/P lumbar fusion              Lumbar fusion L3-5    Satnam is 3 months post L3-4 fusion. She is doing well. She does have trunk weakness typical with this phase of recovery. Treatment will be lumbar strengthening program with long-term HEP. Access Code: [email or web address]  URL: [email or web address]  Date: 2022  Prepared by: Dorothea Sebastian    Exercises  Standing High Row with Resistance - 3-4 x weekly - 2 sets - 20 reps  Mid Row with anchored resistance - 3-4 x weekly - 2 sets - 20 reps  Standing Floor Level Row with Resistance Band with PLB - 3-4 x weekly - 2 sets - 20 reps  Chest Press with Resistance - 1 x daily - 3-4 x weekly - 2-3 sets - 15-20 reps  Standing march with counter support option - 3-4 x weekly - 2 sets - 20 reps  Alternating Hip Abduction with counter support - 3-4 x weekly - 2 sets - 20 reps  Mini Squat with Counter Support - 3-4 x weekly - 3 sets - 10 reps  Standing alternating overhead press with DB - 1 x daily - 3-4 x weekly - 2-3 sets - 15-20 reps    Access Code: [email or web address]  URL: [email or web address]  Date: 2022  Prepared by: Dorothea Sebastian    Exercises  Hooklying Single Knee to Chest Stretch - 1 x daily - 5 reps - 10s hold  Supine Lower Trunk Rotation - 1 x daily - 1-3 sets - 10 reps  Seated Flexion Stretch - 1 x daily - 10 reps - 10s hold    X = TO BE PERFORMED NEXT VISIT  > = PROGRESS TO THIS    S: Pt reports LB is achy today  O:   Time 6095-9487     Visit -  30 visits Repeat outcome measure at mid point and end. Pain Pain 5/10        ROM      Modalities Use sparingly if at all.   Prefer an active program.     MH + ES  [place]   Hot pack Before ex's [place]         Manual         MT   ROM      SKTC  NR   Hook lying rotation  TE   Trunk flexion stretch  TE      TE      TE         Exercise      Bike L4 x 10 min     ROWS: H Blue 2 x 20  TE   ROWS: M  Reach and Pull Blue 2 x 20  TE   ROWS: L   Reach and Pull Blue 2 x 20  TE   Tubing Pushes  TE   Marching  TA   Alt hip Abd     Squats  TE      TE   Ambulation  TE         Alternating shoulder press           A: able to all exercises above with good outcome.  Given HEP  P: Continue with rehab plan  Albert Kalyan, PTA    Treatment Charges: Mins Units   Initial Evaluation     Re-Evaluation     Ther Exercise         TE 10 1   Manual Therapy     MT     Ther Activities        TA 20 1   Gait Training          GT     Neuro Re-education NR     Modalities     Non-Billable Service Time     Other     Total Time/Units 30 2 No

## 2023-09-07 ENCOUNTER — OFFICE VISIT (OUTPATIENT)
Dept: FAMILY MEDICINE CLINIC | Age: 58
End: 2023-09-07

## 2023-09-07 VITALS
WEIGHT: 247 LBS | RESPIRATION RATE: 20 BRPM | BODY MASS INDEX: 43.77 KG/M2 | HEART RATE: 93 BPM | SYSTOLIC BLOOD PRESSURE: 124 MMHG | OXYGEN SATURATION: 98 % | HEIGHT: 63 IN | DIASTOLIC BLOOD PRESSURE: 70 MMHG

## 2023-09-07 DIAGNOSIS — I10 ESSENTIAL HYPERTENSION: ICD-10-CM

## 2023-09-07 DIAGNOSIS — Z01.810 PREOP CARDIOVASCULAR EXAM: Primary | ICD-10-CM

## 2023-09-07 RX ORDER — AMLODIPINE BESYLATE 10 MG/1
10 TABLET ORAL DAILY
Qty: 90 TABLET | Refills: 1 | Status: SHIPPED | OUTPATIENT
Start: 2023-09-07

## 2023-09-07 ASSESSMENT — ENCOUNTER SYMPTOMS
DIARRHEA: 0
VOMITING: 0
SHORTNESS OF BREATH: 0
NAUSEA: 0

## 2023-09-12 ENCOUNTER — PREP FOR PROCEDURE (OUTPATIENT)
Dept: PAIN MANAGEMENT | Age: 58
End: 2023-09-12

## 2023-09-18 ENCOUNTER — HOSPITAL ENCOUNTER (OUTPATIENT)
Age: 58
Setting detail: OUTPATIENT SURGERY
Discharge: HOME | End: 2023-09-18
Attending: PAIN MEDICINE | Admitting: PAIN MEDICINE
Payer: MEDICAID

## 2023-09-18 ENCOUNTER — HOSPITAL ENCOUNTER (OUTPATIENT)
Dept: OPERATING ROOM | Age: 58
Setting detail: OUTPATIENT SURGERY
Discharge: HOME | End: 2023-09-18
Attending: PAIN MEDICINE
Payer: MEDICAID

## 2023-09-18 VITALS
HEIGHT: 63 IN | TEMPERATURE: 98 F | RESPIRATION RATE: 16 BRPM | DIASTOLIC BLOOD PRESSURE: 74 MMHG | BODY MASS INDEX: 43.77 KG/M2 | WEIGHT: 247 LBS | HEART RATE: 72 BPM | OXYGEN SATURATION: 100 % | SYSTOLIC BLOOD PRESSURE: 125 MMHG

## 2023-09-18 DIAGNOSIS — M46.1 SACROILIITIS (HCC): ICD-10-CM

## 2023-09-18 PROCEDURE — 6360000002 HC RX W HCPCS: Performed by: PAIN MEDICINE

## 2023-09-18 PROCEDURE — 3600000005 HC SURGERY LEVEL 5 BASE: Performed by: PAIN MEDICINE

## 2023-09-18 PROCEDURE — 27096 INJECT SACROILIAC JOINT: CPT | Performed by: PAIN MEDICINE

## 2023-09-18 PROCEDURE — 7100000010 HC PHASE II RECOVERY - FIRST 15 MIN: Performed by: PAIN MEDICINE

## 2023-09-18 PROCEDURE — 6360000004 HC RX CONTRAST MEDICATION: Performed by: PAIN MEDICINE

## 2023-09-18 PROCEDURE — 7100000011 HC PHASE II RECOVERY - ADDTL 15 MIN: Performed by: PAIN MEDICINE

## 2023-09-18 PROCEDURE — 2500000003 HC RX 250 WO HCPCS: Performed by: PAIN MEDICINE

## 2023-09-18 PROCEDURE — 2709999900 HC NON-CHARGEABLE SUPPLY: Performed by: PAIN MEDICINE

## 2023-09-18 RX ORDER — LIDOCAINE HYDROCHLORIDE 5 MG/ML
INJECTION, SOLUTION INFILTRATION; INTRAVENOUS PRN
Status: DISCONTINUED | OUTPATIENT
Start: 2023-09-18 | End: 2023-09-18 | Stop reason: ALTCHOICE

## 2023-09-18 RX ORDER — SODIUM CHLORIDE 0.9 % (FLUSH) 0.9 %
5-40 SYRINGE (ML) INJECTION EVERY 12 HOURS SCHEDULED
Status: DISCONTINUED | OUTPATIENT
Start: 2023-09-18 | End: 2023-09-18 | Stop reason: HOSPADM

## 2023-09-18 RX ORDER — SODIUM CHLORIDE 0.9 % (FLUSH) 0.9 %
5-40 SYRINGE (ML) INJECTION PRN
Status: DISCONTINUED | OUTPATIENT
Start: 2023-09-18 | End: 2023-09-18 | Stop reason: HOSPADM

## 2023-09-18 RX ORDER — SODIUM CHLORIDE 9 MG/ML
INJECTION, SOLUTION INTRAVENOUS PRN
Status: DISCONTINUED | OUTPATIENT
Start: 2023-09-18 | End: 2023-09-18 | Stop reason: HOSPADM

## 2023-09-18 NOTE — H&P
[address], [address], [address] [place] [address]  [phone]    Procedure History & Physical      [facility]     HPI:    Patient  is here for right buttock pain for Right SIJ injection.     Labs/imaging studies reviewed   All question and concerns addressed including R/B/A associated with the procedure    Past Medical History:   Diagnosis Date    Anxiety     Back pain     Depression     Dyslipidemia     Hyperlipidemia     Hypertension     Obesity     Osteoarthritis     Post-menopausal bleeding        Past Surgical History:   Procedure Laterality Date    ANESTHESIA NERVE BLOCK Right 12/19/2019    RIGHT L3-4 TRANSFORAMINAL EPIDURAL STEROID INJECTION (CPT 60198) performed by Cece Claire DO at [facility] Right 05/06/2021    RIGHT SACROILIAC JOINT INJECTION UNDER X-RAY GUIDANCE performed by Cece Claire DO at [address] [place] [address] N/A 01/18/2022    HYSTEROSCOPY DILATION AND CURETTAGE performed by Rufino Dotson MD at [facility] Right 01/16/2020    RIGHT SACROILIAC JOINT STEROID INJECTION UNDER X-RAY GUIDANCE performed by Cece Claire DO at [facility] Right 2015    LUMBAR SPINE SURGERY N/A 06/27/2022    L3-L4 AND L4-L5 POSTERIOR LUMBAR INTERBODY FUSION performed by Natan Elizondo MD at [address] [employer] Right 12/19/2019    lumbar trasnforaminal    NERVE BLOCK Right 01/16/2020    right sacroiliac joint steroid injection     NERVE BLOCK Right 09/03/2020    right l3-4transforaminal epidural steroid injection    NERVE BLOCK Right 09/03/2020    RIGHT L3-4 TRANSFORAMINAL EPIDURAL STEROID INJECTION performed by Cece Claire DO at [facility] Bilateral 11/05/2020    medial branch block    NERVE BLOCK Bilateral 11/05/2020    MEDIAL BRANCH BLOCK BILATERAL L4-5 AND L5-S1

## 2023-09-18 NOTE — DISCHARGE INSTRUCTIONS
[address] Pain Management Department  [phone]   Post-Pain Block/ Radiofrequency Home Going Instructions    1-Go home, rest for the remainder of the day  2-Please do not lift over 20 pounds the day of the injection  3-If you received sedation No: alcohol, driving, operating lawn mowers, plows, tractors or other dangerous equipment until next morning. Do not make important decisions or sign legal documents for 24 hours. You may experience light headedness, dizziness, nausea or sleepiness after sedation. Do not stay alone. A responsible adult must be with you for 24 hours. You could be nauseated from the medications you have received. Your IV site may be sore and bruised. 4-No dietary restrictions     5-Resume all medications the same day, blood thinners to be resumed 24 hours after injection    6-Keep the surgical site clean and dry, you may shower the next morning and remove the      dressing. 7- No sitz baths, tub baths or hot tubs/swimming for 24 hours. 8- If you have any pain at the injection site(s), application of an ice pack to the area should be       helpful, 20 minutes on/20 minutes off for next 48 hours. 9- [facility] if you develop. Fever greater than 100.4 F  Have bleeding or drainage from the puncture site  Have progressive Leg/arm numbness and or weakness  Loss of control of bowel and or bladder (wet/soil yourself)  Severe headache with inability to lift head  10-You may return to work the next day      Infection After Surgery: Care Instructions  Overview  After surgery, an infection is always possible. It doesn't mean that the surgery didn't go well. Because an infection can be serious, your doctor has taken steps to manage it. Your doctor checked the infection and cleaned it if necessary. Your doctor may have made an opening in the area so that the pus can drain out. You may have gauze in the cut so that the area will stay open and keep draining.

## 2023-09-18 NOTE — OP NOTE
240 omnipaque was injected. The  Joint space was appropriately outlined. Then, after negative aspiration, a solution consisiting of 0.25% marcaine 2 cc and 40 mg DepoMedrol was easily injected. The needle was then removed and the needle insertion site was covered with Band-Aid. Disposition the patient tolerated the procedure well and there were no complications . Vital signs remained stable throughout the procedure. The patient was escorted to the recovery area where they remained until discharge and written discharge instructions for the procedure were given. Plan: Kym Vanegas will return to our pain management center as scheduled.      Mimi Hanks MD

## 2023-09-29 ENCOUNTER — OFFICE VISIT (OUTPATIENT)
Dept: PAIN MANAGEMENT | Age: 58
End: 2023-09-29
Payer: MEDICAID

## 2023-09-29 VITALS
RESPIRATION RATE: 16 BRPM | DIASTOLIC BLOOD PRESSURE: 98 MMHG | HEART RATE: 84 BPM | HEIGHT: 63 IN | OXYGEN SATURATION: 92 % | WEIGHT: 247 LBS | BODY MASS INDEX: 43.77 KG/M2 | TEMPERATURE: 96.9 F | SYSTOLIC BLOOD PRESSURE: 126 MMHG

## 2023-09-29 DIAGNOSIS — M53.3 DISORDER OF SACRUM: ICD-10-CM

## 2023-09-29 DIAGNOSIS — M51.9 LUMBAR DISC DISORDER: ICD-10-CM

## 2023-09-29 DIAGNOSIS — E66.01 MORBID OBESITY WITH BMI OF 45.0-49.9, ADULT (HCC): ICD-10-CM

## 2023-09-29 DIAGNOSIS — M47.816 LUMBAR SPONDYLOSIS: ICD-10-CM

## 2023-09-29 DIAGNOSIS — M47.816 LUMBAR FACET ARTHROPATHY: ICD-10-CM

## 2023-09-29 DIAGNOSIS — G89.4 CHRONIC PAIN SYNDROME: Primary | ICD-10-CM

## 2023-09-29 PROCEDURE — 3017F COLORECTAL CA SCREEN DOC REV: CPT | Performed by: PAIN MEDICINE

## 2023-09-29 PROCEDURE — 1036F TOBACCO NON-USER: CPT | Performed by: PAIN MEDICINE

## 2023-09-29 PROCEDURE — 99213 OFFICE O/P EST LOW 20 MIN: CPT | Performed by: PAIN MEDICINE

## 2023-09-29 PROCEDURE — 3074F SYST BP LT 130 MM HG: CPT | Performed by: PAIN MEDICINE

## 2023-09-29 PROCEDURE — G8427 DOCREV CUR MEDS BY ELIG CLIN: HCPCS | Performed by: PAIN MEDICINE

## 2023-09-29 PROCEDURE — 3080F DIAST BP >= 90 MM HG: CPT | Performed by: PAIN MEDICINE

## 2023-09-29 PROCEDURE — G8417 CALC BMI ABV UP PARAM F/U: HCPCS | Performed by: PAIN MEDICINE

## 2023-09-29 RX ORDER — CYCLOBENZAPRINE HCL 5 MG
5 TABLET ORAL 2 TIMES DAILY PRN
COMMUNITY

## 2023-09-29 NOTE — PROGRESS NOTES
[address] [place] [address]  [phone]    Follow up Note      Rosina Ott     Date of Visit:  9/29/2023    CC:  Patient presents for follow up   Chief Complaint   Patient presents with    Follow Up After Procedure     Right   Sacroiliitis, somatic dysfunction of the lumbosacral spine       HPI:  Follow up on her low back and right buttocks pain with no acute issues  Appropriate analgesia with current medications regimen: yes . Change in quality of symptoms:no. Medication side effects:none. Recent diagnostic testing:none  Recent interventional procedures:Right SIJ injection with more than 50% improvement in her pain    She has not been on anticoagulation medications to include none. The patient  has not been on herbal supplements. The patient is not diabetic. Imaging:   Lumbar spine CT 2023  1. CT myelography was performed. The thecal sac was well opacified by  contrast.  2.  Status post decompressive laminectomies with posterior interbody fusion  from L3-L5. The hardware is intact. No perihardware lucency is seen to  indicate loosening or infection. 3. Grade 1 anterolisthesis of L3 over L4 by approximately 5 mm. 4. No significant central canal stenosis. 5.  Multilevel neural foraminal stenoses, worst (mild-to-moderate) at the  right L2-3, bilateral L3-4 and left L4-5 levels. Pelvis CT 2023  1. No fracture or dislocation. 2.  Patient status post lumbar spine surgery, as described above. 3.  Osteo-degenerative changes, as described above. 4.  Leiomyomatous uterus. 5.  Sigmoid colon diverticulosis. Lumbar spine MRI 2022:  Multilevel degenerative disc disease with associated facet and ligamentous   hypertrophy most severe at L3-4. Mild right foraminal narrowing at L2-3, mild left and severe right foraminal   narrowing at L3-4, and severe left foraminal narrowing at L4-5.       Previous treatments:

## 2023-10-01 DIAGNOSIS — E78.2 MIXED HYPERLIPIDEMIA: ICD-10-CM

## 2023-10-02 RX ORDER — ATORVASTATIN CALCIUM 10 MG/1
TABLET, FILM COATED ORAL
Qty: 90 TABLET | Refills: 0 | Status: SHIPPED | OUTPATIENT
Start: 2023-10-02

## 2023-10-31 RX ORDER — CYCLOBENZAPRINE HCL 5 MG
TABLET ORAL
Qty: 60 TABLET | Refills: 0 | OUTPATIENT
Start: 2023-10-31

## 2023-11-06 ENCOUNTER — ANESTHESIA EVENT (OUTPATIENT)
Dept: OPERATING ROOM | Age: 58
End: 2023-11-06
Payer: MEDICARE

## 2023-11-06 RX ORDER — MIDAZOLAM HYDROCHLORIDE 2 MG/2ML
1 INJECTION, SOLUTION INTRAMUSCULAR; INTRAVENOUS ONCE
OUTPATIENT
Start: 2023-11-06 | End: 2023-11-06

## 2023-11-06 RX ORDER — ROPIVACAINE HYDROCHLORIDE 5 MG/ML
30 INJECTION, SOLUTION EPIDURAL; INFILTRATION; PERINEURAL ONCE
OUTPATIENT
Start: 2023-11-06

## 2023-11-06 RX ORDER — FENTANYL CITRATE 50 UG/ML
50 INJECTION, SOLUTION INTRAMUSCULAR; INTRAVENOUS ONCE
OUTPATIENT
Start: 2023-11-06 | End: 2023-11-06

## 2023-11-06 NOTE — ANESTHESIA PRE PROCEDURE
Department of Anesthesiology  Preprocedure Note       Name:  Rangel Morton   Age:  59 y.o.  :  1965                                          MRN:  [id number]         Date:  2023      Surgeon: Flo Patino):  Ariel Jade MD    Procedure: Procedure(s):  RIGHT SHOULDER  REVERSE TOTAL ARTHROPLASTY   ++BIOMET++    ++ISB++    Medications prior to admission:   Prior to Admission medications    Medication Sig Start Date End Date Taking? Authorizing Provider   atorvastatin (LIPITOR) 10 MG tablet Take 1 tablet by mouth once daily 10/2/23   Tami Lopez MD   cyclobenzaprine (FLEXERIL) 5 MG tablet Take 1 tablet by mouth 2 times daily as needed for Muscle spasms    Juan Jaime MD   amLODIPine (NORVASC) 10 MG tablet Take 1 tablet by mouth daily 23   Tami Lopez MD   busPIRone (BUSPAR) 15 MG tablet TAKE 1 TABLET BY MOUTH THREE TIMES DAILY AS DIRECTED 22   Juan Jaime MD   nystatin-triamcinolone (MYCOLOG II) 712194-3.5 UNIT/GM-% cream Apply topically 2 times daily. Patient taking differently: as needed Apply topically 2 times daily.  22   Tami Lopez MD   valACYclovir (VALTREX) 1 g tablet Take 2 tablets by mouth daily  Patient taking differently: Take 2 tablets by mouth as needed 22   Tami Lopez MD   tobramycin-dexamethasone (TOBRADEX) 0.3-0.1 % ophthalmic ointment Place into the left eye 3 times daily 3 times daily as needed    Juan Jaime MD   prednisoLONE acetate (PRED FORTE) 1 % ophthalmic suspension as needed  21   Juan Jaime MD   buPROPion (WELLBUTRIN XL) 300 MG extended release tablet TAKE 1 TABLET BY MOUTH ONCE DAILY 21   Juan Jaime MD   buPROPion (WELLBUTRIN XL) 150 MG extended release tablet Take 1 tablet by mouth every morning Total dose 450 mg in AM- one 300 mg tablet and 1 150mg 20   Juan Jaime MD   clonazePAM (KLONOPIN) 1 MG tablet Take 1

## 2023-11-07 ENCOUNTER — HOSPITAL ENCOUNTER (OUTPATIENT)
Dept: PREADMISSION TESTING | Age: 58
Discharge: HOME | End: 2023-11-07
Payer: MEDICAID

## 2023-11-07 VITALS
DIASTOLIC BLOOD PRESSURE: 80 MMHG | BODY MASS INDEX: 46.33 KG/M2 | HEIGHT: 63 IN | HEART RATE: 86 BPM | RESPIRATION RATE: 20 BRPM | TEMPERATURE: 98.5 F | SYSTOLIC BLOOD PRESSURE: 131 MMHG | OXYGEN SATURATION: 96 % | WEIGHT: 261.5 LBS

## 2023-11-07 LAB
ANION GAP SERPL CALCULATED.3IONS-SCNC: 16 MMOL/L (ref 7–16)
BUN SERPL-MCNC: 12 MG/DL (ref 6–23)
CALCIUM SERPL-MCNC: 10.2 MG/DL (ref 8.6–10.2)
CHLORIDE SERPL-SCNC: 98 MMOL/L (ref 98–107)
CO2 SERPL-SCNC: 25 MMOL/L (ref 22–29)
CREAT SERPL-MCNC: 0.9 MG/DL (ref 0.5–1)
ERYTHROCYTE [DISTWIDTH] IN BLOOD BY AUTOMATED COUNT: 17.7 % (ref 11.5–15)
GFR SERPL CREATININE-BSD FRML MDRD: >60 ML/MIN/1.73M2
GLUCOSE SERPL-MCNC: 145 MG/DL (ref 74–99)
HCT VFR BLD AUTO: 39.2 % (ref 34–48)
HGB BLD-MCNC: 11.9 G/DL (ref 11.5–15.5)
MCH RBC QN AUTO: 24.6 PG (ref 26–35)
MCHC RBC AUTO-ENTMCNC: 30.4 G/DL (ref 32–34.5)
MCV RBC AUTO: 81 FL (ref 80–99.9)
PLATELET # BLD AUTO: 318 K/UL (ref 130–450)
PMV BLD AUTO: 10.3 FL (ref 7–12)
POTASSIUM SERPL-SCNC: 3.5 MMOL/L (ref 3.5–5)
RBC # BLD AUTO: 4.84 M/UL (ref 3.5–5.5)
SODIUM SERPL-SCNC: 139 MMOL/L (ref 132–146)
WBC OTHER # BLD: 4.9 K/UL (ref 4.5–11.5)

## 2023-11-07 PROCEDURE — 85027 COMPLETE CBC AUTOMATED: CPT

## 2023-11-07 PROCEDURE — 80048 BASIC METABOLIC PNL TOTAL CA: CPT

## 2023-11-07 PROCEDURE — 36415 COLL VENOUS BLD VENIPUNCTURE: CPT

## 2023-11-07 PROCEDURE — 87081 CULTURE SCREEN ONLY: CPT

## 2023-11-07 RX ORDER — MIRTAZAPINE 7.5 MG/1
7.5 TABLET, FILM COATED ORAL NIGHTLY
COMMUNITY

## 2023-11-07 RX ORDER — BUSPIRONE HYDROCHLORIDE 10 MG/1
20 TABLET ORAL 3 TIMES DAILY
COMMUNITY

## 2023-11-07 ASSESSMENT — PAIN DESCRIPTION - LOCATION: LOCATION: SHOULDER

## 2023-11-07 ASSESSMENT — PAIN DESCRIPTION - PAIN TYPE: TYPE: CHRONIC PAIN

## 2023-11-07 ASSESSMENT — PAIN SCALES - GENERAL: PAINLEVEL_OUTOF10: 8

## 2023-11-07 ASSESSMENT — PAIN DESCRIPTION - ORIENTATION: ORIENTATION: RIGHT

## 2023-11-07 NOTE — PROGRESS NOTES
[address] PRE-ADMISSION TESTING INSTRUCTIONS    The Preadmission Testing patient is instructed accordingly using the following criteria (check applicable):    ARRIVAL INSTRUCTIONS:  [x] Parking the day of Surgery is located in the Main Entrance lot. Upon entering the door, make an immediate right to the surgery reception desk    [x] Bring photo ID and insurance card    [] Bring in a copy of Living will or Durable Power of  papers. [x] Please be sure to arrange for responsible adult to provide transportation to and from the hospital    [x] Please arrange for responsible adult to be with you for the 24 hour period post procedure due to having anesthesia    [x] If you awake am of surgery not feeling well or have temperature >100 please call [phone]    GENERAL INSTRUCTIONS:    [x] Follow instructions for hydration that have been provided to you at your Pre-Admission Visit. Solid food until midnight then clear liquids. No gum, candy or mints. [x] You may brush your teeth, but do not swallow any water    [x] Take medications as instructed with 1-2 oz of water    [] Stop herbal supplements and vitamins 5 days prior to procedure    [] Follow preop dosing of blood thinners per physician instructions    [] Take 1/2 dose of evening insulin, but no insulin after midnight    [] No oral diabetic medications after midnight    [] If diabetic and have low blood sugar or feel symptomatic, take 1-2oz apple juice only    [] Bring inhalers day of surgery    [] Bring C-PAP/ Bi-Pap day of surgery    [] Bring urine specimen day of surgery    [x] Shower or bath with soap, lather and rinse well, AM of Surgery, no lotion, powders or creams to surgical site    [] Follow bowel prep as instructed per surgeon    [x] No tobacco products within 24 hours of surgery     [x] No alcohol or illegal drug use within 24 hours of surgery.     [x] Jewelry, body piercing's, eyeglasses, contact lenses and dentures

## 2023-11-08 LAB
MICROORGANISM SPEC CULT: NORMAL
SPECIMEN DESCRIPTION: NORMAL

## 2023-11-13 NOTE — H&P
capsule by mouth three times a day   clonazepam 1 mg tablet (clonazepam) Take 1 tablet by mouth three times a day as needed   bupropion HCl 300 mg tablet extended release 24 hr (bupropion hcl) Take 1 tablet by mouth once a day   duloxetine 60 mg capsule,delayed release(DR/EC) (duloxetine) Take 2 capsule by mouth once a day as directed   atorvastatin 10 mg tablet (atorvastatin) Take 1 tablet by mouth once a day   cyclobenzaprine 5 mg tablet (cyclobenzaprine) Take 2 tablet by mouth every night as needed   diclofenac sodium 50 mg tablet,delayed release (DR/EC) (diclofenac sodium) Take 1 tablet by mouth twice a day       Current Allergies (reviewed this update):  No known allergies      Vital Signs   Weight: 280.01 lbs. (127.01 kg.)  Height: 63 in.    (160.02 cm.)  Blood Pressure: 149/66 mm Hg  Body Mass Index: 49.60  Body Surface Area: 2.23 m2      Review of Systems   General:  Patient denies sweats, weight loss, fevers, chills, fatigue. Eyes:  Patient denies eye irritation, vision loss - 1 eye, discharge, blurring, vision loss - both eyes. ENT:  Patient denies decreased hearing, difficulty swallowing. Cardiovascular:  Patient denies chest pain or discomfort, racing / skipping heartbeats, swelling of hands or feet, difficulty breathing while lying down, palpitations, shortness of breath with exertion, weight gain, blackouts/fainting. Respiratory:  Patient denies cough, coughing up blood, chest discomfort, wheezing, shortness of breath. Gastrointestinal:  Patient denies vomiting, loss of appetite, diarrhea, nausea. Genitourinary:  Patient denies urinary retention, urinary frequency, frequent UTI, urinary urgency, pain. Musculoskeletal: Positive for back pain, stiffness, arthritis, muscle aches, joint pain. Skin:  Patient denies dryness, unusual hair distribution, suspicious lesions, psoriasis, changes in color of skin, changes in nail beds, poor wound healing.    Neurologic: Positive for poor balance,

## 2023-11-14 ENCOUNTER — HOSPITAL ENCOUNTER (OUTPATIENT)
Age: 58
Setting detail: OBSERVATION
Discharge: HOME | End: 2023-11-15
Attending: ORTHOPAEDIC SURGERY | Admitting: ORTHOPAEDIC SURGERY
Payer: MEDICARE

## 2023-11-14 ENCOUNTER — ANESTHESIA (OUTPATIENT)
Dept: OPERATING ROOM | Age: 58
End: 2023-11-14
Payer: MEDICARE

## 2023-11-14 ENCOUNTER — APPOINTMENT (OUTPATIENT)
Dept: GENERAL RADIOLOGY | Age: 58
End: 2023-11-14
Attending: ORTHOPAEDIC SURGERY
Payer: MEDICARE

## 2023-11-14 DIAGNOSIS — M19.211 SECONDARY OSTEOARTHRITIS OF RIGHT SHOULDER: ICD-10-CM

## 2023-11-14 PROCEDURE — 2500000003 HC RX 250 WO HCPCS

## 2023-11-14 PROCEDURE — 6360000002 HC RX W HCPCS: Performed by: ORTHOPAEDIC SURGERY

## 2023-11-14 PROCEDURE — G0378 HOSPITAL OBSERVATION PER HR: HCPCS

## 2023-11-14 PROCEDURE — 3700000000 HC ANESTHESIA ATTENDED CARE: Performed by: ORTHOPAEDIC SURGERY

## 2023-11-14 PROCEDURE — C1776 JOINT DEVICE (IMPLANTABLE): HCPCS | Performed by: ORTHOPAEDIC SURGERY

## 2023-11-14 PROCEDURE — 3600000015 HC SURGERY LEVEL 5 ADDTL 15MIN: Performed by: ORTHOPAEDIC SURGERY

## 2023-11-14 PROCEDURE — L3650 SO 8 ABD RESTRAINT PRE OTS: HCPCS | Performed by: ORTHOPAEDIC SURGERY

## 2023-11-14 PROCEDURE — 6360000002 HC RX W HCPCS: Performed by: ANESTHESIOLOGY

## 2023-11-14 PROCEDURE — 2720000010 HC SURG SUPPLY STERILE: Performed by: ORTHOPAEDIC SURGERY

## 2023-11-14 PROCEDURE — 3600000005 HC SURGERY LEVEL 5 BASE: Performed by: ORTHOPAEDIC SURGERY

## 2023-11-14 PROCEDURE — 64415 NJX AA&/STRD BRCH PLXS IMG: CPT | Performed by: ANESTHESIOLOGY

## 2023-11-14 PROCEDURE — 2709999900 HC NON-CHARGEABLE SUPPLY: Performed by: ORTHOPAEDIC SURGERY

## 2023-11-14 PROCEDURE — 2580000003 HC RX 258: Performed by: ORTHOPAEDIC SURGERY

## 2023-11-14 PROCEDURE — 2500000003 HC RX 250 WO HCPCS: Performed by: ORTHOPAEDIC SURGERY

## 2023-11-14 PROCEDURE — 6370000000 HC RX 637 (ALT 250 FOR IP): Performed by: ORTHOPAEDIC SURGERY

## 2023-11-14 PROCEDURE — 88304 TISSUE EXAM BY PATHOLOGIST: CPT

## 2023-11-14 PROCEDURE — 88311 DECALCIFY TISSUE: CPT

## 2023-11-14 PROCEDURE — 6360000002 HC RX W HCPCS

## 2023-11-14 PROCEDURE — 7100000001 HC PACU RECOVERY - ADDTL 15 MIN: Performed by: ORTHOPAEDIC SURGERY

## 2023-11-14 PROCEDURE — 7100000000 HC PACU RECOVERY - FIRST 15 MIN: Performed by: ORTHOPAEDIC SURGERY

## 2023-11-14 PROCEDURE — 3700000001 HC ADD 15 MINUTES (ANESTHESIA): Performed by: ORTHOPAEDIC SURGERY

## 2023-11-14 PROCEDURE — C1713 ANCHOR/SCREW BN/BN,TIS/BN: HCPCS | Performed by: ORTHOPAEDIC SURGERY

## 2023-11-14 DEVICE — BEARING HUM STD 36 MM SHLDR VIVACIT-E PROLONG COMPHSVE: Type: IMPLANTABLE DEVICE | Site: SHOULDER | Status: FUNCTIONAL

## 2023-11-14 DEVICE — IMPLANTABLE DEVICE
Type: IMPLANTABLE DEVICE | Site: SHOULDER | Status: FUNCTIONAL
Brand: COMPREHENSIVE®

## 2023-11-14 DEVICE — IMPLANTABLE DEVICE
Type: IMPLANTABLE DEVICE | Site: SHOULDER | Status: FUNCTIONAL
Brand: COMPREHENSIVE® REVERSE SHOULDER

## 2023-11-14 DEVICE — IMPLANTABLE DEVICE
Type: IMPLANTABLE DEVICE | Site: SHOULDER | Status: FUNCTIONAL
Brand: COMPREHENSIVE REVERSE SHOULDER

## 2023-11-14 DEVICE — IMPLANTABLE DEVICE
Type: IMPLANTABLE DEVICE | Site: SHOULDER | Status: FUNCTIONAL
Brand: COMPREHENSIVE® SHOULDER SYSTEM

## 2023-11-14 RX ORDER — PROPOFOL 10 MG/ML
INJECTION, EMULSION INTRAVENOUS PRN
Status: DISCONTINUED | OUTPATIENT
Start: 2023-11-14 | End: 2023-11-14 | Stop reason: SDUPTHER

## 2023-11-14 RX ORDER — ROCURONIUM BROMIDE 10 MG/ML
INJECTION, SOLUTION INTRAVENOUS PRN
Status: DISCONTINUED | OUTPATIENT
Start: 2023-11-14 | End: 2023-11-14 | Stop reason: SDUPTHER

## 2023-11-14 RX ORDER — ATORVASTATIN CALCIUM 10 MG/1
10 TABLET, FILM COATED ORAL DAILY
Status: DISCONTINUED | OUTPATIENT
Start: 2023-11-15 | End: 2023-11-15 | Stop reason: HOSPADM

## 2023-11-14 RX ORDER — LABETALOL HYDROCHLORIDE 5 MG/ML
10 INJECTION, SOLUTION INTRAVENOUS
Status: DISCONTINUED | OUTPATIENT
Start: 2023-11-14 | End: 2023-11-14 | Stop reason: HOSPADM

## 2023-11-14 RX ORDER — PHENYLEPHRINE HCL IN 0.9% NACL 1 MG/10 ML
SYRINGE (ML) INTRAVENOUS PRN
Status: DISCONTINUED | OUTPATIENT
Start: 2023-11-14 | End: 2023-11-14 | Stop reason: SDUPTHER

## 2023-11-14 RX ORDER — ROPIVACAINE HYDROCHLORIDE 5 MG/ML
30 INJECTION, SOLUTION EPIDURAL; INFILTRATION; PERINEURAL ONCE
Status: COMPLETED | OUTPATIENT
Start: 2023-11-14 | End: 2023-11-14

## 2023-11-14 RX ORDER — SODIUM CHLORIDE 0.9 % (FLUSH) 0.9 %
5-40 SYRINGE (ML) INJECTION PRN
Status: DISCONTINUED | OUTPATIENT
Start: 2023-11-14 | End: 2023-11-14 | Stop reason: HOSPADM

## 2023-11-14 RX ORDER — SODIUM CHLORIDE 9 MG/ML
INJECTION, SOLUTION INTRAVENOUS PRN
Status: DISCONTINUED | OUTPATIENT
Start: 2023-11-14 | End: 2023-11-14 | Stop reason: HOSPADM

## 2023-11-14 RX ORDER — BUPROPION HYDROCHLORIDE 150 MG/1
450 TABLET ORAL EVERY MORNING
Status: DISCONTINUED | OUTPATIENT
Start: 2023-11-15 | End: 2023-11-15 | Stop reason: HOSPADM

## 2023-11-14 RX ORDER — BUSPIRONE HYDROCHLORIDE 10 MG/1
20 TABLET ORAL 3 TIMES DAILY
Status: DISCONTINUED | OUTPATIENT
Start: 2023-11-14 | End: 2023-11-15 | Stop reason: HOSPADM

## 2023-11-14 RX ORDER — CYCLOBENZAPRINE HCL 10 MG
5 TABLET ORAL 2 TIMES DAILY PRN
Status: DISCONTINUED | OUTPATIENT
Start: 2023-11-14 | End: 2023-11-15 | Stop reason: HOSPADM

## 2023-11-14 RX ORDER — OXYCODONE HYDROCHLORIDE 5 MG/1
5 TABLET ORAL EVERY 4 HOURS PRN
Status: DISCONTINUED | OUTPATIENT
Start: 2023-11-14 | End: 2023-11-15 | Stop reason: HOSPADM

## 2023-11-14 RX ORDER — ASPIRIN 325 MG
325 TABLET, DELAYED RELEASE (ENTERIC COATED) ORAL DAILY
Status: DISCONTINUED | OUTPATIENT
Start: 2023-11-14 | End: 2023-11-15 | Stop reason: HOSPADM

## 2023-11-14 RX ORDER — ONDANSETRON 2 MG/ML
INJECTION INTRAMUSCULAR; INTRAVENOUS PRN
Status: DISCONTINUED | OUTPATIENT
Start: 2023-11-14 | End: 2023-11-14 | Stop reason: SDUPTHER

## 2023-11-14 RX ORDER — AMLODIPINE BESYLATE 10 MG/1
10 TABLET ORAL DAILY
Status: DISCONTINUED | OUTPATIENT
Start: 2023-11-15 | End: 2023-11-15 | Stop reason: HOSPADM

## 2023-11-14 RX ORDER — SODIUM CHLORIDE 9 MG/ML
INJECTION, SOLUTION INTRAVENOUS CONTINUOUS
Status: DISCONTINUED | OUTPATIENT
Start: 2023-11-14 | End: 2023-11-15 | Stop reason: HOSPADM

## 2023-11-14 RX ORDER — ZOLPIDEM TARTRATE 5 MG/1
5 TABLET ORAL NIGHTLY PRN
Status: DISCONTINUED | OUTPATIENT
Start: 2023-11-14 | End: 2023-11-15 | Stop reason: HOSPADM

## 2023-11-14 RX ORDER — FENTANYL CITRATE 50 UG/ML
INJECTION, SOLUTION INTRAMUSCULAR; INTRAVENOUS PRN
Status: DISCONTINUED | OUTPATIENT
Start: 2023-11-14 | End: 2023-11-14 | Stop reason: SDUPTHER

## 2023-11-14 RX ORDER — DEXAMETHASONE SODIUM PHOSPHATE 4 MG/ML
INJECTION, SOLUTION INTRA-ARTICULAR; INTRALESIONAL; INTRAMUSCULAR; INTRAVENOUS; SOFT TISSUE PRN
Status: DISCONTINUED | OUTPATIENT
Start: 2023-11-14 | End: 2023-11-14 | Stop reason: SDUPTHER

## 2023-11-14 RX ORDER — CLONAZEPAM 0.5 MG/1
1 TABLET ORAL 2 TIMES DAILY PRN
Status: DISCONTINUED | OUTPATIENT
Start: 2023-11-14 | End: 2023-11-15 | Stop reason: HOSPADM

## 2023-11-14 RX ORDER — MEPERIDINE HYDROCHLORIDE 25 MG/ML
12.5 INJECTION INTRAMUSCULAR; INTRAVENOUS; SUBCUTANEOUS EVERY 5 MIN PRN
Status: DISCONTINUED | OUTPATIENT
Start: 2023-11-14 | End: 2023-11-14 | Stop reason: HOSPADM

## 2023-11-14 RX ORDER — MIDAZOLAM HYDROCHLORIDE 2 MG/2ML
2 INJECTION, SOLUTION INTRAMUSCULAR; INTRAVENOUS
Status: DISCONTINUED | OUTPATIENT
Start: 2023-11-14 | End: 2023-11-14 | Stop reason: HOSPADM

## 2023-11-14 RX ORDER — SODIUM CHLORIDE 0.9 % (FLUSH) 0.9 %
5-40 SYRINGE (ML) INJECTION PRN
Status: DISCONTINUED | OUTPATIENT
Start: 2023-11-14 | End: 2023-11-15 | Stop reason: HOSPADM

## 2023-11-14 RX ORDER — BUPROPION HYDROCHLORIDE 300 MG/1
300 TABLET ORAL DAILY
Status: DISCONTINUED | OUTPATIENT
Start: 2023-11-14 | End: 2023-11-14 | Stop reason: ALTCHOICE

## 2023-11-14 RX ORDER — ONDANSETRON 2 MG/ML
4 INJECTION INTRAMUSCULAR; INTRAVENOUS
Status: DISCONTINUED | OUTPATIENT
Start: 2023-11-14 | End: 2023-11-14 | Stop reason: HOSPADM

## 2023-11-14 RX ORDER — SODIUM CHLORIDE 0.9 % (FLUSH) 0.9 %
5-40 SYRINGE (ML) INJECTION EVERY 12 HOURS SCHEDULED
Status: DISCONTINUED | OUTPATIENT
Start: 2023-11-14 | End: 2023-11-15 | Stop reason: HOSPADM

## 2023-11-14 RX ORDER — MIRTAZAPINE 15 MG/1
7.5 TABLET, FILM COATED ORAL NIGHTLY
Status: DISCONTINUED | OUTPATIENT
Start: 2023-11-14 | End: 2023-11-15 | Stop reason: HOSPADM

## 2023-11-14 RX ORDER — HYDRALAZINE HYDROCHLORIDE 20 MG/ML
10 INJECTION INTRAMUSCULAR; INTRAVENOUS
Status: DISCONTINUED | OUTPATIENT
Start: 2023-11-14 | End: 2023-11-14 | Stop reason: HOSPADM

## 2023-11-14 RX ORDER — SODIUM CHLORIDE 9 MG/ML
INJECTION, SOLUTION INTRAVENOUS CONTINUOUS
Status: DISCONTINUED | OUTPATIENT
Start: 2023-11-14 | End: 2023-11-14

## 2023-11-14 RX ORDER — DULOXETIN HYDROCHLORIDE 60 MG/1
120 CAPSULE, DELAYED RELEASE ORAL DAILY
Status: DISCONTINUED | OUTPATIENT
Start: 2023-11-15 | End: 2023-11-15 | Stop reason: HOSPADM

## 2023-11-14 RX ORDER — SODIUM CHLORIDE 0.9 % (FLUSH) 0.9 %
5-40 SYRINGE (ML) INJECTION EVERY 12 HOURS SCHEDULED
Status: DISCONTINUED | OUTPATIENT
Start: 2023-11-14 | End: 2023-11-14 | Stop reason: HOSPADM

## 2023-11-14 RX ORDER — FENTANYL CITRATE 50 UG/ML
50 INJECTION, SOLUTION INTRAMUSCULAR; INTRAVENOUS ONCE
Status: DISCONTINUED | OUTPATIENT
Start: 2023-11-14 | End: 2023-11-14 | Stop reason: HOSPADM

## 2023-11-14 RX ORDER — ACETAMINOPHEN 325 MG/1
650 TABLET ORAL EVERY 6 HOURS
Status: DISCONTINUED | OUTPATIENT
Start: 2023-11-14 | End: 2023-11-15 | Stop reason: HOSPADM

## 2023-11-14 RX ORDER — LIDOCAINE HYDROCHLORIDE 20 MG/ML
INJECTION, SOLUTION EPIDURAL; INFILTRATION; INTRACAUDAL; PERINEURAL PRN
Status: DISCONTINUED | OUTPATIENT
Start: 2023-11-14 | End: 2023-11-14 | Stop reason: SDUPTHER

## 2023-11-14 RX ORDER — IPRATROPIUM BROMIDE AND ALBUTEROL SULFATE 2.5; .5 MG/3ML; MG/3ML
1 SOLUTION RESPIRATORY (INHALATION)
Status: DISCONTINUED | OUTPATIENT
Start: 2023-11-14 | End: 2023-11-14 | Stop reason: HOSPADM

## 2023-11-14 RX ORDER — MIDAZOLAM HYDROCHLORIDE 2 MG/2ML
1 INJECTION, SOLUTION INTRAMUSCULAR; INTRAVENOUS ONCE
Status: COMPLETED | OUTPATIENT
Start: 2023-11-14 | End: 2023-11-14

## 2023-11-14 RX ORDER — SODIUM CHLORIDE 9 MG/ML
INJECTION, SOLUTION INTRAVENOUS PRN
Status: DISCONTINUED | OUTPATIENT
Start: 2023-11-14 | End: 2023-11-15 | Stop reason: HOSPADM

## 2023-11-14 RX ADMIN — Medication 100 MCG: at 14:43

## 2023-11-14 RX ADMIN — PROPOFOL 150 MG: 10 INJECTION, EMULSION INTRAVENOUS at 13:17

## 2023-11-14 RX ADMIN — SODIUM CHLORIDE: 9 INJECTION, SOLUTION INTRAVENOUS at 13:12

## 2023-11-14 RX ADMIN — HYDROMORPHONE HYDROCHLORIDE 0.5 MG: 1 INJECTION, SOLUTION INTRAMUSCULAR; INTRAVENOUS; SUBCUTANEOUS at 15:40

## 2023-11-14 RX ADMIN — ASPIRIN 325 MG: 325 TABLET, COATED ORAL at 17:17

## 2023-11-14 RX ADMIN — WATER 2000 MG: 1 INJECTION INTRAMUSCULAR; INTRAVENOUS; SUBCUTANEOUS at 13:33

## 2023-11-14 RX ADMIN — SUGAMMADEX 450 MG: 100 INJECTION, SOLUTION INTRAVENOUS at 15:25

## 2023-11-14 RX ADMIN — ACETAMINOPHEN 650 MG: 325 TABLET ORAL at 17:17

## 2023-11-14 RX ADMIN — HYDROMORPHONE HYDROCHLORIDE 0.5 MG: 1 INJECTION, SOLUTION INTRAMUSCULAR; INTRAVENOUS; SUBCUTANEOUS at 15:58

## 2023-11-14 RX ADMIN — OXYCODONE 5 MG: 5 TABLET ORAL at 20:55

## 2023-11-14 RX ADMIN — MIDAZOLAM HYDROCHLORIDE 2 MG: 1 INJECTION, SOLUTION INTRAMUSCULAR; INTRAVENOUS at 12:15

## 2023-11-14 RX ADMIN — ROPIVACAINE HYDROCHLORIDE 30 ML: 5 INJECTION EPIDURAL; INFILTRATION; PERINEURAL at 12:19

## 2023-11-14 RX ADMIN — DEXAMETHASONE SODIUM PHOSPHATE 10 MG: 4 INJECTION, SOLUTION INTRAMUSCULAR; INTRAVENOUS at 13:32

## 2023-11-14 RX ADMIN — FENTANYL CITRATE 50 MCG: 50 INJECTION, SOLUTION INTRAMUSCULAR; INTRAVENOUS at 13:17

## 2023-11-14 RX ADMIN — TRANEXAMIC ACID 1000 MG: 100 INJECTION, SOLUTION INTRAVENOUS at 15:05

## 2023-11-14 RX ADMIN — LIDOCAINE HYDROCHLORIDE 100 MG: 20 INJECTION, SOLUTION EPIDURAL; INFILTRATION; INTRACAUDAL; PERINEURAL at 13:17

## 2023-11-14 RX ADMIN — SODIUM CHLORIDE: 9 INJECTION, SOLUTION INTRAVENOUS at 14:15

## 2023-11-14 RX ADMIN — WATER 2000 MG: 1 INJECTION INTRAMUSCULAR; INTRAVENOUS; SUBCUTANEOUS at 20:54

## 2023-11-14 RX ADMIN — ROCURONIUM BROMIDE 50 MG: 10 INJECTION, SOLUTION INTRAVENOUS at 13:17

## 2023-11-14 RX ADMIN — ACETAMINOPHEN 650 MG: 325 TABLET ORAL at 23:43

## 2023-11-14 RX ADMIN — ROCURONIUM BROMIDE 10 MG: 10 INJECTION, SOLUTION INTRAVENOUS at 14:23

## 2023-11-14 RX ADMIN — BUSPIRONE HYDROCHLORIDE 20 MG: 10 TABLET ORAL at 20:55

## 2023-11-14 RX ADMIN — MIRTAZAPINE 7.5 MG: 15 TABLET, FILM COATED ORAL at 20:55

## 2023-11-14 RX ADMIN — ONDANSETRON 4 MG: 2 INJECTION INTRAMUSCULAR; INTRAVENOUS at 15:07

## 2023-11-14 RX ADMIN — SODIUM CHLORIDE, PRESERVATIVE FREE 10 ML: 5 INJECTION INTRAVENOUS at 20:56

## 2023-11-14 RX ADMIN — FENTANYL CITRATE 50 MCG: 50 INJECTION, SOLUTION INTRAMUSCULAR; INTRAVENOUS at 13:57

## 2023-11-14 ASSESSMENT — PAIN DESCRIPTION - ORIENTATION
ORIENTATION: RIGHT

## 2023-11-14 ASSESSMENT — PAIN SCALES - GENERAL
PAINLEVEL_OUTOF10: 9
PAINLEVEL_OUTOF10: 1
PAINLEVEL_OUTOF10: 7
PAINLEVEL_OUTOF10: 4
PAINLEVEL_OUTOF10: 7
PAINLEVEL_OUTOF10: 7
PAINLEVEL_OUTOF10: 9
PAINLEVEL_OUTOF10: 0
PAINLEVEL_OUTOF10: 7
PAINLEVEL_OUTOF10: 8
PAINLEVEL_OUTOF10: 0

## 2023-11-14 ASSESSMENT — PAIN DESCRIPTION - LOCATION
LOCATION: SHOULDER

## 2023-11-14 ASSESSMENT — PAIN - FUNCTIONAL ASSESSMENT
PAIN_FUNCTIONAL_ASSESSMENT: PREVENTS OR INTERFERES SOME ACTIVE ACTIVITIES AND ADLS
PAIN_FUNCTIONAL_ASSESSMENT: PREVENTS OR INTERFERES SOME ACTIVE ACTIVITIES AND ADLS
PAIN_FUNCTIONAL_ASSESSMENT: 0-10
PAIN_FUNCTIONAL_ASSESSMENT: PREVENTS OR INTERFERES SOME ACTIVE ACTIVITIES AND ADLS

## 2023-11-14 ASSESSMENT — PAIN DESCRIPTION - DESCRIPTORS
DESCRIPTORS: DISCOMFORT
DESCRIPTORS: DISCOMFORT
DESCRIPTORS: ACHING;DISCOMFORT
DESCRIPTORS: ACHING;DISCOMFORT
DESCRIPTORS: DISCOMFORT
DESCRIPTORS: DISCOMFORT
DESCRIPTORS: ACHING;DISCOMFORT;DULL
DESCRIPTORS: DISCOMFORT;ACHING

## 2023-11-14 ASSESSMENT — PAIN DESCRIPTION - FREQUENCY
FREQUENCY: CONTINUOUS
FREQUENCY: CONTINUOUS

## 2023-11-14 ASSESSMENT — PAIN DESCRIPTION - ONSET
ONSET: ON-GOING
ONSET: ON-GOING

## 2023-11-14 ASSESSMENT — PAIN DESCRIPTION - PAIN TYPE
TYPE: SURGICAL PAIN

## 2023-11-14 NOTE — PROGRESS NOTES
1613 report called to Santino Griffin rn and called out to family waiting room to have family go to room [phone]

## 2023-11-14 NOTE — DISCHARGE INSTRUCTIONS
Pain medication is percocet - sent to [facility]. DVT prophylaxis is ECASA 325 po q day for 30 days - sent to [facility]. .    Weightbearing status is nwb to right arm. Keep dressing clean and dry for 7 days postop. Then, OK to take off dressing and to get wound wet with clean water (shower). Do not get wound wet with dirty water (dishwater). Do not tub soak wound for 1 month (pool, bath). Follow up with Dr. Valerie Cuevas in 2 weeks.

## 2023-11-14 NOTE — PLAN OF CARE
d  Problem: Discharge Planning  Goal: Discharge to home or other facility with appropriate resources  Outcome: Progressing     Problem: Pain  Goal: Verbalizes/displays adequate comfort level or baseline comfort level  Outcome: Progressing     Problem: ABCDS Injury Assessment  Goal: Absence of physical injury  Outcome: Progressing

## 2023-11-14 NOTE — ANESTHESIA PROCEDURE NOTES
Peripheral Block    Patient location during procedure: pre-op  Reason for block: post-op pain management and at surgeon's request  Start time: 11/14/2023 12:05 PM  End time: 11/14/2023 12:12 PM  Staffing  Performed: anesthesiologist   Anesthesiologist: Diogo Villatoro DO  Performed by: Diogo Villatoro DO  Authorized by: Diogo Villatoro DO    Preanesthetic Checklist  Completed: patient identified, IV checked, site marked, risks and benefits discussed, surgical/procedural consents, equipment checked, pre-op evaluation, timeout performed, anesthesia consent given, oxygen available and monitors applied/VS acknowledged  Peripheral Block   Patient position: sitting  Prep: ChloraPrep  Provider prep: mask and sterile gloves  Patient monitoring: cardiac monitor, continuous pulse ox, frequent blood pressure checks and IV access  Block type: Brachial plexus  Interscalene  Laterality: right  Injection technique: single-shot  Guidance: ultrasound guided  Local infiltration: ropivacaine  Infiltration strength: 0.5 %  Local infiltration: ropivacaine  Dose: 30 mL    Needle   Needle gauge: 21 G  Needle localization: ultrasound guidance  Needle length: 10 cm  Assessment   Injection assessment: negative aspiration for heme, no paresthesia on injection, local visualized surrounding nerve on ultrasound and no intravascular symptoms  Paresthesia pain: none  Slow fractionated injection: yes  Hemodynamics: stable  Real-time US image taken/store: yes  Outcomes: uncomplicated and patient tolerated procedure well    Additional Notes

## 2023-11-15 VITALS
WEIGHT: 261 LBS | SYSTOLIC BLOOD PRESSURE: 129 MMHG | OXYGEN SATURATION: 98 % | HEIGHT: 63 IN | RESPIRATION RATE: 18 BRPM | BODY MASS INDEX: 46.25 KG/M2 | TEMPERATURE: 97.9 F | HEART RATE: 87 BPM | DIASTOLIC BLOOD PRESSURE: 78 MMHG

## 2023-11-15 LAB
ANION GAP SERPL CALCULATED.3IONS-SCNC: 9 MMOL/L (ref 7–16)
BUN SERPL-MCNC: 10 MG/DL (ref 6–23)
CALCIUM SERPL-MCNC: 9 MG/DL (ref 8.6–10.2)
CHLORIDE SERPL-SCNC: 105 MMOL/L (ref 98–107)
CO2 SERPL-SCNC: 24 MMOL/L (ref 22–29)
CREAT SERPL-MCNC: 0.7 MG/DL (ref 0.5–1)
ERYTHROCYTE [DISTWIDTH] IN BLOOD BY AUTOMATED COUNT: 17.2 % (ref 11.5–15)
GFR SERPL CREATININE-BSD FRML MDRD: >60 ML/MIN/1.73M2
GLUCOSE SERPL-MCNC: 144 MG/DL (ref 74–99)
HCT VFR BLD AUTO: 33.8 % (ref 34–48)
HGB BLD-MCNC: 10.3 G/DL (ref 11.5–15.5)
INR PPP: 1
MCH RBC QN AUTO: 24.8 PG (ref 26–35)
MCHC RBC AUTO-ENTMCNC: 30.5 G/DL (ref 32–34.5)
MCV RBC AUTO: 81.3 FL (ref 80–99.9)
PLATELET # BLD AUTO: 319 K/UL (ref 130–450)
PMV BLD AUTO: 9.3 FL (ref 7–12)
POTASSIUM SERPL-SCNC: 4.5 MMOL/L (ref 3.5–5)
PROTHROMBIN TIME: 11.2 SEC (ref 9.3–12.4)
RBC # BLD AUTO: 4.16 M/UL (ref 3.5–5.5)
SODIUM SERPL-SCNC: 138 MMOL/L (ref 132–146)
WBC OTHER # BLD: 6.6 K/UL (ref 4.5–11.5)

## 2023-11-15 PROCEDURE — 85027 COMPLETE CBC AUTOMATED: CPT

## 2023-11-15 PROCEDURE — 6370000000 HC RX 637 (ALT 250 FOR IP): Performed by: ORTHOPAEDIC SURGERY

## 2023-11-15 PROCEDURE — 2580000003 HC RX 258: Performed by: ORTHOPAEDIC SURGERY

## 2023-11-15 PROCEDURE — 36415 COLL VENOUS BLD VENIPUNCTURE: CPT

## 2023-11-15 PROCEDURE — 97161 PT EVAL LOW COMPLEX 20 MIN: CPT

## 2023-11-15 PROCEDURE — 6360000002 HC RX W HCPCS: Performed by: ORTHOPAEDIC SURGERY

## 2023-11-15 PROCEDURE — 97535 SELF CARE MNGMENT TRAINING: CPT

## 2023-11-15 PROCEDURE — G0378 HOSPITAL OBSERVATION PER HR: HCPCS

## 2023-11-15 PROCEDURE — 97165 OT EVAL LOW COMPLEX 30 MIN: CPT

## 2023-11-15 PROCEDURE — 85610 PROTHROMBIN TIME: CPT

## 2023-11-15 PROCEDURE — 80048 BASIC METABOLIC PNL TOTAL CA: CPT

## 2023-11-15 RX ADMIN — AMLODIPINE BESYLATE 10 MG: 10 TABLET ORAL at 09:42

## 2023-11-15 RX ADMIN — OXYCODONE 5 MG: 5 TABLET ORAL at 05:49

## 2023-11-15 RX ADMIN — SODIUM CHLORIDE, PRESERVATIVE FREE 10 ML: 5 INJECTION INTRAVENOUS at 09:43

## 2023-11-15 RX ADMIN — SODIUM CHLORIDE, PRESERVATIVE FREE 10 ML: 5 INJECTION INTRAVENOUS at 05:50

## 2023-11-15 RX ADMIN — ATORVASTATIN CALCIUM 10 MG: 10 TABLET, FILM COATED ORAL at 09:43

## 2023-11-15 RX ADMIN — ACETAMINOPHEN 650 MG: 325 TABLET ORAL at 05:49

## 2023-11-15 RX ADMIN — OXYCODONE 5 MG: 5 TABLET ORAL at 01:19

## 2023-11-15 RX ADMIN — BUPROPION HYDROCHLORIDE 450 MG: 150 TABLET, EXTENDED RELEASE ORAL at 09:43

## 2023-11-15 RX ADMIN — OXYCODONE 5 MG: 5 TABLET ORAL at 10:08

## 2023-11-15 RX ADMIN — ASPIRIN 325 MG: 325 TABLET, COATED ORAL at 09:43

## 2023-11-15 RX ADMIN — DULOXETINE HYDROCHLORIDE 120 MG: 60 CAPSULE, DELAYED RELEASE ORAL at 09:43

## 2023-11-15 RX ADMIN — WATER 2000 MG: 1 INJECTION INTRAMUSCULAR; INTRAVENOUS; SUBCUTANEOUS at 05:49

## 2023-11-15 RX ADMIN — BUSPIRONE HYDROCHLORIDE 20 MG: 10 TABLET ORAL at 09:42

## 2023-11-15 ASSESSMENT — PAIN DESCRIPTION - LOCATION
LOCATION: SHOULDER

## 2023-11-15 ASSESSMENT — PAIN - FUNCTIONAL ASSESSMENT
PAIN_FUNCTIONAL_ASSESSMENT: ACTIVITIES ARE NOT PREVENTED
PAIN_FUNCTIONAL_ASSESSMENT: PREVENTS OR INTERFERES SOME ACTIVE ACTIVITIES AND ADLS
PAIN_FUNCTIONAL_ASSESSMENT: PREVENTS OR INTERFERES SOME ACTIVE ACTIVITIES AND ADLS

## 2023-11-15 ASSESSMENT — PAIN DESCRIPTION - ONSET
ONSET: ON-GOING

## 2023-11-15 ASSESSMENT — PAIN DESCRIPTION - ORIENTATION
ORIENTATION: RIGHT

## 2023-11-15 ASSESSMENT — PAIN DESCRIPTION - PAIN TYPE
TYPE: SURGICAL PAIN

## 2023-11-15 ASSESSMENT — PAIN DESCRIPTION - DESCRIPTORS
DESCRIPTORS: THROBBING
DESCRIPTORS: ACHING;DISCOMFORT
DESCRIPTORS: ACHING;DISCOMFORT

## 2023-11-15 ASSESSMENT — PAIN SCALES - GENERAL
PAINLEVEL_OUTOF10: 5
PAINLEVEL_OUTOF10: 8
PAINLEVEL_OUTOF10: 6
PAINLEVEL_OUTOF10: 8
PAINLEVEL_OUTOF10: 7

## 2023-11-15 ASSESSMENT — PAIN DESCRIPTION - FREQUENCY
FREQUENCY: CONTINUOUS

## 2023-11-15 NOTE — OP NOTE
was brought to the postoperative area in good condition. POSTOPERATIVE PLAN:  The patient will follow up with Dr. Melissa Ross in two  weeks. She will be admitted to the Orthopedic Floor. She will be  nonweightbearing, right arm. She will stay in her immobilizer. Labs  will be obtained. She will be on DVT prophylaxis for a month. Any  questions, feel free to call the office.         Tori Connolly    D: 11/14/2023 15:38:32       T: 11/14/2023 21:16:16     [email or web address]  Job#: [id number]     Doc#: [id number]    CC:

## 2023-11-15 NOTE — PROGRESS NOTES
Orthopaedic Surgery Progress Note  Freeman Larose MD    Date of Encounter: 11/15/23    Time of Encounter: 0700    Subjective: right shoulder pain. Objective:  General - NAD, awake, alert  Extremity - right arm - in sling. Drain. Sensation and motor intact. Warm and well perfused.     Assessment: 58 yo female POD 1 right reverse TSA    Plan:  Pain control  NWB right arm  Sling  PT OT  Abx  Drain pulled  Ok for DC today  Follow up with Giovana Payne in 2 weeks

## 2023-11-15 NOTE — ANESTHESIA POSTPROCEDURE EVALUATION
Department of Anesthesiology  Postprocedure Note    Patient: Tapan Kessler  MRN: [id number]  [facility] [facility] 1965  Date of evaluation: 11/14/2023      Procedure Summary     Date: 11/14/23 Room / Location: [facility] OR 01 / [facility]    Anesthesia Start: 4709 Anesthesia Stop: 3101    Procedure: RIGHT SHOULDER  REVERSE TOTAL ARTHROPLASTY   ++BIOMET++    ++ISB++ (Right: Shoulder) Diagnosis:       Secondary osteoarthritis of right shoulder      (Secondary osteoarthritis of right shoulder [M19.211])    Surgeons: Diane Rios MD Responsible Provider: Laz Davies DO    Anesthesia Type: general, regional ASA Status: 3          Anesthesia Type: No value filed.     Radha Phase I: Radha Score: 9    Radha Phase II:        Anesthesia Post Evaluation    Patient location during evaluation: PACU  Patient participation: complete - patient participated  Level of consciousness: awake  Airway patency: patent  Nausea & Vomiting: no nausea and no vomiting  Complications: no  Cardiovascular status: hemodynamically stable  Respiratory status: acceptable  Hydration status: stable  Pain management: adequate

## 2023-11-15 NOTE — PROGRESS NOTES
CLINICAL PHARMACY NOTE: MEDS TO BEDS    Total # of Prescriptions Filled: 2   The following medications were delivered to the patient:  Aspirin 325 mg   Norco 5/325 mg       Additional Documentation:

## 2023-11-15 NOTE — PROGRESS NOTES
Occupational Therapy    OCCUPATIONAL THERAPY INITIAL EVALUATION    [facility]   [address], [place]         JSVX:10/41/4844                                                  Patient Name: Jerrica Champion    MRN: [id number]    : 1965    Room: [facility]      Evaluating OT: Neftali Eugene OTR/L   [id number]      Referring Oscar Mckay MD    Specific Provider Orders/Date:OT eval and treat 2023      Diagnosis:  Secondary osteoarthritis of right shoulder [M19.211]  Localized secondary osteoarthritis of right shoulder region [M19.211]    Surgery: R TSA 2023     Pertinent Medical History: back pain, back surgery, OA,     Precautions:  Fall Risk, NWB R UE, sling, no shoulder ROM     Assessment of current deficits    [x] Functional mobility  [x]ADLs  [x] Strength               []Cognition    [x] Functional transfers   [x] IADLs         [x] Safety Awareness   [x]Endurance    [] Fine Coordination              [x] Balance      [] Vision/perception   []Sensation     []Gross Motor Coordination  [] ROM  [] Delirium                   [] Motor Control     OT PLAN OF CARE   OT POC based on physician orders, patient diagnosis and results of clinical assessment    Frequency/Duration  1-2 days/wk for 2 weeks PRN   Specific OT Treatment Interventions to include:   ADL retraining/adapted techniques and AE recommendations to increase functional independence within precautions                    Energy conservation techniques to improve tolerance for selfcare routine   Functional transfer/mobility training/DME recommendations for increased independence, safety and fall prevention         Patient/family education to increase safety and functional independence             Environmental modifications for safe mobility and completion of ADLs                             Therapeutic activity to improve functional performance during ADLs.

## 2023-11-15 NOTE — DISCHARGE SUMMARY
Orthopaedic Surgery Discharge Summary  Ariel Jade MD    Admission Date: 11/14/23    Admitting Attending: Jaquan Perez    Admission Diagnosis: right shoulder secondary arthritis    Discharge Date: 11/15/23    Consults Obtained: none    Chief Complaint: right shoulder pain    History of Present Illness: 58 yo female with right shoulder pain. Past Medical History: see H&P    Past Surgical History: see H&P    Family Medical History: see H&P    Social History: see H&P    Allergies: see H&P    Review of Systems: see H&P    Physical:  General - NAD, awake, alert  Extremity - right arm - in sling. Drain. Sensation and motor intact. Warm and well perfused. Diagnostic Tests: labs, fluoro    Hospital Course: had surgery - right reverse TSA. Tolerated well. Admitted. Seen POD 1 doing well. In sling. Labs ok. [facility] for discharge.     Assessment: 58 yo female sp right reverse TSA    Discharge Instructions: NWB, stay in sling    Discharge Medications: percocet, ASA    Disposition: to home    Condition: good    Follow-Up: Jaquan Perez

## 2023-11-15 NOTE — PLAN OF CARE
Problem: Discharge Planning  Goal: Discharge to home or other facility with appropriate resources  11/15/2023 1002 by Luther Mckeon, RN  Outcome: Progressing     Problem: Pain  Goal: Verbalizes/displays adequate comfort level or baseline comfort level  11/15/2023 1002 by Luther Mcekon, RN  Outcome: Progressing     Problem: ABCDS Injury Assessment  Goal: Absence of physical injury  11/15/2023 1002 by Luther Mckeon RN  Outcome: Progressing     Problem: Safety - Adult  Goal: Free from fall injury  Outcome: Progressing

## 2023-11-15 NOTE — PROGRESS NOTES
Physical Therapy  Facility/Department: [facility] MED SURG  Physical Therapy Initial Assessment    Name: Ahmet Lynn  : 1965  MRN: [id number]  Date of Service: 11/15/2023    Attending Provider:  Brooke Maier MD    Evaluating PT:  Gregg Freeman. Elida Kennedy, P.T. Room #:  7995/3150-B  Diagnosis:  Secondary osteoarthritis of right shoulder [M19.211]  Localized secondary osteoarthritis of right shoulder region [M19.211]  Procedure/Surgery:  23 R reverse TSA  Precautions:  NWB RUE and sling to RUE    SUBJECTIVE:    Pt lives with her wife in a 3 story home with 1 small step to enter. There are 8 steps and 2 rails to her bed and bath in the basement. Pt ambulated with no AD PTA. OBJECTIVE:   Initial Evaluation  Date: 11/15/23   Was pt agreeable to Eval/treatment? yes   Does pt have pain? C/o R shoulder pain 8/10   Bed Mobility  NA, pt was found and left sitting up in chair. Plans to sleep in recliner chair at home. Transfers Sit to stand: Independent  Stand to sit: Independent  Stand pivot: Independent   Ambulation   250 feet with no AD Independent    Stair negotiation: ascended and descended 4 steps with 1 rail Independent    AM-PAC 6 Clicks 90/50     BLE ROM is WFL. BLE strength is grossly 4+/5. Sensation:  Pt denies numbness and tingling to extremities  Balance: sitting is Independent and standing with no AD is Independent   Endurance: good    ASSESSMENT:    Comments:  Pt is Independent with functional mobility at this time and has no skilled PT needs. OT is following for RUE management. Pt was left sitting up in chair with call light left by patient. Pt's/ family goals   1. To go home. Patient and or family understand(s) diagnosis, prognosis, and plan of care. PHYSICAL THERAPY PLAN OF CARE:    PT will discharge pt from our service.       Referring provider/PT Order:  PT eval and treat  Diagnosis:  Secondary osteoarthritis of right shoulder

## 2023-11-15 NOTE — PLAN OF CARE
Problem: Discharge Planning  Goal: Discharge to home or other facility with appropriate resources  11/15/2023 1151 by Luther Mckeon RN  Outcome: Completed     Problem: Pain  Goal: Verbalizes/displays adequate comfort level or baseline comfort level  11/15/2023 1151 by Luther Mckeon RN  Outcome: Completed     Problem: ABCDS Injury Assessment  Goal: Absence of physical injury  11/15/2023 1151 by Luther Mckeon RN  Outcome: Completed     Problem: Safety - Adult  Goal: Free from fall injury  11/15/2023 1151 by Luther Mckeon RN  Outcome: Completed

## 2023-11-15 NOTE — CARE COORDINATION
Met with patient about diagnosis and discharge plan of care. POD#1 right TSA. Pt lives with spouse in 2 story home, they reside in basement, 8 steps down with rails. Pt has DME. Plan is home today with no needs.  PCP is Dr Ilan Davis

## 2023-11-15 NOTE — PLAN OF CARE
Problem: Discharge Planning  Goal: Discharge to home or other facility with appropriate resources  11/15/2023 0251 by Frank Slade, RN  Outcome: Progressing     Problem: Pain  Goal: Verbalizes/displays adequate comfort level or baseline comfort level  11/15/2023 0251 by Frank Slade, RN  Outcome: Progressing     Problem: ABCDS Injury Assessment  Goal: Absence of physical injury  11/15/2023 0251 by Frank Slade, RN  Outcome: Progressing

## 2023-11-17 LAB — SURGICAL PATHOLOGY REPORT: NORMAL

## 2023-11-20 NOTE — PROGRESS NOTES
10/7/2020     Victor Hugo Pathak (:  1965) is a 60 y.o. female, with a:  Past Medical History:   Diagnosis Date    Anxiety     Depression     Obesity     Osteoarthritis        Here for evaluation of the following medical concerns:  Chief Complaint   Patient presents with    Pre-op Exam     left knee arthroscopy torn meniscus (10- with [facility])     Pre-Operative Risk assessment using 2014 ACC/AHA guidelines     Emergent procedure No  Active Cardiac Condition No (decompensated HF, Arrhythmia, MI <3 weeks, severe valve disease)  Risk Level of Procedure Intermediate Risk (intraperitoneal, intrathoracic, HENT, orthopedic, or carotid endarterectomy, etc.)  Revised Cardiac Risk Index Risk factors: None  Measurement of Exercise Tolerance before Surgery >4 Yes    Review of Systems   Constitutional: Negative for chills, fatigue and fever. Respiratory: Negative for cough, shortness of breath and wheezing. Cardiovascular: Negative for chest pain and palpitations. Gastrointestinal: Negative for constipation, diarrhea, nausea and vomiting. Genitourinary: Negative for difficulty urinating and dysuria. Musculoskeletal: Positive for arthralgias and gait problem. Neurological: Negative for headaches. Prior to Visit Medications    Medication Sig Taking? Authorizing Provider   cyclobenzaprine (FLEXERIL) 5 MG tablet Take 2 tablets by mouth nightly as needed for Muscle spasms Yes Kayla Mederos, DO   gabapentin (NEURONTIN) 300 MG capsule Take 1 capsule by mouth 3 times daily for 30 days.  Yes Ariel Kaiser, DO   diclofenac (VOLTAREN) 50 MG EC tablet Take 1 tablet by mouth 2 times daily Yes Kayla Mederos, DO   atorvastatin (LIPITOR) 10 MG tablet Take 1 tablet by mouth daily Yes Pavel Naqvi, DO   buPROPion (WELLBUTRIN XL) 300 MG extended release tablet TAKE 1 TABLET BY MOUTH ONCE DAILY Yes Historical Provider, MD   clonazePAM (KLONOPIN) 1 MG tablet TAKE 1 TABLET BY MOUTH THREE TIMES DAILY AS NEEDED Yes Historical Provider, MD   DULoxetine (CYMBALTA) 60 MG extended release capsule TAKE 2 CAPSULES BY MOUTH ONCE DAILY AS DIRECTED Yes Historical Provider, MD   zolpidem (AMBIEN) 10 MG tablet TAKE 1 2 (ONE HALF) TO 1 WHOLE TABLET BY MOUTH AT BEDTIME AS DIRECETED ON EMPTY STOMACH Yes Historical Provider, MD        Social History     Tobacco Use    Smoking status: Never Smoker    Smokeless tobacco: Never Used   Substance Use Topics    Alcohol use: Yes     Frequency: Monthly or less     Drinks per session: 1 or 2     Binge frequency: Never        Past Surgical History:   Procedure Laterality Date    ANESTHESIA NERVE BLOCK Right 12/19/2019    RIGHT L3-4 TRANSFORAMINAL EPIDURAL STEROID INJECTION (CPT 65965) performed by Tiara Flores DO at [address] Right 1/16/2020    RIGHT SACROILIAC JOINT STEROID INJECTION UNDER X-RAY GUIDANCE performed by Tiara Flores DO at [address] [facility] Right 2015    NERVE BLOCK Right 12/19/2019    lumbar trasnforaminal    NERVE BLOCK Right 01/16/2020    right sacroiliac joint steroid injection     NERVE BLOCK Right 09/03/2020    right l3-4transforaminal epidural steroid injection    NERVE BLOCK Right 9/3/2020    RIGHT L3-4 TRANSFORAMINAL EPIDURAL STEROID INJECTION performed by Tiara Flores DO at [address] [facility] Right 2000    SHOULDER SURGERY Left 2019       Vitals:    10/07/20 1215   BP: 138/88   Pulse: 83   Temp: 97.3 °F (36.3 °C)   TempSrc: Temporal   SpO2: 96%   Weight: 275 lb (124.7 kg)   Height: 5' 3\" (1.6 m)     Estimated body mass index is 48.71 kg/m² as calculated from the following:    Height as of this encounter: 5' 3\" (1.6 m). Weight as of this encounter: 275 lb (124.7 kg). Physical Exam  Constitutional:       General: She is not in acute distress. Appearance: She is well-developed. She is obese. HENT:      Head: Normocephalic and atraumatic. Right Ear: External ear normal.      Left Ear: External ear normal.      Nose: Nose normal. No congestion or rhinorrhea. Eyes:      Extraocular Movements: Extraocular movements intact. Pupils: Pupils are equal, round, and reactive to light. Neck:      Musculoskeletal: Normal range of motion. Thyroid: No thyromegaly. Cardiovascular:      Rate and Rhythm: Normal rate and regular rhythm. Pulmonary:      Effort: Pulmonary effort is normal. No respiratory distress. Breath sounds: Normal breath sounds. No wheezing or rales. Abdominal:      General: There is no distension. Musculoskeletal: Normal range of motion. General: No swelling or deformity. Skin:     General: Skin is warm. Findings: No rash. Neurological:      General: No focal deficit present. Mental Status: She is alert. Mental status is at baseline. Psychiatric:         Mood and Affect: Mood normal.         Behavior: Behavior normal.         ASSESSMENT/PLAN:  Mode Sinha was seen today for pre-op exam.    Diagnoses and all orders for this visit:    Pre-op exam  -     EKG 12 lead; Future  -     EKG 12 lead  -     CBC Auto Differential; Future  -     HEMOGLOBIN A1C; Future  -     Comprehensive Metabolic Panel; Future  -     XR CHEST STANDARD (2 VW); Future    Additional plan and future considerations: According to the 2014 ACC/AHA pre-operative risk assessment guidelines Michel Smith is a low risk for major cardiac complications during a intermediate risk procedure and may continue as planned. Specific medication recommendations are listed below. Medications recommended to continue should be taken with a sip of water even when NPO.        Future Appointments   Date Time Provider Cadence Titus   11/16/2020  1:00 PM Avelino Gifford DO [facility]         --DO janelle Joy 10/7/2020 at 12:28 PM Consent (Temporal Branch)/Introductory Paragraph: The rationale for Mohs was explained to the patient and consent was obtained. The risks, benefits and alternatives to therapy were discussed in detail. Specifically, the risks of damage to the temporal branch of the facial nerve, infection, scarring, bleeding, prolonged wound healing, incomplete removal, allergy to anesthesia, and recurrence were addressed. Prior to the procedure, the treatment site was clearly identified and confirmed by the patient. All components of Universal Protocol/PAUSE Rule completed.

## 2023-11-21 NOTE — PROGRESS NOTES
LVM with patient to inform of cancellation of 12/05 appointment with Ranjan Lion CNP. Provided call back number and advised patient to call office to reschedule.     Sent patient text message to inform of cancellation of 12/05 appointment with Ranjan Lion CNP.     flexors are intact. Her calf is supple with no pain on palpation    Radiologic Studies: Imaging study report of the left knee suggested no fracture but significant degenerative changes    ASSESSMENT:  Juwan Duenas was seen today for new patient. Diagnoses and all orders for this visit:    Contusion of left knee, initial encounter    Sprain of left knee, unspecified ligament, initial encounter     Treatment alternatives were reviewed including medical and physical therapies, injections, and surgical options, expected risks benefits and likely outcome of each were discussed in detail, questions asked and answered and understood. Known problems were discussed with the patient. There is no suggestion of fracture or gross instability of the joint therefore she can at least partial weight-bear. She could wear the immobilizer if this is needed for some stability temporarily. She should remove it for gentle range of motion when she is not ambulating. PLAN: Immobilizer for activity and crutches for safety. Range of motion of the knee. Follow-up in 1-1/2 weeks. If there is no improvement we will x-ray here to be able to see the extent of her preexistent damage and check for any occult acute injury.         Patient Active Problem List   Diagnosis    Class 3 severe obesity due to excess calories with body mass index (BMI) of 45.0 to 49.9 in adult [facility])    Chronic right-sided low back pain with right-sided sciatica    Right hip pain    Anxiety and depression    Insomnia    Prediabetes    Spondylolisthesis of lumbar region    Lumbar spondylosis    Other spondylosis with radiculopathy, lumbar region    Leg length discrepancy    Facet arthropathy    Lumbar radiculitis    Sacroiliitis ([facility] [address]       Past Medical History:   Diagnosis Date    Anxiety     Depression     Obesity     Osteoarthritis        Past Surgical History:   Procedure Laterality Date    ANESTHESIA NERVE BLOCK Right 12/19/2019    RIGHT L3-4 TRANSFORAMINAL EPIDURAL STEROID INJECTION (CPT 24483) performed by Soheila Herron DO at [address] JOINT Right 1/16/2020    RIGHT SACROILIAC JOINT STEROID INJECTION UNDER X-RAY GUIDANCE performed by Soheila Herron DO at [address] [facility] [address] Right 2015    NERVE BLOCK Right 12/19/2019    lumbar trasnforaminal    NERVE BLOCK Right 01/16/2020    right sacroiliac joint steroid injection     ROTATOR CUFF REPAIR Right 2000    SHOULDER SURGERY Left 2019       Current Outpatient Medications   Medication Sig Dispense Refill    gabapentin (NEURONTIN) 300 MG capsule TAKE 1 CAPSULE BY MOUTH NIGHTLY FOR 3 DAYS,THEN 1CAPSULE TWICE A DAY FOR 3 DAYS,THEN 1 CAPSULE 3 TIMES A DAY 90 capsule 3    diclofenac (VOLTAREN) 50 MG EC tablet TAKE 1 TABLET BY MOUTH TWICE DAILY AS NEEDED FOR PAIN 60 tablet 0    cyclobenzaprine (FLEXERIL) 5 MG tablet       atorvastatin (LIPITOR) 10 MG tablet Take 1 tablet by mouth daily 30 tablet 5    buPROPion (WELLBUTRIN XL) 300 MG extended release tablet TAKE 1 TABLET BY MOUTH ONCE DAILY  2    clonazePAM (KLONOPIN) 1 MG tablet TAKE 1 TABLET BY MOUTH THREE TIMES DAILY AS NEEDED  2    DULoxetine (CYMBALTA) 60 MG extended release capsule TAKE 2 CAPSULES BY MOUTH ONCE DAILY AS DIRECTED  2    zolpidem (AMBIEN) 10 MG tablet TAKE 1 2 (ONE HALF) TO 1 WHOLE TABLET BY MOUTH AT BEDTIME AS DIRECETED ON EMPTY STOMACH  2    Probiotic Product (PROBIOTIC-10 PO) Take by mouth       No current facility-administered medications for this visit.         No Known Allergies    Social History     Socioeconomic History    Marital status:      Spouse name: None    Number of children: None    Years of education: None    Highest education level: None   Occupational History    None   Social Needs    Financial resource strain: None    Food insecurity:     Worry: None     Inability: None    Transportation needs:     Medical: None

## 2023-11-24 RX ORDER — CYCLOBENZAPRINE HCL 5 MG
TABLET ORAL
Qty: 60 TABLET | Refills: 0 | OUTPATIENT
Start: 2023-11-24

## 2024-01-06 LAB — NONINV COLON CA DNA+OCC BLD SCRN STL QL: NORMAL

## 2024-01-29 LAB — NONINV COLON CA DNA+OCC BLD SCRN STL QL: NEGATIVE

## 2024-02-05 DIAGNOSIS — I10 ESSENTIAL HYPERTENSION: ICD-10-CM

## 2024-02-05 DIAGNOSIS — E78.2 MIXED HYPERLIPIDEMIA: ICD-10-CM

## 2024-02-05 LAB
ALBUMIN SERPL-MCNC: 4.2 G/DL (ref 3.5–5.2)
ALP BLD-CCNC: 95 U/L (ref 35–104)
ALT SERPL-CCNC: 22 U/L (ref 0–32)
ANION GAP SERPL CALCULATED.3IONS-SCNC: 20 MMOL/L (ref 7–16)
AST SERPL-CCNC: 23 U/L (ref 0–31)
BILIRUB SERPL-MCNC: 0.4 MG/DL (ref 0–1.2)
BUN BLDV-MCNC: 11 MG/DL (ref 6–23)
CALCIUM SERPL-MCNC: 9.7 MG/DL (ref 8.6–10.2)
CHLORIDE BLD-SCNC: 98 MMOL/L (ref 98–107)
CHOLESTEROL: 211 MG/DL
CO2: 22 MMOL/L (ref 22–29)
CREAT SERPL-MCNC: 1 MG/DL (ref 0.5–1)
GFR SERPL CREATININE-BSD FRML MDRD: >60 ML/MIN/1.73M2
GLUCOSE BLD-MCNC: 190 MG/DL (ref 74–99)
HCT VFR BLD CALC: 40.8 % (ref 34–48)
HDLC SERPL-MCNC: 57 MG/DL
HEMOGLOBIN: 12.1 G/DL (ref 11.5–15.5)
LDL CHOLESTEROL: 120 MG/DL
MCH RBC QN AUTO: 23.4 PG (ref 26–35)
MCHC RBC AUTO-ENTMCNC: 29.7 G/DL (ref 32–34.5)
MCV RBC AUTO: 78.9 FL (ref 80–99.9)
PDW BLD-RTO: 17.2 % (ref 11.5–15)
PLATELET # BLD: 387 K/UL (ref 130–450)
PMV BLD AUTO: 10.9 FL (ref 7–12)
POTASSIUM SERPL-SCNC: 3.3 MMOL/L (ref 3.5–5)
RBC # BLD: 5.17 M/UL (ref 3.5–5.5)
SODIUM BLD-SCNC: 140 MMOL/L (ref 132–146)
TOTAL PROTEIN: 7.5 G/DL (ref 6.4–8.3)
TRIGL SERPL-MCNC: 171 MG/DL
TSH SERPL DL<=0.05 MIU/L-ACNC: 5.23 UIU/ML (ref 0.27–4.2)
VLDLC SERPL CALC-MCNC: 34 MG/DL
WBC # BLD: 6.4 K/UL (ref 4.5–11.5)

## 2024-02-14 DIAGNOSIS — E87.6 HYPOKALEMIA: Primary | ICD-10-CM

## 2024-02-14 RX ORDER — POTASSIUM CHLORIDE 750 MG/1
10 TABLET, EXTENDED RELEASE ORAL DAILY
Qty: 30 TABLET | Refills: 3 | Status: SHIPPED | OUTPATIENT
Start: 2024-02-14

## 2024-02-15 DIAGNOSIS — E66.01 CLASS 3 SEVERE OBESITY DUE TO EXCESS CALORIES WITHOUT SERIOUS COMORBIDITY WITH BODY MASS INDEX (BMI) OF 45.0 TO 49.9 IN ADULT (HCC): Primary | ICD-10-CM

## 2024-03-01 ENCOUNTER — HOSPITAL ENCOUNTER (OUTPATIENT)
Dept: MAMMOGRAPHY | Age: 59
End: 2024-03-01
Payer: MEDICARE

## 2024-03-01 VITALS — WEIGHT: 274 LBS | BODY MASS INDEX: 48.55 KG/M2 | HEIGHT: 63 IN

## 2024-03-01 DIAGNOSIS — Z12.31 ENCOUNTER FOR SCREENING MAMMOGRAM FOR MALIGNANT NEOPLASM OF BREAST: ICD-10-CM

## 2024-03-01 PROCEDURE — 77063 BREAST TOMOSYNTHESIS BI: CPT

## 2024-03-15 ENCOUNTER — TELEPHONE (OUTPATIENT)
Dept: BARIATRICS/WEIGHT MGMT | Age: 59
End: 2024-03-15

## 2024-03-26 ENCOUNTER — OFFICE VISIT (OUTPATIENT)
Dept: FAMILY MEDICINE CLINIC | Age: 59
End: 2024-03-26
Payer: MEDICARE

## 2024-03-26 VITALS
SYSTOLIC BLOOD PRESSURE: 120 MMHG | HEART RATE: 92 BPM | HEIGHT: 63 IN | DIASTOLIC BLOOD PRESSURE: 74 MMHG | OXYGEN SATURATION: 97 % | BODY MASS INDEX: 48.37 KG/M2 | WEIGHT: 273 LBS | RESPIRATION RATE: 20 BRPM

## 2024-03-26 DIAGNOSIS — Z00.00 INITIAL MEDICARE ANNUAL WELLNESS VISIT: Primary | ICD-10-CM

## 2024-03-26 DIAGNOSIS — I10 ESSENTIAL HYPERTENSION: ICD-10-CM

## 2024-03-26 DIAGNOSIS — R79.89 ABNORMAL THYROID BLOOD TEST: ICD-10-CM

## 2024-03-26 DIAGNOSIS — R73.01 IFG (IMPAIRED FASTING GLUCOSE): ICD-10-CM

## 2024-03-26 DIAGNOSIS — E87.6 HYPOKALEMIA: ICD-10-CM

## 2024-03-26 LAB — HBA1C MFR BLD: 6.9 % (ref 4–5.6)

## 2024-03-26 PROCEDURE — G0438 PPPS, INITIAL VISIT: HCPCS | Performed by: FAMILY MEDICINE

## 2024-03-26 PROCEDURE — 3074F SYST BP LT 130 MM HG: CPT | Performed by: FAMILY MEDICINE

## 2024-03-26 PROCEDURE — 3078F DIAST BP <80 MM HG: CPT | Performed by: FAMILY MEDICINE

## 2024-03-26 SDOH — ECONOMIC STABILITY: INCOME INSECURITY: HOW HARD IS IT FOR YOU TO PAY FOR THE VERY BASICS LIKE FOOD, HOUSING, MEDICAL CARE, AND HEATING?: NOT HARD AT ALL

## 2024-03-26 SDOH — ECONOMIC STABILITY: FOOD INSECURITY: WITHIN THE PAST 12 MONTHS, YOU WORRIED THAT YOUR FOOD WOULD RUN OUT BEFORE YOU GOT MONEY TO BUY MORE.: NEVER TRUE

## 2024-03-26 SDOH — ECONOMIC STABILITY: FOOD INSECURITY: WITHIN THE PAST 12 MONTHS, THE FOOD YOU BOUGHT JUST DIDN'T LAST AND YOU DIDN'T HAVE MONEY TO GET MORE.: NEVER TRUE

## 2024-03-26 ASSESSMENT — PATIENT HEALTH QUESTIONNAIRE - PHQ9
SUM OF ALL RESPONSES TO PHQ QUESTIONS 1-9: 4
SUM OF ALL RESPONSES TO PHQ9 QUESTIONS 1 & 2: 3
9. THOUGHTS THAT YOU WOULD BE BETTER OFF DEAD, OR OF HURTING YOURSELF: NOT AT ALL
3. TROUBLE FALLING OR STAYING ASLEEP: NOT AT ALL
1. LITTLE INTEREST OR PLEASURE IN DOING THINGS: SEVERAL DAYS
5. POOR APPETITE OR OVEREATING: NOT AT ALL
SUM OF ALL RESPONSES TO PHQ QUESTIONS 1-9: 4
6. FEELING BAD ABOUT YOURSELF - OR THAT YOU ARE A FAILURE OR HAVE LET YOURSELF OR YOUR FAMILY DOWN: NOT AT ALL
4. FEELING TIRED OR HAVING LITTLE ENERGY: SEVERAL DAYS
8. MOVING OR SPEAKING SO SLOWLY THAT OTHER PEOPLE COULD HAVE NOTICED. OR THE OPPOSITE, BEING SO FIGETY OR RESTLESS THAT YOU HAVE BEEN MOVING AROUND A LOT MORE THAN USUAL: NOT AT ALL
7. TROUBLE CONCENTRATING ON THINGS, SUCH AS READING THE NEWSPAPER OR WATCHING TELEVISION: NOT AT ALL
2. FEELING DOWN, DEPRESSED OR HOPELESS: MORE THAN HALF THE DAYS
10. IF YOU CHECKED OFF ANY PROBLEMS, HOW DIFFICULT HAVE THESE PROBLEMS MADE IT FOR YOU TO DO YOUR WORK, TAKE CARE OF THINGS AT HOME, OR GET ALONG WITH OTHER PEOPLE: NOT DIFFICULT AT ALL
SUM OF ALL RESPONSES TO PHQ QUESTIONS 1-9: 4
SUM OF ALL RESPONSES TO PHQ QUESTIONS 1-9: 4

## 2024-03-26 ASSESSMENT — LIFESTYLE VARIABLES: HOW OFTEN DO YOU HAVE A DRINK CONTAINING ALCOHOL: MONTHLY OR LESS

## 2024-03-26 NOTE — PROGRESS NOTES
Medicare Annual Wellness Visit    Heena Gamboa is here for Medicare AWV    Assessment & Plan   Initial Medicare annual wellness visit  Essential hypertension  -     TSH  -     T4, Free  -     amLODIPine (NORVASC) 10 MG tablet; Take 1 tablet by mouth daily  Hypokalemia  -     Basic Metabolic Panel  IFG (impaired fasting glucose)  -     Hemoglobin A1C  Abnormal thyroid blood test  -     TSH  -     T4, Free    Recommendations for Preventive Services Due: see orders and patient instructions/AVS.  Recommended screening schedule for the next 5-10 years is provided to the patient in written form: see Patient Instructions/AVS.     Return in 3 months (on 6/26/2024) for f/u elevated blood sugar and Medicare Annual Wellness Visit in 1 year.     Subjective   Patient doing well on current regimen for hypertension.    Patient's complete Health Risk Assessment and screening values have been reviewed and are found in Flowsheets. The following problems were reviewed today and where indicated follow up appointments were made and/or referrals ordered.    Positive Risk Factor Screenings with Interventions:            Controlled Medication Review:      Today's Pain Level: No data recorded   Opioid Risk: (Low risk score <55) Opioid risk score: 33    Patient is low risk for opioid use disorder or overdose.    Last PDMP [place] as Reviewed:  Review User Review Instant Review Result   CECI CURRY 9/29/2023 11:47 AM     Reviewed PDMP [1]        Activity, Diet, and Weight:               Body mass index is 48.36 kg/m². (!) Abnormal    Obesity Interventions:  See AVS for additional education material                               Objective   Vitals:    03/26/24 1018   BP: 120/74   Pulse: 92   Resp: 20   SpO2: 97%   Weight: 123.8 kg (273 lb)   Height: 1.6 m (5' 3\")      Body mass index is 48.36 kg/m².      Physical Exam  Vitals reviewed.   Constitutional:       General: She is not in acute distress.     Appearance: She is well-developed.

## 2024-03-27 LAB
ANION GAP SERPL CALCULATED.3IONS-SCNC: 16 MMOL/L (ref 7–16)
BUN BLDV-MCNC: 13 MG/DL (ref 6–23)
CALCIUM SERPL-MCNC: 9.6 MG/DL (ref 8.6–10.2)
CHLORIDE BLD-SCNC: 104 MMOL/L (ref 98–107)
CO2: 22 MMOL/L (ref 22–29)
CREAT SERPL-MCNC: 0.9 MG/DL (ref 0.5–1)
GFR SERPL CREATININE-BSD FRML MDRD: 71 ML/MIN/1.73M2
GLUCOSE BLD-MCNC: 150 MG/DL (ref 74–99)
POTASSIUM SERPL-SCNC: 5.6 MMOL/L (ref 3.5–5)
SODIUM BLD-SCNC: 142 MMOL/L (ref 132–146)
T4 FREE: 1.1 NG/DL (ref 0.9–1.7)
TSH SERPL DL<=0.05 MIU/L-ACNC: 3.78 UIU/ML (ref 0.27–4.2)

## 2024-03-29 RX ORDER — AMLODIPINE BESYLATE 10 MG/1
10 TABLET ORAL DAILY
Qty: 90 TABLET | Refills: 1
Start: 2024-03-29

## 2024-05-02 ENCOUNTER — OFFICE VISIT (OUTPATIENT)
Dept: BARIATRICS/WEIGHT MGMT | Age: 59
End: 2024-05-02
Payer: MEDICARE

## 2024-05-02 VITALS
WEIGHT: 269.2 LBS | HEIGHT: 63 IN | TEMPERATURE: 97.9 F | SYSTOLIC BLOOD PRESSURE: 143 MMHG | DIASTOLIC BLOOD PRESSURE: 73 MMHG | HEART RATE: 80 BPM | BODY MASS INDEX: 47.7 KG/M2

## 2024-05-02 DIAGNOSIS — R53.82 CHRONIC FATIGUE: Primary | ICD-10-CM

## 2024-05-02 DIAGNOSIS — E66.01 CLASS 3 SEVERE OBESITY DUE TO EXCESS CALORIES WITH SERIOUS COMORBIDITY AND BODY MASS INDEX (BMI) OF 45.0 TO 49.9 IN ADULT (HCC): ICD-10-CM

## 2024-05-02 PROCEDURE — 99202 OFFICE O/P NEW SF 15 MIN: CPT

## 2024-05-02 PROCEDURE — 3077F SYST BP >= 140 MM HG: CPT | Performed by: INTERNAL MEDICINE

## 2024-05-02 PROCEDURE — 3078F DIAST BP <80 MM HG: CPT | Performed by: INTERNAL MEDICINE

## 2024-05-02 PROCEDURE — 99205 OFFICE O/P NEW HI 60 MIN: CPT | Performed by: INTERNAL MEDICINE

## 2024-05-02 RX ORDER — CLONAZEPAM 0.5 MG/1
0.5 TABLET ORAL 2 TIMES DAILY PRN
COMMUNITY
Start: 2024-04-25

## 2024-05-02 RX ORDER — PHENTERMINE HYDROCHLORIDE 37.5 MG/1
37.5 TABLET ORAL DAILY
Qty: 30 TABLET | Refills: 0 | Status: SHIPPED | OUTPATIENT
Start: 2024-05-02 | End: 2024-06-01

## 2024-05-02 NOTE — PROGRESS NOTES
one multivitamin every day    Targets:  Count every calorie every day (you can use free amanda such as 'baritastic')  Limit calorie intake to 1200 calories/day  Walk 30 minutes daily  Avoid eating 2 hours within bedtime.     Tips:  Do not eat outside of the dining room or the kitchen  Do not eat while watching TV, videos, working on the computer or using a smart phone  Do not eat food out of a multi-serving bag or container.     Phentermine:  Take phentermine 37.5 mg, one-half to one tablet daily as needed for appetite suppression. Take each dose 30-90 min before effect will be needed.    While taking phentermine, check the Blood Pressure every morning and every evening.     If the systolic BP is >155 mmHg, the diastolic BP is >90 mm/Hg or the heart rate is > 100 beats per minute, do not take phentermine that day.    If the systolic BP is consistently >155 mmHg, the diastolic BP is consistently above 90 mm/Hg or the heart rate is consistently > 100 beats per minute, then stop taking phentermine altogether.    If the systolic BP >170 mmHg or the diastolic BP is >100 mmHg (even if it is only once), then phentermine should be stopped altogether without proving that any of these are consistently elevated.    Follow up in 1 month.    Total time spent on encounter: 83 min.    Kylee Hinkle MD  Internal Medicine/Obesity Medicine  5/2/2024.

## 2024-05-02 NOTE — PATIENT INSTRUCTIONS
Rules:  Limit sweets to one day per month  Limit chips/crackers/pretzels/nuts/popcorn to 150 tashi/day  Eliminate all sugar sweetened beverages (including fruit juice)  Limit restaurants (including fast food and food from a convenience store) to one time every two weeks while in town    Requirements:  Make sure protein intake is at least 75 grams per day (do not count protein every day; instead spot check your intake every 2-3 weeks and make sure what you think you are getting is close to accurate; consider using a protein shake if needed; these are in the pharmacy section of the stores, not the grocery section; Premier, Pure Protein and Fairlife are relatively inexpensive and taste good to most patients; other options are Nectar, Boost Max, Ensure Max, BeneProtein and [employer]NC lean (which is lactose-free);   Nectar fruit, Premier Protein Clear, IsoPure Protein Drink, and Protein 2 O are water-based options; Quest (or Cosco, which is cheaper and is ordered on Amazon) and the [employer] 1 protein bars can also be used, but have less protein in them ) (<200 Tashi, >25 g protein) - (chicken, fish, turkey, egg white)  (Disclaimer: Dietary supplements rarely have their listed ingredients and the amount of each verified by a third party other. Sometimes they give verification for their claims to be GMO and gluten free and to be organic. However, even such verifications as these may still be untrustworthy.)  Make sure that fiber intake is at least 22 grams per day. Do this in part or whole by taking 12 tablespoons of Fiber one original cereal or Genie's All Bran Buds cereal, or 4 tablespoons of wheat dextrin powder (Benefiber or generic brand); for both of these, start with 1/8th - 1/4th the target amount and every week add another 1/8th - 1/4th until reaching the target).  Also, fiber gummies containing inulin (such as Nature Mad, Akers, Benefiber) or Fiber Choice Pre-biotic tablets containing inulin are also an option.1 cup of  No

## 2024-05-14 DIAGNOSIS — I10 ESSENTIAL HYPERTENSION: ICD-10-CM

## 2024-05-21 RX ORDER — AMLODIPINE BESYLATE 10 MG/1
10 TABLET ORAL DAILY
Qty: 90 TABLET | Refills: 1 | OUTPATIENT
Start: 2024-05-21

## 2024-05-21 RX ORDER — AMLODIPINE BESYLATE 10 MG/1
10 TABLET ORAL DAILY
Qty: 90 TABLET | Refills: 1 | Status: SHIPPED | OUTPATIENT
Start: 2024-05-21

## 2024-05-30 ENCOUNTER — OFFICE VISIT (OUTPATIENT)
Dept: BARIATRICS/WEIGHT MGMT | Age: 59
End: 2024-05-30
Payer: MEDICARE

## 2024-05-30 VITALS
HEIGHT: 63 IN | BODY MASS INDEX: 44.83 KG/M2 | HEART RATE: 96 BPM | WEIGHT: 253 LBS | DIASTOLIC BLOOD PRESSURE: 81 MMHG | TEMPERATURE: 97 F | SYSTOLIC BLOOD PRESSURE: 132 MMHG

## 2024-05-30 DIAGNOSIS — E66.01 CLASS 3 SEVERE OBESITY DUE TO EXCESS CALORIES WITH SERIOUS COMORBIDITY AND BODY MASS INDEX (BMI) OF 40.0 TO 44.9 IN ADULT (HCC): ICD-10-CM

## 2024-05-30 DIAGNOSIS — R53.82 CHRONIC FATIGUE: Primary | ICD-10-CM

## 2024-05-30 PROCEDURE — 99214 OFFICE O/P EST MOD 30 MIN: CPT | Performed by: INTERNAL MEDICINE

## 2024-05-30 PROCEDURE — 3075F SYST BP GE 130 - 139MM HG: CPT | Performed by: INTERNAL MEDICINE

## 2024-05-30 PROCEDURE — 99211 OFF/OP EST MAY X REQ PHY/QHP: CPT | Performed by: INTERNAL MEDICINE

## 2024-05-30 PROCEDURE — 3079F DIAST BP 80-89 MM HG: CPT | Performed by: INTERNAL MEDICINE

## 2024-05-30 RX ORDER — PHENTERMINE HYDROCHLORIDE 37.5 MG/1
37.5 TABLET ORAL DAILY
Qty: 30 TABLET | Refills: 0 | Status: SHIPPED | OUTPATIENT
Start: 2024-05-30 | End: 2024-06-29

## 2024-05-30 NOTE — PATIENT INSTRUCTIONS
Rules:  Limit sweets to one day per month  Limit chips/crackers/pretzels/nuts/popcorn to 150 ivan/day  Eliminate all sugar sweetened beverages (including fruit juice)  Limit restaurants (including fast food and food from a convenience store) to one time every two weeks while in town    Requirements:  Make sure protein intake is at least 75 grams per day  Make sure that fiber intake is at least 22 grams per day  Take one multivitamin every day    Targets:  Count every calorie every day (you can use free amanda such as 'TherapeuticsMD')  Limit calorie intake to 1200 calories/day  Walk 30 minutes daily  Avoid eating 2 hours within bedtime.     Tips:  Do not eat outside of the dining room or the kitchen  Do not eat while watching TV, videos, working on the computer or using a smart phone  Do not eat food out of a multi-serving bag or container.     Phentermine:  Take phentermine 37.5 mg, one-half to one tablet daily as needed for appetite suppression. Take each dose 30-90 min before effect will be needed.    While taking phentermine, check the Blood Pressure every morning and every evening.     If the systolic BP is >155 mmHg, the diastolic BP is >90 mm/Hg or the heart rate is > 100 beats per minute, do not take phentermine that day.    If the systolic BP is consistently >155 mmHg, the diastolic BP is consistently above 90 mm/Hg or the heart rate is consistently > 100 beats per minute, then stop taking phentermine altogether.    If the systolic BP >170 mmHg or the diastolic BP is >100 mmHg (even if it is only once), then phentermine should be stopped altogether without proving that any of these are consistently elevated.    Follow up in 1 month.

## 2024-05-30 NOTE — PROGRESS NOTES
quest and pure protein, pure protein vanilla shakes with fruit  Fiber: metamucil packets  Water: 64 oz x 2/day  Exercise: 30 minutes/day 3d/wk plus yard work.    Assessment  - Uncontrolled -> improving; Doing well with low calorie diet, and would like to continue. Phentermine is helping, denies significant side effect, no contraindication, and we planned to continue.    Plan   -   Rules:  Limit sweets to one day per month  Limit chips/crackers/pretzels/nuts/popcorn to 150 ivan/day  Eliminate all sugar sweetened beverages (including fruit juice)  Limit restaurants (including fast food and food from a convenience store) to one time every two weeks while in town    Requirements:  Make sure protein intake is at least 75 grams per day  Make sure that fiber intake is at least 22 grams per day  Take one multivitamin every day    Targets:  Count every calorie every day (you can use free amanda such as 'TrewCap')  Limit calorie intake to 1200 calories/day  Walk 30 minutes daily  Avoid eating 2 hours within bedtime.     Tips:  Do not eat outside of the dining room or the kitchen  Do not eat while watching TV, videos, working on the computer or using a smart phone  Do not eat food out of a multi-serving bag or container.     Phentermine:  Take phentermine 37.5 mg, one-half to one tablet daily as needed for appetite suppression. Take each dose 30-90 min before effect will be needed.    While taking phentermine, check the Blood Pressure every morning and every evening.     If the systolic BP is >155 mmHg, the diastolic BP is >90 mm/Hg or the heart rate is > 100 beats per minute, do not take phentermine that day.    If the systolic BP is consistently >155 mmHg, the diastolic BP is consistently above 90 mm/Hg or the heart rate is consistently > 100 beats per minute, then stop taking phentermine altogether.    If the systolic BP >170 mmHg or the diastolic BP is >100 mmHg (even if it is only once), then phentermine should be stopped

## 2024-06-19 DIAGNOSIS — E78.2 MIXED HYPERLIPIDEMIA: ICD-10-CM

## 2024-06-19 RX ORDER — ATORVASTATIN CALCIUM 10 MG/1
10 TABLET, FILM COATED ORAL DAILY
Qty: 90 TABLET | Refills: 0 | Status: SHIPPED | OUTPATIENT
Start: 2024-06-19

## 2024-06-19 NOTE — TELEPHONE ENCOUNTER
Last seen 3/26/2024  Next appt 7/3/2024    Requested Prescriptions     Pending Prescriptions Disp Refills    atorvastatin (LIPITOR) 10 MG tablet [Pharmacy Med Name: Atorvastatin Calcium 10 MG Oral Tablet] 90 tablet 0     Sig: Take 1 tablet by mouth once daily

## 2024-07-02 ENCOUNTER — OFFICE VISIT (OUTPATIENT)
Dept: BARIATRICS/WEIGHT MGMT | Age: 59
End: 2024-07-02
Payer: MEDICARE

## 2024-07-02 VITALS
WEIGHT: 250 LBS | SYSTOLIC BLOOD PRESSURE: 106 MMHG | HEIGHT: 63 IN | TEMPERATURE: 97.4 F | BODY MASS INDEX: 44.3 KG/M2 | HEART RATE: 91 BPM | DIASTOLIC BLOOD PRESSURE: 75 MMHG

## 2024-07-02 DIAGNOSIS — E66.01 CLASS 3 SEVERE OBESITY DUE TO EXCESS CALORIES WITH SERIOUS COMORBIDITY AND BODY MASS INDEX (BMI) OF 40.0 TO 44.9 IN ADULT (HCC): ICD-10-CM

## 2024-07-02 DIAGNOSIS — R53.82 CHRONIC FATIGUE: Primary | ICD-10-CM

## 2024-07-02 PROCEDURE — 3074F SYST BP LT 130 MM HG: CPT | Performed by: INTERNAL MEDICINE

## 2024-07-02 PROCEDURE — 99211 OFF/OP EST MAY X REQ PHY/QHP: CPT | Performed by: INTERNAL MEDICINE

## 2024-07-02 PROCEDURE — 3078F DIAST BP <80 MM HG: CPT | Performed by: INTERNAL MEDICINE

## 2024-07-02 PROCEDURE — 99214 OFFICE O/P EST MOD 30 MIN: CPT | Performed by: INTERNAL MEDICINE

## 2024-07-02 RX ORDER — BREXPIPRAZOLE 1 MG/1
1 TABLET ORAL DAILY
COMMUNITY
Start: 2024-06-25

## 2024-07-02 RX ORDER — PHENTERMINE HYDROCHLORIDE 37.5 MG/1
TABLET ORAL
Qty: 30 TABLET | Refills: 0 | Status: SHIPPED | OUTPATIENT
Start: 2024-07-02 | End: 2024-08-01

## 2024-07-02 NOTE — PATIENT INSTRUCTIONS
Rules:  Limit sweets to one day per month  Limit chips/crackers/pretzels/nuts/popcorn to 150 ivan/day  Eliminate all sugar sweetened beverages (including fruit juice)  Limit restaurants (including fast food and food from a convenience store) to one time every two weeks while in town    Requirements:  Make sure protein intake is at least 75 grams per day  Make sure that fiber intake is at least 22 grams per day  Take one multivitamin every day    Targets:  Count every calorie every day (you can use free amanda such as '[employer]')  Limit calorie intake to 1200 calories/day  Walk 30 minutes daily  Avoid eating 2 hours within bedtime.     Tips:  Do not eat outside of the dining room or the kitchen  Do not eat while watching TV, videos, working on the computer or using a smart phone  Do not eat food out of a multi-serving bag or container.     Phentermine:  Take phentermine 37.5 mg, one-half to one tablet daily as needed for appetite suppression. Take each dose 30-90 min before effect will be needed.    While taking phentermine, check the Blood Pressure every morning and every evening.     If the systolic BP is >155 mmHg, the diastolic BP is >90 mm/Hg or the heart rate is > 100 beats per minute, do not take phentermine that day.    If the systolic BP is consistently >155 mmHg, the diastolic BP is consistently above 90 mm/Hg or the heart rate is consistently > 100 beats per minute, then stop taking phentermine altogether.    If the systolic BP >170 mmHg or the diastolic BP is >100 mmHg (even if it is only once), then phentermine should be stopped altogether without proving that any of these are consistently elevated.    Follow up in 1 month.

## 2024-07-02 NOTE — PROGRESS NOTES
CC -   Fatigue, Obesity    BACKGROUND -   Last visit: 5/2/24  First visit: 5/2/24    Obesity (all weight in lbs)  Initial Ht 63\", Wt 269.2,  BMI 47.69  Began in childhood  HS Grad wt ~180   Lowest   wt 180   Highest  wt ~280  Pattern of wt gain: up and down  Wt change past yr: similar to now  Most wt lost: 40-45 (2018)  Other diets attempted: counted calories (1200 Tashi/day)    Desire to lose weight: 10/10 (never thought about surgery for wt loss)    Initial Diet:    Number of meals per day - 1 (dinner)    Number of snacks per day - 1 (at  night)    Meal volume - 12\" plate,  <1x/wk seconds    Fast food/convenience store - 3x/week    Restaurants (not fast food) - 0-1x/week   Sweets - 7d/week   (cookies, chocolate, lifesavers, occ ice cream)   Chips - 0-1d/week   Crackers/pretzels - 3-4d/week (cheese and crackers)   Nuts - 7d/week (pecans, cashews ~1 handful)   Peanut Butter - 3d/week   Popcorn - 0d/week (1x/m)   Dried fruit - 0d/week   Whole fruit - 7d/week (usu 1 serving)   Breakfast cereal - 1d/week (raisin bran/bran flakes/special k - with whole milk)   Granola/Protein/Energy bar - 2d/week   Sugar sweetened beverages - zero sugar pop/soda (occ regular pepsi), no fruit juice, coffee 2 cups/day - ~2 tbsp/cup 35 Tashi/tbsp and no sugar, black tea with honey and lemon, no energy drinks   Protein - No supplements   Fiber - No supplements     Initial Exercise:    Gym membership - Not currently (gym closed - [employer] - closed)    Walking - 1.5 - 2.5 miles/day almost daily    Running - no    Resistance - no    Aerobic class - yard work     Sleep: poor per pt; trouble falling asleep. ~ 5hrs/night. Feels rested at times and not rested other times.  ______________________    PFSH -  Past Medical History:   Diagnosis Date    Anxiety     Back pain     Depression     Dyslipidemia     Hyperlipidemia     Hypertension     Obesity     Osteoarthritis     Right shoulder pain      Past Surgical History:   Procedure  Admission

## 2024-07-03 ENCOUNTER — OFFICE VISIT (OUTPATIENT)
Dept: FAMILY MEDICINE CLINIC | Age: 59
End: 2024-07-03
Payer: MEDICARE

## 2024-07-03 VITALS
OXYGEN SATURATION: 98 % | HEIGHT: 63 IN | SYSTOLIC BLOOD PRESSURE: 122 MMHG | RESPIRATION RATE: 20 BRPM | WEIGHT: 250 LBS | HEART RATE: 90 BPM | DIASTOLIC BLOOD PRESSURE: 82 MMHG | BODY MASS INDEX: 44.3 KG/M2

## 2024-07-03 DIAGNOSIS — E11.65 TYPE 2 DIABETES MELLITUS WITH HYPERGLYCEMIA, WITHOUT LONG-TERM CURRENT USE OF INSULIN (HCC): ICD-10-CM

## 2024-07-03 DIAGNOSIS — E78.2 MIXED HYPERLIPIDEMIA: ICD-10-CM

## 2024-07-03 DIAGNOSIS — I10 ESSENTIAL HYPERTENSION: Primary | ICD-10-CM

## 2024-07-03 PROCEDURE — 3074F SYST BP LT 130 MM HG: CPT | Performed by: FAMILY MEDICINE

## 2024-07-03 PROCEDURE — 3044F HG A1C LEVEL LT 7.0%: CPT | Performed by: FAMILY MEDICINE

## 2024-07-03 PROCEDURE — 3079F DIAST BP 80-89 MM HG: CPT | Performed by: FAMILY MEDICINE

## 2024-07-03 PROCEDURE — 99214 OFFICE O/P EST MOD 30 MIN: CPT | Performed by: FAMILY MEDICINE

## 2024-07-03 RX ORDER — AMLODIPINE BESYLATE 10 MG/1
10 TABLET ORAL DAILY
Qty: 90 TABLET | Refills: 1
Start: 2024-07-03

## 2024-07-03 RX ORDER — ATORVASTATIN CALCIUM 10 MG/1
10 TABLET, FILM COATED ORAL DAILY
Qty: 90 TABLET | Refills: 1 | Status: SHIPPED | OUTPATIENT
Start: 2024-07-03

## 2024-07-03 ASSESSMENT — ENCOUNTER SYMPTOMS
VOMITING: 0
EYE REDNESS: 1
NAUSEA: 0
SHORTNESS OF BREATH: 0
DIARRHEA: 0
EYE PAIN: 1

## 2024-07-03 NOTE — PROGRESS NOTES
4.20 uIU/mL   T4, Free   Result Value Ref Range    T4 Free 1.1 0.9 - 1.7 ng/dL   Hemoglobin A1C   Result Value Ref Range    Hemoglobin A1C 6.9 (H) 4.0 - 5.6 %   Basic Metabolic Panel   Result Value Ref Range    Sodium 142 132 - 146 mmol/L    Potassium 5.6 (H) 3.5 - 5.0 mmol/L    Chloride 104 98 - 107 mmol/L    CO2 22 22 - 29 mmol/L    Anion Gap 16 7 - 16 mmol/L    Glucose 150 (H) 74 - 99 mg/dL    BUN 13 6 - 23 mg/dL    Creatinine 0.9 0.50 - 1.00 mg/dL    Est, Glom Filt Rate 71 >60 mL/min/1.73m2    Calcium 9.6 8.6 - 10.2 mg/dL           Heena was seen today for hypertension.    Diagnoses and all orders for this visit:    Essential hypertension  -     amLODIPine (NORVASC) 10 MG tablet; Take 1 tablet by mouth daily    Type 2 diabetes mellitus with hyperglycemia, without long-term current use of insulin (HCC)  -     Hemoglobin A1C; Future        -     improve diet    Mixed hyperlipidemia  -     atorvastatin (LIPITOR) 10 MG tablet; Take 1 tablet by mouth daily  -     Lipid Panel; Future            Phone/MyChart follow up iftests abnormal.    Return in about 6 months (around 1/3/2025) for Annual Medicare Wellness Visit--30 minutes., or sooner if necessary.            Ngoc Lopez MD     Educational materials and/or home exercises printed for patient's review and wereincluded in patient instructions on his/her After Visit Summary and given to patient at the end of visit.      Counseled regarding above diagnosis, including possible risks andcomplications,  especially if left uncontrolled.    Counseled regarding the possible side effects, risks, benefits and alternatives to treatment; patient and/or guardian verbalizes understanding, agrees, feelscomfortable with and wishes to proceed with above treatment plan.    Advised patient to call with any new medication issues, and read all Rx info from pharmacy to assure aware of all possible risks and side effects ofmedication before taking.    Reviewed age and

## 2024-07-24 ENCOUNTER — OFFICE VISIT (OUTPATIENT)
Dept: SLEEP CENTER | Age: 59
End: 2024-07-24
Payer: MEDICARE

## 2024-07-24 VITALS
RESPIRATION RATE: 17 BRPM | WEIGHT: 251.54 LBS | DIASTOLIC BLOOD PRESSURE: 77 MMHG | BODY MASS INDEX: 44.57 KG/M2 | SYSTOLIC BLOOD PRESSURE: 115 MMHG | HEART RATE: 88 BPM | OXYGEN SATURATION: 97 % | HEIGHT: 63 IN

## 2024-07-24 DIAGNOSIS — G47.33 OSA (OBSTRUCTIVE SLEEP APNEA): Primary | ICD-10-CM

## 2024-07-24 DIAGNOSIS — E66.01 MORBID OBESITY WITH BMI OF 40.0-44.9, ADULT (HCC): ICD-10-CM

## 2024-07-24 PROCEDURE — 3074F SYST BP LT 130 MM HG: CPT | Performed by: STUDENT IN AN ORGANIZED HEALTH CARE EDUCATION/TRAINING PROGRAM

## 2024-07-24 PROCEDURE — 3078F DIAST BP <80 MM HG: CPT | Performed by: STUDENT IN AN ORGANIZED HEALTH CARE EDUCATION/TRAINING PROGRAM

## 2024-07-24 PROCEDURE — 99204 OFFICE O/P NEW MOD 45 MIN: CPT | Performed by: STUDENT IN AN ORGANIZED HEALTH CARE EDUCATION/TRAINING PROGRAM

## 2024-07-24 NOTE — PROGRESS NOTES
BY MOUTH AT BEDTIME AS DIRECETED ON EMPTY STOMACH  2     No current facility-administered medications for this visit.        Objective:     /77 (Site: Right Upper Arm, Position: Sitting, Cuff Size: Large Adult)   Pulse 88   Resp 17   Ht 1.6 m (5' 3\")   Wt 114.1 kg (251 lb 8.7 oz)   LMP  (LMP Unknown)   SpO2 97%   BMI 44.56 kg/m²      Physical Exam  HENT:      Nose: Nose normal.   Cardiovascular:      Rate and Rhythm: Normal rate.      Pulses: Normal pulses.   Neurological:      Mental Status: She is alert.                 7/24/2024    11:41 AM   Sleep Medicine   Sitting and reading 1   Watching TV 3   Sitting, inactive in a public place (e.g. a theatre or a meeting) 0   As a passenger in a car for an hour without a break 1   Lying down to rest in the afternoon when circumstances permit 3   Sitting and talking to someone 0   Sitting quietly after a lunch without alcohol 1   In a car, while stopped for a few minutes in traffic 0   [place] Sleepiness Score 9   Neck (Inches) 17.5       The 21st Century Cures Act allowed all electronic protected health information to be disseminated to patients under the Health Insurance Portability and Accountability Act (HIPAA). As such, there may be clinical language in these notes that may unintentionally confuse or offend patients. If this is a concern, please discuss this with your provider. This note may be dictated with vocal recognition software. All grammatical or semantic errors should be considered accordingly.    Level of Service:  Notes Reviewed: 3+ (PCP)  Labs Reviewed: 3+ (CBC, CMP, TSH)    Gerson Rock M.D.  [facility] Sleep Medicine  Diplomate, American Board of Internal Medicine - Sleep Medicine  Diplomate, American Board of Internal Medicine

## 2024-08-01 ENCOUNTER — OFFICE VISIT (OUTPATIENT)
Dept: BARIATRICS/WEIGHT MGMT | Age: 59
End: 2024-08-01
Payer: MEDICARE

## 2024-08-01 VITALS
BODY MASS INDEX: 42.95 KG/M2 | SYSTOLIC BLOOD PRESSURE: 124 MMHG | HEIGHT: 63 IN | DIASTOLIC BLOOD PRESSURE: 76 MMHG | TEMPERATURE: 97.2 F | HEART RATE: 90 BPM | WEIGHT: 242.4 LBS

## 2024-08-01 DIAGNOSIS — R53.82 CHRONIC FATIGUE: Primary | ICD-10-CM

## 2024-08-01 DIAGNOSIS — E66.01 CLASS 3 SEVERE OBESITY DUE TO EXCESS CALORIES WITH SERIOUS COMORBIDITY AND BODY MASS INDEX (BMI) OF 40.0 TO 44.9 IN ADULT (HCC): ICD-10-CM

## 2024-08-01 PROCEDURE — 99211 OFF/OP EST MAY X REQ PHY/QHP: CPT

## 2024-08-01 PROCEDURE — 3078F DIAST BP <80 MM HG: CPT | Performed by: INTERNAL MEDICINE

## 2024-08-01 PROCEDURE — 3074F SYST BP LT 130 MM HG: CPT | Performed by: INTERNAL MEDICINE

## 2024-08-01 PROCEDURE — 99214 OFFICE O/P EST MOD 30 MIN: CPT | Performed by: INTERNAL MEDICINE

## 2024-08-01 RX ORDER — PHENTERMINE HYDROCHLORIDE 37.5 MG/1
TABLET ORAL
Qty: 30 TABLET | Refills: 0 | Status: SHIPPED | OUTPATIENT
Start: 2024-08-01 | End: 2024-08-31

## 2024-08-01 NOTE — PATIENT INSTRUCTIONS
Summary of weight management:          Weight             Date          269.2               5/2/24              Phentermine          253.0               5/30/24            Phentermine #2          250.0               7/2/24              Phentermine #3          242.4          8/1/24          Phentermine     Total weight change to date: -26.8 lbs.  Average daily energy variance:  5/2/2024 - 5/30/2024: -14.6 lbs (77681 Tashi)/27 d = -1893 Tashi/d deficit.  5/30/2024 - 7/2/2024: -3.0 lbs (91000 Tashi)/33 d = -318 Tashi/d deficit.   7/2/2024 - 8/1/2024: - 7.6 lbs (70951 Tashi)30 d = - 886 Tashi/d deficit    ___________________________________      Instructions    Rules:  Limit sweets to one day per month  Limit chips/crackers/pretzels/nuts/popcorn to 150 tashi/day  Eliminate all sugar sweetened beverages (including fruit juice)  Limit restaurants (including fast food and food from a convenience store) to one time every two weeks while in town     Requirements:  Make sure protein intake is at least 75 grams per day  Make sure that fiber intake is at least 22 grams per day  Take one multivitamin every day     Targets:  Count every calorie every day (you can use free amanda such as '[employer]')  Limit calorie intake to 1200 calories/day  Walk 30 minutes daily  Avoid eating 2 hours within bedtime.      Tips:  Do not eat outside of the dining room or the kitchen  Do not eat while watching TV, videos, working on the computer or using a smart phone  Do not eat food out of a multi-serving bag or container.      Phentermine:  Take phentermine 37.5 mg, one-half to one tablet daily as needed for appetite suppression. Take each dose 30-90 min before effect will be needed.     While taking phentermine, check the Blood Pressure every morning and every evening.      If the systolic BP is >155 mmHg, the diastolic BP is >90 mm/Hg or the heart rate is > 100 beats per minute, do not take phentermine that day.     If the systolic BP is consistently >155

## 2024-08-05 ENCOUNTER — INITIAL CONSULT (OUTPATIENT)
Dept: BARIATRICS/WEIGHT MGMT | Age: 59
End: 2024-08-05
Payer: MEDICARE

## 2024-08-05 VITALS — BODY MASS INDEX: 43.77 KG/M2 | HEIGHT: 63 IN | WEIGHT: 247 LBS

## 2024-08-05 DIAGNOSIS — E66.01 MORBID OBESITY DUE TO EXCESS CALORIES (HCC): Primary | ICD-10-CM

## 2024-08-05 DIAGNOSIS — Z71.3 DIETARY COUNSELING: ICD-10-CM

## 2024-08-05 PROCEDURE — 97802 MEDICAL NUTRITION INDIV IN: CPT | Performed by: DIETITIAN, REGISTERED

## 2024-08-05 NOTE — PATIENT INSTRUCTIONS
Tips/Suggestions:  -Count all calories every day - this is SO important!  -Focus on calorie control  -Macronutrient suggestions:  75 grams protein, 40 grams fat, 135 grams carbs    Note:  macronutrients can vary (example - 75 grams protein, 50 grams fat, 110 grams carbs is fine)    For any questions, please call Alirio Sanchez RDN, ld - [phone]

## 2024-08-05 NOTE — PROGRESS NOTES
Medical Nutrition Therapy - Initial     Provided initial weight loss diet counseling to pt.  Pt weighed 247  lbs today, indicating a loss of 22.2 lbs since beginning weight loss plan (269.2 lbs on 5/2/24).  Plan is as follows, per Dr Hinkle:    Rules:  Limit sweets to one day per month  Limit chips/crackers/pretzels/nuts/popcorn to 150 ivan/day  Eliminate all sugar sweetened beverages (including fruit juice)  Limit restaurants (including fast food and food from a convenience store) to one time every two weeks while in town     Requirements:  Make sure protein intake is at least 75 grams per day  Make sure that fiber intake is at least 22 grams per day  Take one multivitamin every day     Targets:  Count every calorie every day (you can use free amanda such as '[employer]')  Limit calorie intake to 1200 calories/day  Walk 30 minutes daily  Avoid eating 2 hours within bedtime.      Medication:        Phentermine - Take per current orders    - Review of pt's current diet plan and level of compliance with plan:    - Education:      - Printed handouts given to pt to support the current diet plan and address any current dietary issues:    - Follow up plan:  All questions answered.  Pt is free to call [phone] for any further questions.  Follow up as needed.    Tips/Suggestions:  -Count all calories every day - this is SO important!  -Focus on calorie control  -Macronutrient suggestions:  75 grams protein, 40 grams fat, 135 grams carbs    Note:  macronutrients can vary (example - 75 grams protein, 50 grams fat, 110 grams carbs is fine)    For any questions, please call Alirio Sanchez RDN, [facility] - [phone]

## 2024-08-29 ENCOUNTER — HOSPITAL ENCOUNTER (OUTPATIENT)
Dept: SLEEP CENTER | Age: 59
Discharge: HOME | End: 2024-08-29
Payer: MEDICARE

## 2024-08-29 DIAGNOSIS — G47.33 OSA (OBSTRUCTIVE SLEEP APNEA): ICD-10-CM

## 2024-08-29 PROCEDURE — 95811 POLYSOM 6/>YRS CPAP 4/> PARM: CPT

## 2024-09-04 ENCOUNTER — OFFICE VISIT (OUTPATIENT)
Dept: BARIATRICS/WEIGHT MGMT | Age: 59
End: 2024-09-04
Payer: MEDICARE

## 2024-09-04 ENCOUNTER — OFFICE VISIT (OUTPATIENT)
Dept: FAMILY MEDICINE CLINIC | Age: 59
End: 2024-09-04
Payer: MEDICARE

## 2024-09-04 VITALS
HEIGHT: 63 IN | TEMPERATURE: 97.2 F | BODY MASS INDEX: 42.03 KG/M2 | SYSTOLIC BLOOD PRESSURE: 146 MMHG | WEIGHT: 237.2 LBS | HEART RATE: 89 BPM | DIASTOLIC BLOOD PRESSURE: 80 MMHG

## 2024-09-04 VITALS
OXYGEN SATURATION: 97 % | BODY MASS INDEX: 41.99 KG/M2 | SYSTOLIC BLOOD PRESSURE: 124 MMHG | HEART RATE: 91 BPM | HEIGHT: 63 IN | WEIGHT: 237 LBS | DIASTOLIC BLOOD PRESSURE: 78 MMHG | RESPIRATION RATE: 20 BRPM

## 2024-09-04 DIAGNOSIS — R53.82 CHRONIC FATIGUE: Primary | ICD-10-CM

## 2024-09-04 DIAGNOSIS — F43.21 GRIEF REACTION: ICD-10-CM

## 2024-09-04 DIAGNOSIS — E66.01 CLASS 3 SEVERE OBESITY DUE TO EXCESS CALORIES WITH SERIOUS COMORBIDITY AND BODY MASS INDEX (BMI) OF 40.0 TO 44.9 IN ADULT (HCC): ICD-10-CM

## 2024-09-04 DIAGNOSIS — R10.13 EPIGASTRIC PAIN: Primary | ICD-10-CM

## 2024-09-04 PROCEDURE — 3078F DIAST BP <80 MM HG: CPT | Performed by: FAMILY MEDICINE

## 2024-09-04 PROCEDURE — 3074F SYST BP LT 130 MM HG: CPT | Performed by: FAMILY MEDICINE

## 2024-09-04 PROCEDURE — 99214 OFFICE O/P EST MOD 30 MIN: CPT | Performed by: FAMILY MEDICINE

## 2024-09-04 PROCEDURE — 99211 OFF/OP EST MAY X REQ PHY/QHP: CPT | Performed by: INTERNAL MEDICINE

## 2024-09-04 RX ORDER — DICYCLOMINE HCL 20 MG
20 TABLET ORAL 3 TIMES DAILY PRN
Qty: 90 TABLET | Refills: 3 | Status: SHIPPED | OUTPATIENT
Start: 2024-09-04

## 2024-09-04 RX ORDER — VALACYCLOVIR HYDROCHLORIDE 1 G/1
2000 TABLET, FILM COATED ORAL DAILY
Qty: 4 TABLET | Refills: 3 | Status: SHIPPED | OUTPATIENT
Start: 2024-09-04

## 2024-09-04 RX ORDER — PHENTERMINE HYDROCHLORIDE 37.5 MG/1
TABLET ORAL
Qty: 30 TABLET | Refills: 0 | Status: SHIPPED | OUTPATIENT
Start: 2024-09-04 | End: 2024-10-04

## 2024-09-04 ASSESSMENT — ENCOUNTER SYMPTOMS
ABDOMINAL PAIN: 1
DIARRHEA: 0
CONSTIPATION: 0
NAUSEA: 0
HEMATOCHEZIA: 0
VOMITING: 0

## 2024-09-04 NOTE — PATIENT INSTRUCTIONS
Rules:  Limit sweets to one day per month  Limit chips/crackers/pretzels/nuts/popcorn to 150 ivan/day  Eliminate all sugar sweetened beverages (including fruit juice)  Limit restaurants (including fast food and food from a convenience store) to one time every two weeks while in town     Requirements:  Make sure protein intake is at least 75 grams per day  Make sure that fiber intake is at least 22 grams per day  Take one multivitamin every day     Targets:  Count every calorie every day (you can use free amanda such as '[employer]')  Limit calorie intake to 1200 calories/day  Walk 30 minutes daily  Avoid eating 2 hours within bedtime.      Tips:  Do not eat outside of the dining room or the kitchen  Do not eat while watching TV, videos, working on the computer or using a smart phone  Do not eat food out of a multi-serving bag or container.      Phentermine:  Take phentermine 37.5 mg, one-half to one tablet daily as needed for appetite suppression. Take each dose 30-90 min before effect will be needed.     While taking phentermine, check the Blood Pressure every morning and every evening.      If the systolic BP is >155 mmHg, the diastolic BP is >90 mm/Hg or the heart rate is > 100 beats per minute, do not take phentermine that day.     If the systolic BP is consistently >155 mmHg, the diastolic BP is consistently above 90 mm/Hg or the heart rate is consistently > 100 beats per minute, then stop taking phentermine altogether.     If the systolic BP >170 mmHg or the diastolic BP is >100 mmHg (even if it is only once), then phentermine should be stopped altogether without proving that any of these are consistently elevated.    Goal weight   For October '24: 237.5 lbs or less;   For November '24: 230.2 lbs or less;  For December '24: 225.3 lbs or less;

## 2024-09-04 NOTE — PROGRESS NOTES
[address], [address]   [place] [address]   [phone]   Ngoc Lopez MD     Patient: Heena Gamboa  Dateof Birth: 1965  Visit Date: 9/4/24    Heena is a 59 y.o. year old female here today for   Chief Complaint   Patient presents with    GI Problem     Loss mother last month this started  lump on top of stomach , wants to  see counselor        HPI  Abdominal Pain  This is a new problem. The current episode started 1 to 4 weeks ago. The onset quality is sudden. The problem occurs intermittently. The problem has been waxing and waning. The pain is located in the epigastric region. The pain is at a severity of 8/10. The pain is severe. The quality of the pain is cramping. The abdominal pain does not radiate. Pertinent negatives include no constipation, diarrhea, dysuria, hematochezia, melena, nausea or vomiting. Exacerbated by: stress. Relieved by: otc Nauseum.   Started after mother passed.      Recent lab results reviewed, including CMP, CBC, TSH, and lipid panel which are remarkable for hyperglycemia.        Review of Systems   Gastrointestinal:  Positive for abdominal pain. Negative for constipation, diarrhea, hematochezia, melena, nausea and vomiting.   Genitourinary:  Negative for dysuria.       Past medical, surgical, social and/or family historyreviewed, updated as needed, and are non-contributory (unless otherwise stated).  Medications, allergies, and problem list also reviewed and updated as needed in patient's record.     Current Outpatient Medications   Medication Sig Dispense Refill    phentermine (ADIPEX-P) 37.5 MG tablet Take half to one tablet a day. 30 tablet 0    valACYclovir (VALTREX) 1 g tablet Take 2 tablets by mouth daily 4 tablet 3    dicyclomine (BENTYL) 20 MG tablet Take 1 tablet by mouth 3 times daily as needed (abdominal spasms) 90 tablet 3    atorvastatin (LIPITOR) 10 MG tablet Take 1 tablet by mouth daily 90 tablet

## 2024-09-04 NOTE — PROGRESS NOTES
CC -   Fatigue, Obesity     BACKGROUND -   Last visit: 8/1/24  First visit: 5/2/24     Obesity (all weight in lbs)  Initial Ht 63\", Wt 269.2,  BMI 47.69  Began in childhood  HS Grad wt ~180   Lowest   wt 180   Highest  wt ~280  Pattern of wt gain: up and down  Wt change past yr: similar to now  Most wt lost: 40-45 (2018)  Other diets attempted: counted calories (1200 Tashi/day)     Desire to lose weight: 10/10 (never thought about surgery for wt loss)     Initial Diet:            Number of meals per day       - 1 (dinner)            Number of snacks per day      - 1 (at  night)            Meal volume                           - 12\" plate,  <1x/wk seconds            Fast food/convenience store  - 3x/week            Restaurants (not fast food)     - 0-1x/week           Sweets                                    - 7d/week   (cookies, chocolate, lifesavers, occ ice cream)           Chips                                      - 0-1d/week           Crackers/pretzels                    - 3-4d/week (cheese and crackers)           Nuts                                        - 7d/week (pecans, cashews ~1 handful)           Peanut Butter                          - 3d/week           Popcorn                                  - 0d/week (1x/m)           Dried fruit                                - 0d/week           Whole fruit                              - 7d/week (usu 1 serving)           Breakfast cereal                      - 1d/week (raisin bran/bran flakes/special k - with whole milk)           Granola/Protein/Energy bar    - 2d/week           Sugar sweetened beverages - zero sugar pop/soda (occ regular pepsi), no fruit juice, coffee 2 cups/day - ~2 tbsp/cup 35 Tashi/tbsp and no sugar, black tea with honey and lemon, no energy drinks           Protein                                    - No supplements           Fiber                                       - No supplements          Initial Exercise:            Gym membership    -

## 2024-09-16 ENCOUNTER — INITIAL CONSULT (OUTPATIENT)
Dept: BARIATRICS/WEIGHT MGMT | Age: 59
End: 2024-09-16
Payer: MEDICARE

## 2024-09-16 VITALS — HEIGHT: 63 IN | BODY MASS INDEX: 42.61 KG/M2 | WEIGHT: 240.5 LBS

## 2024-09-16 DIAGNOSIS — E66.01 MORBID OBESITY DUE TO EXCESS CALORIES (HCC): Primary | ICD-10-CM

## 2024-09-16 DIAGNOSIS — Z71.3 DIETARY COUNSELING: ICD-10-CM

## 2024-09-16 PROCEDURE — 97803 MED NUTRITION INDIV SUBSEQ: CPT | Performed by: DIETITIAN, REGISTERED

## 2024-09-29 DIAGNOSIS — G47.33 OSA (OBSTRUCTIVE SLEEP APNEA): Primary | ICD-10-CM

## 2024-10-01 ENCOUNTER — TELEPHONE (OUTPATIENT)
Dept: SLEEP CENTER | Age: 59
End: 2024-10-01

## 2024-10-02 ENCOUNTER — OFFICE VISIT (OUTPATIENT)
Dept: BARIATRICS/WEIGHT MGMT | Age: 59
End: 2024-10-02
Payer: MEDICARE

## 2024-10-02 VITALS
HEIGHT: 63 IN | WEIGHT: 236 LBS | BODY MASS INDEX: 41.82 KG/M2 | DIASTOLIC BLOOD PRESSURE: 83 MMHG | SYSTOLIC BLOOD PRESSURE: 130 MMHG | TEMPERATURE: 97.4 F | HEART RATE: 83 BPM

## 2024-10-02 DIAGNOSIS — E66.01 CLASS 3 SEVERE OBESITY DUE TO EXCESS CALORIES WITH SERIOUS COMORBIDITY AND BODY MASS INDEX (BMI) OF 40.0 TO 44.9 IN ADULT: ICD-10-CM

## 2024-10-02 DIAGNOSIS — E66.813 CLASS 3 SEVERE OBESITY DUE TO EXCESS CALORIES WITH SERIOUS COMORBIDITY AND BODY MASS INDEX (BMI) OF 40.0 TO 44.9 IN ADULT: ICD-10-CM

## 2024-10-02 DIAGNOSIS — R53.82 CHRONIC FATIGUE: Primary | ICD-10-CM

## 2024-10-02 PROCEDURE — 3075F SYST BP GE 130 - 139MM HG: CPT | Performed by: INTERNAL MEDICINE

## 2024-10-02 PROCEDURE — 3079F DIAST BP 80-89 MM HG: CPT | Performed by: INTERNAL MEDICINE

## 2024-10-02 PROCEDURE — 99214 OFFICE O/P EST MOD 30 MIN: CPT | Performed by: INTERNAL MEDICINE

## 2024-10-02 PROCEDURE — 99211 OFF/OP EST MAY X REQ PHY/QHP: CPT | Performed by: INTERNAL MEDICINE

## 2024-10-02 RX ORDER — TOPIRAMATE 25 MG/1
TABLET, FILM COATED ORAL
Qty: 180 TABLET | Refills: 1 | Status: SHIPPED | OUTPATIENT
Start: 2024-10-02

## 2024-10-02 RX ORDER — PHENTERMINE HYDROCHLORIDE 37.5 MG/1
TABLET ORAL
Qty: 30 TABLET | Refills: 0 | Status: SHIPPED | OUTPATIENT
Start: 2024-10-02 | End: 2024-11-01

## 2024-10-02 NOTE — PROGRESS NOTES
CC -   Fatigue, Obesity     BACKGROUND -   Last visit: 9/4/2024  First visit: 5/2/24     Obesity (all weight in lbs)  Initial Ht 63\", Wt 269.2,  BMI 47.69  Began in childhood  HS Grad wt ~180   Lowest   wt 180   Highest  wt ~280  Pattern of wt gain: up and down  Wt change past yr: similar to now  Most wt lost: 40-45 (2018)  Other diets attempted: counted calories (1200 Tashi/day)     Desire to lose weight: 10/10 (never thought about surgery for wt loss)     Initial Diet:            Number of meals per day       - 1 (dinner)            Number of snacks per day      - 1 (at  night)            Meal volume                           - 12\" plate,  <1x/wk seconds            Fast food/convenience store  - 3x/week            Restaurants (not fast food)     - 0-1x/week           Sweets                                    - 7d/week   (cookies, chocolate, lifesavers, occ ice cream)           Chips                                      - 0-1d/week           Crackers/pretzels                    - 3-4d/week (cheese and crackers)           Nuts                                        - 7d/week (pecans, cashews ~1 handful)           Peanut Butter                          - 3d/week           Popcorn                                  - 0d/week (1x/m)           Dried fruit                                - 0d/week           Whole fruit                              - 7d/week (usu 1 serving)           Breakfast cereal                      - 1d/week (raisin bran/bran flakes/special k - with whole milk)           Granola/Protein/Energy bar    - 2d/week           Sugar sweetened beverages - zero sugar pop/soda (occ regular pepsi), no fruit juice, coffee 2 cups/day - ~2 tbsp/cup 35 Tashi/tbsp and no sugar, black tea with honey and lemon, no energy drinks           Protein                                    - No supplements           Fiber                                       - No supplements          Initial Exercise:            Gym membership    -  Subjective     Patient ID: Johnny Adrian is a 75 y.o. male.    Chief Complaint: Annual Exam    Last seen one year ago. Returns for annual exam and f/u chronic medical conditions. Compliant with daily meds as prescribed, no adverse effects. Home BP consistently controlled based on log in [facility] records. He is feeling well, remains active. Looking forward to a cruise to [place] early next year.     PMH:   Hypertension.  Hyperlipidemia.   Allergic Rhinitis.   BPH.    No Colonoscopy - declines. Cologuard negative 10/20. EKG on chart. Eye exam 4/22. Vaccines reviewed.     PSH: None.     Social: Nonsmoker, 4-6 beers per week, , 3 daughters and 6 grandchildren. Retired , background in construction accounting.     FMH: Early heart disease in father. Thyroid dis, liver cancer and Alzheimer's dementia in mother. Bladder cancer in sister.     NKDA.     Medications: Atorvastatin 10mg daily. Lisinopril HCT 10/12.5mg daily, Xyzal 5mg prn. ASA 81mg three days per week.                         Review of Systems   Constitutional:  Negative for activity change, appetite change, chills, fatigue, fever and unexpected weight change.   HENT:  Negative for nasal congestion, ear pain, hearing loss, postnasal drip, rhinorrhea, sore throat, trouble swallowing and voice change.    Eyes:  Negative for pain, discharge and visual disturbance.   Respiratory:  Negative for apnea, cough, chest tightness, shortness of breath and wheezing.    Cardiovascular:  Negative for chest pain, palpitations and leg swelling.   Gastrointestinal:  Negative for abdominal pain, blood in stool, constipation, diarrhea, nausea and vomiting.   Endocrine: Negative for polydipsia and polyuria.   Genitourinary:  Negative for difficulty urinating, dysuria, frequency, hematuria and urgency.   Musculoskeletal:  Negative for arthralgias, back pain, gait problem, joint swelling, myalgias, neck pain and neck stiffness.  "  Integumentary:  Negative for color change and rash.   Neurological:  Negative for dizziness, seizures, syncope, weakness, numbness and headaches.   Hematological:  Negative for adenopathy. Does not bruise/bleed easily.   Psychiatric/Behavioral:  Negative for agitation, confusion, dysphoric mood and sleep disturbance. The patient is not nervous/anxious.           Objective   Vitals:    11/02/23 0801   BP: 114/68   BP Location: Right arm   Patient Position: Sitting   Pulse: 79   Resp: 18   Temp: 98.7 °F (37.1 °C)   TempSrc: Oral   SpO2: 96%   Weight: 81.1 kg (178 lb 12.7 oz)   Height: 5' 6" (1.676 m)   BMI=28.9  Physical Exam  Constitutional:       General: He is not in acute distress.     Appearance: Normal appearance. He is well-developed. He is not diaphoretic.   HENT:      Head: Normocephalic and atraumatic.      Right Ear: Tympanic membrane, ear canal and external ear normal.      Left Ear: Tympanic membrane, ear canal and external ear normal.      Nose: Nose normal. No congestion.      Mouth/Throat:      Mouth: Mucous membranes are moist.      Pharynx: Oropharynx is clear.   Eyes:      General: No scleral icterus.     Extraocular Movements: Extraocular movements intact.      Conjunctiva/sclera: Conjunctivae normal.      Pupils: Pupils are equal, round, and reactive to light.   Neck:      Thyroid: No thyromegaly.      Vascular: No carotid bruit or JVD.   Cardiovascular:      Rate and Rhythm: Normal rate and regular rhythm.      Pulses: Normal pulses.      Heart sounds: Normal heart sounds. No murmur heard.     No friction rub. No gallop.   Pulmonary:      Effort: Pulmonary effort is normal. No respiratory distress.      Breath sounds: Normal breath sounds. No wheezing, rhonchi or rales.   Abdominal:      General: Bowel sounds are normal. There is no distension.      Palpations: Abdomen is soft. There is no mass.      Tenderness: There is no abdominal tenderness.      Hernia: No hernia is present. "   Musculoskeletal:         General: No tenderness or deformity. Normal range of motion.      Cervical back: Normal range of motion and neck supple.      Right lower leg: No edema.      Left lower leg: No edema.   Lymphadenopathy:      Cervical: No cervical adenopathy.   Skin:     General: Skin is warm and dry.      Findings: No rash.   Neurological:      General: No focal deficit present.      Mental Status: He is alert and oriented to person, place, and time.      Cranial Nerves: No cranial nerve deficit.      Motor: No weakness or abnormal muscle tone.      Coordination: Coordination normal.      Gait: Gait normal.   Psychiatric:         Mood and Affect: Mood and affect normal.         Speech: Speech normal.         Behavior: Behavior normal.         Thought Content: Thought content normal.         Judgment: Judgment normal.       No visits with results within 3 Week(s) from this visit.   Latest known visit with results is:   Lab Visit on 10/20/2022   Component Date Value    WBC 10/20/2022 6.29     RBC 10/20/2022 5.37     Hemoglobin 10/20/2022 17.0     Hematocrit 10/20/2022 50.6     MCV 10/20/2022 94     MCH 10/20/2022 31.7 (H)     MCHC 10/20/2022 33.6     RDW 10/20/2022 11.8     Platelets 10/20/2022 150     MPV 10/20/2022 12.1     Sodium 10/20/2022 137     Potassium 10/20/2022 3.9     Chloride 10/20/2022 101     CO2 10/20/2022 26     Glucose 10/20/2022 99     BUN 10/20/2022 12     Creatinine 10/20/2022 1.0     Calcium 10/20/2022 9.4     Total Protein 10/20/2022 7.3     Albumin 10/20/2022 4.3     Total Bilirubin 10/20/2022 1.0     Alkaline Phosphatase 10/20/2022 61     AST 10/20/2022 19     ALT 10/20/2022 <5 (L)     Anion Gap 10/20/2022 10     eGFR 10/20/2022 >60.0     Cholesterol 10/20/2022 152     Triglycerides 10/20/2022 113     HDL 10/20/2022 50     LDL Cholesterol 10/20/2022 79.4     HDL/Cholesterol Ratio 10/20/2022 32.9     Total Cholesterol/HDL Ra* 10/20/2022 3.0     Non-HDL Cholesterol 10/20/2022 102      PSA, Screen 10/20/2022 1.8         Assessment and Plan     1. Essential hypertension well controlled  -     CBC Auto Differential; Future; Expected date: 11/02/2023  -     Comprehensive Metabolic Panel; Future; Expected date: 11/02/2023  -     Urinalysis  -     lisinopriL-hydrochlorothiazide (PRINZIDE,ZESTORETIC) 10-12.5 mg per tablet; Take 1 tablet by mouth once daily.  Dispense: 90 tablet; Refill: 3    2. Hyperlipidemia, unspecified hyperlipidemia type  -     Lipid Panel; Future; Expected date: 11/02/2023  -     atorvastatin (LIPITOR) 10 MG tablet; Take 1 tablet (10 mg total) by mouth once daily.  Dispense: 90 tablet; Refill: 3    3. Seasonal allergic rhinitis, unspecified trigger  -     levocetirizine (XYZAL) 5 MG tablet; Take 1 tablet (5 mg total) by mouth once daily. For Allergies.  Dispense: 90 tablet; Refill: 1    4. Need for COVID-19 vaccine - COVID booster today.     5. Need for RSV vaccination - RSV vaccine in 1-2 weeks.     Prostate and Colon screening declined.      Follow up in about 1 year (around 11/2/2024).

## 2024-10-02 NOTE — TELEPHONE ENCOUNTER
Call to pt to discuss SS results. Also sent pt a My Chart message. LM that orders were sent to [facility] as to not delay tx and for pt to call the office to schedule a compliance appt. Dr Rock updated

## 2024-10-02 NOTE — PATIENT INSTRUCTIONS
Rules:  Limit sweets to one day per month  Limit chips/crackers/pretzels/nuts/popcorn to 150 ivan/day  Eliminate all sugar sweetened beverages (including fruit juice)  Limit restaurants (including fast food and food from a convenience store) to one time every two weeks while in town     Requirements:  Make sure protein intake is at least 75 grams per day  Make sure that fiber intake is at least 22 grams per day  Take one multivitamin every day     Targets:  Count every calorie every day (you can use free amanda such as 'Notizza')  Limit calorie intake to 1200 calories/day  Walk 30 minutes daily  Avoid eating 2 hours within bedtime.      Tips:  Do not eat outside of the dining room or the kitchen  Do not eat while watching TV, videos, working on the computer or using a smart phone  Do not eat food out of a multi-serving bag or container.      Low calorie non-starchy vegetables:  Food (per 100 g) Calories Fibers T. Carbohydrates Protein Fat Sodium (mg) Potassium (mg)   Green Bell Peppers 10 1.7 4.6 0.9 0.2 3 175   Cucumbers 15 0.5 3.6 0.7 0.1 2 147   Celery 16 1.6 3 0.7 0.2 80 260   Tomatoes 18 1.2 3.9 0.9 0.2 5 237   Carrots 41 2.8 10 0.9 0.2 69 320   Cabbage 25 2.5 6 1.3 0.1 18 170   Broccoli 35 3.3 7.2 2.4 0.4 41 293   Cauliflower 23 2.3 4.1 1.8 0.5 15 142   Iceberg Lettuce 14 1.2 3 0.9 0.1 10 141   Kale 28 2 5.6 1.9 0.4 23 228   Brussel Sprouts 43 3.8 9 3.4 0.3 25 389   Spinach 23 2.4 3.8 3 0.3 70 466   Zucchini 15 1 2.7 1.1 0.4 3 264   Mushroom 28 2.2 5.3 2.2 0.5 2 356   Asparagus 22 2 4.1 2.4 0.2 14 224   Green Beans 35 3.2 7.9 1.9 0.3 1 146   Eggplant 35 2.5 8.7 0.8 0.2 1 123     Phentermine:  Take phentermine 37.5 mg, one-half to one tablet daily as needed for appetite suppression. Take each dose 30-90 min before effect will be needed.     While taking phentermine, check the Blood Pressure every morning and every evening.      If the systolic BP is >155 mmHg, the diastolic BP is >90 mm/Hg or the heart rate is >

## 2024-11-04 ENCOUNTER — INITIAL CONSULT (OUTPATIENT)
Dept: BARIATRICS/WEIGHT MGMT | Age: 59
End: 2024-11-04

## 2024-11-04 ENCOUNTER — OFFICE VISIT (OUTPATIENT)
Dept: BARIATRICS/WEIGHT MGMT | Age: 59
End: 2024-11-04

## 2024-11-04 VITALS
HEART RATE: 75 BPM | WEIGHT: 230.6 LBS | BODY MASS INDEX: 40.86 KG/M2 | DIASTOLIC BLOOD PRESSURE: 86 MMHG | HEIGHT: 63 IN | TEMPERATURE: 97.7 F | SYSTOLIC BLOOD PRESSURE: 137 MMHG

## 2024-11-04 VITALS — BODY MASS INDEX: 40.82 KG/M2 | HEIGHT: 63 IN | WEIGHT: 230.4 LBS

## 2024-11-04 DIAGNOSIS — Z71.3 DIETARY COUNSELING: ICD-10-CM

## 2024-11-04 DIAGNOSIS — E66.813 CLASS 3 SEVERE OBESITY DUE TO EXCESS CALORIES WITH SERIOUS COMORBIDITY AND BODY MASS INDEX (BMI) OF 40.0 TO 44.9 IN ADULT: ICD-10-CM

## 2024-11-04 DIAGNOSIS — G47.33 OSA (OBSTRUCTIVE SLEEP APNEA): Primary | ICD-10-CM

## 2024-11-04 DIAGNOSIS — E66.01 MORBID OBESITY DUE TO EXCESS CALORIES: Primary | ICD-10-CM

## 2024-11-04 DIAGNOSIS — E66.01 CLASS 3 SEVERE OBESITY DUE TO EXCESS CALORIES WITH SERIOUS COMORBIDITY AND BODY MASS INDEX (BMI) OF 40.0 TO 44.9 IN ADULT: ICD-10-CM

## 2024-11-04 PROCEDURE — 3075F SYST BP GE 130 - 139MM HG: CPT | Performed by: INTERNAL MEDICINE

## 2024-11-04 PROCEDURE — 99214 OFFICE O/P EST MOD 30 MIN: CPT | Performed by: INTERNAL MEDICINE

## 2024-11-04 PROCEDURE — 97803 MED NUTRITION INDIV SUBSEQ: CPT | Performed by: DIETITIAN, REGISTERED

## 2024-11-04 PROCEDURE — 99211 OFF/OP EST MAY X REQ PHY/QHP: CPT

## 2024-11-04 PROCEDURE — 3079F DIAST BP 80-89 MM HG: CPT | Performed by: INTERNAL MEDICINE

## 2024-11-04 RX ORDER — PHENTERMINE HYDROCHLORIDE 37.5 MG/1
TABLET ORAL
Qty: 30 TABLET | Refills: 0 | Status: SHIPPED | OUTPATIENT
Start: 2024-11-04 | End: 2024-12-04

## 2024-11-04 NOTE — PROGRESS NOTES
Provided follow up weight loss diet counseling to pt.  Pt weighed 230.4  lbs today, indicating a loss of 38.8 lbs since beginning weight loss plan (269.2 lbs on 5/2/24).  Plan is as follows, per Dr Hinkle:     Rules:  Limit sweets to one day per month  Limit chips/crackers/pretzels/nuts/popcorn to 150 ivan/day  Eliminate all sugar sweetened beverages (including fruit juice)  Limit restaurants (including fast food and food from a convenience store) to one time every two weeks while in town     Requirements:  Make sure protein intake is at least 75 grams per day  Make sure that fiber intake is at least 22 grams per day  Take one multivitamin every day     Targets:  Count every calorie every day (you can use free amanda such as '[employer]')  Limit calorie intake to 1200 calories/day  Walk 30 minutes daily  Avoid eating 2 hours within bedtime.      Medication:          Topiramate, Phentermine - Take per current orders     - Review of pt's current diet plan and level of compliance with plan:  Pt is doing great now.  She is down 38.8 lbs now, which is an excellent loss, since starting her plan on 5/2/24.  Pt expressed satisfaction with her plan.  She has goal of keeping to 1200 calories per day and said she is maintaining well below that level many days.  Pt is limiting sweets quite well, and is careful to limit restaurant use and if she does eat out, makes very careful choices that are low in calories.  Pt is limiting or avoiding salty snacks and avoids sugar sweetened beverages.   Actual protein intake was not discussed in detail today, but, pt continues to use a protein supplement as needed.  Pt is using the CX amanda to track her intake daily.  Pt said she started the Topiramate after her last follow up with Dr Hinkle in October and said that it, in combination with Phentermine, is effective at controlling her appetite and subduing cravings.       - Education:  Discussed the need to continue following all elements

## 2024-11-04 NOTE — PATIENT INSTRUCTIONS
Rules:  Limit sweets to one day per month  Limit chips/crackers/pretzels/nuts/popcorn to 150 ivan/day  Eliminate all sugar sweetened beverages (including fruit juice)  Limit restaurants (including fast food and food from a convenience store) to one time every two weeks while in town     Requirements:  Make sure protein intake is at least 75 grams per day  Make sure that fiber intake is at least 22 grams per day  Take one multivitamin every day     Targets:  Count every calorie every day (you can use free amanda such as 'TM')  Limit calorie intake to 1200 calories/day  Walk 30 minutes daily  Avoid eating 2 hours within bedtime.      Tips:  Do not eat outside of the dining room or the kitchen  Do not eat while watching TV, videos, working on the computer or using a smart phone  Do not eat food out of a multi-serving bag or container.      Topiramate:  Continue Topiramate two tablets daily in the evening.    Phentermine:  Take phentermine 37.5 mg, one-half to one tablet daily as needed for appetite suppression. Take each dose 30-90 min before effect will be needed.     While taking phentermine, check the Blood Pressure every morning and every evening.      If the systolic BP is >155 mmHg, the diastolic BP is >90 mm/Hg or the heart rate is > 100 beats per minute, do not take phentermine that day.     If the systolic BP is consistently >155 mmHg, the diastolic BP is consistently above 90 mm/Hg or the heart rate is consistently > 100 beats per minute, then stop taking phentermine altogether.     If the systolic BP >170 mmHg or the diastolic BP is >100 mmHg (even if it is only once), then phentermine should be stopped altogether without proving that any of these are consistently elevated.    Goal weight   For December '24: 225.3 lbs or less;  For January '25:     224.2 lbs or less;   For February '25 : 219.0 lbs or less;    Follow up in 1 month

## 2024-11-04 NOTE — PATIENT INSTRUCTIONS
Tips/Suggestions:    -Consider using pasta made with [employer] root - Miracle Noodles for example  - very low calorie and high fiber  -Look for substitutes to use when cooking  -As able, count all calories every day  -Limit to 1200 calories per day  -Be sure to increase fiber intake  -Exercise - do something you like to do to stay active     Contact:  Alirio Sanchez RDN, ld - [phone]

## 2024-11-11 DIAGNOSIS — I10 ESSENTIAL HYPERTENSION: ICD-10-CM

## 2024-11-11 NOTE — TELEPHONE ENCOUNTER
Name of Medication(s) Requested:  Requested Prescriptions     Pending Prescriptions Disp Refills    amLODIPine (NORVASC) 10 MG tablet [Pharmacy Med Name: amLODIPine Besylate 10 MG Oral Tablet] 90 tablet 0     Sig: Take 1 tablet by mouth once daily       Medication is on current medication list Yes    Dosage and directions were verified? Yes    Quantity verified: 90 day supply     Pharmacy Verified?  Yes    Last Appointment:  9/4/2024    Future appts:  Future Appointments   Date Time Provider Department Center   12/4/2024 11:30 AM Kylee Hinkle MD S[facility]   1/6/2025  1:30 PM Ngoc Lopez MD [facility]   1/7/2025  3:15 PM Kylee Hinkle MD [facility]   2/4/2025  3:00 PM Kylee Hinkle MD [facility]   3/4/2025  1:00 PM Kylee Hinkle MD [facility]        (If no appt send self scheduling link. .REFILLAPPT)  Scheduling request sent?     [] Yes  [x] No    Does patient need updated?  [] Yes  [x] No

## 2024-11-12 RX ORDER — AMLODIPINE BESYLATE 10 MG/1
10 TABLET ORAL DAILY
Qty: 90 TABLET | Refills: 1 | Status: SHIPPED | OUTPATIENT
Start: 2024-11-12

## 2024-12-04 ENCOUNTER — OFFICE VISIT (OUTPATIENT)
Dept: BARIATRICS/WEIGHT MGMT | Age: 59
End: 2024-12-04
Payer: MEDICARE

## 2024-12-04 VITALS
HEIGHT: 63 IN | WEIGHT: 230.4 LBS | SYSTOLIC BLOOD PRESSURE: 107 MMHG | TEMPERATURE: 97 F | BODY MASS INDEX: 40.82 KG/M2 | DIASTOLIC BLOOD PRESSURE: 68 MMHG | HEART RATE: 95 BPM

## 2024-12-04 DIAGNOSIS — G47.33 OSA (OBSTRUCTIVE SLEEP APNEA): Primary | ICD-10-CM

## 2024-12-04 DIAGNOSIS — E66.01 CLASS 3 SEVERE OBESITY DUE TO EXCESS CALORIES WITH SERIOUS COMORBIDITY AND BODY MASS INDEX (BMI) OF 40.0 TO 44.9 IN ADULT: ICD-10-CM

## 2024-12-04 DIAGNOSIS — E66.813 CLASS 3 SEVERE OBESITY DUE TO EXCESS CALORIES WITH SERIOUS COMORBIDITY AND BODY MASS INDEX (BMI) OF 40.0 TO 44.9 IN ADULT: ICD-10-CM

## 2024-12-04 PROCEDURE — 99211 OFF/OP EST MAY X REQ PHY/QHP: CPT | Performed by: INTERNAL MEDICINE

## 2024-12-04 PROCEDURE — 1036F TOBACCO NON-USER: CPT | Performed by: INTERNAL MEDICINE

## 2024-12-04 PROCEDURE — 99214 OFFICE O/P EST MOD 30 MIN: CPT | Performed by: INTERNAL MEDICINE

## 2024-12-04 PROCEDURE — 3078F DIAST BP <80 MM HG: CPT | Performed by: INTERNAL MEDICINE

## 2024-12-04 PROCEDURE — 3017F COLORECTAL CA SCREEN DOC REV: CPT | Performed by: INTERNAL MEDICINE

## 2024-12-04 PROCEDURE — G8428 CUR MEDS NOT DOCUMENT: HCPCS | Performed by: INTERNAL MEDICINE

## 2024-12-04 PROCEDURE — 3074F SYST BP LT 130 MM HG: CPT | Performed by: INTERNAL MEDICINE

## 2024-12-04 PROCEDURE — G8484 FLU IMMUNIZE NO ADMIN: HCPCS | Performed by: INTERNAL MEDICINE

## 2024-12-04 PROCEDURE — G8417 CALC BMI ABV UP PARAM F/U: HCPCS | Performed by: INTERNAL MEDICINE

## 2024-12-04 RX ORDER — TOPIRAMATE 50 MG/1
TABLET, FILM COATED ORAL
Qty: 180 TABLET | Refills: 1 | Status: SHIPPED | OUTPATIENT
Start: 2024-12-04

## 2024-12-04 NOTE — PROGRESS NOTES
not daily, has not been at home as much.    Assessment    - Was improving, has been slow, but plans to get back on low calorie diet. We are unable to continue Phentermine at this time (<5% weight loss in the last 3 months). She would like to continue Topiramate to help with appetite suppression, and go up on dose. Watch for s/e. Would like to follow up in 1 month.    Plan                 -   Rules:  Limit sweets to one day per month  Limit chips/crackers/pretzels/nuts/popcorn to 150 ivan/day  Eliminate all sugar sweetened beverages (including fruit juice)  Limit restaurants (including fast food and food from a convenience store) to one time every two weeks while in town     Requirements:  Make sure protein intake is at least 75 grams per day  Make sure that fiber intake is at least 22 grams per day  Take one multivitamin every day     Targets:  Count every calorie every day (you can use free amanda such as '[employer]')  Limit calorie intake to 1200 calories/day  Walk 30 minutes daily  Avoid eating 2 hours within bedtime.      Tips:  Do not eat outside of the dining room or the kitchen  Do not eat while watching TV, videos, working on the computer or using a smart phone  Do not eat food out of a multi-serving bag or container.      Topiramate:  Increase Topiramate to 50 mg by mouth twice a day. With current tablets, take 2 tablets twice a day, and with new refill take 1 tablet twice a day.    Follow up in 1 month.    Medication management: Topiramate.    Kylee Hinkle MD  Internal Medicine/Obesity Medicine  12/4/2024.

## 2024-12-04 NOTE — PATIENT INSTRUCTIONS
Rules:  Limit sweets to one day per month  Limit chips/crackers/pretzels/nuts/popcorn to 150 ivan/day  Eliminate all sugar sweetened beverages (including fruit juice)  Limit restaurants (including fast food and food from a convenience store) to one time every two weeks while in town     Requirements:  Make sure protein intake is at least 75 grams per day  Make sure that fiber intake is at least 22 grams per day  Take one multivitamin every day     Targets:  Count every calorie every day (you can use free amanda such as '[employer]')  Limit calorie intake to 1200 calories/day  Walk 30 minutes daily  Avoid eating 2 hours within bedtime.      Tips:  Do not eat outside of the dining room or the kitchen  Do not eat while watching TV, videos, working on the computer or using a smart phone  Do not eat food out of a multi-serving bag or container.      Topiramate:  Increase Topiramate to 50 mg by mouth twice a day. With current tablets, take 2 tablets twice a day, and with new refill take 1 tablet twice a day.    Follow up in 1 month.

## 2025-01-16 DIAGNOSIS — E87.6 HYPOKALEMIA: ICD-10-CM

## 2025-01-16 DIAGNOSIS — E78.2 MIXED HYPERLIPIDEMIA: ICD-10-CM

## 2025-01-16 DIAGNOSIS — E11.65 TYPE 2 DIABETES MELLITUS WITH HYPERGLYCEMIA, WITHOUT LONG-TERM CURRENT USE OF INSULIN (HCC): ICD-10-CM

## 2025-01-16 LAB
ALBUMIN: 4.1 G/DL (ref 3.5–5.2)
ALP BLD-CCNC: 74 U/L (ref 35–104)
ALT SERPL-CCNC: 14 U/L (ref 0–32)
ANION GAP SERPL CALCULATED.3IONS-SCNC: 14 MMOL/L (ref 7–16)
AST SERPL-CCNC: 18 U/L (ref 0–31)
BILIRUB SERPL-MCNC: 0.3 MG/DL (ref 0–1.2)
BUN BLDV-MCNC: 10 MG/DL (ref 6–23)
CALCIUM SERPL-MCNC: 9.6 MG/DL (ref 8.6–10.2)
CHLORIDE BLD-SCNC: 108 MMOL/L (ref 98–107)
CHOLESTEROL, TOTAL: 149 MG/DL
CO2: 19 MMOL/L (ref 22–29)
CREAT SERPL-MCNC: 0.9 MG/DL (ref 0.5–1)
GFR, ESTIMATED: 78 ML/MIN/1.73M2
GLUCOSE BLD-MCNC: 120 MG/DL (ref 74–99)
HBA1C MFR BLD: 5.9 % (ref 4–5.6)
HDLC SERPL-MCNC: 62 MG/DL
LDL CHOLESTEROL: 70 MG/DL
POTASSIUM SERPL-SCNC: 4.3 MMOL/L (ref 3.5–5)
SODIUM BLD-SCNC: 141 MMOL/L (ref 132–146)
TOTAL PROTEIN: 7.1 G/DL (ref 6.4–8.3)
TRIGL SERPL-MCNC: 83 MG/DL
VLDLC SERPL CALC-MCNC: 17 MG/DL

## 2025-01-21 ENCOUNTER — OFFICE VISIT (OUTPATIENT)
Dept: FAMILY MEDICINE CLINIC | Age: 60
End: 2025-01-21
Payer: MEDICARE

## 2025-01-21 VITALS
BODY MASS INDEX: 40.57 KG/M2 | TEMPERATURE: 97.4 F | SYSTOLIC BLOOD PRESSURE: 126 MMHG | OXYGEN SATURATION: 98 % | HEIGHT: 63 IN | DIASTOLIC BLOOD PRESSURE: 72 MMHG | HEART RATE: 94 BPM | WEIGHT: 229 LBS | RESPIRATION RATE: 19 BRPM

## 2025-01-21 DIAGNOSIS — Z01.810 PREOP CARDIOVASCULAR EXAM: ICD-10-CM

## 2025-01-21 DIAGNOSIS — I10 ESSENTIAL HYPERTENSION: Primary | ICD-10-CM

## 2025-01-21 PROCEDURE — 99214 OFFICE O/P EST MOD 30 MIN: CPT | Performed by: FAMILY MEDICINE

## 2025-01-21 PROCEDURE — 3074F SYST BP LT 130 MM HG: CPT | Performed by: FAMILY MEDICINE

## 2025-01-21 PROCEDURE — 3078F DIAST BP <80 MM HG: CPT | Performed by: FAMILY MEDICINE

## 2025-01-21 SDOH — ECONOMIC STABILITY: FOOD INSECURITY: WITHIN THE PAST 12 MONTHS, THE FOOD YOU BOUGHT JUST DIDN'T LAST AND YOU DIDN'T HAVE MONEY TO GET MORE.: NEVER TRUE

## 2025-01-21 SDOH — ECONOMIC STABILITY: FOOD INSECURITY: WITHIN THE PAST 12 MONTHS, YOU WORRIED THAT YOUR FOOD WOULD RUN OUT BEFORE YOU GOT MONEY TO BUY MORE.: NEVER TRUE

## 2025-01-21 ASSESSMENT — PATIENT HEALTH QUESTIONNAIRE - PHQ9
SUM OF ALL RESPONSES TO PHQ QUESTIONS 1-9: 2
9. THOUGHTS THAT YOU WOULD BE BETTER OFF DEAD, OR OF HURTING YOURSELF: NOT AT ALL
1. LITTLE INTEREST OR PLEASURE IN DOING THINGS: NOT AT ALL
5. POOR APPETITE OR OVEREATING: NOT AT ALL
3. TROUBLE FALLING OR STAYING ASLEEP: NOT AT ALL
6. FEELING BAD ABOUT YOURSELF - OR THAT YOU ARE A FAILURE OR HAVE LET YOURSELF OR YOUR FAMILY DOWN: NOT AT ALL
2. FEELING DOWN, DEPRESSED OR HOPELESS: NOT AT ALL
10. IF YOU CHECKED OFF ANY PROBLEMS, HOW DIFFICULT HAVE THESE PROBLEMS MADE IT FOR YOU TO DO YOUR WORK, TAKE CARE OF THINGS AT HOME, OR GET ALONG WITH OTHER PEOPLE: NOT DIFFICULT AT ALL
8. MOVING OR SPEAKING SO SLOWLY THAT OTHER PEOPLE COULD HAVE NOTICED. OR THE OPPOSITE, BEING SO FIGETY OR RESTLESS THAT YOU HAVE BEEN MOVING AROUND A LOT MORE THAN USUAL: NOT AT ALL
SUM OF ALL RESPONSES TO PHQ QUESTIONS 1-9: 2
SUM OF ALL RESPONSES TO PHQ9 QUESTIONS 1 & 2: 0
7. TROUBLE CONCENTRATING ON THINGS, SUCH AS READING THE NEWSPAPER OR WATCHING TELEVISION: NOT AT ALL
SUM OF ALL RESPONSES TO PHQ QUESTIONS 1-9: 2
SUM OF ALL RESPONSES TO PHQ QUESTIONS 1-9: 2
4. FEELING TIRED OR HAVING LITTLE ENERGY: MORE THAN HALF THE DAYS

## 2025-01-21 NOTE — PROGRESS NOTES
[address], [address]   [place] [address]   [phone]   Ngoc Lopez MD     Patient: Heena Gamboa  Dateof Birth: 1965  Visit Date: 1/21/25    Heena is a 59 y.o. year old female here today for   Chief Complaint   Patient presents with    Hypertension       HPI  History of Present Illness  The patient is a 59-year-old female who presents for evaluation of hypertension.    She reports no recent episodes of chest pain or shortness of breath. She has not experienced any visual disturbances such as blurriness, spots, or double vision. Additionally, she has not had any recent gastrointestinal symptoms including nausea, vomiting, or diarrhea. There is no dysuria reported. She has lost 45 pounds with the help of phentermine and dietary changes. She has been eating high protein and low carb diet.    She has a history of constipation, which was managed with stool softeners prior to her last surgical procedure. She has a scheduled surgery on 02/17/2025, to be performed by Dr. Faust at [facility].    She has dry hands that are starting to peel and uses [employer] moisturizer.          Recent lab results reviewed, including CMP, CBC, TSH, and lipid panel which are not remarkable.          Past medical, surgical, social and/or family historyreviewed, updated as needed, and are non-contributory (unless otherwise stated).  Medications, allergies, and problem list also reviewed and updated as needed in patient's record.     Current Outpatient Medications   Medication Sig Dispense Refill    amLODIPine (NORVASC) 10 MG tablet Take 1 tablet by mouth daily 90 tablet 1    topiramate (TOPAMAX) 50 MG tablet Start Topiramate 25 mg. Take one tablet daily in the evenings for one week. Then increase to two tablets daily in the evening. 180 tablet 1    valACYclovir (VALTREX) 1 g tablet Take 2 tablets by mouth daily 4 tablet 3    dicyclomine (BENTYL) 20 MG tablet

## 2025-01-22 RX ORDER — AMLODIPINE BESYLATE 10 MG/1
10 TABLET ORAL DAILY
Qty: 90 TABLET | Refills: 1
Start: 2025-01-22

## 2025-01-31 ENCOUNTER — HOSPITAL ENCOUNTER (OUTPATIENT)
Dept: CT IMAGING | Age: 60
Discharge: HOME | End: 2025-01-31
Payer: MEDICARE

## 2025-01-31 ENCOUNTER — HOSPITAL ENCOUNTER (OUTPATIENT)
Age: 60
Discharge: HOME | End: 2025-01-31
Payer: MEDICARE

## 2025-01-31 DIAGNOSIS — M17.12 OSTEOARTHRITIS OF LEFT KNEE, UNSPECIFIED OSTEOARTHRITIS TYPE: ICD-10-CM

## 2025-01-31 LAB
EKG ATRIAL RATE: 86 BPM
EKG P AXIS: 52 DEGREES
EKG P-R INTERVAL: 170 MS
EKG Q-T INTERVAL: 372 MS
EKG QRS DURATION: 96 MS
EKG QTC CALCULATION (BAZETT): 445 MS
EKG R AXIS: -8 DEGREES
EKG T AXIS: 16 DEGREES
EKG VENTRICULAR RATE: 86 BPM

## 2025-01-31 PROCEDURE — 93005 ELECTROCARDIOGRAM TRACING: CPT | Performed by: FAMILY MEDICINE

## 2025-01-31 PROCEDURE — 73700 CT LOWER EXTREMITY W/O DYE: CPT

## 2025-02-02 ENCOUNTER — PATIENT MESSAGE (OUTPATIENT)
Dept: FAMILY MEDICINE CLINIC | Age: 60
End: 2025-02-02

## 2025-02-04 ENCOUNTER — OFFICE VISIT (OUTPATIENT)
Dept: BARIATRICS/WEIGHT MGMT | Age: 60
End: 2025-02-04
Payer: MEDICARE

## 2025-02-04 VITALS
DIASTOLIC BLOOD PRESSURE: 75 MMHG | TEMPERATURE: 97 F | HEART RATE: 79 BPM | BODY MASS INDEX: 40.33 KG/M2 | SYSTOLIC BLOOD PRESSURE: 132 MMHG | WEIGHT: 227.6 LBS | HEIGHT: 63 IN

## 2025-02-04 DIAGNOSIS — G47.33 OSA (OBSTRUCTIVE SLEEP APNEA): Primary | ICD-10-CM

## 2025-02-04 PROCEDURE — 3078F DIAST BP <80 MM HG: CPT | Performed by: INTERNAL MEDICINE

## 2025-02-04 PROCEDURE — 99211 OFF/OP EST MAY X REQ PHY/QHP: CPT

## 2025-02-04 PROCEDURE — 99214 OFFICE O/P EST MOD 30 MIN: CPT | Performed by: INTERNAL MEDICINE

## 2025-02-04 PROCEDURE — 3075F SYST BP GE 130 - 139MM HG: CPT | Performed by: INTERNAL MEDICINE

## 2025-02-04 RX ORDER — TIRZEPATIDE 5 MG/.5ML
5 INJECTION, SOLUTION SUBCUTANEOUS WEEKLY
Qty: 2 ML | Refills: 1 | Status: SHIPPED | OUTPATIENT
Start: 2025-03-04

## 2025-02-04 RX ORDER — TIRZEPATIDE 2.5 MG/.5ML
2.5 INJECTION, SOLUTION SUBCUTANEOUS WEEKLY
Qty: 2 ML | Refills: 0 | Status: SHIPPED | OUTPATIENT
Start: 2025-02-04 | End: 2025-03-03

## 2025-02-04 NOTE — PROGRESS NOTES
Phentermine                          253.0               5/30/24            Phentermine #2                          250.0               7/2/24              Phentermine #3  242.4  8/1/24   Phentermine  #4  237.2  9/4/24   Phentermine #5  236.0  10/2/24 Phentermine #6, Topiramate  230.6  11/4/24  Phentermine #7, Topiramate contd.  230.4  12/4/24 Topiramate  227.6  2/4/25  Zepbound 2.5 -> 5       Total weight change to date: -41.6 lbs.  Average daily energy variance:  5/2/2024 - 5/30/2024: -14.6 lbs (07422 Tashi)/27 d = -1893 Tashi/d deficit.  5/30/2024 - 7/2/2024: -3.0 lbs (52352 Tashi)/33 d = -318 Tashi/d deficit.   7/2/2024 - 8/1/2024: - 7.6 lbs (72529 Tashi)30 d = - 886 Tashi/d deficit  8/1/2024 - 9/4/2024: -5.2 lbs (34317 Tashi)/34 d = -535 Tashi/d deficit.  9/4/2024 - 10/2/2024: -1.2 lbs (4200 Tashi)/28 d = -150 Tashi/d deficit.    10/2/2024 - 11/4/2024: -5.4 lbs (20173 Tashi)/33 d = -572 Tashi/d deficit.   11/4/2024 - 12/4/2024: -0.2 lbs (700 Tashi)/30 d = -23 Tashi/d deficit.   12/4/2024 - 2/4/2025: -2.8 lbs (9800 Tashi)/62 d = -158 Tashi/d deficit.      5/2/24 - 8/1/24 : 269.2 - 242.4 = -10.0% change in body wt in about 3 months  5/30/24 - 9/4/24 : 253 - 237.2 = -6.2% change in body wt in about 3 months.  7/2/24 - 10/2/24 : 250 - 236 = -5.6% change in body wt in about 3 months.  8/1/24 - 11/4/24 : 242.4 - 230.6 = -5% change in body wt in about 3 months.  9/4/24 - 12/4/24 : 237.2 - 230.4 = -2.9% change in body wt in about 3 months.    Patient's estimated daily energy need (DEN) = 1838 Tashi/d = 81858 Tashi/wk   Wt effect of HR foods (Sweets, salty snacks) = 2800 Tashi/wk = 400 Tashi/d= 21.76% DEN = 42 lb/year.     Update:  Most of the time. Had 'I don't care' attitude, but that is change it.  Sweets: ~3x/m, occ ice cream, doughnut  SB: none  RF/FF: RF - usu homemade meatloaf and green beans  Appetite suppressant: Topiramate 50 mg in the evening, feels it is helping appetite suppression; side effects: none.  Protein: Essentials protein and adds whey

## 2025-02-04 NOTE — PATIENT INSTRUCTIONS
Rules:  Limit sweets to one day per month  Limit chips/crackers/pretzels/nuts/popcorn to 150 ivan/day  Eliminate all sugar sweetened beverages (including fruit juice)  Limit restaurants (including fast food and food from a convenience store) to one time every two weeks while in town     Requirements:  Make sure protein intake is at least 75 grams per day  Make sure that fiber intake is at least 22 grams per day  Take one multivitamin every day     Targets:  Count every calorie every day (you can use free amanda such as 'J[employer] The Word')  Limit calorie intake to 1200 calories/day  Walk 30 minutes daily  Avoid eating 2 hours within bedtime.      Tips:  Do not eat outside of the dining room or the kitchen  Do not eat while watching TV, videos, working on the computer or using a smart phone  Do not eat food out of a multi-serving bag or container.     Continue Topiramate. We can plan on tapering if you can start Zepbound.    Tirzepatide (Zepbound):  Start Tirzepatide 2.5 mg under the skin once a week for 4 weeks, and then 5 mg weekly. Look for more information on this medicine online at [email or web address].claudia.com.    Follow up in about 3 months.

## 2025-02-05 ENCOUNTER — TELEPHONE (OUTPATIENT)
Dept: BARIATRICS/WEIGHT MGMT | Age: 60
End: 2025-02-05

## 2025-02-05 NOTE — TELEPHONE ENCOUNTER
Prior authorization approved  Payer: [facility] and [employer] - Medicare Case ID: [id number]  Note from payer: [place] Case: [id number], Status: Approved, Coverage Starts on: 1/1/2025 12:00:00 AM, Coverage Ends on: 2/5/2026 12:00:00 AM.  Approval Details    Authorized from January 1, 2025 to February 5, 2026

## 2025-02-14 ENCOUNTER — HOSPITAL ENCOUNTER (OUTPATIENT)
Dept: PREADMISSION TESTING | Age: 60
Discharge: HOME | End: 2025-02-14
Payer: MEDICARE

## 2025-02-14 ENCOUNTER — ANESTHESIA EVENT (OUTPATIENT)
Dept: OPERATING ROOM | Age: 60
End: 2025-02-14
Payer: MEDICARE

## 2025-02-14 VITALS
HEART RATE: 98 BPM | OXYGEN SATURATION: 97 % | HEIGHT: 63 IN | BODY MASS INDEX: 40.93 KG/M2 | RESPIRATION RATE: 18 BRPM | TEMPERATURE: 97.3 F | DIASTOLIC BLOOD PRESSURE: 72 MMHG | WEIGHT: 231 LBS | SYSTOLIC BLOOD PRESSURE: 132 MMHG

## 2025-02-14 LAB
ANION GAP SERPL CALCULATED.3IONS-SCNC: 12 MMOL/L (ref 7–16)
BUN SERPL-MCNC: 15 MG/DL (ref 6–23)
CALCIUM SERPL-MCNC: 9.2 MG/DL (ref 8.6–10.2)
CHLORIDE SERPL-SCNC: 104 MMOL/L (ref 98–107)
CO2 SERPL-SCNC: 23 MMOL/L (ref 22–29)
CREAT SERPL-MCNC: 0.7 MG/DL (ref 0.5–1)
ERYTHROCYTE [DISTWIDTH] IN BLOOD BY AUTOMATED COUNT: 17.8 % (ref 11.5–15)
GFR, ESTIMATED: >90 ML/MIN/1.73M2
GLUCOSE SERPL-MCNC: 141 MG/DL (ref 74–99)
HCT VFR BLD AUTO: 40.3 % (ref 34–48)
HGB BLD-MCNC: 12.2 G/DL (ref 11.5–15.5)
MCH RBC QN AUTO: 26.1 PG (ref 26–35)
MCHC RBC AUTO-ENTMCNC: 30.3 G/DL (ref 32–34.5)
MCV RBC AUTO: 86.3 FL (ref 80–99.9)
PLATELET # BLD AUTO: 241 K/UL (ref 130–450)
PMV BLD AUTO: 10.8 FL (ref 7–12)
POTASSIUM SERPL-SCNC: 4.4 MMOL/L (ref 3.5–5)
RBC # BLD AUTO: 4.67 M/UL (ref 3.5–5.5)
SODIUM SERPL-SCNC: 139 MMOL/L (ref 132–146)
WBC OTHER # BLD: 5.9 K/UL (ref 4.5–11.5)

## 2025-02-14 PROCEDURE — 36415 COLL VENOUS BLD VENIPUNCTURE: CPT

## 2025-02-14 PROCEDURE — 85027 COMPLETE CBC AUTOMATED: CPT

## 2025-02-14 PROCEDURE — 80048 BASIC METABOLIC PNL TOTAL CA: CPT

## 2025-02-14 PROCEDURE — 87081 CULTURE SCREEN ONLY: CPT

## 2025-02-14 RX ORDER — FENTANYL CITRATE 50 UG/ML
100 INJECTION, SOLUTION INTRAMUSCULAR; INTRAVENOUS ONCE
OUTPATIENT
Start: 2025-02-14 | End: 2025-02-14

## 2025-02-14 RX ORDER — MIDAZOLAM HYDROCHLORIDE 2 MG/2ML
2 INJECTION, SOLUTION INTRAMUSCULAR; INTRAVENOUS ONCE
OUTPATIENT
Start: 2025-02-14 | End: 2025-02-14

## 2025-02-14 RX ORDER — ROPIVACAINE HYDROCHLORIDE 5 MG/ML
30 INJECTION, SOLUTION EPIDURAL; INFILTRATION; PERINEURAL ONCE
OUTPATIENT
Start: 2025-02-14 | End: 2025-02-14

## 2025-02-14 ASSESSMENT — PROMIS GLOBAL HEALTH SCALE
IN GENERAL, WOULD YOU SAY YOUR QUALITY OF LIFE IS...[ON A SCALE OF 1 (POOR) TO 5 (EXCELLENT)]: VERY GOOD
SUM OF RESPONSES TO QUESTIONS 2, 4, 5, & 10: 11
IN THE PAST 7 DAYS, HOW OFTEN HAVE YOU BEEN BOTHERED BY EMOTIONAL PROBLEMS, SUCH AS FEELING ANXIOUS, DEPRESSED, OR IRRITABLE [ON A SCALE FROM 1 (NEVER) TO 5 (ALWAYS)]?: OFTEN
IN THE PAST 7 DAYS, HOW WOULD YOU RATE YOUR FATIGUE ON AVERAGE [ON A SCALE FROM 1 (NONE) TO 5 (VERY SEVERE)]?: MODERATE
IN THE PAST 7 DAYS, HOW WOULD YOU RATE YOUR PAIN ON AVERAGE [ON A SCALE FROM 0 (NO PAIN) TO 10 (WORST IMAGINABLE PAIN)]?: 7
SUM OF RESPONSES TO QUESTIONS 3, 6, 7, & 8: 17
IN GENERAL, WOULD YOU SAY YOUR HEALTH IS...[ON A SCALE OF 1 (POOR) TO 5 (EXCELLENT)]: GOOD
IN GENERAL, PLEASE RATE HOW WELL YOU CARRY OUT YOUR USUAL SOCIAL ACTIVITIES (INCLUDES ACTIVITIES AT HOME, AT WORK, AND IN YOUR COMMUNITY, AND RESPONSIBILITIES AS A PARENT, CHILD, SPOUSE, EMPLOYEE, FRIEND, ETC) [ON A SCALE OF 1 (POOR) TO 5 (EXCELLENT)]?: GOOD
IN GENERAL, HOW WOULD YOU RATE YOUR MENTAL HEALTH, INCLUDING YOUR MOOD AND YOUR ABILITY TO THINK [ON A SCALE OF 1 (POOR) TO 5 (EXCELLENT)]?: FAIR
IN GENERAL, HOW WOULD YOU RATE YOUR PHYSICAL HEALTH [ON A SCALE OF 1 (POOR) TO 5 (EXCELLENT)]?: GOOD
WHO IS THE PERSON COMPLETING THE PROMIS V1.1 SURVEY?: SELF
IN GENERAL, HOW WOULD YOU RATE YOUR SATISFACTION WITH YOUR SOCIAL ACTIVITIES AND RELATIONSHIPS [ON A SCALE OF 1 (POOR) TO 5 (EXCELLENT)]?: GOOD
TO WHAT EXTENT ARE YOU ABLE TO CARRY OUT YOUR EVERYDAY PHYSICAL ACTIVITIES SUCH AS WALKING, CLIMBING STAIRS, CARRYING GROCERIES, OR MOVING A CHAIR [ON A SCALE OF 1 (NOT AT ALL) TO 5 (COMPLETELY)]?: MOSTLY
HOW IS THE PROMIS V1.1 BEING ADMINISTERED?: ELECTRONIC

## 2025-02-14 ASSESSMENT — KOOS JR
GOING UP OR DOWN STAIRS: EXTREME
TWISING OR PIVOTING ON KNEE: EXTREME
KOOS JR TOTAL INTERVAL SCORE: 34.174
RISING FROM SITTING: MODERATE
STANDING UPRIGHT: SEVERE
HOW SEVERE IS YOUR KNEE STIFFNESS AFTER FIRST WAKING IN MORNING: SEVERE
STRAIGHTENING KNEE FULLY: SEVERE
BENDING TO THE FLOOR TO PICK UP OBJECT: MODERATE

## 2025-02-14 NOTE — DISCHARGE INSTRUCTIONS
Naeem Ford, RN  Registered Nurse     Progress Notes     Signed     Date of Service: 2/14/2025  8:00 AM     Signed         [facility] PRE-ADMISSION TESTING INSTRUCTIONS     The Preadmission Testing patient is instructed accordingly using the following criteria (check applicable):     ARRIVAL INSTRUCTIONS: 1130  [x] Parking the day of Surgery is located in the Main Entrance lot.  Upon entering the door, make an immediate right to the surgery reception desk     [x] Bring photo ID and insurance card     [x] Please be sure to arrange for responsible adult to provide transportation to and from the hospital     [x] Please arrange for responsible adult to be with you for the 24 hour period post procedure due to having anesthesia     [x] If you awake am of surgery not feeling well or have temperature >100 please call [phone]     GENERAL INSTRUCTIONS:     [x] Follow instructions for hydration that have been provided to you at your Pre-Admission Visit. Solid food until midnight then clear liquids. No gum, candy or mints.     [x] You may brush your teeth     [x] Take medications as instructed              Buspar              Klonipin if needed              Wellbutrin              Cymbalta              Rexulti              Amlodipine           [x] Stop herbal supplements and vitamins 5 days prior to procedure     [x] Follow preop dosing of blood thinners per physician instructions     [x] No tobacco products within 24 hours of surgery      [x] No alcohol or illegal drug use within 24 hours of surgery.     [x] Jewelry, body piercing's, eyeglasses, contact lenses and dentures are not permitted into surgery (bring cases)                            [x] Please do not wear any nail polish, make up or hair products on the day of surgery     [x] You can expect a call the business day prior to procedure to notify you if your arrival time changes     [x] If you receive a survey after surgery we would

## 2025-02-14 NOTE — PROGRESS NOTES
[facility] PRE-ADMISSION TESTING INSTRUCTIONS    The Preadmission Testing patient is instructed accordingly using the following criteria (check applicable):    ARRIVAL INSTRUCTIONS: 1130  [x] Parking the day of Surgery is located in the Main Entrance lot.  Upon entering the door, make an immediate right to the surgery reception desk    [x] Bring photo ID and insurance card    [x] Please be sure to arrange for responsible adult to provide transportation to and from the hospital    [x] Please arrange for responsible adult to be with you for the 24 hour period post procedure due to having anesthesia    [x] If you awake am of surgery not feeling well or have temperature >100 please call [phone]    GENERAL INSTRUCTIONS:    [x] Follow instructions for hydration that have been provided to you at your Pre-Admission Visit. Solid food until midnight then clear liquids. No gum, candy or mints.    [x] You may brush your teeth    [x] Take medications as instructed   Buspar   Klonipin if needed   Wellbutrin   Cymbalta   Rexulti   Amlodipine     [x] Stop herbal supplements and vitamins 5 days prior to procedure    [x] Follow preop dosing of blood thinners per physician instructions    [x] No tobacco products within 24 hours of surgery     [x] No alcohol or illegal drug use within 24 hours of surgery.    [x] Jewelry, body piercing's, eyeglasses, contact lenses and dentures are not permitted into surgery (bring cases)      [x] Please do not wear any nail polish, make up or hair products on the day of surgery    [x] You can expect a call the business day prior to procedure to notify you if your arrival time changes    [x] If you receive a survey after surgery we would greatly appreciate your comments    [x]  The Outpatient Pharmacy is available to fill your prescription here on your day of surgery, ask your preop nurse for details    [] Other instructions

## 2025-02-14 NOTE — ANESTHESIA PRE PROCEDURE
Department of Anesthesiology  Preprocedure Note       Name:  Heena Gamboa   Age:  59 y.o.  :  1965                                          MRN:  [id number]         Date:  2025      Surgeon: Surgeon(s):  Nima Holguin MD    Procedure: Procedure(s):  LEFT KNEE LINDA ROBOTIC ASSISTED TOTAL ARTHROPLASTY   ++LO++   ++PNB++    Medications prior to admission:   Prior to Admission medications    Medication Sig Start Date End Date Taking? Authorizing Provider   tirzepatide-weight management (ZEPBOUND) 5 MG/0.5ML SOAJ subCUTAneous auto-injector pen Inject 5 mg into the skin once a week 3/4/25   Kylee Hinkle MD   tirzepatide-weight management (ZEPBOUND) 2.5 MG/0.5ML SOAJ subCUTAneous auto-injector pen Inject 2.5 mg into the skin once a week for 27 days 2/4/25 3/3/25  Kylee Hinkle MD   amLODIPine (NORVASC) 10 MG tablet Take 1 tablet by mouth daily 25   Ngoc Lopez MD   topiramate (TOPAMAX) 50 MG tablet Start Topiramate 25 mg. Take one tablet daily in the evenings for one week. Then increase to two tablets daily in the evening. 24   Kylee Hinkle MD   valACYclovir (VALTREX) 1 g tablet Take 2 tablets by mouth daily 24   Ngoc Lopez MD   dicyclomine (BENTYL) 20 MG tablet Take 1 tablet by mouth 3 times daily as needed (abdominal spasms) 24   Ngoc Lopez MD   atorvastatin (LIPITOR) 10 MG tablet Take 1 tablet by mouth daily 7/3/24   Ngoc Lopez MD   REXULTI 1 MG TABS tablet Take 1 tablet by mouth daily 24   Juan Jaime MD   clonazePAM (KLONOPIN) 0.5 MG tablet Take 1 tablet by mouth 2 times daily as needed. 24   Juan Jaime MD   busPIRone (BUSPAR) 10 MG tablet Take 2 tablets by mouth 3 times daily    Juan Jaime MD   prednisoLONE acetate (PRED FORTE) 1 % ophthalmic suspension as needed  21   Provider, Juan, MD   buPROPion (WELLBUTRIN XL) 300 MG extended release

## 2025-02-15 LAB
MICROORGANISM SPEC CULT: NORMAL
SPECIMEN DESCRIPTION: NORMAL

## 2025-02-20 NOTE — H&P
History and Physical  K. Ren Holguin MD    Chief Complaint     Heena returns for follow up of left knee.  Date of last x-ray was 01/04/2024.  Her current pain level is a 7/10.  She reports symptoms of swelling.  She is taking medication for pain Tylenol/MOtrin Dual Action.   Patient complains her knee constantly aches.  It is worse with twisting or using stairs.  She is requesting injection today.  She states the gel injection didn't help and would like to talk more about her options in the future.   She arrived today with no assistance.        History of present illness   Heena returns to the office for follow up regarding her left knee. She says that the cortisone injection given 3 months ago helped for a little while. She continues to have pain and instability. The pain has become a significant quality of life issue. She would like to discuss a TKA. She has lost 40lbs.     Past medical history is not significant.   Currently pain is 7 out of 10.  Pain is localized to the anterior knee, medial knee. Symptoms include instability, stiffness, swelling, night pain. Pain is worse with activity, stairs, walking and improves with rest.  Previous and current medications include NSAIDS, tylenol  Previous and current treatments include cortisone injection, Durolane injection, home exercise program, brace, ice, knee sleeve    Physical Exam   Awake, alert, oriented x3  No acute distress  Morbidly Obese  Eyes appear Normal  No obvious abnormalities  No lower extremity edema present  Respiratory effort: non-labored  Skin Appearance: Normal    Left Knee Exam   Skin Exam:   skin intact  Effusion:   trace  Alignment:   varus  ROM:   0-120°   Tenderness:   anterior knee, medial joint line  Laxity with varus/valgus stress:   6-9 degrees  Neurovascular SILT L4-S1, TA/EHL/GS intact, +2 DP warm well perfused, calf soft and nontender    Left Hip Exam   Painful ROM:   no    Gait:   cautious      X-Ray Findings December 12, 2024  Xray 4 views left knee were obtained at [facility] and reviewed in the office today.  They show severe tricompartmental degenerative changes with bone on bone in the medial and patellofemoral joint, peripheral osteophytes, subchondral sclerosis and varus alignment.       Past Medical History - reviewed  Arthritis  Depression    Surgical History - reviewed  Rotator cuff repair (bilateral)  Arthroscopic knee (right)  Right PMM/ PLM/ KSK  R TSA 11/14/23 mpm    Social History - reviewed  Hand dominance: right  Occupation: retired    Risk Factors - reviewed  Patient had a flu shot in the last 12 months? yes  The patient is 65 years or older and has had a pneumonia vaccine: n/a  Patient has an implant none  Tobacco status: never smoker  Alcohol use: does drink alcohol, drinks occasionally  Does patient exercise? yes  How often does patient exercise? 1-3 times per week  In the past 12 months, have you fallen? yes  Have you had cervical cancer screening in the last 12 months ? n/a  Have you had breast cancer screening in the last 12 months?   n/a  Have you had colorectal cancer screening in the last 12 months?   no        Current Medications (including meds started today):   mirtazapine 7.5 mg tablet (mirtazapine)   cholecalciferol (vitamin D3) 50 mcg (2,000 unit) capsule (cholecalciferol (vitamin d3))   duloxetine 60 mg capsule,delayed release (duloxetine)   methylprednisolone 4 mg tablets in a dose pack (methylprednisolone)   bupropion HCl  mg 24 hr tablet, extended release (bupropion hcl)   potassium chloride ER 10 mEq tablet,extended release(part/cryst) (potassium chloride)   bupropion HCl  mg 24 hr tablet, extended release (bupropion hcl)   cyclobenzaprine 5 mg tablet (cyclobenzaprine)   buspirone 15 mg tablet (buspirone)   aspirin 325 mg tablet,delayed release (aspirin)   clonazepam 0.5 mg tablet (clonazepam)   buspirone 10 mg tablet (buspirone)   zolpidem 10 mg tablet (zolpidem)   amlodipine  10 mg tablet (amlodipine)   prednisolone acetate 1 % eye drops,suspension (prednisolone acetate)   atorvastatin 10 mg tablet (atorvastatin)   clonazepam 1 mg tablet (clonazepam)   hydrocodone 5 mg-acetaminophen 325 mg tablet (hydrocodone-acetaminophen)   hydrocodone 5 mg-acetaminophen 325 mg tablet (hydrocodone-acetaminophen) Take 1 tablet by mouth every four to six hours as needed for pain   Ecotrin 325 mg tablet,delayed release (DR/EC) (aspirin) Take 1 tablet by mouth once a day Take 1 tablet daily for DVT prophylaxis (prevention of blood clots)  zolpidem 10 mg tablet (zolpidem) Take 0.5-1 tablet by mouth every night as directed   gabapentin 300 mg capsule (gabapentin) Take 1 capsule by mouth three times a day   clonazepam 1 mg tablet (clonazepam) Take 1 tablet by mouth three times a day as needed   bupropion HCl 300 mg tablet extended release 24 hr (bupropion hcl) Take 1 tablet by mouth once a day   duloxetine 60 mg capsule,delayed release(DR/EC) (duloxetine) Take 2 capsule by mouth once a day as directed   atorvastatin 10 mg tablet (atorvastatin) Take 1 tablet by mouth once a day   cyclobenzaprine 5 mg tablet (cyclobenzaprine) Take 2 tablet by mouth every night as needed   diclofenac sodium 50 mg tablet,delayed release (DR/EC) (diclofenac sodium) Take 1 tablet by mouth twice a day       Current Allergies (reviewed this update):  No known allergies      Vital Signs   Weight: 231.00 lbs.    (104.78 kg.)  Height: 63 in.    (160.02 cm.)  Body Mass Index: 40.92  Body Surface Area: 2.06 m2      Review of Systems   General:  Patient denies sweats, weight loss, fevers, chills, fatigue.   Eyes:  Patient denies eye irritation, vision loss - 1 eye, discharge, blurring, vision loss - both eyes.   ENT:  Patient denies decreased hearing, difficulty swallowing.   Cardiovascular:  Patient denies chest pain or discomfort, racing / skipping heartbeats, swelling of hands or feet, difficulty breathing while lying down,

## 2025-02-21 ENCOUNTER — APPOINTMENT (OUTPATIENT)
Dept: GENERAL RADIOLOGY | Age: 60
End: 2025-02-21
Attending: ORTHOPAEDIC SURGERY
Payer: MEDICARE

## 2025-02-21 ENCOUNTER — ANESTHESIA (OUTPATIENT)
Dept: OPERATING ROOM | Age: 60
End: 2025-02-21
Payer: MEDICARE

## 2025-02-21 ENCOUNTER — HOSPITAL ENCOUNTER (OUTPATIENT)
Age: 60
Setting detail: OBSERVATION
Discharge: HOME HEALTH | End: 2025-02-22
Attending: ORTHOPAEDIC SURGERY | Admitting: ORTHOPAEDIC SURGERY
Payer: MEDICARE

## 2025-02-21 DIAGNOSIS — Z47.1 AFTERCARE FOLLOWING LEFT KNEE JOINT REPLACEMENT SURGERY: Primary | ICD-10-CM

## 2025-02-21 DIAGNOSIS — Z96.652 AFTERCARE FOLLOWING LEFT KNEE JOINT REPLACEMENT SURGERY: Primary | ICD-10-CM

## 2025-02-21 DIAGNOSIS — M17.12 PRIMARY OSTEOARTHRITIS OF LEFT KNEE: ICD-10-CM

## 2025-02-21 DIAGNOSIS — M17.12 OSTEOARTHRITIS OF LEFT KNEE, UNSPECIFIED OSTEOARTHRITIS TYPE: ICD-10-CM

## 2025-02-21 LAB — GLUCOSE BLD-MCNC: 100 MG/DL (ref 74–99)

## 2025-02-21 PROCEDURE — 88305 TISSUE EXAM BY PATHOLOGIST: CPT

## 2025-02-21 PROCEDURE — 2500000003 HC RX 250 WO HCPCS: Performed by: ORTHOPAEDIC SURGERY

## 2025-02-21 PROCEDURE — 82962 GLUCOSE BLOOD TEST: CPT

## 2025-02-21 PROCEDURE — 6360000002 HC RX W HCPCS: Performed by: ORTHOPAEDIC SURGERY

## 2025-02-21 PROCEDURE — 6370000000 HC RX 637 (ALT 250 FOR IP): Performed by: NURSE PRACTITIONER

## 2025-02-21 PROCEDURE — 2720000010 HC SURG SUPPLY STERILE: Performed by: ORTHOPAEDIC SURGERY

## 2025-02-21 PROCEDURE — 2500000003 HC RX 250 WO HCPCS: Performed by: NURSE PRACTITIONER

## 2025-02-21 PROCEDURE — 3600000015 HC SURGERY LEVEL 5 ADDTL 15MIN: Performed by: ORTHOPAEDIC SURGERY

## 2025-02-21 PROCEDURE — 6360000002 HC RX W HCPCS

## 2025-02-21 PROCEDURE — 7100000000 HC PACU RECOVERY - FIRST 15 MIN: Performed by: ORTHOPAEDIC SURGERY

## 2025-02-21 PROCEDURE — 3600000005 HC SURGERY LEVEL 5 BASE: Performed by: ORTHOPAEDIC SURGERY

## 2025-02-21 PROCEDURE — 2580000003 HC RX 258: Performed by: ORTHOPAEDIC SURGERY

## 2025-02-21 PROCEDURE — 6370000000 HC RX 637 (ALT 250 FOR IP): Performed by: ORTHOPAEDIC SURGERY

## 2025-02-21 PROCEDURE — C1776 JOINT DEVICE (IMPLANTABLE): HCPCS | Performed by: ORTHOPAEDIC SURGERY

## 2025-02-21 PROCEDURE — 73560 X-RAY EXAM OF KNEE 1 OR 2: CPT

## 2025-02-21 PROCEDURE — G0378 HOSPITAL OBSERVATION PER HR: HCPCS

## 2025-02-21 PROCEDURE — 88311 DECALCIFY TISSUE: CPT

## 2025-02-21 PROCEDURE — C1713 ANCHOR/SCREW BN/BN,TIS/BN: HCPCS | Performed by: ORTHOPAEDIC SURGERY

## 2025-02-21 PROCEDURE — 3700000000 HC ANESTHESIA ATTENDED CARE: Performed by: ORTHOPAEDIC SURGERY

## 2025-02-21 PROCEDURE — 2500000003 HC RX 250 WO HCPCS

## 2025-02-21 PROCEDURE — 6360000002 HC RX W HCPCS: Performed by: NURSE PRACTITIONER

## 2025-02-21 PROCEDURE — 2580000003 HC RX 258

## 2025-02-21 PROCEDURE — 7100000001 HC PACU RECOVERY - ADDTL 15 MIN: Performed by: ORTHOPAEDIC SURGERY

## 2025-02-21 PROCEDURE — 64447 NJX AA&/STRD FEMORAL NRV IMG: CPT | Performed by: STUDENT IN AN ORGANIZED HEALTH CARE EDUCATION/TRAINING PROGRAM

## 2025-02-21 PROCEDURE — 6360000002 HC RX W HCPCS: Performed by: STUDENT IN AN ORGANIZED HEALTH CARE EDUCATION/TRAINING PROGRAM

## 2025-02-21 PROCEDURE — 2700000000 HC OXYGEN THERAPY PER DAY

## 2025-02-21 PROCEDURE — 2709999900 HC NON-CHARGEABLE SUPPLY: Performed by: ORTHOPAEDIC SURGERY

## 2025-02-21 PROCEDURE — 3700000001 HC ADD 15 MINUTES (ANESTHESIA): Performed by: ORTHOPAEDIC SURGERY

## 2025-02-21 DEVICE — TIBIAL COMPONENT
Type: IMPLANTABLE DEVICE | Site: KNEE | Status: FUNCTIONAL
Brand: TRIATHLON

## 2025-02-21 DEVICE — CRUCIATE RETAINING FEMORAL
Type: IMPLANTABLE DEVICE | Site: KNEE | Status: FUNCTIONAL
Brand: TRIATHLON

## 2025-02-21 DEVICE — TIBIAL BEARING INSERT - CR
Type: IMPLANTABLE DEVICE | Site: KNEE | Status: FUNCTIONAL
Brand: TRIATHLON

## 2025-02-21 DEVICE — PATELLA
Type: IMPLANTABLE DEVICE | Site: KNEE | Status: FUNCTIONAL
Brand: TRIATHLON

## 2025-02-21 RX ORDER — ROPIVACAINE HYDROCHLORIDE 5 MG/ML
30 INJECTION, SOLUTION EPIDURAL; INFILTRATION; PERINEURAL ONCE
Status: DISCONTINUED | OUTPATIENT
Start: 2025-02-21 | End: 2025-02-21 | Stop reason: HOSPADM

## 2025-02-21 RX ORDER — OXYCODONE HYDROCHLORIDE 5 MG/1
5 TABLET ORAL EVERY 4 HOURS PRN
Status: DISCONTINUED | OUTPATIENT
Start: 2025-02-21 | End: 2025-02-22 | Stop reason: HOSPADM

## 2025-02-21 RX ORDER — TRAMADOL HYDROCHLORIDE 50 MG/1
50 TABLET ORAL EVERY 6 HOURS
Status: DISCONTINUED | OUTPATIENT
Start: 2025-02-21 | End: 2025-02-22 | Stop reason: HOSPADM

## 2025-02-21 RX ORDER — ACETAMINOPHEN 325 MG/1
650 TABLET ORAL EVERY 6 HOURS
Status: DISCONTINUED | OUTPATIENT
Start: 2025-02-21 | End: 2025-02-22 | Stop reason: HOSPADM

## 2025-02-21 RX ORDER — SODIUM CHLORIDE 9 MG/ML
INJECTION, SOLUTION INTRAVENOUS PRN
Status: DISCONTINUED | OUTPATIENT
Start: 2025-02-21 | End: 2025-02-22 | Stop reason: HOSPADM

## 2025-02-21 RX ORDER — DULOXETIN HYDROCHLORIDE 60 MG/1
120 CAPSULE, DELAYED RELEASE ORAL DAILY
Status: DISCONTINUED | OUTPATIENT
Start: 2025-02-22 | End: 2025-02-22 | Stop reason: HOSPADM

## 2025-02-21 RX ORDER — PREGABALIN 75 MG/1
75 CAPSULE ORAL ONCE
Status: COMPLETED | OUTPATIENT
Start: 2025-02-21 | End: 2025-02-21

## 2025-02-21 RX ORDER — NALOXONE HYDROCHLORIDE 0.4 MG/ML
INJECTION, SOLUTION INTRAMUSCULAR; INTRAVENOUS; SUBCUTANEOUS PRN
Status: DISCONTINUED | OUTPATIENT
Start: 2025-02-21 | End: 2025-02-21 | Stop reason: HOSPADM

## 2025-02-21 RX ORDER — AMLODIPINE BESYLATE 10 MG/1
10 TABLET ORAL DAILY
Status: DISCONTINUED | OUTPATIENT
Start: 2025-02-22 | End: 2025-02-22 | Stop reason: HOSPADM

## 2025-02-21 RX ORDER — ASPIRIN 325 MG
325 TABLET, DELAYED RELEASE (ENTERIC COATED) ORAL DAILY
Status: DISCONTINUED | OUTPATIENT
Start: 2025-02-21 | End: 2025-02-22 | Stop reason: HOSPADM

## 2025-02-21 RX ORDER — TRANEXAMIC ACID 10 MG/ML
1000 INJECTION, SOLUTION INTRAVENOUS
Status: COMPLETED | OUTPATIENT
Start: 2025-02-21 | End: 2025-02-21

## 2025-02-21 RX ORDER — DEXAMETHASONE SODIUM PHOSPHATE 10 MG/ML
INJECTION, SOLUTION INTRAMUSCULAR; INTRAVENOUS
Status: DISCONTINUED | OUTPATIENT
Start: 2025-02-21 | End: 2025-02-21 | Stop reason: SDUPTHER

## 2025-02-21 RX ORDER — SODIUM CHLORIDE 0.9 % (FLUSH) 0.9 %
5-40 SYRINGE (ML) INJECTION PRN
Status: DISCONTINUED | OUTPATIENT
Start: 2025-02-21 | End: 2025-02-22 | Stop reason: HOSPADM

## 2025-02-21 RX ORDER — SODIUM CHLORIDE 0.9 % (FLUSH) 0.9 %
5-40 SYRINGE (ML) INJECTION EVERY 12 HOURS SCHEDULED
Status: DISCONTINUED | OUTPATIENT
Start: 2025-02-21 | End: 2025-02-22 | Stop reason: HOSPADM

## 2025-02-21 RX ORDER — PROCHLORPERAZINE EDISYLATE 5 MG/ML
5 INJECTION INTRAMUSCULAR; INTRAVENOUS
Status: DISCONTINUED | OUTPATIENT
Start: 2025-02-21 | End: 2025-02-21 | Stop reason: HOSPADM

## 2025-02-21 RX ORDER — BUPROPION HYDROCHLORIDE 150 MG/1
150 TABLET ORAL EVERY MORNING
Status: DISCONTINUED | OUTPATIENT
Start: 2025-02-22 | End: 2025-02-22 | Stop reason: HOSPADM

## 2025-02-21 RX ORDER — PROPOFOL 10 MG/ML
INJECTION, EMULSION INTRAVENOUS
Status: DISCONTINUED | OUTPATIENT
Start: 2025-02-21 | End: 2025-02-21 | Stop reason: SDUPTHER

## 2025-02-21 RX ORDER — SODIUM CHLORIDE 0.9 % (FLUSH) 0.9 %
5-40 SYRINGE (ML) INJECTION PRN
Status: DISCONTINUED | OUTPATIENT
Start: 2025-02-21 | End: 2025-02-21 | Stop reason: HOSPADM

## 2025-02-21 RX ORDER — FENTANYL CITRATE 50 UG/ML
INJECTION, SOLUTION INTRAMUSCULAR; INTRAVENOUS
Status: DISCONTINUED | OUTPATIENT
Start: 2025-02-21 | End: 2025-02-21 | Stop reason: SDUPTHER

## 2025-02-21 RX ORDER — DEXAMETHASONE SODIUM PHOSPHATE 4 MG/ML
INJECTION, SOLUTION INTRA-ARTICULAR; INTRALESIONAL; INTRAMUSCULAR; INTRAVENOUS; SOFT TISSUE
Status: DISCONTINUED | OUTPATIENT
Start: 2025-02-21 | End: 2025-02-21 | Stop reason: SDUPTHER

## 2025-02-21 RX ORDER — SODIUM CHLORIDE 9 MG/ML
INJECTION, SOLUTION INTRAVENOUS
Status: DISCONTINUED | OUTPATIENT
Start: 2025-02-21 | End: 2025-02-21 | Stop reason: SDUPTHER

## 2025-02-21 RX ORDER — ACETAMINOPHEN 500 MG
1000 TABLET ORAL ONCE
Status: COMPLETED | OUTPATIENT
Start: 2025-02-21 | End: 2025-02-21

## 2025-02-21 RX ORDER — OXYCODONE HYDROCHLORIDE 5 MG/1
5 TABLET ORAL
Status: DISCONTINUED | OUTPATIENT
Start: 2025-02-21 | End: 2025-02-21 | Stop reason: HOSPADM

## 2025-02-21 RX ORDER — CLONAZEPAM 0.5 MG/1
0.5 TABLET ORAL 2 TIMES DAILY PRN
Status: DISCONTINUED | OUTPATIENT
Start: 2025-02-21 | End: 2025-02-22 | Stop reason: HOSPADM

## 2025-02-21 RX ORDER — KETOROLAC TROMETHAMINE 30 MG/ML
30 INJECTION, SOLUTION INTRAMUSCULAR; INTRAVENOUS ONCE
Status: COMPLETED | OUTPATIENT
Start: 2025-02-21 | End: 2025-02-21

## 2025-02-21 RX ORDER — VALACYCLOVIR HYDROCHLORIDE 500 MG/1
2000 TABLET, FILM COATED ORAL DAILY
Status: DISCONTINUED | OUTPATIENT
Start: 2025-02-21 | End: 2025-02-21 | Stop reason: ALTCHOICE

## 2025-02-21 RX ORDER — OXYCODONE HYDROCHLORIDE 5 MG/1
10 TABLET ORAL EVERY 4 HOURS PRN
Status: DISCONTINUED | OUTPATIENT
Start: 2025-02-21 | End: 2025-02-22 | Stop reason: HOSPADM

## 2025-02-21 RX ORDER — SODIUM CHLORIDE 0.9 % (FLUSH) 0.9 %
5-40 SYRINGE (ML) INJECTION EVERY 12 HOURS SCHEDULED
Status: DISCONTINUED | OUTPATIENT
Start: 2025-02-21 | End: 2025-02-21 | Stop reason: HOSPADM

## 2025-02-21 RX ORDER — DEXAMETHASONE SODIUM PHOSPHATE 10 MG/ML
10 INJECTION, SOLUTION INTRA-ARTICULAR; INTRALESIONAL; INTRAMUSCULAR; INTRAVENOUS; SOFT TISSUE ONCE
Status: COMPLETED | OUTPATIENT
Start: 2025-02-22 | End: 2025-02-22

## 2025-02-21 RX ORDER — ROCURONIUM BROMIDE 10 MG/ML
INJECTION, SOLUTION INTRAVENOUS
Status: DISCONTINUED | OUTPATIENT
Start: 2025-02-21 | End: 2025-02-21 | Stop reason: SDUPTHER

## 2025-02-21 RX ORDER — ONDANSETRON 2 MG/ML
4 INJECTION INTRAMUSCULAR; INTRAVENOUS EVERY 6 HOURS PRN
Status: DISCONTINUED | OUTPATIENT
Start: 2025-02-21 | End: 2025-02-22 | Stop reason: HOSPADM

## 2025-02-21 RX ORDER — VANCOMYCIN HYDROCHLORIDE 1 G/20ML
INJECTION, POWDER, LYOPHILIZED, FOR SOLUTION INTRAVENOUS PRN
Status: DISCONTINUED | OUTPATIENT
Start: 2025-02-21 | End: 2025-02-21 | Stop reason: ALTCHOICE

## 2025-02-21 RX ORDER — ROPIVACAINE HYDROCHLORIDE 5 MG/ML
INJECTION, SOLUTION EPIDURAL; INFILTRATION; PERINEURAL
Status: DISCONTINUED | OUTPATIENT
Start: 2025-02-21 | End: 2025-02-21 | Stop reason: SDUPTHER

## 2025-02-21 RX ORDER — SENNA AND DOCUSATE SODIUM 50; 8.6 MG/1; MG/1
1 TABLET, FILM COATED ORAL 2 TIMES DAILY
Status: DISCONTINUED | OUTPATIENT
Start: 2025-02-21 | End: 2025-02-22 | Stop reason: HOSPADM

## 2025-02-21 RX ORDER — ATORVASTATIN CALCIUM 10 MG/1
10 TABLET, FILM COATED ORAL DAILY
Status: DISCONTINUED | OUTPATIENT
Start: 2025-02-21 | End: 2025-02-22 | Stop reason: HOSPADM

## 2025-02-21 RX ORDER — TRANEXAMIC ACID 10 MG/ML
1000 INJECTION, SOLUTION INTRAVENOUS ONCE
Status: COMPLETED | OUTPATIENT
Start: 2025-02-21 | End: 2025-02-22

## 2025-02-21 RX ORDER — TOPIRAMATE 25 MG/1
50 TABLET, FILM COATED ORAL DAILY
Status: DISCONTINUED | OUTPATIENT
Start: 2025-02-22 | End: 2025-02-22 | Stop reason: HOSPADM

## 2025-02-21 RX ORDER — FENTANYL CITRATE 50 UG/ML
100 INJECTION, SOLUTION INTRAMUSCULAR; INTRAVENOUS ONCE
Status: DISCONTINUED | OUTPATIENT
Start: 2025-02-21 | End: 2025-02-21 | Stop reason: HOSPADM

## 2025-02-21 RX ORDER — MIDAZOLAM HYDROCHLORIDE 2 MG/2ML
2 INJECTION, SOLUTION INTRAMUSCULAR; INTRAVENOUS ONCE
Status: COMPLETED | OUTPATIENT
Start: 2025-02-21 | End: 2025-02-21

## 2025-02-21 RX ORDER — FENTANYL CITRATE 50 UG/ML
50 INJECTION, SOLUTION INTRAMUSCULAR; INTRAVENOUS EVERY 5 MIN PRN
Status: DISCONTINUED | OUTPATIENT
Start: 2025-02-21 | End: 2025-02-21 | Stop reason: HOSPADM

## 2025-02-21 RX ORDER — SODIUM CHLORIDE 9 MG/ML
INJECTION, SOLUTION INTRAVENOUS PRN
Status: DISCONTINUED | OUTPATIENT
Start: 2025-02-21 | End: 2025-02-21 | Stop reason: HOSPADM

## 2025-02-21 RX ORDER — BUPROPION HYDROCHLORIDE 300 MG/1
300 TABLET ORAL EVERY MORNING
Status: DISCONTINUED | OUTPATIENT
Start: 2025-02-22 | End: 2025-02-22 | Stop reason: HOSPADM

## 2025-02-21 RX ORDER — SODIUM CHLORIDE 9 MG/ML
INJECTION, SOLUTION INTRAVENOUS CONTINUOUS
Status: DISCONTINUED | OUTPATIENT
Start: 2025-02-21 | End: 2025-02-22 | Stop reason: HOSPADM

## 2025-02-21 RX ORDER — BUSPIRONE HYDROCHLORIDE 10 MG/1
20 TABLET ORAL 2 TIMES DAILY
Status: DISCONTINUED | OUTPATIENT
Start: 2025-02-21 | End: 2025-02-22 | Stop reason: HOSPADM

## 2025-02-21 RX ORDER — KETOROLAC TROMETHAMINE 30 MG/ML
30 INJECTION, SOLUTION INTRAMUSCULAR; INTRAVENOUS EVERY 6 HOURS
Status: DISCONTINUED | OUTPATIENT
Start: 2025-02-21 | End: 2025-02-22 | Stop reason: HOSPADM

## 2025-02-21 RX ORDER — ZOLPIDEM TARTRATE 5 MG/1
5 TABLET ORAL NIGHTLY
Status: DISCONTINUED | OUTPATIENT
Start: 2025-02-21 | End: 2025-02-22 | Stop reason: HOSPADM

## 2025-02-21 RX ORDER — DEXAMETHASONE SODIUM PHOSPHATE 10 MG/ML
8 INJECTION, SOLUTION INTRAMUSCULAR; INTRAVENOUS ONCE
Status: DISCONTINUED | OUTPATIENT
Start: 2025-02-21 | End: 2025-02-21 | Stop reason: HOSPADM

## 2025-02-21 RX ORDER — SODIUM CHLORIDE 9 MG/ML
INJECTION, SOLUTION INTRAVENOUS CONTINUOUS
Status: DISCONTINUED | OUTPATIENT
Start: 2025-02-21 | End: 2025-02-21 | Stop reason: HOSPADM

## 2025-02-21 RX ORDER — ONDANSETRON 4 MG/1
4 TABLET, ORALLY DISINTEGRATING ORAL EVERY 8 HOURS PRN
Status: DISCONTINUED | OUTPATIENT
Start: 2025-02-21 | End: 2025-02-22 | Stop reason: HOSPADM

## 2025-02-21 RX ADMIN — KETOROLAC TROMETHAMINE 30 MG: 30 INJECTION, SOLUTION INTRAMUSCULAR at 16:16

## 2025-02-21 RX ADMIN — TRANEXAMIC ACID 1000 MG: 10 INJECTION, SOLUTION INTRAVENOUS at 14:48

## 2025-02-21 RX ADMIN — TRAMADOL HYDROCHLORIDE 50 MG: 50 TABLET, COATED ORAL at 16:15

## 2025-02-21 RX ADMIN — ZOLPIDEM TARTRATE 5 MG: 5 TABLET ORAL at 20:58

## 2025-02-21 RX ADMIN — SODIUM CHLORIDE: 9 INJECTION, SOLUTION INTRAVENOUS at 11:18

## 2025-02-21 RX ADMIN — ASPIRIN 325 MG: 325 TABLET, COATED ORAL at 17:48

## 2025-02-21 RX ADMIN — ACETAMINOPHEN 1000 MG: 500 TABLET ORAL at 08:27

## 2025-02-21 RX ADMIN — DEXAMETHASONE SODIUM PHOSPHATE 8 MG: 4 INJECTION, SOLUTION INTRAMUSCULAR; INTRAVENOUS at 11:54

## 2025-02-21 RX ADMIN — PROPOFOL 200 MG: 10 INJECTION, EMULSION INTRAVENOUS at 11:54

## 2025-02-21 RX ADMIN — FENTANYL CITRATE 100 MCG: 50 INJECTION, SOLUTION INTRAMUSCULAR; INTRAVENOUS at 11:54

## 2025-02-21 RX ADMIN — BUSPIRONE HYDROCHLORIDE 20 MG: 10 TABLET ORAL at 20:59

## 2025-02-21 RX ADMIN — ACETAMINOPHEN 650 MG: 325 TABLET ORAL at 20:59

## 2025-02-21 RX ADMIN — KETOROLAC TROMETHAMINE 30 MG: 30 INJECTION, SOLUTION INTRAMUSCULAR at 08:26

## 2025-02-21 RX ADMIN — WATER 2000 MG: 1 INJECTION INTRAMUSCULAR; INTRAVENOUS; SUBCUTANEOUS at 11:25

## 2025-02-21 RX ADMIN — SUGAMMADEX 200 MG: 100 INJECTION, SOLUTION INTRAVENOUS at 12:56

## 2025-02-21 RX ADMIN — FENTANYL CITRATE 50 MCG: 50 INJECTION, SOLUTION INTRAMUSCULAR; INTRAVENOUS at 12:15

## 2025-02-21 RX ADMIN — KETOROLAC TROMETHAMINE 30 MG: 30 INJECTION, SOLUTION INTRAMUSCULAR at 20:59

## 2025-02-21 RX ADMIN — ATORVASTATIN CALCIUM 10 MG: 10 TABLET, FILM COATED ORAL at 17:48

## 2025-02-21 RX ADMIN — DEXAMETHASONE SODIUM PHOSPHATE 5 MG: 10 INJECTION, SOLUTION INTRAMUSCULAR; INTRAVENOUS at 09:45

## 2025-02-21 RX ADMIN — ACETAMINOPHEN 650 MG: 325 TABLET ORAL at 16:15

## 2025-02-21 RX ADMIN — TRAMADOL HYDROCHLORIDE 50 MG: 50 TABLET, COATED ORAL at 20:58

## 2025-02-21 RX ADMIN — TRANEXAMIC ACID 1000 MG: 10 INJECTION, SOLUTION INTRAVENOUS at 12:05

## 2025-02-21 RX ADMIN — MIDAZOLAM HYDROCHLORIDE 2 MG: 1 INJECTION, SOLUTION INTRAMUSCULAR; INTRAVENOUS at 09:35

## 2025-02-21 RX ADMIN — ROPIVACAINE HYDROCHLORIDE 30 ML: 5 INJECTION, SOLUTION EPIDURAL; INFILTRATION; PERINEURAL at 09:45

## 2025-02-21 RX ADMIN — DOCUSATE SODIUM 50 MG AND SENNOSIDES 8.6 MG 1 TABLET: 8.6; 5 TABLET, FILM COATED ORAL at 20:58

## 2025-02-21 RX ADMIN — SODIUM CHLORIDE, PRESERVATIVE FREE 10 ML: 5 INJECTION INTRAVENOUS at 21:00

## 2025-02-21 RX ADMIN — PREGABALIN 75 MG: 75 CAPSULE ORAL at 08:27

## 2025-02-21 RX ADMIN — FENTANYL CITRATE 50 MCG: 50 INJECTION, SOLUTION INTRAMUSCULAR; INTRAVENOUS at 13:01

## 2025-02-21 RX ADMIN — WATER 2000 MG: 1 INJECTION INTRAMUSCULAR; INTRAVENOUS; SUBCUTANEOUS at 21:00

## 2025-02-21 RX ADMIN — SODIUM CHLORIDE: 9 INJECTION, SOLUTION INTRAVENOUS at 12:45

## 2025-02-21 RX ADMIN — ROCURONIUM BROMIDE 50 MG: 10 INJECTION, SOLUTION INTRAVENOUS at 11:54

## 2025-02-21 ASSESSMENT — PAIN SCALES - GENERAL
PAINLEVEL_OUTOF10: 0
PAINLEVEL_OUTOF10: 5
PAINLEVEL_OUTOF10: 0
PAINLEVEL_OUTOF10: 0
PAINLEVEL_OUTOF10: 6
PAINLEVEL_OUTOF10: 0

## 2025-02-21 ASSESSMENT — PAIN - FUNCTIONAL ASSESSMENT
PAIN_FUNCTIONAL_ASSESSMENT: PREVENTS OR INTERFERES SOME ACTIVE ACTIVITIES AND ADLS
PAIN_FUNCTIONAL_ASSESSMENT: 0-10

## 2025-02-21 ASSESSMENT — PAIN DESCRIPTION - LOCATION: LOCATION: KNEE

## 2025-02-21 ASSESSMENT — PAIN DESCRIPTION - PAIN TYPE: TYPE: SURGICAL PAIN

## 2025-02-21 ASSESSMENT — PAIN DESCRIPTION - DESCRIPTORS
DESCRIPTORS: ACHING;DULL
DESCRIPTORS: ACHING

## 2025-02-21 ASSESSMENT — PAIN DESCRIPTION - FREQUENCY: FREQUENCY: CONTINUOUS

## 2025-02-21 ASSESSMENT — PAIN DESCRIPTION - ONSET: ONSET: ON-GOING

## 2025-02-21 ASSESSMENT — PAIN DESCRIPTION - ORIENTATION: ORIENTATION: LEFT

## 2025-02-21 NOTE — CARE COORDINATION
Met with patient about diagnosis and discharge plan of care. Post op left TKA. Pt seen in ortho class. Lives with spouse in ranch home, 2 steps in. 9 steps to basement for walk in shower. Pt has wheeled walker, high commode. Plan is home with [facility]-orders completed and notified. PCP is Dr Lopez. Will follow-o

## 2025-02-21 NOTE — ANESTHESIA PROCEDURE NOTES
Peripheral Block    Patient location during procedure: PACU  Reason for block: post-op pain management and at surgeon's request  Start time: 2/21/2025 9:35 AM  End time: 2/21/2025 9:45 AM  Staffing  Performed: other anesthesia staff and anesthesiologist   Anesthesiologist: Lotus Kenney DO  Other anesthesia staff: Meghann Trinidad RN  Performed by: Meghann Trinidad RN  Authorized by: Lotus Kenney DO    Preanesthetic Checklist  Completed: patient identified, IV checked, site marked, risks and benefits discussed, surgical/procedural consents, equipment checked, pre-op evaluation, timeout performed, anesthesia consent given, oxygen available, monitors applied/VS acknowledged, fire risk safety assessment completed and verbalized and blood product R/B/A discussed and consented  Peripheral Block   Patient position: supine  Prep: ChloraPrep  Provider prep: mask and sterile gloves  Patient monitoring: cardiac monitor, continuous pulse ox, frequent blood pressure checks and IV access  Block type: Saphenous and iPacks  Laterality: left  Injection technique: single-shot  Guidance: ultrasound guided    Needle   Needle type: insulated echogenic nerve stimulator needle   Needle gauge: 20 G  Needle localization: ultrasound guidance  Needle length: 10 cm  Assessment   Injection assessment: negative aspiration for heme, no paresthesia on injection and local visualized surrounding nerve on ultrasound  Slow fractionated injection: yes  Hemodynamics: stable  Outcomes: uncomplicated and patient tolerated procedure well    Additional Notes  Saphenous 20ml, Ipack 10 ml

## 2025-02-21 NOTE — INTERVAL H&P NOTE
Update History & Physical    H&P reviewed. No changes.     Electronically signed by Nima Holguin MD on 2/21/2025 at 10:05 AM

## 2025-02-21 NOTE — PROGRESS NOTES
0944 left ipac/ left adductor canal block done by anesthesia . Pt tolerated procedure well  and called out to update pts family member  they chose to just stay in waiting room for now

## 2025-02-21 NOTE — ANESTHESIA POSTPROCEDURE EVALUATION
Department of Anesthesiology  Postprocedure Note    Patient: Heena Gamboa  MRN: [id number]  YOB: 1965  Date of evaluation: 2/21/2025    Procedure Summary       Date: 02/21/25 Room / Location: [address]    Anesthesia Start: 1118 Anesthesia Stop: 1307    Procedure: LEFT KNEE LINDA ROBOTIC ASSISTED TOTAL ARTHROPLASTY (Left: Knee) Diagnosis:       Osteoarthritis of left knee, unspecified osteoarthritis type      (Osteoarthritis of left knee, unspecified osteoarthritis type [M17.12])    Surgeons: Nima Holguin MD Responsible Provider: Lotus Kenney DO    Anesthesia Type: spinal, regional ASA Status: 3            Anesthesia Type: No value filed.    Radha Phase I: Radha Score: 10    Radha Phase II:      Anesthesia Post Evaluation    Patient location during evaluation: PACU  Patient participation: complete - patient participated  Level of consciousness: sleepy but conscious  Airway patency: patent  Nausea & Vomiting: no nausea and no vomiting  Cardiovascular status: blood pressure returned to baseline  Respiratory status: acceptable and face mask  Hydration status: euvolemic  Pain management: adequate        No notable events documented.

## 2025-02-22 VITALS
WEIGHT: 231 LBS | HEART RATE: 92 BPM | BODY MASS INDEX: 40.93 KG/M2 | RESPIRATION RATE: 16 BRPM | OXYGEN SATURATION: 95 % | SYSTOLIC BLOOD PRESSURE: 152 MMHG | DIASTOLIC BLOOD PRESSURE: 92 MMHG | TEMPERATURE: 97.9 F | HEIGHT: 63 IN

## 2025-02-22 LAB
ANION GAP SERPL CALCULATED.3IONS-SCNC: 8 MMOL/L (ref 7–16)
BUN SERPL-MCNC: 10 MG/DL (ref 6–23)
CALCIUM SERPL-MCNC: 8.5 MG/DL (ref 8.6–10.2)
CHLORIDE SERPL-SCNC: 111 MMOL/L (ref 98–107)
CO2 SERPL-SCNC: 24 MMOL/L (ref 22–29)
CREAT SERPL-MCNC: 0.7 MG/DL (ref 0.5–1)
ERYTHROCYTE [DISTWIDTH] IN BLOOD BY AUTOMATED COUNT: 17.4 % (ref 11.5–15)
GFR, ESTIMATED: >90 ML/MIN/1.73M2
GLUCOSE SERPL-MCNC: 99 MG/DL (ref 74–99)
HCT VFR BLD AUTO: 35.6 % (ref 34–48)
HGB BLD-MCNC: 10.8 G/DL (ref 11.5–15.5)
MCH RBC QN AUTO: 26.7 PG (ref 26–35)
MCHC RBC AUTO-ENTMCNC: 30.3 G/DL (ref 32–34.5)
MCV RBC AUTO: 88.1 FL (ref 80–99.9)
PLATELET # BLD AUTO: 230 K/UL (ref 130–450)
PMV BLD AUTO: 10.3 FL (ref 7–12)
POTASSIUM SERPL-SCNC: 4 MMOL/L (ref 3.5–5)
RBC # BLD AUTO: 4.04 M/UL (ref 3.5–5.5)
SODIUM SERPL-SCNC: 143 MMOL/L (ref 132–146)
WBC OTHER # BLD: 10 K/UL (ref 4.5–11.5)

## 2025-02-22 PROCEDURE — 97110 THERAPEUTIC EXERCISES: CPT

## 2025-02-22 PROCEDURE — 97530 THERAPEUTIC ACTIVITIES: CPT

## 2025-02-22 PROCEDURE — 96374 THER/PROPH/DIAG INJ IV PUSH: CPT

## 2025-02-22 PROCEDURE — 97165 OT EVAL LOW COMPLEX 30 MIN: CPT

## 2025-02-22 PROCEDURE — 96376 TX/PRO/DX INJ SAME DRUG ADON: CPT

## 2025-02-22 PROCEDURE — 97535 SELF CARE MNGMENT TRAINING: CPT

## 2025-02-22 PROCEDURE — 96375 TX/PRO/DX INJ NEW DRUG ADDON: CPT

## 2025-02-22 PROCEDURE — G0378 HOSPITAL OBSERVATION PER HR: HCPCS

## 2025-02-22 PROCEDURE — 6370000000 HC RX 637 (ALT 250 FOR IP): Performed by: ORTHOPAEDIC SURGERY

## 2025-02-22 PROCEDURE — 2500000003 HC RX 250 WO HCPCS: Performed by: ORTHOPAEDIC SURGERY

## 2025-02-22 PROCEDURE — 97161 PT EVAL LOW COMPLEX 20 MIN: CPT

## 2025-02-22 PROCEDURE — 6360000002 HC RX W HCPCS: Performed by: ORTHOPAEDIC SURGERY

## 2025-02-22 PROCEDURE — 85027 COMPLETE CBC AUTOMATED: CPT

## 2025-02-22 PROCEDURE — 80048 BASIC METABOLIC PNL TOTAL CA: CPT

## 2025-02-22 RX ORDER — TRAMADOL HYDROCHLORIDE 50 MG/1
50 TABLET ORAL EVERY 6 HOURS
Qty: 28 TABLET | Refills: 0 | Status: SHIPPED | OUTPATIENT
Start: 2025-02-22 | End: 2025-03-01

## 2025-02-22 RX ORDER — ASPIRIN 325 MG
325 TABLET, DELAYED RELEASE (ENTERIC COATED) ORAL DAILY
Qty: 42 TABLET | Refills: 0 | Status: SHIPPED | OUTPATIENT
Start: 2025-02-23

## 2025-02-22 RX ORDER — OXYCODONE HYDROCHLORIDE 5 MG/1
5 TABLET ORAL EVERY 4 HOURS PRN
Qty: 42 TABLET | Refills: 0 | Status: SHIPPED | OUTPATIENT
Start: 2025-02-22 | End: 2025-03-01

## 2025-02-22 RX ORDER — SENNA AND DOCUSATE SODIUM 50; 8.6 MG/1; MG/1
1 TABLET, FILM COATED ORAL 2 TIMES DAILY
Qty: 30 TABLET | Refills: 0 | Status: SHIPPED | OUTPATIENT
Start: 2025-02-22

## 2025-02-22 RX ORDER — ACETAMINOPHEN 325 MG/1
650 TABLET ORAL EVERY 6 HOURS
Qty: 120 TABLET | Refills: 3 | Status: SHIPPED | OUTPATIENT
Start: 2025-02-22

## 2025-02-22 RX ADMIN — TRAMADOL HYDROCHLORIDE 50 MG: 50 TABLET, COATED ORAL at 10:53

## 2025-02-22 RX ADMIN — AMLODIPINE BESYLATE 10 MG: 10 TABLET ORAL at 08:35

## 2025-02-22 RX ADMIN — ACETAMINOPHEN 650 MG: 325 TABLET ORAL at 10:54

## 2025-02-22 RX ADMIN — TOPIRAMATE 50 MG: 25 TABLET, FILM COATED ORAL at 08:36

## 2025-02-22 RX ADMIN — DULOXETINE 120 MG: 60 CAPSULE, DELAYED RELEASE ORAL at 08:34

## 2025-02-22 RX ADMIN — BREXPIPRAZOLE 1 MG: 2 TABLET ORAL at 08:35

## 2025-02-22 RX ADMIN — DOCUSATE SODIUM 50 MG AND SENNOSIDES 8.6 MG 1 TABLET: 8.6; 5 TABLET, FILM COATED ORAL at 08:34

## 2025-02-22 RX ADMIN — TRAMADOL HYDROCHLORIDE 50 MG: 50 TABLET, COATED ORAL at 04:27

## 2025-02-22 RX ADMIN — KETOROLAC TROMETHAMINE 30 MG: 30 INJECTION, SOLUTION INTRAMUSCULAR at 04:26

## 2025-02-22 RX ADMIN — ATORVASTATIN CALCIUM 10 MG: 10 TABLET, FILM COATED ORAL at 08:35

## 2025-02-22 RX ADMIN — SODIUM CHLORIDE, PRESERVATIVE FREE 10 ML: 5 INJECTION INTRAVENOUS at 08:36

## 2025-02-22 RX ADMIN — BUPROPION HYDROCHLORIDE 150 MG: 150 TABLET, EXTENDED RELEASE ORAL at 08:35

## 2025-02-22 RX ADMIN — DEXAMETHASONE SODIUM PHOSPHATE 10 MG: 10 INJECTION INTRAMUSCULAR; INTRAVENOUS at 08:56

## 2025-02-22 RX ADMIN — BUPROPION HYDROCHLORIDE 300 MG: 300 TABLET, EXTENDED RELEASE ORAL at 08:36

## 2025-02-22 RX ADMIN — OXYCODONE HYDROCHLORIDE 10 MG: 5 TABLET ORAL at 13:09

## 2025-02-22 RX ADMIN — ASPIRIN 325 MG: 325 TABLET, COATED ORAL at 08:34

## 2025-02-22 RX ADMIN — OXYCODONE HYDROCHLORIDE 10 MG: 5 TABLET ORAL at 08:40

## 2025-02-22 RX ADMIN — ACETAMINOPHEN 650 MG: 325 TABLET ORAL at 04:27

## 2025-02-22 RX ADMIN — KETOROLAC TROMETHAMINE 30 MG: 30 INJECTION, SOLUTION INTRAMUSCULAR at 10:54

## 2025-02-22 RX ADMIN — WATER 2000 MG: 1 INJECTION INTRAMUSCULAR; INTRAVENOUS; SUBCUTANEOUS at 04:26

## 2025-02-22 RX ADMIN — BUSPIRONE HYDROCHLORIDE 20 MG: 10 TABLET ORAL at 08:35

## 2025-02-22 ASSESSMENT — PAIN SCALES - GENERAL
PAINLEVEL_OUTOF10: 8
PAINLEVEL_OUTOF10: 5
PAINLEVEL_OUTOF10: 8
PAINLEVEL_OUTOF10: 7

## 2025-02-22 ASSESSMENT — PAIN DESCRIPTION - DESCRIPTORS
DESCRIPTORS: ACHING
DESCRIPTORS: ACHING;SORE
DESCRIPTORS: ACHING
DESCRIPTORS: SORE

## 2025-02-22 ASSESSMENT — PAIN DESCRIPTION - FREQUENCY: FREQUENCY: CONTINUOUS

## 2025-02-22 ASSESSMENT — PAIN DESCRIPTION - PAIN TYPE: TYPE: SURGICAL PAIN

## 2025-02-22 ASSESSMENT — PAIN DESCRIPTION - LOCATION
LOCATION: KNEE

## 2025-02-22 ASSESSMENT — PAIN - FUNCTIONAL ASSESSMENT: PAIN_FUNCTIONAL_ASSESSMENT: PREVENTS OR INTERFERES SOME ACTIVE ACTIVITIES AND ADLS

## 2025-02-22 ASSESSMENT — PAIN DESCRIPTION - ORIENTATION
ORIENTATION: LEFT

## 2025-02-22 ASSESSMENT — PAIN DESCRIPTION - ONSET: ONSET: ON-GOING

## 2025-02-22 NOTE — CARE COORDINATION
Social Work/Discharge Planning:  Discharge order noted.  Notified Kerrie with [facility] of discharge order.  Electronically signed by ALEJANDRA Logan on 2/22/2025 at 1:37 PM

## 2025-02-22 NOTE — PLAN OF CARE
Problem: Discharge Planning  Goal: Discharge to home or other facility with appropriate resources  2/22/2025 0938 by Blanquita Connors, RN  Outcome: Progressing     Problem: Pain  Goal: Verbalizes/displays adequate comfort level or baseline comfort level  2/22/2025 0938 by Blanquita Connors, RN  Outcome: Progressing     Problem: ABCDS Injury Assessment  Goal: Absence of physical injury  2/22/2025 0938 by Blanquita Connors, RN  Outcome: Progressing     Problem: Safety - Adult  Goal: Free from fall injury  Outcome: Progressing

## 2025-02-22 NOTE — PROGRESS NOTES
Orthopaedic Surgery Progress Note  MAKAYLA Holguin MD      SUBJECTIVE:  No acute events over night. Pain controlled. Denies chest pain or shortness of breath.    OBJECTIVE:   AAOx3, NAD    Left lower extremity    Dressings C/D/I  SILT L1-S1  TA/EHL/GS intact  Calf soft, NT  +2 DP, W/WP     Data:  CBC:   Lab Results   Component Value Date/Time    WBC 10.0 02/22/2025 04:24 AM    RBC 4.04 02/22/2025 04:24 AM    HGB 10.8 02/22/2025 04:24 AM    HCT 35.6 02/22/2025 04:24 AM    MCV 88.1 02/22/2025 04:24 AM    MCH 26.7 02/22/2025 04:24 AM    MCHC 30.3 02/22/2025 04:24 AM    RDW 17.4 02/22/2025 04:24 AM     02/22/2025 04:24 AM    MPV 10.3 02/22/2025 04:24 AM     BMP:    Lab Results   Component Value Date/Time     02/22/2025 04:24 AM    K 4.0 02/22/2025 04:24 AM    K 4.2 06/22/2022 11:50 AM     02/22/2025 04:24 AM    CO2 24 02/22/2025 04:24 AM    BUN 10 02/22/2025 04:24 AM    CREATININE 0.7 02/22/2025 04:24 AM    CALCIUM 8.5 02/22/2025 04:24 AM    GFRAA >60 06/29/2022 05:44 AM    LABGLOM >90 02/22/2025 04:24 AM    LABGLOM 71 03/26/2024 11:26 AM    GLUCOSE 99 02/22/2025 04:24 AM         A/P: POD 1 left TKA  - WBAT  - Pain control  - PT/OT  - DVT prophylaxis  - D/C planning    MAKAYLA Holguin MD

## 2025-02-22 NOTE — PROGRESS NOTES
Occupational Therapy  OCCUPATIONAL THERAPY INITIAL EVALUATION  [facility]  [address], [place]    Date: 2025     Patient Name: Heena (\"Satnam\") DEBBI Gamboa  MRN: [id number]  : 1965  Room: [address] [place] [address]    Evaluating OT: Blanca Aldrich, OTR/L - [id number]    Referring Provider: Nima Holguin MD  Specific Provider Orders/Date: \"OT eval and treat\" - 2025    Diagnosis: Osteoarthritis of left knee, unspecified osteoarthritis type [M17.12]  Primary osteoarthritis of left knee [M17.12]      Surgery: Patient underwent L TKA on 2025.    Pertinent Medical History: anxiety, depression, back pain, obesity, HTN, OA    Precautions: fall risk, FWBAT    Assessment of Current Deficits:    [x] Functional mobility   [x] ADLs  [x] Strength               [] Cognition   [x] Functional transfers   [x] IADLs         [x] Safety Awareness   [x] Endurance   [] Fine Motor Coordination  [x] Balance      [] Vision/Perception   [x] Sensation    [] Gross Motor Coordination [] ROM          [] Delirium                  [] Motor Control     OT PLAN OF CARE   OT POC is based on physician orders, patient diagnosis, and results of clinical assessment.  Frequency/Duration 2-5 days/week for 2-4 weeks PRN   Specific OT Treatment Interventions to Include:   * Instruction/training on adapted ADL techniques and AE recommendations to increase functional independence within precautions       * Training on energy conservation strategies, correct breathing pattern and techniques to improve independence/tolerance for self-care routine  * Functional transfer/mobility training/DME recommendations for increased independence, safety, and fall prevention  * Patient/Family education to increase follow through with safety techniques and functional independence  * Recommendation of environmental modifications for increased safety with functional transfers/mobility and ADLs  *

## 2025-02-22 NOTE — PROGRESS NOTES
PT Initial Eval        Attending Provider:  Nima Holguin MD    Evaluating PT:  Pablone Deleon PT    Room #:  0739/0739-A  Diagnosis:  OA LT Knee  Pertinent PMHx/PSHx:  See PMH  Procedure/Surgery:  S/P LT TKA Pankaj  Precautions:  WBAT  Equipment Needs:  WW    SUBJECTIVE:    Pt lives with wife in a 1 story home with 2+1 mini stairs and 1 rail to enter.   Pt ambulated with no AD PTA.    OBJECTIVE:   Initial Evaluation  Date: 2/22/25 Treatment Short Term/ Long Term   Goals   Was pt agreeable to Eval/treatment? Yes     Does pt have pain? Minimal      Bed Mobility  Rolling: Indep  Supine to sit: Indep  Sit to supine: NA  Scooting: Indep     Transfers Sit to stand: Indep/S  Stand to sit: Indep  Stand pivot: Indep     Ambulation   300 feet with WW S/Indep  500 feet with WW Indep   Stair negotiation: ascended and descended 4 steps with 2 rail Indep/S  4 steps with 1 rail Indep   ROM LT knee -5*-90* advancing to 0-100* post Manual/there-x    RT Knee as jolynn   MMT WFL, no stand buckle     AM-PAC 6 Clicks 24/24       Pt is A & O x 3   Sensation:  Pt denies numbness and tingling to LT distal extremities  Edema:  Post operative LTLE  Balance: sitting:Indep and standing: indep with WW  Endurance: Good    Patient education  Pt educated on QS and ROM ex, stair sequencing, heel roll/no post knee roll, gait reciprocal pattern    Patient response to education:   Pt verbalized understanding Pt demonstrated skill Pt requires further education in this area   Y Y Review PRN     ASSESSMENT:    Comments:  In bed on my arrival ready for D/C ASAP.  Transfer to EOB was w/o delay and indep levels.  Transfer to stand was also indep w/o delay or any LOB.  No post positional change dizziness expressed. Amb with WW with immediate reciprocal step through pattern.  No drifting, lateral veering or knee buckle @ 300'.  Stair climb was indep and easy for her.  There-ex was well tolerated with gains in Ext/Flex ROM post Tx.  Wanted to remain in chair @ Tx.   Call light and wall phone within reach.      Treatment:  Patient practiced and was instructed in the following treatment:    Eval  Amb 300'  Steps  There-ex  Education    Pt was left + with call light left by patient.    Chair/bed alarm: +    Pt's/ family goals   1. Home Today ASAP    Patient and or family understand(s) diagnosis, prognosis, and plan of care.    PLAN:    PT care will be provided in accordance with the objectives noted above. Exercises and functional mobility practice will be used as well as appropriate assistive devices or modalities to obtain goals. Patient and family education will also be administered as needed.    Frequency of treatments: Daily         Total Treatment Time  20 minutes     Evaluation Time includes thorough review of current medical information, gathering information on past medical history/social history and prior level of function, completion of standardized testing/informal observation of tasks, assessment of data and education on plan of care and goals.    CPT codes:  [x] Low Complexity PT evaluation 25350  [] Moderate Complexity PT evaluation 13103  [] High Complexity PT evaluation 00989  [] PT Re-evaluation 84130  [] Gait training 68045 ** minutes  [] Manual therapy 88631 ** minutes  [x] Therapeutic activities 52036 ** minutes  [x] Therapeutic exercises 53673 ** minutes  [] Neuromuscular reeducation 28218 ** minutes    Pablo Deleon PT

## 2025-02-22 NOTE — DISCHARGE SUMMARY
Patient ID:  Heena Gamboa  [id number]  59 y.o.  1965    Admit date: 2/21/2025    Discharge date and time:  2/22/2025    Admitting Physician: MAKAYLA Holguin MD     Discharge Physician: MAKAYLA Holguin MD    Admission Diagnoses: left Knee Osteoarthritis    Discharge Diagnoses: Same    Admission Condition: Good    Discharged Condition: Good    Procedure and Date: Pankaj left Total Knee Arthroplasty     Hospital Course: A Pankaj total knee arthroplasty was performed on 2/21/2025. Following this procedure the patient was admitted for a 1 day postoperative hospital stay.  During this admission, DVT prophylaxis was followed using bilateral ICDs, SIENA hose, and daily ASA. Post-operative pain control was achieved with the use of IV/oral pain medications. Discharge plans were discussed with the patient with the aid of .     Consults: None    Disposition: Home    Patient Instructions:     Medications for pain:    Acetaminophen - Take one tablet every 6 hours for 1 week.  Then take every 6 hours as needed for pain.    Tramadol - Take one tablet every 6 hours for 1 week.  Then take every 6 hours as needed for pain.    Oxycodone 1 tablet every 4 hours as needed for pain.  You can take 2 every 6 hours for severe pain.    Medication for DVT prophylaxis (Prevention of blood clots)  Aspirin - Take 1 tablet daily for 6 weeks for prevention of blood clots    Medication for constipation:  Colace - Take 1 tablet every 12 hours as needed for constipation        Activity: activity as tolerated  Diet: regular diet  Wound Care: Patient should remove dressing in 9 days.  Patient can shower with the dressing on but should not bathe.  After removing, the dressing keep incision clean and dry    Follow-up with Dr. Holguin in 2 weeks.

## 2025-02-24 ENCOUNTER — TELEPHONE (OUTPATIENT)
Dept: FAMILY MEDICINE CLINIC | Age: 60
End: 2025-02-24

## 2025-02-25 NOTE — OP NOTE
Operative Report  Heena Gamboa  [id number]  2/21/2025    Pre-operative diagnosis: Left knee degenerative joint disease    Post-operative diagnosis: Left knee degenerative joint disease    Procedure: Pankaj Left Total Knee Arthroplasty    Surgeon: Nima Holguin MD    Assistant:  Issac Hennessy NP; assisted with positioning, retracting and closing     Anesthesia:  Spinal     Operative Indication: Heena is a 60 y/o female who presents with left knee osteoarthritis. The pain has become a quality of life issue and the patient has failed nonoperative treatment. She presents for a left total knee arthroplasty. The risks and benefits of surgery were discussed and all questions were answered.    The risks for surgery that were discussed include bleeding, infection, damage to blood vessels, damage to nerves, risk of further surgery, restricted range of motion, risk of continued discomfort, risk of need for further reconstructive procedures, risk of need for altered activities and altered gait, risk of blood clots, pulmonary embolism, myocardial infarction, and risk of death were discussed. The patient understood these risks and consented for surgery. The typical post-operative recovery process was also discussed.    EBL: 50 ml    Tourniquet time: 60 min    Complications: None    Components: 1. Staunton triatholon femur, size 2, CR, press fit                         2. Porter triatholon tibia, size 3, press fit    3. Poly 9 mm     4. Patella, size 33, press fit    Operative Procedure:   The patient was taken to the operating room and was given spinal anaesthesia. The patient was properly positioned on the table and a tourniquet was applied to the upper thigh. The leg was then prepped and draped in the usual sterile fashion. A timeout was called identifying the patient, prodedure and the adminstration of IV Ancef and TXA.    A straight midline incision was made through the skin and subcutaneous tissue. A medial  parapatellar arthrotomy was used. The anterior synovium and fat pad were excised and the proximal tibia was exposed. The ACL and medial and lateral meniscus were transected.    2 Jeffery pins were then placed in the distal femur. A [employer] satellite array was then affixed. 2 additional small skin incisions were made over the proximal tibia and 2 Jeffery pins were placed. A Pankaj satellite array was then affixed.    At this point, 2 check points were placed on the femur and tibia. Anatomical landmarks on the ankle were registered. The femur and tibia were then registered and verified. The patient was noted to have a 8.5° varus deformity and 6° flexion contracture.  Osteophytes were then removed with a rongeur. With valgus and varus stresses applied, poses were captured at 0° 90°. Small modifications were made to implant position using the [employer] software to create balanced flexion and extension gaps.    The StudyTube robot was then used to make cuts in the distal femur, anterior and poster chamfer, anterior femur, posterior condyles and proximal tibia. The medial and lateral meniscus was then excised. The knee was then trialed with a size 2 femur and size 3 tibia and a 9 mm poly. The knee felt balanced in flexion and extension and this was confirmed with the [employer] software.     A patellar osteotomy was performed with the patellar reamer and cleaned up with a saw. A size 33 patella was then selected.    All trial components were removed and the knee was thoroughly irrigated with normal saline. The implants were then press fit.  A size 9 trial poly was used and the knee was again found to be balanced in flexion and extension. The poly was then inserted.    The knee was then thoroughly irrigated with normal saline. The Ranawat cocktail was then injected into the soft tissues prior to closure. The wound was then closed with 1, 2-0, 3-0 Stratofix, dermabond and steri strips. A sterile dressing was then applied and the knee was

## 2025-02-26 LAB — SURGICAL PATHOLOGY REPORT: NORMAL

## 2025-02-27 ENCOUNTER — OFFICE VISIT (OUTPATIENT)
Dept: FAMILY MEDICINE CLINIC | Age: 60
End: 2025-02-27
Payer: MEDICARE

## 2025-02-27 VITALS
HEIGHT: 63 IN | OXYGEN SATURATION: 98 % | HEART RATE: 89 BPM | WEIGHT: 231 LBS | SYSTOLIC BLOOD PRESSURE: 124 MMHG | BODY MASS INDEX: 40.93 KG/M2 | RESPIRATION RATE: 20 BRPM | DIASTOLIC BLOOD PRESSURE: 64 MMHG

## 2025-02-27 DIAGNOSIS — M17.12 PRIMARY OSTEOARTHRITIS OF LEFT KNEE: ICD-10-CM

## 2025-02-27 DIAGNOSIS — M25.562 POSTOPERATIVE PAIN OF LEFT KNEE: Primary | ICD-10-CM

## 2025-02-27 DIAGNOSIS — Z96.652 AFTERCARE FOLLOWING LEFT KNEE JOINT REPLACEMENT SURGERY: ICD-10-CM

## 2025-02-27 DIAGNOSIS — Z47.1 AFTERCARE FOLLOWING LEFT KNEE JOINT REPLACEMENT SURGERY: ICD-10-CM

## 2025-02-27 DIAGNOSIS — G89.18 POSTOPERATIVE PAIN OF LEFT KNEE: Primary | ICD-10-CM

## 2025-02-27 PROCEDURE — 99214 OFFICE O/P EST MOD 30 MIN: CPT | Performed by: FAMILY MEDICINE

## 2025-02-27 PROCEDURE — 3074F SYST BP LT 130 MM HG: CPT | Performed by: FAMILY MEDICINE

## 2025-02-27 PROCEDURE — 3078F DIAST BP <80 MM HG: CPT | Performed by: FAMILY MEDICINE

## 2025-02-27 NOTE — PROGRESS NOTES
[address], [address]   [place] [address]   [phone]   Ngoc Lopez MD     Patient: Heena Gamboa  Dateof Birth: 1965  Visit Date: 2/27/25    Heena is a 59 y.o. year old female here today for   Chief Complaint   Patient presents with    Follow-Up from Hospital     Knee  surgery       HPI  History of Present Illness  The patient is a 59-year-old female who presents for a hospital follow-up after knee surgery.    She underwent knee surgery at [facility] last Friday and has a scheduled postoperative follow-up with Dr. Rivero next Thursday. This marks her first knee surgery, although she has had procedures on other joints. She was informed that the initial two weeks post-surgery would be particularly painful. She rates her pain as 3 out of 10, attributing it more to swelling and tightness rather than the surgical procedure itself. She reports no issues with constipation. She was advised to elevate her leg post-surgery. She expresses concern about the swelling in her leg, which she describes as feeling like it is going to explode. She also notes that the bandage is applied very tightly. Her physical therapist has informed her that the primary focus will be on achieving full extension of the leg. Her pain management regimen includes tramadol every 6 hours, supplemented with 2 Tylenol tablets, and oxycodone, which she reserves for nighttime use.            Recent lab results reviewed, including CMP, lipid panel, CBC which are remarkable for anemia.          Past medical, surgical, social and/or family historyreviewed, updated as needed, and are non-contributory (unless otherwise stated).  Medications, allergies, and problem list also reviewed and updated as needed in patient's record.     Current Outpatient Medications   Medication Sig Dispense Refill    oxyCODONE (ROXICODONE) 5 MG immediate release tablet Take 1 tablet by mouth

## 2025-03-01 RX ORDER — OXYCODONE HYDROCHLORIDE 5 MG/1
5 TABLET ORAL EVERY 4 HOURS PRN
Qty: 42 TABLET | Refills: 0
Start: 2025-03-01 | End: 2025-03-08

## 2025-03-01 RX ORDER — TRAMADOL HYDROCHLORIDE 50 MG/1
50 TABLET ORAL EVERY 6 HOURS
Qty: 28 TABLET | Refills: 0
Start: 2025-03-01 | End: 2025-03-08

## 2025-03-03 DIAGNOSIS — G47.33 OSA (OBSTRUCTIVE SLEEP APNEA): ICD-10-CM

## 2025-03-05 RX ORDER — TIRZEPATIDE 2.5 MG/.5ML
INJECTION, SOLUTION SUBCUTANEOUS
Qty: 4 ML | Refills: 0 | OUTPATIENT
Start: 2025-03-05

## 2025-03-08 DIAGNOSIS — E78.2 MIXED HYPERLIPIDEMIA: ICD-10-CM

## 2025-03-08 DIAGNOSIS — G47.33 OSA (OBSTRUCTIVE SLEEP APNEA): ICD-10-CM

## 2025-03-10 DIAGNOSIS — G47.33 OSA (OBSTRUCTIVE SLEEP APNEA): ICD-10-CM

## 2025-03-10 DIAGNOSIS — E78.2 MIXED HYPERLIPIDEMIA: ICD-10-CM

## 2025-03-10 RX ORDER — ATORVASTATIN CALCIUM 10 MG/1
10 TABLET, FILM COATED ORAL DAILY
Qty: 90 TABLET | Refills: 1 | Status: SHIPPED
Start: 2025-03-10 | End: 2025-03-12

## 2025-03-10 NOTE — TELEPHONE ENCOUNTER
Name of Medication(s) Requested:  Requested Prescriptions     Pending Prescriptions Disp Refills    atorvastatin (LIPITOR) 10 MG tablet 90 tablet 1     Sig: Take 1 tablet by mouth daily       Medication is on current medication list Yes    Dosage and directions were verified? Yes    Quantity verified: 90 day supply     Pharmacy Verified?  Yes    Last Appointment:  2/27/2025    Future appts:  Future Appointments   Date Time Provider Department Center   5/5/2025  2:20 PM Kylee Hinkle MD [facility]   7/29/2025  1:00 PM Ngoc Lopez MD [facility]        (If no appt send self scheduling link. .REFILLAPPT)  Scheduling request sent?     [] Yes  [x] No    Does patient need updated?  [] Yes  [x] No

## 2025-03-10 NOTE — TELEPHONE ENCOUNTER
Name of Medication(s) Requested:  Requested Prescriptions     Pending Prescriptions Disp Refills    atorvastatin (LIPITOR) 10 MG tablet [Pharmacy Med Name: Atorvastatin Calcium 10 MG Oral Tablet] 90 tablet 0     Sig: Take 1 tablet by mouth once daily       Medication is on current medication list Yes    Dosage and directions were verified? Yes    Quantity verified: 90 day supply     Pharmacy Verified?  Yes    Last Appointment:  2/27/2025    Future appts:  Future Appointments   Date Time Provider Department Center   5/5/2025  2:20 PM Kylee Hinkle MD Surg [facility]   7/29/2025  1:00 PM Ngoc Lopez MD [facility]        (If no appt send self scheduling link. .REFILLAPPT)  Scheduling request sent?     [] Yes  [x] No    Does patient need updated?  [] Yes  [x] No

## 2025-03-12 RX ORDER — ATORVASTATIN CALCIUM 10 MG/1
10 TABLET, FILM COATED ORAL DAILY
Qty: 90 TABLET | Refills: 1 | Status: SHIPPED | OUTPATIENT
Start: 2025-03-12

## 2025-04-06 RX ORDER — TIRZEPATIDE 2.5 MG/.5ML
INJECTION, SOLUTION SUBCUTANEOUS
Qty: 4 ML | Refills: 0 | OUTPATIENT
Start: 2025-04-06

## 2025-04-06 RX ORDER — TIRZEPATIDE 5 MG/.5ML
5 INJECTION, SOLUTION SUBCUTANEOUS WEEKLY
Qty: 2 ML | Refills: 1 | Status: SHIPPED | OUTPATIENT
Start: 2025-04-06

## 2025-04-15 NOTE — TELEPHONE ENCOUNTER
Name of Medication(s) Requested:  Requested Prescriptions     Pending Prescriptions Disp Refills    valACYclovir (VALTREX) 1 g tablet 4 tablet 3     Sig: Take 2 tablets by mouth daily       Medication is on current medication list Yes    Dosage and directions were verified? Yes    Quantity verified: 30 day supply     Pharmacy Verified?  Yes    Last Appointment:  2/27/2025    Future appts:  Future Appointments   Date Time Provider Department Center   5/5/2025  2:20 PM Kylee Hinkle MD Surg [facility]   5/27/2025 12:45 PM [facility] 1 [facility] MOBILE SE Rad/Car   7/29/2025  1:00 PM Ngoc Lopez MD [facility]        (If no appt send self scheduling link. .REFILLAPPT)  Scheduling request sent?     [] Yes  [x] No    Does patient need updated?  [] Yes  [x] No

## 2025-04-16 RX ORDER — VALACYCLOVIR HYDROCHLORIDE 1 G/1
2000 TABLET, FILM COATED ORAL DAILY
Qty: 4 TABLET | Refills: 3 | Status: SHIPPED | OUTPATIENT
Start: 2025-04-16

## 2025-04-24 DIAGNOSIS — Z12.31 ENCOUNTER FOR SCREENING MAMMOGRAM FOR MALIGNANT NEOPLASM OF BREAST: Primary | ICD-10-CM

## 2025-05-05 ENCOUNTER — OFFICE VISIT (OUTPATIENT)
Dept: BARIATRICS/WEIGHT MGMT | Age: 60
End: 2025-05-05
Payer: MEDICARE

## 2025-05-05 VITALS
HEIGHT: 63 IN | TEMPERATURE: 97.3 F | WEIGHT: 235.6 LBS | BODY MASS INDEX: 41.75 KG/M2 | HEART RATE: 75 BPM | DIASTOLIC BLOOD PRESSURE: 72 MMHG | SYSTOLIC BLOOD PRESSURE: 120 MMHG

## 2025-05-05 DIAGNOSIS — G47.33 OSA (OBSTRUCTIVE SLEEP APNEA): Primary | ICD-10-CM

## 2025-05-05 DIAGNOSIS — E66.813 CLASS 3 SEVERE OBESITY DUE TO EXCESS CALORIES WITH SERIOUS COMORBIDITY AND BODY MASS INDEX (BMI) OF 40.0 TO 44.9 IN ADULT (HCC): ICD-10-CM

## 2025-05-05 PROCEDURE — 99211 OFF/OP EST MAY X REQ PHY/QHP: CPT

## 2025-05-05 PROCEDURE — 3074F SYST BP LT 130 MM HG: CPT | Performed by: INTERNAL MEDICINE

## 2025-05-05 PROCEDURE — 99214 OFFICE O/P EST MOD 30 MIN: CPT | Performed by: INTERNAL MEDICINE

## 2025-05-05 PROCEDURE — 3078F DIAST BP <80 MM HG: CPT | Performed by: INTERNAL MEDICINE

## 2025-05-05 RX ORDER — HYDROCODONE BITARTRATE AND ACETAMINOPHEN 5; 325 MG/1; MG/1
TABLET ORAL
COMMUNITY
Start: 2025-04-24

## 2025-05-05 NOTE — PROGRESS NOTES
started on CPAP, off cpap currently (could not sleep in her bed with CPAP, off ambien as well, sleep is fairly good per patient  Assessment  - fairly controlled, weight reduction can help with THERESE  Plan   - Weight reduction per plan as noted below     Problem 2        - Obesity   HPI                  - See above Background for description                          Weight             Date                          269.2               5/2/24              Phentermine                          253.0               5/30/24            Phentermine #2                          250.0               7/2/24              Phentermine #3  242.4  8/1/24   Phentermine  #4  237.2  9/4/24   Phentermine #5  236.0  10/2/24 Phentermine #6, Topiramate  230.6  11/4/24  Phentermine #7, Topiramate contd.  230.4  12/4/24 Topiramate  227.6  2/4/25  Zepbound 2.5 -> 5  235.6  5/5/25  Zepbound 7.5     Total weight change to date: -33.6 lbs.  Average daily energy variance:  5/2/2024 - 5/30/2024: -14.6 lbs (10031 Tashi)/27 d = -1893 Tashi/d deficit.  5/30/2024 - 7/2/2024: -3.0 lbs (82664 Tashi)/33 d = -318 Tashi/d deficit.   7/2/2024 - 8/1/2024: - 7.6 lbs (18978 Tashi)30 d = - 886 Tashi/d deficit  8/1/2024 - 9/4/2024: -5.2 lbs (43477 Tashi)/34 d = -535 Tashi/d deficit.  9/4/2024 - 10/2/2024: -1.2 lbs (4200 Tashi)/28 d = -150 Tashi/d deficit.    10/2/2024 - 11/4/2024: -5.4 lbs (47832 Tashi)/33 d = -572 Tashi/d deficit.   11/4/2024 - 12/4/2024: -0.2 lbs (700 Tashi)/30 d = -23 Tashi/d deficit.   12/4/2024 - 2/4/2025: -2.8 lbs (9800 Tashi)/62 d = -158 Tashi/d deficit.   2/4/2025 - 5/5/2025: +8.0 lbs (98435 Tashi)/90 d = +311 Tashi/d excess.      5/2/24 - 8/1/24 : 269.2 - 242.4 = -10.0% change in body wt in about 3 months  5/30/24 - 9/4/24 : 253 - 237.2 = -6.2% change in body wt in about 3 months.  7/2/24 - 10/2/24 : 250 - 236 = -5.6% change in body wt in about 3 months.  8/1/24 - 11/4/24 : 242.4 - 230.6 = -5% change in body wt in about 3 months.  9/4/24 - 12/4/24 : 237.2 - 230.4 = -2.9%

## 2025-05-05 NOTE — PATIENT INSTRUCTIONS
Rules:  Limit sweets to one day per month  Limit chips/crackers/pretzels/nuts/popcorn to 150 ivan/day  Eliminate all sugar sweetened beverages (including fruit juice)  Limit restaurants (including fast food and food from a convenience store) to one time every two weeks while in town     Requirements:  Make sure protein intake is at least 75 grams per day  Make sure that fiber intake is at least 22 grams per day  Take one multivitamin every day     Targets:  Count every calorie every day (you can use free amanda such as 'Conveneer')  Limit calorie intake to 1200 calories/day  Walk 30 minutes daily  Avoid eating 2 hours within bedtime.      Tips:  Do not eat outside of the dining room or the kitchen  Do not eat while watching TV, videos, working on the computer or using a smart phone  Do not eat food out of a multi-serving bag or container.     Topiramate:  Take 25 mg (half of 50 mg) daily in the evening for 1 week and then stop.      Tirzepatide (Zepbound):  Start Tirzepatide 7.5 mg under the skin once a week starting 1 week after last dose of 5 mg.    Follow up in about 3 months.

## 2025-05-07 DIAGNOSIS — I10 ESSENTIAL HYPERTENSION: ICD-10-CM

## 2025-05-07 NOTE — TELEPHONE ENCOUNTER
Name of Medication(s) Requested:  Requested Prescriptions     Pending Prescriptions Disp Refills    amLODIPine (NORVASC) 10 MG tablet [Pharmacy Med Name: amLODIPine Besylate 10 MG Oral Tablet] 90 tablet 0     Sig: Take 1 tablet by mouth once daily       Medication is on current medication list Yes    Dosage and directions were verified? Yes    Quantity verified: 90 day supply     Pharmacy Verified?  Yes    Last Appointment:  2/27/2025    Future appts:  Future Appointments   Date Time Provider Department Center   5/27/2025 12:45 PM [facility] RM 1 [facility] MOBILE SE Rad/Car   7/29/2025  1:00 PM Ngoc Lopez MD [facility]   8/6/2025  2:00 PM Kylee Hinkle MD Surg [facility]        (If no appt send self scheduling link. .REFILLAPPT)  Scheduling request sent?     [] Yes  [x] No    Does patient need updated?  [] Yes  [x] No

## 2025-05-08 RX ORDER — AMLODIPINE BESYLATE 10 MG/1
10 TABLET ORAL DAILY
Qty: 90 TABLET | Refills: 1 | Status: SHIPPED | OUTPATIENT
Start: 2025-05-08

## 2025-05-19 ENCOUNTER — PATIENT MESSAGE (OUTPATIENT)
Dept: FAMILY MEDICINE CLINIC | Age: 60
End: 2025-05-19

## 2025-06-24 ENCOUNTER — HOSPITAL ENCOUNTER (OUTPATIENT)
Dept: MAMMOGRAPHY | Age: 60
Discharge: HOME | End: 2025-06-26
Payer: MEDICARE

## 2025-06-24 VITALS — WEIGHT: 240 LBS | HEIGHT: 63 IN | BODY MASS INDEX: 42.52 KG/M2

## 2025-06-24 DIAGNOSIS — Z12.31 ENCOUNTER FOR SCREENING MAMMOGRAM FOR MALIGNANT NEOPLASM OF BREAST: ICD-10-CM

## 2025-06-24 PROCEDURE — 77067 SCR MAMMO BI INCL CAD: CPT

## 2025-07-01 ENCOUNTER — PATIENT MESSAGE (OUTPATIENT)
Dept: FAMILY MEDICINE CLINIC | Age: 60
End: 2025-07-01

## 2025-07-29 ENCOUNTER — OFFICE VISIT (OUTPATIENT)
Dept: FAMILY MEDICINE CLINIC | Age: 60
End: 2025-07-29
Payer: MEDICARE

## 2025-07-29 VITALS
OXYGEN SATURATION: 98 % | WEIGHT: 230 LBS | HEART RATE: 97 BPM | BODY MASS INDEX: 40.75 KG/M2 | SYSTOLIC BLOOD PRESSURE: 124 MMHG | HEIGHT: 63 IN | RESPIRATION RATE: 20 BRPM | DIASTOLIC BLOOD PRESSURE: 74 MMHG

## 2025-07-29 DIAGNOSIS — B37.2 CUTANEOUS CANDIDIASIS: ICD-10-CM

## 2025-07-29 DIAGNOSIS — E11.9 TYPE 2 DIABETES MELLITUS WITHOUT COMPLICATION, WITHOUT LONG-TERM CURRENT USE OF INSULIN (HCC): ICD-10-CM

## 2025-07-29 DIAGNOSIS — Z00.00 MEDICARE ANNUAL WELLNESS VISIT, SUBSEQUENT: Primary | ICD-10-CM

## 2025-07-29 DIAGNOSIS — E66.813 OBESITY, CLASS 3 (HCC): ICD-10-CM

## 2025-07-29 PROBLEM — E11.65 TYPE 2 DIABETES MELLITUS WITH HYPERGLYCEMIA, WITHOUT LONG-TERM CURRENT USE OF INSULIN (HCC): Status: ACTIVE | Noted: 2025-07-29

## 2025-07-29 LAB — HBA1C MFR BLD: 5.4 %

## 2025-07-29 PROCEDURE — 83036 HEMOGLOBIN GLYCOSYLATED A1C: CPT | Performed by: FAMILY MEDICINE

## 2025-07-29 PROCEDURE — 3078F DIAST BP <80 MM HG: CPT | Performed by: FAMILY MEDICINE

## 2025-07-29 PROCEDURE — 3074F SYST BP LT 130 MM HG: CPT | Performed by: FAMILY MEDICINE

## 2025-07-29 PROCEDURE — G0439 PPPS, SUBSEQ VISIT: HCPCS | Performed by: FAMILY MEDICINE

## 2025-07-29 PROCEDURE — 3044F HG A1C LEVEL LT 7.0%: CPT | Performed by: FAMILY MEDICINE

## 2025-07-29 RX ORDER — CLOTRIMAZOLE AND BETAMETHASONE DIPROPIONATE 10; .64 MG/G; MG/G
CREAM TOPICAL
Qty: 45 G | Refills: 3 | Status: SHIPPED | OUTPATIENT
Start: 2025-07-29

## 2025-07-29 ASSESSMENT — PATIENT HEALTH QUESTIONNAIRE - PHQ9
8. MOVING OR SPEAKING SO SLOWLY THAT OTHER PEOPLE COULD HAVE NOTICED. OR THE OPPOSITE, BEING SO FIGETY OR RESTLESS THAT YOU HAVE BEEN MOVING AROUND A LOT MORE THAN USUAL: NOT AT ALL
4. FEELING TIRED OR HAVING LITTLE ENERGY: NOT AT ALL
SUM OF ALL RESPONSES TO PHQ QUESTIONS 1-9: 1
3. TROUBLE FALLING OR STAYING ASLEEP: NOT AT ALL
6. FEELING BAD ABOUT YOURSELF - OR THAT YOU ARE A FAILURE OR HAVE LET YOURSELF OR YOUR FAMILY DOWN: NOT AT ALL
9. THOUGHTS THAT YOU WOULD BE BETTER OFF DEAD, OR OF HURTING YOURSELF: NOT AT ALL
2. FEELING DOWN, DEPRESSED OR HOPELESS: NOT AT ALL
1. LITTLE INTEREST OR PLEASURE IN DOING THINGS: SEVERAL DAYS
SUM OF ALL RESPONSES TO PHQ QUESTIONS 1-9: 1
5. POOR APPETITE OR OVEREATING: NOT AT ALL
10. IF YOU CHECKED OFF ANY PROBLEMS, HOW DIFFICULT HAVE THESE PROBLEMS MADE IT FOR YOU TO DO YOUR WORK, TAKE CARE OF THINGS AT HOME, OR GET ALONG WITH OTHER PEOPLE: NOT DIFFICULT AT ALL
7. TROUBLE CONCENTRATING ON THINGS, SUCH AS READING THE NEWSPAPER OR WATCHING TELEVISION: NOT AT ALL

## 2025-07-29 ASSESSMENT — LIFESTYLE VARIABLES: HOW OFTEN DO YOU HAVE A DRINK CONTAINING ALCOHOL: MONTHLY OR LESS

## 2025-07-29 NOTE — PROGRESS NOTES
Medicare Annual Wellness Visit    Heena Gamboa is here for Medicare AWV (Needs  cream for under breast rash )    Assessment & Plan  1. Medicare wellness visit.  - Blood pressure readings are within the normal range.  - Last recorded A1c level was 5.9 in January 2025. She has lost 40 pounds and continues to lose weight.  - Comprehensive panel of fasting blood work, including CBC, thyroid, cholesterol, metabolic panel, and A1c, will be ordered for January 2026. Fingerstick test will be conducted today.  - No red flags noted during the wellness visit.    2. Under-breast rash.  - Reports itching under the breast, likely exacerbated by recent hot and moist conditions.  - Lotrisone cream, which contains both an antifungal and a steroid, will be prescribed to manage the itching and treat the fungal infection.  - Advised to continue using Gold Bond powder for moisture control and to avoid corn starch powders.  - Prescription will be sent to pharmacy.    3. Weight management.  - Currently on Zepbound 7.5 mg for weight loss and has lost 40 pounds.  - Will follow up with her weight management doctor on 08/05/2025, who may consider increasing the dose.  - Continues to lose weight and reports positive effects from the medication.    Follow-up: 01/2026 for comprehensive blood work.  Medicare annual wellness visit, subsequent  Type 2 diabetes mellitus without complication, without long-term current use of insulin (HCC)  -     POCT glycosylated hemoglobin (Hb A1C)  -     CBC; Future  -     Comprehensive Metabolic Panel; Future  -     Lipid Panel; Future  -     TSH; Future  -     Hemoglobin A1C; Future  Cutaneous candidiasis  -     clotrimazole-betamethasone (LOTRISONE) 1-0.05 % cream; Apply topically 2 times daily., Disp-45 g, R-3, Normal  Obesity, class 3 (E66.813)  Body mass index [BMI] 40.0-44.9, adult (Z68.41)        -     Stable; will assess in 6 months    Results  Labs   - A1c: 01/2025, 5.9     Return for Medicare

## 2025-08-06 ENCOUNTER — OFFICE VISIT (OUTPATIENT)
Age: 60
End: 2025-08-06
Payer: MEDICARE

## 2025-08-06 VITALS
WEIGHT: 230 LBS | SYSTOLIC BLOOD PRESSURE: 143 MMHG | HEART RATE: 81 BPM | HEIGHT: 63 IN | TEMPERATURE: 97.4 F | BODY MASS INDEX: 40.75 KG/M2 | DIASTOLIC BLOOD PRESSURE: 79 MMHG

## 2025-08-06 DIAGNOSIS — G47.33 OSA (OBSTRUCTIVE SLEEP APNEA): Primary | ICD-10-CM

## 2025-08-06 DIAGNOSIS — E66.813 CLASS 3 SEVERE OBESITY DUE TO EXCESS CALORIES WITH SERIOUS COMORBIDITY AND BODY MASS INDEX (BMI) OF 40.0 TO 44.9 IN ADULT (HCC): ICD-10-CM

## 2025-08-06 PROCEDURE — 99214 OFFICE O/P EST MOD 30 MIN: CPT | Performed by: INTERNAL MEDICINE

## 2025-08-06 PROCEDURE — 3078F DIAST BP <80 MM HG: CPT | Performed by: INTERNAL MEDICINE

## 2025-08-06 PROCEDURE — 3077F SYST BP >= 140 MM HG: CPT | Performed by: INTERNAL MEDICINE

## 2025-08-06 RX ORDER — TOPIRAMATE 25 MG/1
TABLET, FILM COATED ORAL
Qty: 180 TABLET | Refills: 1 | Status: SHIPPED | OUTPATIENT
Start: 2025-08-06

## (undated) DEVICE — ENCORE® LATEX TEXTURED SIZE 6.5, STERILE LATEX POWDER-FREE SURGICAL GLOVE: Brand: ENCORE

## (undated) DEVICE — 3M™ RED DOT™ MONITORING ELECTRODE WITH FOAM TAPE AND STICKY GEL 2560, 50/BAG, 20/CASE, 72/PLT: Brand: RED DOT™

## (undated) DEVICE — TOWEL OR BLUEE 16X26IN ST 8 PACK ORB08 16X26ORTWL

## (undated) DEVICE — Z DISCONTINUED APPLICATOR SURG PREP 0.35OZ 2% CHG 70% ISO ALC W/ HI LT

## (undated) DEVICE — GAUZE,SPONGE,4"X4",12PLY,STERILE,LF,2'S: Brand: MEDLINE

## (undated) DEVICE — NEEDLE HYPO 27GA L1.25IN GRY POLYPR HUB S STL REG BVL STR

## (undated) DEVICE — MARKER,SKIN,WI/RULER AND LABELS: Brand: MEDLINE

## (undated) DEVICE — APPLICATOR PREP 26ML 0.7% IOD POVACRYLEX 74% ISO ALC ST

## (undated) DEVICE — GLOVE SURG SZ 65 L12IN FNGR THK79MIL GRN LTX FREE

## (undated) DEVICE — TOWEL,OR,DSP,ST,BLUE,STD,6/PK,12PK/CS: Brand: MEDLINE

## (undated) DEVICE — 2108 SERIES SAGITTAL BLADE, OFFSET (20.0 X 0.89 X 80.0MM)

## (undated) DEVICE — SKN PREP SPNG STKS PVP PNT STR: Brand: MEDLINE INDUSTRIES, INC.

## (undated) DEVICE — CONVERTORS STOCKINETTE: Brand: CONVERTORS

## (undated) DEVICE — GOWN,SIRUS,FABRNF,L,20/CS: Brand: MEDLINE

## (undated) DEVICE — GLOVE ORANGE PI 7   MSG9070

## (undated) DEVICE — CORD RETRCT SIL - ORDER MULTIPLES OF 10 EACH

## (undated) DEVICE — 5.0MM PRECISION ROUND

## (undated) DEVICE — CUTTER SURG OD33MM PAT KNEE DISP FOR RM SYSTEMXCELERATE

## (undated) DEVICE — 12 ML SYRINGE,LUER-LOCK TIP: Brand: MONOJECT

## (undated) DEVICE — HANDPIECE SET WITH COAXIAL HIGH FLOW TIP AND SUCTION TUBE: Brand: INTERPULSE

## (undated) DEVICE — GLOVE ORANGE PI 8   MSG9080

## (undated) DEVICE — DRAPE,REIN 53X77,STERILE: Brand: MEDLINE

## (undated) DEVICE — PACK PROCEDURE SURG GEN CUST

## (undated) DEVICE — NEEDLE HYPO 18GA L1.5IN PNK POLYPR HUB S STL THN WALL FILL

## (undated) DEVICE — SET FLD COLL CLR W/ BLT IN WARN SYS FOR HYSTSCP DOLPHIN

## (undated) DEVICE — NEEDLE HYPO 25GA L1.5IN BLU POLYPR HUB S STL REG BVL STR

## (undated) DEVICE — GAUZE,SPONGE,4"X4",8PLY,STRL,LF,10/TRAY: Brand: MEDLINE

## (undated) DEVICE — BANDAGE ADH W1XL3IN NAT FAB WVN FLX DURABLE N ADH PD SEAL

## (undated) DEVICE — 3 ML SYRINGE LUER-LOCK TIP: Brand: MONOJECT

## (undated) DEVICE — Z INACTIVE USE 2660664 SOLUTION IRRIG 3000ML 0.9% SOD CHL USP UROMATIC PLAS CONT

## (undated) DEVICE — 3M™ IOBAN™ 2 ANTIMICROBIAL INCISE DRAPE 6650EZ: Brand: IOBAN™ 2

## (undated) DEVICE — CVD CANNULA

## (undated) DEVICE — SYRINGE MED 20ML STD CLR PLAS LUERSLIP TIP N CTRL DISP

## (undated) DEVICE — BLADE,STAINLESS-STEEL,10,STRL,DISPOSABLE: Brand: MEDLINE

## (undated) DEVICE — BASIC SINGLE BASIN 1-LF: Brand: MEDLINE INDUSTRIES, INC.

## (undated) DEVICE — KIT DRP FOR RIO ROBOTIC ARM ASST SYS

## (undated) DEVICE — GLOVE SURG SZ 8 CRM LTX FREE POLYISOPRENE POLYMER BEAD ANTI

## (undated) DEVICE — NEEDLE SPNL 22GA L5IN BLK HUB S STL W/ QNCKE PNT W/OUT

## (undated) DEVICE — TRAY,VAG PREP,2PR VNYL GLV,4 C: Brand: MEDLINE INDUSTRIES, INC.

## (undated) DEVICE — 6 ML SYRINGE LUER-LOCK TIP: Brand: MONOJECT

## (undated) DEVICE — BANDAGE COMPR W6INXL12FT SMOOTH FOR LIMB EXSANG ESMARCH

## (undated) DEVICE — SPHERE EYE 1 MRK GUIDANCE PASS STEALTHSTATION 1PK/EA

## (undated) DEVICE — SKIN PREP TRAY 4 COMPARTM TRAY: Brand: MEDLINE INDUSTRIES, INC.

## (undated) DEVICE — STRIP,CLOSURE,WOUND,MEDI-STRIP,1/2X4: Brand: MEDLINE

## (undated) DEVICE — BNDG,ELSTC,MATRIX,STRL,6"X5YD,LF,HOOK&LP: Brand: MEDLINE

## (undated) DEVICE — DOUBLE BASIN SET: Brand: MEDLINE INDUSTRIES, INC.

## (undated) DEVICE — 3M™ TEGADERM™ TRANSPARENT FILM DRESSING FRAME STYLE, 1626W, 4 IN X 4-3/4 IN (10 CM X 12 CM), 50/CT 4CT/CASE: Brand: 3M™ TEGADERM™

## (undated) DEVICE — HYPODERMIC SAFETY NEEDLE: Brand: MAGELLAN

## (undated) DEVICE — SYRINGE,CONTROL,LL,FINGER,GRIP: Brand: MEDLINE INDUSTRIES, INC.

## (undated) DEVICE — IMMOBILIZER SHLDR L10.5-17IN D7IN SLNG W/ 15DEG ABD PLLW

## (undated) DEVICE — KIT EVAC 400CC DIA1/8IN H PAT 12.5IN 3 SPR RND SHP PVC DRN

## (undated) DEVICE — DRESSING,GAUZE,XEROFORM,CURAD,1"X8",ST: Brand: CURAD

## (undated) DEVICE — PAD,ABDOMINAL,5"X9",ST,LF,25/BX: Brand: MEDLINE INDUSTRIES, INC.

## (undated) DEVICE — COVER HNDL LT DISP

## (undated) DEVICE — ELECTRODE PT RET AD L9FT HI MOIST COND ADH HYDRGEL CORDED

## (undated) DEVICE — BIT DRL DIA2.7MM PERIPH SCR REUSE FOR COMPHSVE REV SHLDR

## (undated) DEVICE — 4-PORT MANIFOLD: Brand: NEPTUNE 2

## (undated) DEVICE — GLOVE ORANGE PI 7 1/2   MSG9075

## (undated) DEVICE — GOWN,SIRUS,FABRNF,XL,20/CS: Brand: MEDLINE

## (undated) DEVICE — 3000ML,PRESSURE INFUSER W/STOPCOCK: Brand: MEDLINE

## (undated) DEVICE — BLADE SAW SAG SYS 6 18X90X1.27MM

## (undated) DEVICE — NON-DEHP CATHETER EXTENSION SET, MALE LUER LOCK ADAPTER

## (undated) DEVICE — DRAPE SHEET, X-LARGE: Brand: CONVERTORS

## (undated) DEVICE — COVER,MAYO STAND,STERILE: Brand: MEDLINE

## (undated) DEVICE — TUBING SUCT 12FR MAL ALUM SHFT FN CAP VENT UNIV CONN W/ OBT

## (undated) DEVICE — SPONGE LAP W18XL18IN WHT COT 4 PLY FLD STRUNG RADPQ DISP ST 2 PER PACK

## (undated) DEVICE — GLOVE SURG SZ 6 THK91MIL LTX FREE SYN POLYISOPRENE ANTI

## (undated) DEVICE — GAUZE,SPONGE,4"X4",16PLY,XRAY,STRL,LF: Brand: MEDLINE

## (undated) DEVICE — C-ARM: Brand: UNBRANDED

## (undated) DEVICE — GOWN,SIRUS,POLYRNF,BRTHSLV,XL,30/CS: Brand: MEDLINE

## (undated) DEVICE — NEEDLE SPNL 22GA L3.5IN BLK HUB S STL REG WALL FIT STYL W/

## (undated) DEVICE — TUBING, SUCTION, 9/32" X 10', STRAIGHT: Brand: MEDLINE

## (undated) DEVICE — BANDAGE,GAUZE,BULKEE II,4.5"X4.1YD,STRL: Brand: MEDLINE

## (undated) DEVICE — JACKSON TABLE POSITIONER KIT: Brand: MEDLINE INDUSTRIES, INC.

## (undated) DEVICE — ELECTRODE SURG MPLR NEUT SELF ADH PT PLT MULTIGEN

## (undated) DEVICE — 3M™ COBAN™ NL STERILE NON-LATEX SELF-ADHERENT WRAP, 2084S, 4 IN X 5 YD (10 CM X 4,5 M), 18 ROLLS/CASE: Brand: 3M™ COBAN™

## (undated) DEVICE — DRAPE XR CASS L UNIV FIT ADH CLSR

## (undated) DEVICE — SET PROC W/ 250ML BOWL 30UM FLTR RESVR BRAT 2

## (undated) DEVICE — COVER,LIGHT HANDLE,FLX,2/PK: Brand: MEDLINE INDUSTRIES, INC.

## (undated) DEVICE — SUTURE STRATAFIX SYMMETRIC PDS + SZ 1 L18IN ABSRB VLT OS-6 SXPP1A201

## (undated) DEVICE — BLADE ES L6IN ELASTOMERIC COAT EXT DURABLE BEND UPTO 90DEG

## (undated) DEVICE — SOLUTION IV IRRIG WATER 1000ML POUR BRL 2F7114

## (undated) DEVICE — SYRINGE, LUER LOCK, 10ML: Brand: MEDLINE

## (undated) DEVICE — LEGGINGS, PAIR, 31X48, STERILE: Brand: MEDLINE

## (undated) DEVICE — SOLUTION IV IRRIG POUR BRL 0.9% SODIUM CHL 2F7124

## (undated) DEVICE — DRAPE,LAPAROTOMY,PCH,STERILE: Brand: MEDLINE

## (undated) DEVICE — DRESSING HYDROFIBER AQUACEL AG ADVANTAGE 3.5X10 IN

## (undated) DEVICE — PEEL-AWAY HOOD: Brand: FLYTE, SURGICOOL

## (undated) DEVICE — PACK,SHOULDER II,SIRUS: Brand: MEDLINE

## (undated) DEVICE — LIQUIBAND RAPID ADHESIVE 36/CS 0.8ML: Brand: MEDLINE

## (undated) DEVICE — 3M™ IOBAN™ 2 ANTIMICROBIAL INCISE DRAPE 6651EZ: Brand: IOBAN™ 2

## (undated) DEVICE — TAPE ADH W3INXL10YD WHT COT WVN BK POWERFUL RUB BASE HIGHLY

## (undated) DEVICE — WIPES SKIN CLOTH READYPREP 9 X 10.5 IN 2% CHLORHEX GLUCONATE CHG PREOP

## (undated) DEVICE — NEEDLE HYPO 23GA L1.5IN TURQ POLYPR HUB S STL THN WALL IM

## (undated) DEVICE — BOOT CVR UNIV BLU POLYPR MED FULL CVRAGE SKID RESIST IMPERV

## (undated) DEVICE — LUMBAR LAMINECTOMY: Brand: MEDLINE INDUSTRIES, INC.

## (undated) DEVICE — PIN FIX L60MM DIA3MM STD S STL THRD SGL SHRP FOR HUM RESECT
Type: IMPLANTABLE DEVICE | Site: SHOULDER | Status: NON-FUNCTIONAL
Removed: 2023-11-14

## (undated) DEVICE — KIT SURG W7XL11IN 2 PKT UNTREATED NA

## (undated) DEVICE — GOWN,SIRUS,FABRNF,2XL,18/CS: Brand: MEDLINE

## (undated) DEVICE — PIN BNE FIX TEMP L140MM DIA4MM MAKO

## (undated) DEVICE — PIN BNE FIX TEMP L110MM DIA4MM MAKO

## (undated) DEVICE — DRESSING COMP W4XL4IN N ADH PD W2.5XL2.5IN GZ BORDERED ADH

## (undated) DEVICE — INSTRUMENT HYSTERSCOPE 2.9 DEGREE REUSABLE

## (undated) DEVICE — SHEET,DRAPE,40X58,STERILE: Brand: MEDLINE

## (undated) DEVICE — SWITCH DRAPE TENET 7633

## (undated) DEVICE — BLADE CLIPPER GEN PURP NS

## (undated) DEVICE — THE MILL DISPOSABLE - FINE

## (undated) DEVICE — KIT INT FIX FEM TIB CKPT MAKOPLASTY

## (undated) DEVICE — READY WET SKIN SCRUB TRAY-LF: Brand: MEDLINE INDUSTRIES, INC.

## (undated) DEVICE — 3M™ IOBAN™ 2 ANTIMICROBIAL INCISE DRAPE 6640EZ: Brand: IOBAN™ 2

## (undated) DEVICE — Device

## (undated) DEVICE — 1000 S-DRAPE TOWEL DRAPE 10/BX: Brand: STERI-DRAPE™

## (undated) DEVICE — T-MAX DISPOSABLE FACE MASK 8 PER BOX

## (undated) DEVICE — SOLUTION IRRIG 3000ML 0.9% SOD CHL USP UROMATIC PLAS CONT

## (undated) DEVICE — LOCATOR RAD PASS SPHR MRK NAVIGATION BALL DISP STLTH STN

## (undated) DEVICE — PAD,SANITARY,11 IN,MAXI,N-STRL,IND WRAP: Brand: MEDLINE

## (undated) DEVICE — ADHESIVE SKIN CLSR 0.7ML TOP DERMBND ADV

## (undated) DEVICE — PAD,NON-ADHERENT,3X8,STERILE,LF,1/PK: Brand: MEDLINE

## (undated) DEVICE — ADHESIVE SKIN CLOSURE TOP 36 CC HI VISC DERMBND MINI

## (undated) DEVICE — GLOVE SURG SZ 65 THK91MIL LTX FREE SYN POLYISOPRENE

## (undated) DEVICE — 3M™ STERI-DRAPE™ INCISE DRAPE 1050 (60CM X 45CM): Brand: STERI-DRAPE™

## (undated) DEVICE — NEEDLE SPNL 25GA L3.5IN BLU HUB S STL REG WALL FIT STYL W/

## (undated) DEVICE — DRESSING ADH N ADH 8X35 IN 6X175 IN SFT CLTH MEDIPORE +

## (undated) DEVICE — APPLICATOR MEDICATED 26 CC SOLUTION HI LT ORNG CHLORAPREP

## (undated) DEVICE — 3M™ STERI-DRAPE™ U-DRAPE 1015: Brand: STERI-DRAPE™

## (undated) DEVICE — DRAPE,TOP,102X53,STERILE: Brand: MEDLINE

## (undated) DEVICE — NEEDLE SPNL L3.5IN PNK HUB S STL REG WALL FIT STYL W/ QNCKE

## (undated) DEVICE — SYRINGE MED 30ML STD CLR PLAS LUERLOCK TIP N CTRL DISP

## (undated) DEVICE — GLOVE SURG 8.5 PF POLYMER WHT STRL SIGN LTX ESSENTIAL LTX

## (undated) DEVICE — BLOOD TRANSFUSION FILTER: Brand: HAEMONETICS

## (undated) DEVICE — DRAPE,U/ SHT,SPLIT,PLAS,STERIL: Brand: MEDLINE

## (undated) DEVICE — SHOULDER STABILIZATION KIT,                                    DISPOSABLE 12 PER BOX

## (undated) DEVICE — PADDING UNDERCAST W6INXL4YD WYTEX 6 PER BG

## (undated) DEVICE — KIT TRK KNEE PROC VIZADISC

## (undated) DEVICE — Y-TYPE TUR/BLADDER IRRIGATION SET, REGULATING CLAMP

## (undated) DEVICE — SYRINGE 20ML LL S/C 50

## (undated) DEVICE — BOOT POS LEG DEMAYO

## (undated) DEVICE — APPLICATOR MEDICATED 50MG L6IN 75% SIL NITRT 25% K NITRT

## (undated) DEVICE — TRAY SET D